# Patient Record
Sex: MALE | Race: WHITE | Employment: FULL TIME | URBAN - METROPOLITAN AREA
[De-identification: names, ages, dates, MRNs, and addresses within clinical notes are randomized per-mention and may not be internally consistent; named-entity substitution may affect disease eponyms.]

---

## 2019-09-23 ENCOUNTER — HOSPITAL ENCOUNTER (EMERGENCY)
Facility: CLINIC | Age: 54
Discharge: HOME | End: 2019-09-23
Attending: EMERGENCY MEDICINE | Admitting: EMERGENCY MEDICINE

## 2019-09-23 ENCOUNTER — APPOINTMENT (OUTPATIENT)
Dept: GENERAL RADIOLOGY | Facility: CLINIC | Age: 54
End: 2019-09-23
Attending: EMERGENCY MEDICINE

## 2019-09-23 VITALS
HEART RATE: 77 BPM | DIASTOLIC BLOOD PRESSURE: 99 MMHG | OXYGEN SATURATION: 96 % | SYSTOLIC BLOOD PRESSURE: 141 MMHG | TEMPERATURE: 98.1 F | RESPIRATION RATE: 19 BRPM | WEIGHT: 170.8 LBS

## 2019-09-23 DIAGNOSIS — S43.004A SHOULDER DISLOCATION, RIGHT, INITIAL ENCOUNTER: ICD-10-CM

## 2019-09-23 PROCEDURE — 99152 MOD SED SAME PHYS/QHP 5/>YRS: CPT

## 2019-09-23 PROCEDURE — 23650 CLTX SHO DSLC W/MNPJ WO ANES: CPT | Mod: RT

## 2019-09-23 PROCEDURE — 96360 HYDRATION IV INFUSION INIT: CPT

## 2019-09-23 PROCEDURE — 25000128 H RX IP 250 OP 636: Performed by: EMERGENCY MEDICINE

## 2019-09-23 PROCEDURE — 96361 HYDRATE IV INFUSION ADD-ON: CPT

## 2019-09-23 PROCEDURE — 40000965 ZZH STATISTIC END TITIAL CO2 MONITORING

## 2019-09-23 PROCEDURE — 99285 EMERGENCY DEPT VISIT HI MDM: CPT | Mod: 25

## 2019-09-23 PROCEDURE — 40000986 XR SHOULDER 2 VIEW RIGHT: Mod: RT

## 2019-09-23 PROCEDURE — 40000275 ZZH STATISTIC RCP TIME EA 10 MIN

## 2019-09-23 RX ORDER — CYCLOBENZAPRINE HCL 10 MG
10 TABLET ORAL 3 TIMES DAILY PRN
Qty: 20 TABLET | Refills: 0 | Status: SHIPPED | OUTPATIENT
Start: 2019-09-23 | End: 2019-09-29

## 2019-09-23 RX ORDER — NALOXONE HYDROCHLORIDE 0.4 MG/ML
.1-.4 INJECTION, SOLUTION INTRAMUSCULAR; INTRAVENOUS; SUBCUTANEOUS
Status: DISCONTINUED | OUTPATIENT
Start: 2019-09-23 | End: 2019-09-23 | Stop reason: HOSPADM

## 2019-09-23 RX ORDER — PROPOFOL 10 MG/ML
.5-1 INJECTION, EMULSION INTRAVENOUS ONCE
Status: COMPLETED | OUTPATIENT
Start: 2019-09-23 | End: 2019-09-23

## 2019-09-23 RX ORDER — PROPOFOL 10 MG/ML
0.1 INJECTION, EMULSION INTRAVENOUS
Status: DISCONTINUED | OUTPATIENT
Start: 2019-09-23 | End: 2019-09-23 | Stop reason: HOSPADM

## 2019-09-23 RX ORDER — FENTANYL CITRATE 50 UG/ML
100 INJECTION, SOLUTION INTRAMUSCULAR; INTRAVENOUS ONCE
Status: COMPLETED | OUTPATIENT
Start: 2019-09-23 | End: 2019-09-23

## 2019-09-23 RX ORDER — IBUPROFEN 600 MG/1
600 TABLET, FILM COATED ORAL EVERY 6 HOURS PRN
Qty: 60 TABLET | Refills: 0 | Status: SHIPPED | OUTPATIENT
Start: 2019-09-23 | End: 2019-10-23

## 2019-09-23 RX ADMIN — PROPOFOL 130 MG: 10 INJECTION, EMULSION INTRAVENOUS at 14:45

## 2019-09-23 RX ADMIN — FENTANYL CITRATE 100 MCG: 50 INJECTION INTRAMUSCULAR; INTRAVENOUS at 13:24

## 2019-09-23 RX ADMIN — PROPOFOL 70 MG: 10 INJECTION, EMULSION INTRAVENOUS at 14:31

## 2019-09-23 RX ADMIN — SODIUM CHLORIDE 1000 ML: 9 INJECTION, SOLUTION INTRAVENOUS at 13:30

## 2019-09-23 NOTE — ED TRIAGE NOTES
Pt states he tripped over dog last night and dislocated right shoulder, went to urgent care and had images done, sent here for possible sedation. ABC's intact, alert and oriented x3.CMS intact.

## 2019-09-23 NOTE — ED PROVIDER NOTES
History     Chief Complaint:  Shoulder Injury    HPI   Bruce Blount is a 53 year old male who is right hand dominant who presents with shoulder injury. The patient states that last night he tripped over his dog while and fell onto his right shoulder. The patient went to [facility] urgent care this morning where the dislocation was confirmed with Xray. The patient was sent to the ED for sedated dislocation reduction as he states his pain was a 10/10, so he wanted to be sedated. The patient denies prior dislocation. The patient last drank coffee at 0800. The patient states that he took Advil this morning around 0800 as well but has nothing else to eat or drink today. The patient denies any other injuries or falls.     Allergies:  No Known Drug Allergies    Medications:    Medications reviewed. No current medications.     Past Medical History:    Medical history reviewed. No pertinent medical history.    Past Surgical History:    Surgical history reviewed. No pertinent surgical history.    Family History:    Family history reviewed. No pertinent family history.     Social History:  The patient was accompanied to the ED by lakhwinder Munoz.    Review of Systems   Musculoskeletal:        Right shoulder pain   All other systems reviewed and are negative.    Physical Exam   Patient Vitals for the past 24 hrs:   BP Temp Temp src Pulse Heart Rate Resp SpO2 Weight   09/23/19 1542 -- -- -- -- -- -- 96 % --   09/23/19 1540 (!) 141/99 -- -- 77 -- -- -- --   09/23/19 1520 136/85 -- -- 77 -- -- -- --   09/23/19 1500 130/83 -- -- -- 79 19 96 % --   09/23/19 1450 131/74 -- -- -- 73 20 95 % --   09/23/19 1448 -- -- -- -- 85 19 99 % --   09/23/19 1445 131/74 -- -- -- 85 22 96 % --   09/23/19 1441 (!) 118/96 -- -- -- 92 18 97 % --   09/23/19 1440 -- -- -- -- -- 18 -- --   09/23/19 1435 (!) 130/116 -- -- -- 93 25 97 % --   09/23/19 1430 (!) 159/93 -- -- -- 84 22 98 % --   09/23/19 1425 (!) 147/94 -- -- -- 77 9 99 % --   09/23/19 1420 (!)  152/97 -- -- -- -- -- -- --   09/23/19 1415 (!) 144/93 -- -- -- 75 10 99 % --   09/23/19 1410 (!) 142/86 -- -- -- 96 16 96 % --   09/23/19 1405 -- -- -- -- 78 21 -- --   09/23/19 1355 -- -- -- -- -- -- 94 % --   09/23/19 1350 -- -- -- -- -- -- 94 % --   09/23/19 1345 (!) 153/101 -- -- 95 -- -- 95 % --   09/23/19 1340 -- -- -- -- -- -- 94 % --   09/23/19 1335 -- -- -- -- -- -- 92 % --   09/23/19 1330 (!) 140/88 -- -- 71 -- -- 92 % --   09/23/19 1325 (!) 140/88 -- -- -- -- -- -- --   09/23/19 1250 (!) 157/91 98.1  F (36.7  C) Temporal 81 81 16 96 % 77.5 kg (170 lb 12.8 oz)     Physical Exam  General: Alert, appears well-developed and well-nourished. Cooperative.     In mild distress  HEENT:  Head:  Atraumatic  Ears:  External ears are normal  Mouth/Throat:  Oropharynx is without erythema or exudate and mucous membranes are moist.   Eyes:   Conjunctivae normal and EOM are normal. No scleral icterus.  CV:  Normal rate, regular rhythm, normal heart sounds and radial pulses are 2+ and symmetric.  No murmur.  Resp:  Breath sounds are clear bilaterally    Non-labored, no retractions or accessory muscle use  GI:  Abdomen is soft, no distension, no tenderness. No rebound or guarding.  No CVA tenderness bilaterally  MS:  Normal range of motion. No edema.    Back atraumatic.    No midline cervical, thoracic, or lumbar tenderness    Right Shoulder:    The Clavicle is intact clinically    The AC joint is non-tender    The Glenohumeral Joint is dislocated    There is no evidence of rotator cuff muscle/tendon disruption    The proximal humerus is non-tender    The mid and distal shaft of the humerus are non-tender    Axillary nerve function is intact    Brachial and Radial Artery pulse is normal    Median, Ulnar, and Radial Nerve function distally are intact  Skin:  Warm and dry.  No rash or lesions noted.  Neuro:  Alert. Normal strength.  GCS: 15  Psych:  Normal mood and affect.    Emergency Department Course   Imaging:  Radiology  findings were communicated with the patient who voiced understanding of the findings.    XR Shoulder Right 2 Views:  Humeral head is now properly located in the glenoid fossa.  There is an old healed clavicle fracture. Moderate acromioclavicular  degenerative changes.  As read by Radiology.    [facility]    Procedure: Procedural Sedation  Date/Time: 9/23/2019 1:16 PM  Performed by: Kiko Alberto MD  Authorized by: Kiko Alberto MD     UNIVERSAL PROTOCOL   Site Marked: NA  Prior Images Obtained and Reviewed:  Yes  Required items: Required blood products, implants, devices and special equipment available    Patient identity confirmed:  Verbally with patient, arm band, provided demographic data and hospital-assigned identification number  Patient was reevaluated immediately before administering moderate or deep sedation or anesthesia  Confirmation Checklist:  Patient's identity using two indicators, relevant allergies, procedure was appropriate and matched the consent or emergent situation and correct equipment/implants were available  Time out: Immediately prior to the procedure a time out was called    Preparation: Patient was prepped and draped in usual sterile fashion    ESBL (mL):  0    SEDATION    Patient Sedated: Yes    Sedation Type:  Deep  Sedation:  Propofol  Vital signs: Vital signs monitored during sedation    PROCEDURE   Patient Tolerance:  Patient tolerated the procedure well with no immediate complications    Time of Sedation in Minutes by Physician:  10      Procedure: Shoulder Reduction due to Shoulder dislocation     SITE: Right shoulder     PROCEDURE PROVIDER: Dr. Kiko Alberto     CONSENT: Risks (including but not limited to: decreased respirations, oxygen perfusion, aspiration, hypotension), benefits, and alternatives were discussed with the patient and consent for procedure was obtained.     MONITORING: Monitoring consisted of heart rate, cardiac monitor, continuous pulse oximetry,  continuous capnometry, frequent blood pressure checks, level of consciousness, IV access, constant attendance by RN until patient recovered, constant attendance by MD until patient stable and intubation and emergency airway management equipment available.     REDUCTION COMMENTS: The patient's right shoulder was held in traction and abducted with internal and external rotation movements until the shoulder was palpated to be reduced.  There was no satisfying clunk during the reduction attempt. The patient's right shoulder then appeared reduced with improved alignment. Post reductions X-rays were obtained and showed good reduction.     PATIENT STATUS: Dislocation alignment improved post procedure and both sensation and circulation are intact. Vital signs remained stable, airway patent, and O2 saturations remained above 95%. Post-procedure, the patient was alert and responds to verbal stimuli. Patient was monitored during recovery and returned to pre-procedure baseline.      Interventions:  1330 NS 1000 mL IV  1324 Fentanyl 100 mcg IV  1431 Propofol 70 mg IV  1445 Propofol 130 mg IV    Emergency Department Course:  Nursing notes and vitals reviewed.    1308 I performed an exam of the patient as documented above.     1350 I reviewed the patient's images from urgent care on PACS.     1422 I preformed the sedated shoulder dislocation reduction as noted above.     The patient was sent for a XR Shoulder Right while in the emergency department, results above.     I returned to update the patient.     Findings and plan explained to the Patient. Patient discharged home with instructions regarding supportive care, medications, and reasons to return. The importance of close follow-up was reviewed. The patient was prescribed Ibuprofen.     Impression & Plan    Medical Decision Making:  Bruce Blount is a 53 year old male who presented with an acute right sided anterior, inferior glenohumeral joint dislocation, confirmed on x-ray  visualized in PACS (initial XR images from [facility], outside facility).  There is no evidence as above of neurovascular compromise.  The joint was reduced as outlined in procedure note. He was provided a shoulder immobilizer and after reduction and has good pain relief. Post-reduction x-ray shows no complication.  I discussed the natural history of shoulder dislocation, the possibility of underlying soft tissue injury, and precautions against recurrent dislocation. I have prescribed him oral pain medications and advised him to return for recurrent dislocation, worse pain, numbness, or any other concerns. I recommended orthopedic follow-up in 3-5 days.  Discharged home in care of niece.    Diagnosis:    ICD-10-CM   1. Shoulder dislocation, right, initial encounter S43.004A       Disposition:   The patient is discharged to home.    Discharge Medications:  New Prescriptions    IBUPROFEN (ADVIL/MOTRIN) 600 MG TABLET    Take 1 tablet (600 mg) by mouth every 6 hours as needed for moderate pain or fever       Scribe Disclosure:  IDanuta, am serving as a scribe at 1:06 PM on 9/23/2019 to document services personally performed by Kiko Alberto MD based on my observations and the provider's statements to me.     I, Michael Solano, am serving as a scribe at 4:50 PM on 9/23/2019 to document services personally performed by Kiko Alberto MD based on my observations and the provider's statements to me.   I assisted in finalizing this chart.  [facility] EMERGENCY DEPARTMENT       Kiko Alberto MD  09/24/19 [id number]

## 2019-09-23 NOTE — PROGRESS NOTES
An ETCO2 monitor was placed on the pt with 4 LPM bled in. The Ambu bag, suction, and airways were all setup and present in the room, but not needed. Pt was able to maintain airway throughout the procedure with no intervention needed. ETCO2 levels were maintained between 37-42.

## 2019-09-23 NOTE — ED AVS SNAPSHOT
[facility] Emergency Department  [address]  [facility] [address]  Phone:  [phone]  Fax:  [phone]                                    Bruce Blount   MRN: [id number]    Department:  [facility] Emergency Department   Date of Visit:  9/23/2019           After Visit Summary Signature Page    I have received my discharge instructions, and my questions have been answered. I have discussed any challenges I see with this plan with the nurse or doctor.    ..........................................................................................................................................  Patient/Patient Representative Signature      ..........................................................................................................................................  Patient Representative Print Name and Relationship to Patient    ..................................................               ................................................  Date                                   Time    ..........................................................................................................................................  Reviewed by Signature/Title    ...................................................              ..............................................  Date                                               Time          22EPIC Rev 08/18

## 2019-09-23 NOTE — DISCHARGE INSTRUCTIONS
Discharge Instructions  Procedural Sedation    During your visit today you had Procedural Sedation which is the process used to give you medications in order to make you comfortable or relaxed while a painful or difficult procedure is performed. This might have been a wound repair, fracture reduction ( setting a broken bone ), relocation of a dislocated joint, or other procedure.    The medications used for Procedural Sedation are generally very short-acting so you are being discharged at a time when it is most likely that the medications have completely left your body. It is possible that the medication could cause some persistent symptoms like nausea, drowsiness, dizziness, clumsiness/poor balance, or poor judgment. As a result, please do not drive, operate machinery/tools, or do anything else that requires judgment or coordination for the rest of the day. This could include climbing ladders or other heights, swimming/bathing, exercising, bicycling, or other similar activities. If you are feeling back to normal, you may resume normal activities the following day.    Generally, every Emergency Department visit should have a follow-up clinic visit with either a primary or a specialty clinic/provider. Please follow-up as instructed by your emergency provider today.  Return to the Emergency Department right away if:  You have difficulty breathing.  Your friends or family feel that you are too sleepy/sedated.  You have repeated vomiting.    Home care:  Do not drink alcohol or take sedating (sleeping) medications.  Take pain medications only as instructed by your provider.  Begin with a light diet (like clear liquids) and add more foods as your stomach tolerates.  If you have vomiting, return to clear liquids.    Follow-up:  Follow-up was recommended based on the condition that you received Procedural Sedation for; follow-up according to the instructions you received from your provider today.      If you were given a  prescription for medicine here today, be sure to read all of the information (including the package insert) that comes with your prescription. This will include important information about the medicine, its side effects, and any warnings that you need to know about. The pharmacist who fills the prescription can provide more information and answer questions you may have about the medicine.  If you have questions or concerns that the pharmacist cannot address, please call or return to the Emergency Department.     Remember that you can always come back to the Emergency Department if you are not able to see your regular provider in the amount of time listed above, if you get any new symptoms, or if there is anything that worries you.

## 2021-12-06 ENCOUNTER — APPOINTMENT (OUTPATIENT)
Dept: CT IMAGING | Facility: CLINIC | Age: 56
End: 2021-12-06
Attending: NURSE PRACTITIONER
Payer: COMMERCIAL

## 2021-12-06 ENCOUNTER — HOSPITAL ENCOUNTER (INPATIENT)
Facility: CLINIC | Age: 56
LOS: 28 days | Discharge: HOME | End: 2022-01-03
Attending: INTERNAL MEDICINE | Admitting: INTERNAL MEDICINE
Payer: COMMERCIAL

## 2021-12-06 ENCOUNTER — APPOINTMENT (OUTPATIENT)
Dept: ULTRASOUND IMAGING | Facility: CLINIC | Age: 56
End: 2021-12-06
Attending: NURSE PRACTITIONER
Payer: COMMERCIAL

## 2021-12-06 ENCOUNTER — APPOINTMENT (OUTPATIENT)
Dept: GENERAL RADIOLOGY | Facility: CLINIC | Age: 56
End: 2021-12-06
Attending: NURSE PRACTITIONER
Payer: COMMERCIAL

## 2021-12-06 ENCOUNTER — APPOINTMENT (OUTPATIENT)
Dept: CARDIOLOGY | Facility: CLINIC | Age: 56
End: 2021-12-06
Attending: NURSE PRACTITIONER
Payer: COMMERCIAL

## 2021-12-06 DIAGNOSIS — F10.10 ETOH ABUSE: Chronic | ICD-10-CM

## 2021-12-06 DIAGNOSIS — I96 GANGRENE OF LEFT FOOT (H): ICD-10-CM

## 2021-12-06 DIAGNOSIS — I25.5 ISCHEMIC CARDIOMYOPATHY: ICD-10-CM

## 2021-12-06 DIAGNOSIS — S22.43XA CLOSED FRACTURE OF MULTIPLE RIBS OF BOTH SIDES, INITIAL ENCOUNTER: ICD-10-CM

## 2021-12-06 DIAGNOSIS — I21.21 ST ELEVATION MYOCARDIAL INFARCTION INVOLVING LEFT CIRCUMFLEX CORONARY ARTERY (H): ICD-10-CM

## 2021-12-06 DIAGNOSIS — Z20.822 LAB TEST NEGATIVE FOR COVID-19 VIRUS: ICD-10-CM

## 2021-12-06 DIAGNOSIS — Z12.5 SCREENING FOR PROSTATE CANCER: ICD-10-CM

## 2021-12-06 DIAGNOSIS — I46.9 CARDIAC ARREST (H): Primary | ICD-10-CM

## 2021-12-06 DIAGNOSIS — Z72.0 TOBACCO USE: Chronic | ICD-10-CM

## 2021-12-06 LAB
ABO/RH(D): NORMAL
ACT BLD: 160 SECONDS (ref 74–150)
ACT BLD: 160 SECONDS (ref 74–150)
ACT BLD: 177 SECONDS (ref 74–150)
ACT BLD: 194 SECONDS (ref 74–150)
ACT BLD: 224 SECONDS (ref 74–150)
ACT BLD: 237 SECONDS (ref 74–150)
ACT BLD: 254 SECONDS (ref 74–150)
ACT BLD: 279 SECONDS (ref 74–150)
ACT BLD: 288 SECONDS (ref 74–150)
ACT BLD: 292 SECONDS (ref 74–150)
ACT BLD: 305 SECONDS (ref 74–150)
ACT BLD: 322 SECONDS (ref 74–150)
ALBUMIN SERPL-MCNC: 2.1 G/DL (ref 3.4–5)
ALBUMIN SERPL-MCNC: 2.2 G/DL (ref 3.4–5)
ALBUMIN SERPL-MCNC: 2.3 G/DL (ref 3.4–5)
ALBUMIN UR-MCNC: 50 MG/DL
ALP SERPL-CCNC: 42 U/L (ref 40–150)
ALP SERPL-CCNC: 45 U/L (ref 40–150)
ALP SERPL-CCNC: 58 U/L (ref 40–150)
ALT SERPL W P-5'-P-CCNC: 103 U/L (ref 0–70)
ALT SERPL W P-5'-P-CCNC: 104 U/L (ref 0–70)
ALT SERPL W P-5'-P-CCNC: 106 U/L (ref 0–70)
AMPHETAMINES UR QL SCN: ABNORMAL
ANION GAP SERPL CALCULATED.3IONS-SCNC: 12 MMOL/L (ref 3–14)
ANION GAP SERPL CALCULATED.3IONS-SCNC: 8 MMOL/L (ref 3–14)
ANION GAP SERPL CALCULATED.3IONS-SCNC: 8 MMOL/L (ref 3–14)
ANTIBODY SCREEN: NEGATIVE
APPEARANCE UR: ABNORMAL
APTT PPP: 100 SECONDS (ref 22–38)
APTT PPP: 125 SECONDS (ref 22–38)
APTT PPP: >240 SECONDS (ref 22–38)
AST SERPL W P-5'-P-CCNC: 378 U/L (ref 0–45)
AST SERPL W P-5'-P-CCNC: 403 U/L (ref 0–45)
AST SERPL W P-5'-P-CCNC: 409 U/L (ref 0–45)
ATRIAL RATE - MUSE: 88 BPM
BARBITURATES UR QL: ABNORMAL
BASE EXCESS BLDA CALC-SCNC: -16.8 MMOL/L (ref -9.6–2)
BASE EXCESS BLDA CALC-SCNC: -18.8 MMOL/L (ref -9.6–2)
BASE EXCESS BLDA CALC-SCNC: -5.9 MMOL/L (ref -9–1.8)
BASE EXCESS BLDA CALC-SCNC: -7 MMOL/L (ref -9–1.8)
BASE EXCESS BLDA CALC-SCNC: -8.3 MMOL/L (ref -9–1.8)
BASE EXCESS BLDA CALC-SCNC: -8.3 MMOL/L (ref -9–1.8)
BASE EXCESS BLDA CALC-SCNC: -8.4 MMOL/L (ref -9.6–2)
BASE EXCESS BLDA CALC-SCNC: -8.4 MMOL/L (ref -9–1.8)
BASE EXCESS BLDA CALC-SCNC: -8.5 MMOL/L (ref -9.6–2)
BASE EXCESS BLDA CALC-SCNC: -8.9 MMOL/L (ref -9–1.8)
BASE EXCESS BLDA CALC-SCNC: -9.1 MMOL/L (ref -9.6–2)
BASE EXCESS BLDV CALC-SCNC: -26.7 MMOL/L (ref -8.1–1.9)
BASE EXCESS BLDV CALC-SCNC: -7 MMOL/L (ref -7.7–1.9)
BASOPHILS # BLD AUTO: 0 10E3/UL (ref 0–0.2)
BASOPHILS NFR BLD AUTO: 0 %
BENZODIAZ UR QL: ABNORMAL
BILIRUB SERPL-MCNC: 0.3 MG/DL (ref 0.2–1.3)
BILIRUB SERPL-MCNC: 0.4 MG/DL (ref 0.2–1.3)
BILIRUB SERPL-MCNC: 0.5 MG/DL (ref 0.2–1.3)
BILIRUB UR QL STRIP: NEGATIVE
BLD PROD TYP BPU: NORMAL
BLOOD COMPONENT TYPE: NORMAL
BUN SERPL-MCNC: 23 MG/DL (ref 7–30)
BUN SERPL-MCNC: 24 MG/DL (ref 7–30)
BUN SERPL-MCNC: 25 MG/DL (ref 7–30)
CA-I BLD-MCNC: 3.6 MG/DL (ref 4.4–5.2)
CA-I BLD-MCNC: 3.7 MG/DL (ref 4.4–5.2)
CA-I BLD-MCNC: 3.7 MG/DL (ref 4.4–5.2)
CA-I BLD-MCNC: 3.8 MG/DL (ref 4.4–5.2)
CA-I BLD-MCNC: 4 MG/DL (ref 4.4–5.2)
CA-I BLD-MCNC: 4 MG/DL (ref 4.4–5.2)
CA-I BLD-MCNC: 4.4 MG/DL (ref 4.4–5.2)
CA-I BLD-MCNC: 4.5 MG/DL (ref 4.4–5.2)
CA-I BLD-MCNC: 4.7 MG/DL (ref 4.4–5.2)
CA-I BLD-MCNC: <1.6 MG/DL (ref 4.4–5.2)
CALCIUM SERPL-MCNC: 6.3 MG/DL (ref 8.5–10.1)
CALCIUM SERPL-MCNC: 7 MG/DL (ref 8.5–10.1)
CALCIUM SERPL-MCNC: 7.2 MG/DL (ref 8.5–10.1)
CANNABINOIDS UR QL SCN: ABNORMAL
CF REDUC 30M P MA P HEP LENFR BLD TEG: 2.5 % (ref 0–8)
CF REDUC 60M P MA P HEPASE LENFR BLD TEG: 4.2 % (ref 0–15)
CFT P HPASE BLD TEG: 2.6 MINUTE (ref 1–3)
CHLORIDE BLD-SCNC: 108 MMOL/L (ref 94–109)
CHLORIDE BLD-SCNC: 117 MMOL/L (ref 94–109)
CHLORIDE BLD-SCNC: 117 MMOL/L (ref 94–109)
CI (COAGULATION INDEX)(Z): -6.1 (ref -3–3)
CLOT ANGLE P HPASE BLD TEG: 34.7 DEGREES (ref 53–72)
CLOT INIT P HPASE BLD TEG: 9.5 MINUTE (ref 5–10)
CLOT STRENGTH P HPASE BLD TEG: 5.2 KD/SC (ref 4.5–11)
CO2 SERPL-SCNC: 18 MMOL/L (ref 20–32)
CO2 SERPL-SCNC: 18 MMOL/L (ref 20–32)
CO2 SERPL-SCNC: 19 MMOL/L (ref 20–32)
COCAINE UR QL: ABNORMAL
CODING SYSTEM: NORMAL
COLOR UR AUTO: ABNORMAL
CORTIS SERPL-MCNC: 14.7 UG/DL (ref 4–22)
CPB POCT: NO
CREAT BLD-MCNC: 1.1 MG/DL (ref 0.7–1.3)
CREAT SERPL-MCNC: 0.87 MG/DL (ref 0.66–1.25)
CREAT SERPL-MCNC: 0.97 MG/DL (ref 0.66–1.25)
CREAT SERPL-MCNC: 1.02 MG/DL (ref 0.66–1.25)
CROSSMATCH: NORMAL
CRP SERPL-MCNC: <2.9 MG/L (ref 0–8)
D DIMER PPP FEU-MCNC: >20 UG/ML FEU (ref 0–0.5)
D DIMER PPP FEU-MCNC: >20 UG/ML FEU (ref 0–0.5)
DIASTOLIC BLOOD PRESSURE - MUSE: NORMAL MMHG
EOSINOPHIL # BLD AUTO: 0 10E3/UL (ref 0–0.7)
EOSINOPHIL NFR BLD AUTO: 0 %
ERYTHROCYTE [DISTWIDTH] IN BLOOD BY AUTOMATED COUNT: 12.1 % (ref 10–15)
ERYTHROCYTE [DISTWIDTH] IN BLOOD BY AUTOMATED COUNT: 12.2 % (ref 10–15)
ERYTHROCYTE [DISTWIDTH] IN BLOOD BY AUTOMATED COUNT: 12.3 % (ref 10–15)
ERYTHROCYTE [SEDIMENTATION RATE] IN BLOOD BY WESTERGREN METHOD: 6 MM/HR (ref 0–20)
ETHANOL UR QL SCN: ABNORMAL
FIBRINOGEN PPP-MCNC: 200 MG/DL (ref 170–490)
GFR SERPL CREATININE-BSD FRML MDRD: 82 ML/MIN/1.73M2
GFR SERPL CREATININE-BSD FRML MDRD: 87 ML/MIN/1.73M2
GFR SERPL CREATININE-BSD FRML MDRD: >60 ML/MIN/1.73M2
GFR SERPL CREATININE-BSD FRML MDRD: >90 ML/MIN/1.73M2
GLUCOSE BLD-MCNC: 107 MG/DL (ref 70–99)
GLUCOSE BLD-MCNC: 110 MG/DL (ref 70–99)
GLUCOSE BLD-MCNC: 114 MG/DL (ref 70–99)
GLUCOSE BLD-MCNC: 124 MG/DL (ref 70–99)
GLUCOSE BLD-MCNC: 125 MG/DL (ref 70–99)
GLUCOSE BLD-MCNC: 292 MG/DL (ref 70–99)
GLUCOSE BLD-MCNC: 305 MG/DL (ref 70–99)
GLUCOSE BLD-MCNC: 339 MG/DL (ref 70–99)
GLUCOSE BLD-MCNC: 349 MG/DL (ref 70–99)
GLUCOSE BLD-MCNC: 380 MG/DL (ref 70–99)
GLUCOSE BLD-MCNC: 406 MG/DL (ref 70–99)
GLUCOSE BLD-MCNC: 407 MG/DL (ref 70–99)
GLUCOSE BLD-MCNC: 438 MG/DL (ref 70–99)
GLUCOSE BLDC GLUCOMTR-MCNC: 106 MG/DL (ref 70–99)
GLUCOSE BLDC GLUCOMTR-MCNC: 116 MG/DL (ref 70–99)
GLUCOSE BLDC GLUCOMTR-MCNC: 233 MG/DL (ref 70–99)
GLUCOSE BLDC GLUCOMTR-MCNC: 271 MG/DL (ref 70–99)
GLUCOSE BLDC GLUCOMTR-MCNC: 83 MG/DL (ref 70–99)
GLUCOSE BLDC GLUCOMTR-MCNC: 91 MG/DL (ref 70–99)
GLUCOSE UR STRIP-MCNC: 500 MG/DL
HBA1C MFR BLD: 5.6 % (ref 0–5.6)
HCO3 BLD-SCNC: 18 MMOL/L (ref 21–28)
HCO3 BLD-SCNC: 19 MMOL/L (ref 21–28)
HCO3 BLD-SCNC: 19 MMOL/L (ref 21–28)
HCO3 BLD-SCNC: 20 MMOL/L (ref 21–28)
HCO3 BLD-SCNC: 20 MMOL/L (ref 21–28)
HCO3 BLDA-SCNC: 10 MMOL/L (ref 21–28)
HCO3 BLDA-SCNC: 13 MMOL/L (ref 21–28)
HCO3 BLDA-SCNC: 17 MMOL/L (ref 21–28)
HCO3 BLDA-SCNC: 17 MMOL/L (ref 21–28)
HCO3 BLDA-SCNC: 19 MMOL/L (ref 21–28)
HCO3 BLDA-SCNC: 20 MMOL/L (ref 21–28)
HCO3 BLDV-SCNC: 17 MMOL/L (ref 21–28)
HCO3 BLDV-SCNC: 22 MMOL/L (ref 21–28)
HCO3 BLDV-SCNC: 4 MMOL/L (ref 21–28)
HCT VFR BLD AUTO: 38.3 % (ref 40–53)
HCT VFR BLD AUTO: 39 % (ref 40–53)
HCT VFR BLD AUTO: 40.7 % (ref 40–53)
HCT VFR BLD CALC: 41 % (ref 40–53)
HGB BLD-MCNC: 12.7 G/DL (ref 13.3–17.7)
HGB BLD-MCNC: 12.7 G/DL (ref 13.3–17.7)
HGB BLD-MCNC: 13 G/DL (ref 13.3–17.7)
HGB BLD-MCNC: 13 G/DL (ref 13.3–17.7)
HGB BLD-MCNC: 13.1 G/DL (ref 13.3–17.7)
HGB BLD-MCNC: 13.4 G/DL (ref 13.3–17.7)
HGB BLD-MCNC: 13.9 G/DL (ref 13.3–17.7)
HGB BLD-MCNC: 13.9 G/DL (ref 13.3–17.7)
HGB BLD-MCNC: 14.1 G/DL (ref 13.3–17.7)
HGB BLD-MCNC: 14.2 G/DL (ref 13.3–17.7)
HGB BLD-MCNC: 4.1 G/DL (ref 13.3–17.7)
HGB FREE PLAS-MCNC: 70 MG/DL
HGB UR QL STRIP: ABNORMAL
HOLD SPECIMEN: NORMAL
IMM GRANULOCYTES # BLD: 0.2 10E3/UL
IMM GRANULOCYTES NFR BLD: 1 %
INR BLD: 2.1 (ref 2–3)
INR PPP: 1.41 (ref 0.85–1.15)
INR PPP: 1.44 (ref 0.85–1.15)
INR PPP: 1.74 (ref 0.85–1.15)
INTERPRETATION ECG - MUSE: NORMAL
KETONES UR STRIP-MCNC: NEGATIVE MG/DL
LACTATE BLD-SCNC: 4.2 MMOL/L
LACTATE BLD-SCNC: 5.8 MMOL/L
LACTATE BLD-SCNC: 7.9 MMOL/L
LACTATE BLD-SCNC: 8 MMOL/L
LACTATE BLD-SCNC: 8.7 MMOL/L
LACTATE SERPL-SCNC: 2 MMOL/L (ref 0.7–2)
LACTATE SERPL-SCNC: 2.8 MMOL/L (ref 0.7–2)
LACTATE SERPL-SCNC: 4 MMOL/L (ref 0.7–2)
LACTATE SERPL-SCNC: 4.1 MMOL/L (ref 0.7–2)
LDH SERPL L TO P-CCNC: 786 U/L (ref 85–227)
LEUKOCYTE ESTERASE UR QL STRIP: NEGATIVE
LVEF ECHO: NORMAL
LYMPHOCYTES # BLD AUTO: 1.4 10E3/UL (ref 0.8–5.3)
LYMPHOCYTES NFR BLD AUTO: 9 %
MAGNESIUM SERPL-MCNC: 1.9 MG/DL (ref 1.6–2.3)
MAGNESIUM SERPL-MCNC: 2 MG/DL (ref 1.6–2.3)
MAGNESIUM SERPL-MCNC: 2.6 MG/DL (ref 1.6–2.3)
MCF P HPASE BLD TEG: 51 MM (ref 50–70)
MCH RBC QN AUTO: 31.6 PG (ref 26.5–33)
MCH RBC QN AUTO: 31.8 PG (ref 26.5–33)
MCH RBC QN AUTO: 31.9 PG (ref 26.5–33)
MCHC RBC AUTO-ENTMCNC: 32.9 G/DL (ref 31.5–36.5)
MCHC RBC AUTO-ENTMCNC: 33.2 G/DL (ref 31.5–36.5)
MCHC RBC AUTO-ENTMCNC: 33.3 G/DL (ref 31.5–36.5)
MCV RBC AUTO: 95 FL (ref 78–100)
MCV RBC AUTO: 96 FL (ref 78–100)
MCV RBC AUTO: 97 FL (ref 78–100)
MONOCYTES # BLD AUTO: 0.4 10E3/UL (ref 0–1.3)
MONOCYTES NFR BLD AUTO: 2 %
MRSA DNA SPEC QL NAA+PROBE: NEGATIVE
MUCOUS THREADS #/AREA URNS LPF: PRESENT /LPF
NEUTROPHILS # BLD AUTO: 13.5 10E3/UL (ref 1.6–8.3)
NEUTROPHILS NFR BLD AUTO: 88 %
NITRATE UR QL: NEGATIVE
NRBC # BLD AUTO: 0 10E3/UL
NRBC BLD AUTO-RTO: 0 /100
O2/TOTAL GAS SETTING VFR VENT: 100 %
O2/TOTAL GAS SETTING VFR VENT: 60 %
OPIATES UR QL SCN: ABNORMAL
OXYHGB MFR BLD: 88 % (ref 92–100)
OXYHGB MFR BLD: 95 % (ref 92–100)
OXYHGB MFR BLD: 96 % (ref 92–100)
OXYHGB MFR BLD: 96 % (ref 92–100)
OXYHGB MFR BLD: 98 % (ref 92–100)
OXYHGB MFR BLDA: 95 % (ref 92–100)
OXYHGB MFR BLDA: 96 % (ref 75–100)
OXYHGB MFR BLDA: 96 % (ref 92–100)
OXYHGB MFR BLDV: 54 %
OXYHGB MFR BLDV: 57 % (ref 92–100)
P AXIS - MUSE: 64 DEGREES
PCO2 BLD: 37 MM HG (ref 35–45)
PCO2 BLD: 39 MM HG (ref 35–45)
PCO2 BLD: 40 MM HG (ref 35–45)
PCO2 BLD: 45 MM HG (ref 35–45)
PCO2 BLD: 50 MM HG (ref 35–45)
PCO2 BLDA: 36 MM HG (ref 35–45)
PCO2 BLDA: 37 MM HG (ref 35–45)
PCO2 BLDA: 37 MM HG (ref 35–45)
PCO2 BLDA: 42 MM HG (ref 35–45)
PCO2 BLDA: 43 MM HG (ref 35–45)
PCO2 BLDA: 49 MM HG (ref 35–45)
PCO2 BLDV: 23 MM HG (ref 40–50)
PCO2 BLDV: 61 MM HG (ref 40–50)
PCO2 BLDV: 88 MM HG (ref 40–50)
PH BLD: 7.2 [PH] (ref 7.35–7.45)
PH BLD: 7.24 [PH] (ref 7.35–7.45)
PH BLD: 7.27 [PH] (ref 7.35–7.45)
PH BLD: 7.31 [PH] (ref 7.35–7.45)
PH BLD: 7.31 [PH] (ref 7.35–7.45)
PH BLDA: 7.07 [PH] (ref 7.35–7.45)
PH BLDA: 7.08 [PH] (ref 7.35–7.45)
PH BLDA: 7.21 [PH] (ref 7.35–7.45)
PH BLDA: 7.25 [PH] (ref 7.35–7.45)
PH BLDA: 7.27 [PH] (ref 7.35–7.45)
PH BLDA: 7.29 [PH] (ref 7.35–7.45)
PH BLDV: 6.85 [PH] (ref 7.32–7.43)
PH BLDV: 6.89 [PH] (ref 7.32–7.43)
PH BLDV: 7.17 [PH] (ref 7.32–7.43)
PH UR STRIP: 5.5 [PH] (ref 5–7)
PLATELET # BLD AUTO: 250 10E3/UL (ref 150–450)
PLATELET # BLD AUTO: 297 10E3/UL (ref 150–450)
PLATELET # BLD AUTO: 306 10E3/UL (ref 150–450)
PO2 BLD: 114 MM HG (ref 80–105)
PO2 BLD: 117 MM HG (ref 80–105)
PO2 BLD: 181 MM HG (ref 80–105)
PO2 BLD: 59 MM HG (ref 80–105)
PO2 BLD: 97 MM HG (ref 80–105)
PO2 BLDA: 155 MM HG (ref 80–105)
PO2 BLDA: 163 MM HG (ref 80–105)
PO2 BLDA: 184 MM HG (ref 80–105)
PO2 BLDA: 243 MM HG (ref 80–105)
PO2 BLDA: 352 MM HG (ref 80–105)
PO2 BLDA: 386 MM HG (ref 80–105)
PO2 BLDV: 35 MM HG (ref 25–47)
PO2 BLDV: 50 MM HG (ref 25–47)
PO2 BLDV: 66 MM HG (ref 25–47)
POTASSIUM BLD-SCNC: 1.1 MMOL/L (ref 3.5–5)
POTASSIUM BLD-SCNC: 3.1 MMOL/L (ref 3.4–5.3)
POTASSIUM BLD-SCNC: 3.6 MMOL/L (ref 3.4–5.3)
POTASSIUM BLD-SCNC: 3.9 MMOL/L (ref 3.4–5.3)
POTASSIUM BLD-SCNC: 4.6 MMOL/L (ref 3.4–5.3)
POTASSIUM BLD-SCNC: 4.7 MMOL/L (ref 3.5–5)
POTASSIUM BLD-SCNC: 5.4 MMOL/L (ref 3.5–5)
POTASSIUM BLD-SCNC: 5.5 MMOL/L (ref 3.5–5)
POTASSIUM BLD-SCNC: 5.7 MMOL/L (ref 3.5–5)
POTASSIUM BLD-SCNC: 6 MMOL/L (ref 3.5–5)
PR INTERVAL - MUSE: 192 MS
PROCALCITONIN SERPL-MCNC: 0.24 NG/ML
PROT SERPL-MCNC: 5 G/DL (ref 6.8–8.8)
PROT SERPL-MCNC: 5.1 G/DL (ref 6.8–8.8)
PROT SERPL-MCNC: 5.4 G/DL (ref 6.8–8.8)
QRS DURATION - MUSE: 110 MS
QT - MUSE: 342 MS
QTC - MUSE: 413 MS
R AXIS - MUSE: 82 DEGREES
RBC # BLD AUTO: 3.98 10E6/UL (ref 4.4–5.9)
RBC # BLD AUTO: 4.11 10E6/UL (ref 4.4–5.9)
RBC # BLD AUTO: 4.21 10E6/UL (ref 4.4–5.9)
RBC URINE: 36 /HPF
SA TARGET DNA: POSITIVE
SAO2 % BLDV: 73 % (ref 94–100)
SARS-COV-2 RNA RESP QL NAA+PROBE: NEGATIVE
SODIUM BLD-SCNC: 133 MMOL/L (ref 133–144)
SODIUM BLD-SCNC: 134 MMOL/L (ref 133–144)
SODIUM BLD-SCNC: 135 MMOL/L (ref 133–144)
SODIUM BLD-SCNC: 135 MMOL/L (ref 133–144)
SODIUM BLD-SCNC: 136 MMOL/L (ref 133–144)
SODIUM BLD-SCNC: 137 MMOL/L (ref 133–144)
SODIUM BLD-SCNC: 149 MMOL/L (ref 133–144)
SODIUM SERPL-SCNC: 138 MMOL/L (ref 133–144)
SODIUM SERPL-SCNC: 143 MMOL/L (ref 133–144)
SODIUM SERPL-SCNC: 144 MMOL/L (ref 133–144)
SP GR UR STRIP: 1.01 (ref 1–1.03)
SPECIMEN EXPIRATION DATE: NORMAL
SYSTOLIC BLOOD PRESSURE - MUSE: NORMAL MMHG
T AXIS - MUSE: 118 DEGREES
TROPONIN I SERPL HS-MCNC: ABNORMAL NG/L
TROPONIN T BLD-MCNC: 1.32 UG/L
UFH PPP CHRO-ACNC: 0.31 IU/ML
UFH PPP CHRO-ACNC: 0.53 IU/ML
UFH PPP CHRO-ACNC: >1.1 IU/ML
UNIT ABO/RH: NORMAL
UNIT NUMBER: NORMAL
UNIT STATUS: NORMAL
UNIT TYPE ISBT: 5100
UROBILINOGEN UR STRIP-MCNC: NORMAL MG/DL
VENTRICULAR RATE- MUSE: 88 BPM
WBC # BLD AUTO: 14.7 10E3/UL (ref 4–11)
WBC # BLD AUTO: 15.4 10E3/UL (ref 4–11)
WBC # BLD AUTO: 6.5 10E3/UL (ref 4–11)
WBC URINE: 0 /HPF

## 2021-12-06 PROCEDURE — 80354 DRUG SCREENING FENTANYL: CPT | Performed by: NURSE PRACTITIONER

## 2021-12-06 PROCEDURE — 80307 DRUG TEST PRSMV CHEM ANLYZR: CPT | Performed by: NURSE PRACTITIONER

## 2021-12-06 PROCEDURE — 250N000009 HC RX 250: Performed by: NURSE PRACTITIONER

## 2021-12-06 PROCEDURE — 258N000003 HC RX IP 258 OP 636: Performed by: NURSE PRACTITIONER

## 2021-12-06 PROCEDURE — 31500 INSERT EMERGENCY AIRWAY: CPT | Performed by: EMERGENCY MEDICINE

## 2021-12-06 PROCEDURE — 93306 TTE W/DOPPLER COMPLETE: CPT

## 2021-12-06 PROCEDURE — 93010 ELECTROCARDIOGRAM REPORT: CPT | Mod: 59 | Performed by: INTERNAL MEDICINE

## 2021-12-06 PROCEDURE — 93005 ELECTROCARDIOGRAM TRACING: CPT

## 2021-12-06 PROCEDURE — 74177 CT ABD & PELVIS W/CONTRAST: CPT | Mod: 26 | Performed by: STUDENT IN AN ORGANIZED HEALTH CARE EDUCATION/TRAINING PROGRAM

## 2021-12-06 PROCEDURE — C9803 HOPD COVID-19 SPEC COLLECT: HCPCS | Performed by: EMERGENCY MEDICINE

## 2021-12-06 PROCEDURE — 33967 INSERT I-AORT PERCUT DEVICE: CPT | Performed by: INTERNAL MEDICINE

## 2021-12-06 PROCEDURE — 250N000011 HC RX IP 250 OP 636: Performed by: INTERNAL MEDICINE

## 2021-12-06 PROCEDURE — 85520 HEPARIN ASSAY: CPT | Performed by: NURSE PRACTITIONER

## 2021-12-06 PROCEDURE — 71045 X-RAY EXAM CHEST 1 VIEW: CPT | Mod: 26 | Performed by: RADIOLOGY

## 2021-12-06 PROCEDURE — 250N000009 HC RX 250: Performed by: INTERNAL MEDICINE

## 2021-12-06 PROCEDURE — 85379 FIBRIN DEGRADATION QUANT: CPT | Performed by: NURSE PRACTITIONER

## 2021-12-06 PROCEDURE — 87641 MR-STAPH DNA AMP PROBE: CPT | Performed by: NURSE PRACTITIONER

## 2021-12-06 PROCEDURE — 36556 INSERT NON-TUNNEL CV CATH: CPT | Performed by: INTERNAL MEDICINE

## 2021-12-06 PROCEDURE — C1874 STENT, COATED/COV W/DEL SYS: HCPCS | Performed by: INTERNAL MEDICINE

## 2021-12-06 PROCEDURE — 85610 PROTHROMBIN TIME: CPT

## 2021-12-06 PROCEDURE — 99152 MOD SED SAME PHYS/QHP 5/>YRS: CPT | Performed by: INTERNAL MEDICINE

## 2021-12-06 PROCEDURE — 82947 ASSAY GLUCOSE BLOOD QUANT: CPT | Performed by: NURSE PRACTITIONER

## 2021-12-06 PROCEDURE — 99152 MOD SED SAME PHYS/QHP 5/>YRS: CPT | Mod: GC | Performed by: INTERNAL MEDICINE

## 2021-12-06 PROCEDURE — 87205 SMEAR GRAM STAIN: CPT | Performed by: NURSE PRACTITIONER

## 2021-12-06 PROCEDURE — C9113 INJ PANTOPRAZOLE SODIUM, VIA: HCPCS | Performed by: NURSE PRACTITIONER

## 2021-12-06 PROCEDURE — 83036 HEMOGLOBIN GLYCOSYLATED A1C: CPT | Performed by: NURSE PRACTITIONER

## 2021-12-06 PROCEDURE — 84484 ASSAY OF TROPONIN QUANT: CPT

## 2021-12-06 PROCEDURE — 999N000157 HC STATISTIC RCP TIME EA 10 MIN

## 2021-12-06 PROCEDURE — 84484 ASSAY OF TROPONIN QUANT: CPT | Performed by: NURSE PRACTITIONER

## 2021-12-06 PROCEDURE — 250N000011 HC RX IP 250 OP 636: Performed by: NURSE PRACTITIONER

## 2021-12-06 PROCEDURE — P9047 ALBUMIN (HUMAN), 25%, 50ML: HCPCS

## 2021-12-06 PROCEDURE — 82805 BLOOD GASES W/O2 SATURATION: CPT | Performed by: NURSE PRACTITIONER

## 2021-12-06 PROCEDURE — 83605 ASSAY OF LACTIC ACID: CPT | Performed by: NURSE PRACTITIONER

## 2021-12-06 PROCEDURE — 83735 ASSAY OF MAGNESIUM: CPT | Performed by: NURSE PRACTITIONER

## 2021-12-06 PROCEDURE — 99222 1ST HOSP IP/OBS MODERATE 55: CPT | Mod: GC | Performed by: PSYCHIATRY & NEUROLOGY

## 2021-12-06 PROCEDURE — 272N000555 HC SENSOR NIRS OXIMETER, ADULT

## 2021-12-06 PROCEDURE — 5A02210 ASSISTANCE WITH CARDIAC OUTPUT USING BALLOON PUMP, CONTINUOUS: ICD-10-PCS | Performed by: INTERNAL MEDICINE

## 2021-12-06 PROCEDURE — 83051 HEMOGLOBIN PLASMA: CPT | Performed by: NURSE PRACTITIONER

## 2021-12-06 PROCEDURE — 82565 ASSAY OF CREATININE: CPT

## 2021-12-06 PROCEDURE — 86316 IMMUNOASSAY TUMOR OTHER: CPT | Performed by: NURSE PRACTITIONER

## 2021-12-06 PROCEDURE — 86923 COMPATIBILITY TEST ELECTRIC: CPT | Performed by: INTERNAL MEDICINE

## 2021-12-06 PROCEDURE — 410N000003 HC PER-PERFUSION 1ST 30 MIN: Performed by: INTERNAL MEDICINE

## 2021-12-06 PROCEDURE — 82803 BLOOD GASES ANY COMBINATION: CPT | Performed by: NURSE PRACTITIONER

## 2021-12-06 PROCEDURE — 258N000003 HC RX IP 258 OP 636: Performed by: INTERNAL MEDICINE

## 2021-12-06 PROCEDURE — C1751 CATH, INF, PER/CENT/MIDLINE: HCPCS | Performed by: INTERNAL MEDICINE

## 2021-12-06 PROCEDURE — 93925 LOWER EXTREMITY STUDY: CPT | Mod: 26 | Performed by: RADIOLOGY

## 2021-12-06 PROCEDURE — 250N000011 HC RX IP 250 OP 636: Performed by: EMERGENCY MEDICINE

## 2021-12-06 PROCEDURE — 93306 TTE W/DOPPLER COMPLETE: CPT | Mod: 26 | Performed by: INTERNAL MEDICINE

## 2021-12-06 PROCEDURE — 85652 RBC SED RATE AUTOMATED: CPT | Performed by: NURSE PRACTITIONER

## 2021-12-06 PROCEDURE — 5A1522G EXTRACORPOREAL OXYGENATION, MEMBRANE, PERIPHERAL VENO-ARTERIAL: ICD-10-PCS | Performed by: INTERNAL MEDICINE

## 2021-12-06 PROCEDURE — 250N000011 HC RX IP 250 OP 636

## 2021-12-06 PROCEDURE — 999N000185 HC STATISTIC TRANSPORT TIME EA 15 MIN

## 2021-12-06 PROCEDURE — 82533 TOTAL CORTISOL: CPT | Performed by: NURSE PRACTITIONER

## 2021-12-06 PROCEDURE — 71260 CT THORAX DX C+: CPT | Mod: 26 | Performed by: STUDENT IN AN ORGANIZED HEALTH CARE EDUCATION/TRAINING PROGRAM

## 2021-12-06 PROCEDURE — 250N000013 HC RX MED GY IP 250 OP 250 PS 637: Performed by: NURSE PRACTITIONER

## 2021-12-06 PROCEDURE — C1757 CATH, THROMBECTOMY/EMBOLECT: HCPCS | Performed by: INTERNAL MEDICINE

## 2021-12-06 PROCEDURE — 93880 EXTRACRANIAL BILAT STUDY: CPT | Mod: 26 | Performed by: RADIOLOGY

## 2021-12-06 PROCEDURE — 80347 BENZODIAZEPINES 13 OR MORE: CPT | Performed by: NURSE PRACTITIONER

## 2021-12-06 PROCEDURE — 93454 CORONARY ARTERY ANGIO S&I: CPT | Performed by: INTERNAL MEDICINE

## 2021-12-06 PROCEDURE — 92929 PR PRQ TRLUML CORONARY BM STENT W/ANGIO ADDL ART/BRNCH: CPT | Mod: LC | Performed by: INTERNAL MEDICINE

## 2021-12-06 PROCEDURE — 71045 X-RAY EXAM CHEST 1 VIEW: CPT

## 2021-12-06 PROCEDURE — 84132 ASSAY OF SERUM POTASSIUM: CPT | Performed by: INTERNAL MEDICINE

## 2021-12-06 PROCEDURE — 82330 ASSAY OF CALCIUM: CPT | Performed by: NURSE PRACTITIONER

## 2021-12-06 PROCEDURE — C9606 PERC D-E COR REVASC W AMI S: HCPCS | Mod: LC | Performed by: INTERNAL MEDICINE

## 2021-12-06 PROCEDURE — 70450 CT HEAD/BRAIN W/O DYE: CPT | Mod: 26 | Performed by: RADIOLOGY

## 2021-12-06 PROCEDURE — 82803 BLOOD GASES ANY COMBINATION: CPT

## 2021-12-06 PROCEDURE — 93880 EXTRACRANIAL BILAT STUDY: CPT

## 2021-12-06 PROCEDURE — 200N000002 HC R&B ICU UMMC

## 2021-12-06 PROCEDURE — U0003 INFECTIOUS AGENT DETECTION BY NUCLEIC ACID (DNA OR RNA); SEVERE ACUTE RESPIRATORY SYNDROME CORONAVIRUS 2 (SARS-COV-2) (CORONAVIRUS DISEASE [COVID-19]), AMPLIFIED PROBE TECHNIQUE, MAKING USE OF HIGH THROUGHPUT TECHNOLOGIES AS DESCRIBED BY CMS-2020-01-R: HCPCS | Performed by: NURSE PRACTITIONER

## 2021-12-06 PROCEDURE — 99153 MOD SED SAME PHYS/QHP EA: CPT | Performed by: INTERNAL MEDICINE

## 2021-12-06 PROCEDURE — 80320 DRUG SCREEN QUANTALCOHOLS: CPT | Performed by: NURSE PRACTITIONER

## 2021-12-06 PROCEDURE — 250N000009 HC RX 250: Performed by: EMERGENCY MEDICINE

## 2021-12-06 PROCEDURE — 93005 ELECTROCARDIOGRAM TRACING: CPT | Performed by: EMERGENCY MEDICINE

## 2021-12-06 PROCEDURE — 36556 INSERT NON-TUNNEL CV CATH: CPT | Mod: 59 | Performed by: INTERNAL MEDICINE

## 2021-12-06 PROCEDURE — 86140 C-REACTIVE PROTEIN: CPT | Performed by: NURSE PRACTITIONER

## 2021-12-06 PROCEDURE — 86850 RBC ANTIBODY SCREEN: CPT | Performed by: NURSE PRACTITIONER

## 2021-12-06 PROCEDURE — 85347 COAGULATION TIME ACTIVATED: CPT

## 2021-12-06 PROCEDURE — 33947 ECMO/ECLS INITIATION ARTERY: CPT

## 2021-12-06 PROCEDURE — 93454 CORONARY ARTERY ANGIO S&I: CPT | Mod: 26 | Performed by: INTERNAL MEDICINE

## 2021-12-06 PROCEDURE — 99285 EMERGENCY DEPT VISIT HI MDM: CPT | Mod: 25 | Performed by: EMERGENCY MEDICINE

## 2021-12-06 PROCEDURE — C1887 CATHETER, GUIDING: HCPCS | Performed by: INTERNAL MEDICINE

## 2021-12-06 PROCEDURE — 93010 ELECTROCARDIOGRAM REPORT: CPT | Mod: 59 | Performed by: EMERGENCY MEDICINE

## 2021-12-06 PROCEDURE — C1725 CATH, TRANSLUMIN NON-LASER: HCPCS | Performed by: INTERNAL MEDICINE

## 2021-12-06 PROCEDURE — C1894 INTRO/SHEATH, NON-LASER: HCPCS | Performed by: INTERNAL MEDICINE

## 2021-12-06 PROCEDURE — 94002 VENT MGMT INPAT INIT DAY: CPT

## 2021-12-06 PROCEDURE — 85018 HEMOGLOBIN: CPT | Performed by: INTERNAL MEDICINE

## 2021-12-06 PROCEDURE — 5A1955Z RESPIRATORY VENTILATION, GREATER THAN 96 CONSECUTIVE HOURS: ICD-10-PCS | Performed by: INTERNAL MEDICINE

## 2021-12-06 PROCEDURE — 33952 ECMO/ECLS INSJ PRPH CANNULA: CPT | Mod: GC | Performed by: INTERNAL MEDICINE

## 2021-12-06 PROCEDURE — 81001 URINALYSIS AUTO W/SCOPE: CPT | Performed by: NURSE PRACTITIONER

## 2021-12-06 PROCEDURE — 74177 CT ABD & PELVIS W/CONTRAST: CPT

## 2021-12-06 PROCEDURE — C9600 PERC DRUG-EL COR STENT SING: HCPCS | Performed by: INTERNAL MEDICINE

## 2021-12-06 PROCEDURE — 82947 ASSAY GLUCOSE BLOOD QUANT: CPT

## 2021-12-06 PROCEDURE — C1769 GUIDE WIRE: HCPCS | Performed by: INTERNAL MEDICINE

## 2021-12-06 PROCEDURE — 99291 CRITICAL CARE FIRST HOUR: CPT | Mod: 25 | Performed by: INTERNAL MEDICINE

## 2021-12-06 PROCEDURE — 250N000013 HC RX MED GY IP 250 OP 250 PS 637: Performed by: STUDENT IN AN ORGANIZED HEALTH CARE EDUCATION/TRAINING PROGRAM

## 2021-12-06 PROCEDURE — 272N000001 HC OR GENERAL SUPPLY STERILE: Performed by: INTERNAL MEDICINE

## 2021-12-06 PROCEDURE — 85730 THROMBOPLASTIN TIME PARTIAL: CPT | Performed by: NURSE PRACTITIONER

## 2021-12-06 PROCEDURE — 83615 LACTATE (LD) (LDH) ENZYME: CPT | Performed by: NURSE PRACTITIONER

## 2021-12-06 PROCEDURE — 85384 FIBRINOGEN ACTIVITY: CPT | Performed by: NURSE PRACTITIONER

## 2021-12-06 PROCEDURE — 84295 ASSAY OF SERUM SODIUM: CPT

## 2021-12-06 PROCEDURE — 84132 ASSAY OF SERUM POTASSIUM: CPT

## 2021-12-06 PROCEDURE — 250N000011 HC RX IP 250 OP 636: Performed by: STUDENT IN AN ORGANIZED HEALTH CARE EDUCATION/TRAINING PROGRAM

## 2021-12-06 PROCEDURE — 82805 BLOOD GASES W/O2 SATURATION: CPT

## 2021-12-06 PROCEDURE — 82330 ASSAY OF CALCIUM: CPT

## 2021-12-06 PROCEDURE — 272N000002 HC OR SUPPLY OTHER OPNP: Performed by: INTERNAL MEDICINE

## 2021-12-06 PROCEDURE — C9601 PERC DRUG-EL COR STENT BRAN: HCPCS | Performed by: INTERNAL MEDICINE

## 2021-12-06 PROCEDURE — 85014 HEMATOCRIT: CPT | Performed by: NURSE PRACTITIONER

## 2021-12-06 PROCEDURE — 3E073PZ INTRODUCTION OF PLATELET INHIBITOR INTO CORONARY ARTERY, PERCUTANEOUS APPROACH: ICD-10-PCS | Performed by: INTERNAL MEDICINE

## 2021-12-06 PROCEDURE — 93925 LOWER EXTREMITY STUDY: CPT

## 2021-12-06 PROCEDURE — 85396 CLOTTING ASSAY WHOLE BLOOD: CPT | Performed by: NURSE PRACTITIONER

## 2021-12-06 PROCEDURE — 82810 BLOOD GASES O2 SAT ONLY: CPT

## 2021-12-06 PROCEDURE — 250N000013 HC RX MED GY IP 250 OP 250 PS 637: Performed by: INTERNAL MEDICINE

## 2021-12-06 PROCEDURE — 85610 PROTHROMBIN TIME: CPT | Performed by: NURSE PRACTITIONER

## 2021-12-06 PROCEDURE — 92928 PRQ TCAT PLMT NTRAC ST 1 LES: CPT | Mod: LC | Performed by: INTERNAL MEDICINE

## 2021-12-06 PROCEDURE — 258N000003 HC RX IP 258 OP 636: Performed by: STUDENT IN AN ORGANIZED HEALTH CARE EDUCATION/TRAINING PROGRAM

## 2021-12-06 PROCEDURE — 70450 CT HEAD/BRAIN W/O DYE: CPT

## 2021-12-06 PROCEDURE — B2111ZZ FLUOROSCOPY OF MULTIPLE CORONARY ARTERIES USING LOW OSMOLAR CONTRAST: ICD-10-PCS | Performed by: INTERNAL MEDICINE

## 2021-12-06 PROCEDURE — 92950 HEART/LUNG RESUSCITATION CPR: CPT | Performed by: INTERNAL MEDICINE

## 2021-12-06 PROCEDURE — 84132 ASSAY OF SERUM POTASSIUM: CPT | Performed by: NURSE PRACTITIONER

## 2021-12-06 PROCEDURE — 3E043XZ INTRODUCTION OF VASOPRESSOR INTO CENTRAL VEIN, PERCUTANEOUS APPROACH: ICD-10-PCS | Performed by: INTERNAL MEDICINE

## 2021-12-06 PROCEDURE — 87040 BLOOD CULTURE FOR BACTERIA: CPT | Performed by: NURSE PRACTITIONER

## 2021-12-06 PROCEDURE — 85018 HEMOGLOBIN: CPT

## 2021-12-06 PROCEDURE — 99285 EMERGENCY DEPT VISIT HI MDM: CPT | Performed by: EMERGENCY MEDICINE

## 2021-12-06 PROCEDURE — 272N000237 HC CARDIOHELP CIRCUIT

## 2021-12-06 PROCEDURE — 33947 ECMO/ECLS INITIATION ARTERY: CPT | Mod: GC | Performed by: INTERNAL MEDICINE

## 2021-12-06 PROCEDURE — 250N000009 HC RX 250: Performed by: STUDENT IN AN ORGANIZED HEALTH CARE EDUCATION/TRAINING PROGRAM

## 2021-12-06 PROCEDURE — 027137Z DILATION OF CORONARY ARTERY, TWO ARTERIES WITH FOUR OR MORE DRUG-ELUTING INTRALUMINAL DEVICES, PERCUTANEOUS APPROACH: ICD-10-PCS | Performed by: INTERNAL MEDICINE

## 2021-12-06 PROCEDURE — 33952 ECMO/ECLS INSJ PRPH CANNULA: CPT | Performed by: INTERNAL MEDICINE

## 2021-12-06 PROCEDURE — 84145 PROCALCITONIN (PCT): CPT | Performed by: NURSE PRACTITIONER

## 2021-12-06 DEVICE — STENT CORONARY DES SYNERGY XD MR US 2.50X16MM H7493941816250: Type: IMPLANTABLE DEVICE | Status: FUNCTIONAL

## 2021-12-06 DEVICE — IMPLANTABLE DEVICE: Type: IMPLANTABLE DEVICE | Status: FUNCTIONAL

## 2021-12-06 DEVICE — STENT CORONARY DES SYNERGY XD MR US 2.50X28MM H7493941828250: Type: IMPLANTABLE DEVICE | Status: FUNCTIONAL

## 2021-12-06 RX ORDER — DEXTROSE MONOHYDRATE 25 G/50ML
25-50 INJECTION, SOLUTION INTRAVENOUS
Status: DISCONTINUED | OUTPATIENT
Start: 2021-12-06 | End: 2022-01-03 | Stop reason: HOSPADM

## 2021-12-06 RX ORDER — NALOXONE HYDROCHLORIDE 0.4 MG/ML
0.2 INJECTION, SOLUTION INTRAMUSCULAR; INTRAVENOUS; SUBCUTANEOUS
Status: DISCONTINUED | OUTPATIENT
Start: 2021-12-06 | End: 2022-01-03 | Stop reason: HOSPADM

## 2021-12-06 RX ORDER — NOREPINEPHRINE BITARTRATE 0.06 MG/ML
.01-.6 INJECTION, SOLUTION INTRAVENOUS CONTINUOUS
Status: DISCONTINUED | OUTPATIENT
Start: 2021-12-06 | End: 2021-12-11

## 2021-12-06 RX ORDER — MAGNESIUM SULFATE HEPTAHYDRATE 40 MG/ML
2 INJECTION, SOLUTION INTRAVENOUS DAILY PRN
Status: DISCONTINUED | OUTPATIENT
Start: 2021-12-06 | End: 2021-12-09

## 2021-12-06 RX ORDER — NOREPINEPHRINE BITARTRATE 0.06 MG/ML
.01-.6 INJECTION, SOLUTION INTRAVENOUS CONTINUOUS
Status: DISCONTINUED | OUTPATIENT
Start: 2021-12-06 | End: 2021-12-06

## 2021-12-06 RX ORDER — LIDOCAINE 40 MG/G
CREAM TOPICAL
Status: DISCONTINUED | OUTPATIENT
Start: 2021-12-06 | End: 2022-01-03 | Stop reason: HOSPADM

## 2021-12-06 RX ORDER — CHLORHEXIDINE GLUCONATE ORAL RINSE 1.2 MG/ML
15 SOLUTION DENTAL 2 TIMES DAILY
Status: DISCONTINUED | OUTPATIENT
Start: 2021-12-06 | End: 2021-12-15

## 2021-12-06 RX ORDER — BUSPIRONE HYDROCHLORIDE 10 MG/1
10 TABLET ORAL 3 TIMES DAILY
Status: DISCONTINUED | OUTPATIENT
Start: 2021-12-06 | End: 2021-12-06

## 2021-12-06 RX ORDER — ARGATROBAN 1 MG/ML
350 INJECTION, SOLUTION INTRAVENOUS
Status: DISCONTINUED | OUTPATIENT
Start: 2021-12-06 | End: 2021-12-06 | Stop reason: HOSPADM

## 2021-12-06 RX ORDER — ETOMIDATE 2 MG/ML
20 INJECTION INTRAVENOUS ONCE
Status: COMPLETED | OUTPATIENT
Start: 2021-12-06 | End: 2021-12-06

## 2021-12-06 RX ORDER — HEPARIN SODIUM 1000 [USP'U]/ML
INJECTION, SOLUTION INTRAVENOUS; SUBCUTANEOUS
Status: DISCONTINUED | OUTPATIENT
Start: 2021-12-06 | End: 2021-12-06 | Stop reason: HOSPADM

## 2021-12-06 RX ORDER — HEPARIN SODIUM 200 [USP'U]/100ML
3 INJECTION, SOLUTION INTRAVENOUS CONTINUOUS
Status: DISCONTINUED | OUTPATIENT
Start: 2021-12-06 | End: 2021-12-09

## 2021-12-06 RX ORDER — HEPARIN SODIUM 10000 [USP'U]/100ML
100-1000 INJECTION, SOLUTION INTRAVENOUS CONTINUOUS PRN
Status: DISCONTINUED | OUTPATIENT
Start: 2021-12-06 | End: 2021-12-06 | Stop reason: HOSPADM

## 2021-12-06 RX ORDER — EPTIFIBATIDE 2 MG/ML
180 INJECTION, SOLUTION INTRAVENOUS EVERY 10 MIN PRN
Status: DISCONTINUED | OUTPATIENT
Start: 2021-12-06 | End: 2021-12-06 | Stop reason: HOSPADM

## 2021-12-06 RX ORDER — MIDAZOLAM HCL IN 0.9 % NACL/PF 1 MG/ML
PLASTIC BAG, INJECTION (ML) INTRAVENOUS CONTINUOUS PRN
Status: COMPLETED | OUTPATIENT
Start: 2021-12-06 | End: 2021-12-06

## 2021-12-06 RX ORDER — EPTIFIBATIDE 2 MG/ML
1 INJECTION, SOLUTION INTRAVENOUS CONTINUOUS PRN
Status: DISCONTINUED | OUTPATIENT
Start: 2021-12-06 | End: 2021-12-06 | Stop reason: HOSPADM

## 2021-12-06 RX ORDER — FOLIC ACID 1 MG/1
1 TABLET ORAL DAILY
Status: COMPLETED | OUTPATIENT
Start: 2021-12-06 | End: 2021-12-07

## 2021-12-06 RX ORDER — ASPIRIN 325 MG
TABLET ORAL
Status: DISCONTINUED | OUTPATIENT
Start: 2021-12-06 | End: 2021-12-06 | Stop reason: HOSPADM

## 2021-12-06 RX ORDER — DEXTROSE MONOHYDRATE 100 MG/ML
INJECTION, SOLUTION INTRAVENOUS CONTINUOUS PRN
Status: DISCONTINUED | OUTPATIENT
Start: 2021-12-06 | End: 2021-12-13

## 2021-12-06 RX ORDER — ARGATROBAN 1 MG/ML
150 INJECTION, SOLUTION INTRAVENOUS
Status: DISCONTINUED | OUTPATIENT
Start: 2021-12-06 | End: 2021-12-06 | Stop reason: HOSPADM

## 2021-12-06 RX ORDER — NALOXONE HYDROCHLORIDE 0.4 MG/ML
0.4 INJECTION, SOLUTION INTRAMUSCULAR; INTRAVENOUS; SUBCUTANEOUS
Status: DISCONTINUED | OUTPATIENT
Start: 2021-12-06 | End: 2022-01-03 | Stop reason: HOSPADM

## 2021-12-06 RX ORDER — MIDAZOLAM HCL IN 0.9 % NACL/PF 1 MG/ML
1-8 PLASTIC BAG, INJECTION (ML) INTRAVENOUS CONTINUOUS
Status: DISCONTINUED | OUTPATIENT
Start: 2021-12-06 | End: 2021-12-06

## 2021-12-06 RX ORDER — NITROGLYCERIN 20 MG/100ML
10-200 INJECTION INTRAVENOUS CONTINUOUS PRN
Status: DISCONTINUED | OUTPATIENT
Start: 2021-12-06 | End: 2021-12-06 | Stop reason: HOSPADM

## 2021-12-06 RX ORDER — MAGNESIUM SULFATE HEPTAHYDRATE 40 MG/ML
2 INJECTION, SOLUTION INTRAVENOUS ONCE
Status: COMPLETED | OUTPATIENT
Start: 2021-12-06 | End: 2021-12-06

## 2021-12-06 RX ORDER — NICOTINE POLACRILEX 4 MG
15-30 LOZENGE BUCCAL
Status: DISCONTINUED | OUTPATIENT
Start: 2021-12-06 | End: 2022-01-03 | Stop reason: HOSPADM

## 2021-12-06 RX ORDER — MIDAZOLAM HCL IN 0.9 % NACL/PF 1 MG/ML
1-8 PLASTIC BAG, INJECTION (ML) INTRAVENOUS CONTINUOUS
Status: DISCONTINUED | OUTPATIENT
Start: 2021-12-06 | End: 2021-12-13

## 2021-12-06 RX ORDER — CALCIUM GLUCONATE 20 MG/ML
1 INJECTION, SOLUTION INTRAVENOUS ONCE
Status: COMPLETED | OUTPATIENT
Start: 2021-12-06 | End: 2021-12-06

## 2021-12-06 RX ORDER — POTASSIUM CHLORIDE 29.8 MG/ML
20 INJECTION INTRAVENOUS
Status: DISCONTINUED | OUTPATIENT
Start: 2021-12-06 | End: 2021-12-08

## 2021-12-06 RX ORDER — ALBUMIN (HUMAN) 12.5 G/50ML
SOLUTION INTRAVENOUS
Status: COMPLETED
Start: 2021-12-06 | End: 2021-12-06

## 2021-12-06 RX ORDER — BUSPIRONE HYDROCHLORIDE 10 MG/1
10 TABLET ORAL 3 TIMES DAILY
Status: DISCONTINUED | OUTPATIENT
Start: 2021-12-06 | End: 2021-12-08

## 2021-12-06 RX ORDER — FENTANYL CITRATE 50 UG/ML
INJECTION, SOLUTION INTRAMUSCULAR; INTRAVENOUS
Status: DISCONTINUED | OUTPATIENT
Start: 2021-12-06 | End: 2021-12-06 | Stop reason: HOSPADM

## 2021-12-06 RX ORDER — IOPAMIDOL 755 MG/ML
INJECTION, SOLUTION INTRAVASCULAR
Status: DISCONTINUED | OUTPATIENT
Start: 2021-12-06 | End: 2021-12-06 | Stop reason: HOSPADM

## 2021-12-06 RX ORDER — MAGNESIUM SULFATE HEPTAHYDRATE 40 MG/ML
4 INJECTION, SOLUTION INTRAVENOUS EVERY 4 HOURS PRN
Status: DISCONTINUED | OUTPATIENT
Start: 2021-12-06 | End: 2021-12-09

## 2021-12-06 RX ORDER — TIROFIBAN HYDROCHLORIDE 50 UG/ML
0.07 INJECTION INTRAVENOUS CONTINUOUS PRN
Status: DISCONTINUED | OUTPATIENT
Start: 2021-12-06 | End: 2021-12-06 | Stop reason: HOSPADM

## 2021-12-06 RX ORDER — MULTIPLE VITAMINS W/ MINERALS TAB 9MG-400MCG
1 TAB ORAL DAILY
Status: COMPLETED | OUTPATIENT
Start: 2021-12-06 | End: 2021-12-07

## 2021-12-06 RX ORDER — DOPAMINE HYDROCHLORIDE 160 MG/100ML
2-20 INJECTION, SOLUTION INTRAVENOUS CONTINUOUS PRN
Status: DISCONTINUED | OUTPATIENT
Start: 2021-12-06 | End: 2021-12-06 | Stop reason: HOSPADM

## 2021-12-06 RX ORDER — ASPIRIN 81 MG/1
81 TABLET, CHEWABLE ORAL DAILY
Status: DISCONTINUED | OUTPATIENT
Start: 2021-12-07 | End: 2021-12-30

## 2021-12-06 RX ORDER — DOBUTAMINE HYDROCHLORIDE 200 MG/100ML
2-20 INJECTION INTRAVENOUS CONTINUOUS PRN
Status: DISCONTINUED | OUTPATIENT
Start: 2021-12-06 | End: 2021-12-06 | Stop reason: HOSPADM

## 2021-12-06 RX ORDER — HEPARIN SODIUM 10000 [USP'U]/100ML
10-50 INJECTION, SOLUTION INTRAVENOUS CONTINUOUS
Status: DISCONTINUED | OUTPATIENT
Start: 2021-12-06 | End: 2021-12-09

## 2021-12-06 RX ORDER — ALBUMIN (HUMAN) 12.5 G/50ML
12.5 SOLUTION INTRAVENOUS ONCE
Status: COMPLETED | OUTPATIENT
Start: 2021-12-06 | End: 2021-12-06

## 2021-12-06 RX ORDER — PIPERACILLIN SODIUM, TAZOBACTAM SODIUM 3; .375 G/15ML; G/15ML
3.38 INJECTION, POWDER, LYOPHILIZED, FOR SOLUTION INTRAVENOUS EVERY 6 HOURS
Status: DISCONTINUED | OUTPATIENT
Start: 2021-12-06 | End: 2021-12-08

## 2021-12-06 RX ADMIN — PIPERACILLIN AND TAZOBACTAM 3.38 G: 3; .375 INJECTION, POWDER, LYOPHILIZED, FOR SOLUTION INTRAVENOUS at 19:40

## 2021-12-06 RX ADMIN — SODIUM CHLORIDE, POTASSIUM CHLORIDE, SODIUM LACTATE AND CALCIUM CHLORIDE 1000 ML: 600; 310; 30; 20 INJECTION, SOLUTION INTRAVENOUS at 15:00

## 2021-12-06 RX ADMIN — HEPARIN SODIUM 3 ML/HR: 200 INJECTION, SOLUTION INTRAVENOUS at 13:41

## 2021-12-06 RX ADMIN — PANTOPRAZOLE SODIUM 40 MG: 40 INJECTION, POWDER, FOR SOLUTION INTRAVENOUS at 13:52

## 2021-12-06 RX ADMIN — SODIUM CHLORIDE, POTASSIUM CHLORIDE, SODIUM LACTATE AND CALCIUM CHLORIDE 1000 ML: 600; 310; 30; 20 INJECTION, SOLUTION INTRAVENOUS at 14:00

## 2021-12-06 RX ADMIN — BUSPIRONE HYDROCHLORIDE 10 MG: 10 TABLET ORAL at 17:11

## 2021-12-06 RX ADMIN — CALCIUM CHLORIDE 1 G: 100 INJECTION, SOLUTION INTRAVENOUS at 23:29

## 2021-12-06 RX ADMIN — HEPARIN SODIUM 700 UNITS/HR: 1000 INJECTION INTRAVENOUS; SUBCUTANEOUS at 18:30

## 2021-12-06 RX ADMIN — Medication: at 19:29

## 2021-12-06 RX ADMIN — CHLORHEXIDINE GLUCONATE 0.12% ORAL RINSE 15 ML: 1.2 LIQUID ORAL at 19:29

## 2021-12-06 RX ADMIN — FENTANYL CITRATE 100 MCG/HR: 50 INJECTION INTRAVENOUS at 13:45

## 2021-12-06 RX ADMIN — SODIUM NITROPRUSSIDE 0.5 MCG/KG/MIN: 25 INJECTION, SOLUTION, CONCENTRATE INTRAVENOUS at 10:35

## 2021-12-06 RX ADMIN — MAGNESIUM SULFATE HEPTAHYDRATE 2 G: 40 INJECTION, SOLUTION INTRAVENOUS at 18:40

## 2021-12-06 RX ADMIN — PIPERACILLIN AND TAZOBACTAM 3.38 G: 3; .375 INJECTION, POWDER, LYOPHILIZED, FOR SOLUTION INTRAVENOUS at 15:05

## 2021-12-06 RX ADMIN — IOPAMIDOL 112 ML: 755 INJECTION, SOLUTION INTRAVENOUS at 11:47

## 2021-12-06 RX ADMIN — THIAMINE HCL TAB 100 MG 100 MG: 100 TAB at 17:02

## 2021-12-06 RX ADMIN — Medication 1 TABLET: at 17:02

## 2021-12-06 RX ADMIN — ETOMIDATE 20 MG: 2 INJECTION INTRAVENOUS at 08:27

## 2021-12-06 RX ADMIN — Medication 8 MG/HR: at 20:44

## 2021-12-06 RX ADMIN — TICAGRELOR 90 MG: 90 TABLET ORAL at 19:32

## 2021-12-06 RX ADMIN — DEXTROSE 3 MCG/KG/MIN: 50 INJECTION, SOLUTION INTRAVENOUS at 21:33

## 2021-12-06 RX ADMIN — ALBUMIN (HUMAN) 12.5 G: 12.5 SOLUTION INTRAVENOUS at 14:29

## 2021-12-06 RX ADMIN — Medication 100 MG: at 08:27

## 2021-12-06 RX ADMIN — BUSPIRONE HYDROCHLORIDE 10 MG: 10 TABLET ORAL at 19:29

## 2021-12-06 RX ADMIN — CALCIUM GLUCONATE 1 G: 20 INJECTION, SOLUTION INTRAVENOUS at 13:48

## 2021-12-06 RX ADMIN — FOLIC ACID 1 MG: 1 TABLET ORAL at 17:02

## 2021-12-06 RX ADMIN — ALBUMIN HUMAN 12.5 G: 0.25 SOLUTION INTRAVENOUS at 14:29

## 2021-12-06 RX ADMIN — Medication 0.06 MCG/KG/MIN: at 17:00

## 2021-12-06 RX ADMIN — HUMAN INSULIN 4 UNITS/HR: 100 INJECTION, SOLUTION SUBCUTANEOUS at 13:30

## 2021-12-06 RX ADMIN — SODIUM CHLORIDE, POTASSIUM CHLORIDE, SODIUM LACTATE AND CALCIUM CHLORIDE 1000 ML: 600; 310; 30; 20 INJECTION, SOLUTION INTRAVENOUS at 17:00

## 2021-12-06 RX ADMIN — VANCOMYCIN HYDROCHLORIDE 1750 MG: 100 INJECTION, POWDER, LYOPHILIZED, FOR SOLUTION INTRAVENOUS at 16:29

## 2021-12-06 RX ADMIN — Medication: at 21:35

## 2021-12-06 ASSESSMENT — ACTIVITIES OF DAILY LIVING (ADL)
ADLS_ACUITY_SCORE: 12
ADLS_ACUITY_SCORE: 16
ADLS_ACUITY_SCORE: 16
ADLS_ACUITY_SCORE: 12
ADLS_ACUITY_SCORE: 16
ADLS_ACUITY_SCORE: 12
ADLS_ACUITY_SCORE: 12
ADLS_ACUITY_SCORE: 16
ADLS_ACUITY_SCORE: 16
ADLS_ACUITY_SCORE: 12
ADLS_ACUITY_SCORE: 18
ADLS_ACUITY_SCORE: 12
ADLS_ACUITY_SCORE: 14

## 2021-12-06 ASSESSMENT — MIFFLIN-ST. JEOR: SCORE: 1650.46

## 2021-12-06 NOTE — Clinical Note
dry, intact, no bleeding and no hematoma. All lines are sutured in place by physician. Ioban dressing applied.

## 2021-12-06 NOTE — PROGRESS NOTES
"Care Management Follow Up    Length of Stay (days): 0    Expected Discharge Date:  TBD     Concerns to be Addressed:     HCD/NOK  Patient plan of care discussed at interdisciplinary rounds: Yes    Anticipated Discharge Disposition:  TBD     Anticipated Discharge Services:  TBD  Anticipated Discharge DME:  TBD    Patient/family educated on Medicare website which has current facility and service quality ratings:  N/A at this time  Education Provided on the Discharge Plan:  No  Patient/Family in Agreement with the Plan:  TBD    Referrals Placed by CM/SW:  Financial Counseling referral placed  Private pay costs discussed: Not applicable    Additional Information:  Bruce Blount is a 56 year old male who was cannulated for ECMO 2021 due to  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    SW received phone call from Pt's niece Tammy Snow. She requested assistance regarding verifying Pt's insurance as family was unable to locate Pt's wallet or phone at home. Pt lives in house ([place], [place]) w/ his brother Amor ([place]) who called 911 on Pt's behalf at home. Niece states they believe Pt has \"state insurance.\" SW made Financial Counseling referral 21 requesting assistance. SW confirmed w/ family that Pt does not have a HCD completed. SW reviewed NOK policy. Pt is single, no children, parents . Pt's brothers Amor and Julio are his NOK. Pt's brother Amor (P: [phone]) is primary NOK as other sibling is currently incarcerated. Pt's niece Tammy expressed understanding of NOK policy and is agreeable. She states she is actively involved.      Judi Miller, GERBER  P. [phone]      "

## 2021-12-06 NOTE — PROGRESS NOTES
ECMO Attending Progress Note  2021    Bruce Blount is a 56 year old male who was cannulated for ECMO 2021 due to  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    Cannulation Site:  17 Fr in the R femoral artery  25 Fr in the R femoral vein  IABP    Interval events: admitted, lost all pulsatility, turning flow down, map high    Physical Exam:  Temp:  [89.6  F (32  C)-93  F (33.9  C)] 93  F (33.9  C)  Pulse:  [53-89] 63  Resp:  [16-20] 16  BP: (105)/(78) 105/78  MAP:  [65 mmHg-115 mmHg] 68 mmHg  Arterial Line BP: ()/(38-68) 89/39  FiO2 (%):  [60 %] 60 %  SpO2:  [98 %-100 %] 100 %    Intake/Output Summary (Last 24 hours) at 2021 1456  Last data filed at 2021 1429  Gross per 24 hour   Intake 1151.42 ml   Output 955 ml   Net 196.42 ml    FiO2 (%): 60 %  Resp: 16       Labs:  Recent Labs   Lab 21  1413 21  1028 21  0956 21  0919 21  0851 21  0843   PH 7.20* 7.25* 7.27* 7.29*   < >  --    PCO2 50* 43 37 37   < >  --    PO2 114* 155* 184* 352*   < >  --    HCO3 20* 19* 17* 17*   < >  --    O2PER 60  --   --   --   --  100.0    < > = values in this interval not displayed.      Recent Labs   Lab 21  1229 21  1044 21  1028 21  0956   WBC 15.4*  --   --   --    HGB 13.4 13.9 14.2 14.1     Creatinine   Date Value Ref Range Status   2021 0.87 0.66 - 1.25 mg/dL Final     Creatinine POCT   Date Value Ref Range Status   2021 1.1 0.7 - 1.3 mg/dL Final       Blood Flow (Circuit) LPM: 3.3 LPM  Gas Flow  LPM: 3 LPM  Gas FiO2   %: 60 %  ACT  (seconds): 288 seconds  Blood Temp  (degrees C): 32.6 C  Pulse Oximetry  (SpO2%): 100 %  Arterial Pressure  mmH mmHg      ECMO Issues including assessments and plan on DOS 2021:  Neuro: Sedated for mechanical ventilation cooling and ECMO.  No acute distress.  NIRS will be placed RASS goal: -3  CV: Cardiogenic shock.  Hemodynamically stable on no pressors, requiring antihypertensies  Pulsatility: 0 mmHg  Pulm: Keep vent settings at rest settings as above.  FEN/Renal: Electrolytes stable w/ replacement protocols in place, Cr stable, monitor UOP stable   Heme: ACT goal: 180-200 and if tolerates but no opening valve may increase 200-22 Hemoglobin 13.4.  Minimal oozing around the ECMO cannulas.  ID: Receiving empiric antibiotics  Cannulae: Position is acceptable on exam and the available imaging.  Distal perfusion cannula is in place and patent.  Extremities are well-perfused.     I have personally reviewed the ECMO flows, oxygenation and CO2 clearance, anticoagulation, and cannula position.  I have also personally assessed the patient's systemic response with hemodynamics, oxygenation, ventilation, and bleeding.       The patient requires continued ECMO support and management in the ICU.  I have discussed patient care and treatment plan with the primary team.      Marlo Fry MD  Critical Care Cardiology  [phone]    December 6, 2021

## 2021-12-06 NOTE — Clinical Note
Max pressure = 12 mark. Total duration = 12 seconds.     Max pressure = 12 mark. Total duration = 12 seconds.    Balloon reinflated a second time: Max pressure = 12 mark. Total duration = 12 seconds.

## 2021-12-06 NOTE — PROGRESS NOTES
Procedure/Surgery Information   [facility]      Intra-Aortic Balloon Pump Insertion  Date of Service (when I performed the procedure): 12/06/2021    Diagnosis or Indication: Cardiac arrest    Location: Cath lab   Catheter size: 50cc 8fr   Inserted: 11 inch introducer sheath   Catheter placed: Right femoral artery   Complications:: None   Assist initiated: 1:1 ratio   Percent augmentation: 100%   Timing adjusted: Adjusted to achieve optimal assist   Verification of position: Fluoroscopy   Comments: None      Recorded by Lisa Sauceda RRT

## 2021-12-06 NOTE — ED TRIAGE NOTES
Pt BIBA ([facility]) from home after found down and altered. Pt Found to be in a VFIB arrest and shocked x2, I Gel placed by EMS, epi given x3 and amiodarone given 300. IO placed in lower left leg.

## 2021-12-06 NOTE — H&P
"  [facility]  ICU History & Physical  December 6, 2021          H&P:   Bruce Blount is a 56 year old male with PMHx current tobacco use and ETOH abuse who presents to [facility] after out of hospital cardiac arrest.     Per EMS and brother, patient was in his usual state of health prior to arrest. Patients brother heard a \"thump\" and found patient unresponsive and agonal breathing. 911 called and CPR initiated. EMS arrived within 4 minutes. Initial rhythm VF--> shocked x ~7 with ROSC upon arrival to ED. Total CPR team per EMS ~ 40 minutes. 3 mg Epinephrine, 300 mg Amio given via EMS. Patient EKG reveals Valentín inferior/posterior leads with reciprocal changes. Patient initially presented with an Igel and was intubated with ETT in the ED. He arrested and was again shocked in the ED with ROSC. Taken urgently to [facility] where he underwent coronary angiogram and again arrested requiring manual CPR and was then cannulated on VA ECMO via right with 17 Trinidadian cannula RCFA, 25 Fr cannula RCFV. Coronary angiogram revealed  of RCA and acute culprit lesion of LCx/OM1, s/p PCI to pLCx, mid LCx, and OM1 with TERRY. IABP placed for decreased pulsatility. Thermogard cooling cath placed and patient was transferred to ICU for further management.     Witnessed arest (y/n): No  Home or public location (y/n): Home  Bystander CPR (y/n): Yes  Time of 911 call: 716, EMS arrived 7:20.   Initial rhythm: VF  Did they have intermittent ROSC (y/n): Yes, x 2  # of shocks ~7  Epi:  3 mg  Amio:  300 mg  Bicarb: 0  amps  EtCO2 en route: 30's  O2 sat en route: 50s initially, mid 80's thereafter    Initial rhythm in the cath lab: ST  Lactate 8  VBG initial: 6.89 (venous)  ABG: (after cannulation) 7.08/36/243/10    ECMO cannula size: 17 Trinidadian cannula RCFA, 25 Fr cannula RCFV           Medications:   I have reviewed this patient's current medications    No past medical history on file.    No family history on file.  Social " "History     Socioeconomic History     Marital status: Single     Spouse name: Not on file     Number of children: Not on file     Years of education: Not on file     Highest education level: Not on file   Occupational History     Not on file   Tobacco Use     Smoking status: Not on file     Smokeless tobacco: Not on file   Substance and Sexual Activity     Alcohol use: Not on file     Drug use: Not on file     Sexual activity: Not on file   Other Topics Concern     Not on file   Social History Narrative     Not on file     Social Determinants of Health     Financial Resource Strain: Not on file   Food Insecurity: Not on file   Transportation Needs: Not on file   Physical Activity: Not on file   Stress: Not on file   Social Connections: Not on file   Intimate Partner Violence: Not on file   Housing Stability: Not on file            Review of Systems:   Negative besides that noted in HPI            Physical Exam:   @Vitals: /78   Pulse 89   Resp 16   Ht 1.753 m (5' 9\")   Wt 83 kg (183 lb)   SpO2 100%   BMI 27.02 kg/m    BMI= Body mass index is 27.02 kg/m .   GENERAL APPEARANCE: Intubated, sedated. NAD.  EENT: No icterus, PERRL 2 mm, ETT in place, OG tube in place  CARDIOVASCULAR: Regular rate and rhythm, normal S1 and S2, no S3 or S4 and no murmur, click or rub. Normal PMI. Pulses dopplerable. IABP in place 1:1  RESP: Coarse bilaterally. Mechanical ventilation.    GASTRO: Distended but firm. bowel sounds hypoactive but present  GENITOURINARY: Gutiérrez in place.  EXTREMITIES: Cool, trace LE edema, pulses dopplered, as above. VA ECMO cannulas in right groin, ThermoGard and IO in place right Tibia  NEURO: Sedated and intubated, Pupils equal and reactive, 3 mm. Fent and Versed for sedation, as below.  INTEGUMENTARY: No rashes. Cannula/Line sites CDI  LINES/TUBES/DRAINS: (noted below) V-A ECMO Cannulas R groin, arterial sheath and ThermoGard in L groin. R radial Arterial line. ETT. OG. Gutiérrez Catheter.    Vent " Settings:  Resp: 16 SpO2: 100 % (I gel) O2 Device: Mechanical Ventilator      Arterial Blood Gas:   Recent Labs   Lab 12/06/21  0906 12/06/21  0851 12/06/21  0843   PH 7.08* 7.07*  --    PCO2 42 36  --    PO2 386* 243*  --    HCO3 13* 10*  --    O2PER  --   --  100.0     CXR:pending  Vitals:    12/06/21 0822   Weight: 83 kg (183 lb)   No intake/output data recorded.  Recent Labs   Lab 12/06/21  0906 12/06/21  0851 12/06/21  0843 12/06/21  0840    135   < >  --    POTASSIUM 5.7* 5.5*   < >  --    * 438*   < >  --    CR  --   --   --  1.1    < > = values in this interval not displayed.     No components found for: URINE   No lab results found in last 7 days.     No data recorded   Recent Labs   Lab 12/06/21  0906 12/06/21  0851 12/06/21  0843 12/06/21  0838   HGB 12.7* 13.1* 4.1* 13.9   HCT  --   --   --  41     Recent Labs   Lab 12/06/21  0835   INR 2.1     Recent Labs   Lab 12/06/21  0906 12/06/21  0851 12/06/21  0843 12/06/21  0838   * 438* 114* 406*       Lines:           Data:   All lab results reviewed    Recent Results (from the past 24 hour(s))   Cardiac Catheterization    Narrative    1.  Refractory VF arrest with hemodynamically unstable presentation   resulting in successful peripheral VA ECMO cannulation.  2.  Severe three-vessel disease.  The culprit lesion appeared to be   proximal left circumflex/OM1 bifurcation lesion.  Successful PCI of the   left circumflex/OM1 bifurcation with TERRY x3.  3.  There is moderate to severe lesion of the proximal LAD there appears   to be heavily calcified.  There is  of the mid RCA with left-to-right   collateralization of the PDA.  4.  Successful placement of IABP due to lack of pulsatility while on ECMO  5.  Successful placement of transvenous cooling catheter.   XR Chest Port 1 View    Narrative    EXAM: XR CHEST PORT 1 VIEW  12/6/2021 1:09 PM     HISTORY:  Check endotracheal tube placement and ECLS cannula  placement. DO NOT log-roll patient.   Place film under patient using  patient safety handling process.       COMPARISON:  CT chest abdomen and pelvis 12/6/2021    FINDINGS:   Portable frontal radiograph of the chest. The endotracheal tube  projects 3.2 cm above the jacques. Lower approach CVC and ECMO cannula  project at the superior cavoatrial junction. Intra-aortic balloon pump  marker projects at the level of the proximal descending thoracic  aorta. Enteric tube is looped within the stomach.    Trachea is midline. The cardiac silhouette size is normal. Trace left  pleural effusion. No pneumothorax. Perihilar predominant mixed  interstitial and airspace opacities with more focal patchy airspace  opacities in the right upper lobe. The upper abdomen is unremarkable.  Degenerative changes of the glenohumeral joint. Chronic right clavicle  fracture deformity.      Impression    IMPRESSION:   1. Endotracheal tube projects over the midthoracic trachea. Additional  support devices are unchanged compared to same day CT.  2. Perihilar predominant mixed interstitial and airspace opacities,  which may represent atelectasis versus edema.  3. Patchy opacities in the right upper lobe, which may represent  atelectasis versus aspiration/infection.  4. Trace left pleural effusion.    I have personally reviewed the examination and initial interpretation  and I agree with the findings.    SARAH PADILLA MD         SYSTEM ID:  [id number]              Assessment and Plan by system:   Bruce Blount is a 56 year old male who was admitted on 12/6/2021 following an out of hospital cardiac arrest     Neurology   # Concern for anoxic brain injury    # ETOH abuse  -- Intubated, sedated. Cooled to 33 degrees.   -- Continue versed, fentanyl for sedation with a RASS goal -4 to -5. Currently on Versed @ 5 mg/hr, starting Fentanyl  -- Neuro-crit consulted and appreciate recommendations.   -- vEEG   -- Palliative care consulted.   -- Head CT without acute intracranial pathology  --  Turn down flows on ECMO to wean off Nipride. Transition to Nicardipine if afterload still needed  -- Folate, Thiamine and multi vitamin for ETOH abuse. CIWA as needed when sedation weaned     Cardiovascular  # Refractory VF cardiac arrest 2/2 LCx lesion s/p PCI to pLCx, mLCx, OM1  # Cardiogenic shock requiring VA ECMO  # STEMI/CAD s/p PCI to pLCx, mLCx, residual CAD with RCA   Peripheral V-A ECMO inserted via RCFA and RCFV 17/25 fr  TTE: pending  IABP: 1:1, 100% augmentation. No pulsatility when IABP paused  EKG: pending  ECMO cares:   Flows 3-3.8   Sweep 4   ACT goal: 180-200  -- Decrease flows to wean off Nipride as able. Transition to Nicardipine if afterload still needed  -- Continue ASA 81mg and ticagrelor 90mg BID   -- Hold lipitor for now given shock liver  -- Hold ACE/ARB for now given likely reduced renal fxn after arrest  -- Holding beta blocker given shock  -- Lipid panel/A1c pending  -- Telemetry monitoring  -- Trend Troponin, initial 1.32  --  of RCA not intervened upon     Pulmonary  # Acute hypoxemic respiratory failure requiring intubation  # Probable aspiration pneumonia  # Ongoing tobacco use- 2 ppd smoker PTA  Vent Settings: CMV/16/480/5/60%  AB.25/43/155/19  CXR pending  CAP CT pending  -- Wean vent as able  -- Daily CXR  -- Serial ABGs   -- Consider scheduled duonebs if signs of lung dz, currently PRN      Infectious Disease  # Aspiration Pneumonia- in the setting of arrest.  CXR/CAP as above.   # Leukocytosis  # Lactic acidosis  WBC 15  -- Monitor for signs of infection  -- Daily blood cultures while on ECMO   Cultures thus far:   - NGTD  Antibiotics               - Vancomycin /- present               - Zosyn /- present      Renal, Electrolytes  # Hypoalbuminemia   # Lactic acidosis- improving  # Hyperkalemia  # Hypocalcemia  Potassium 5.4. Lactate downtrending. 8-->4.2. iCal 3.7--> repleted  -- Cr 1.1   -- Monitor urine output        Gastrointestinal, Nutrition  # Shock liver  2/2 cardiac arrest  No known medical hx.   CAP CT pending  -- Monitor LFTs  -- NPO while cooled - Nutrition consult pending feeding tube placement once warmed   -- Bowel regimen - on hold for now due to hypothermia  -- GI Prophylaxis: PPI; Protonix 40 mg daily     Hematology  Hgb stable. No e/o bleeding.  -- Receiving heparin for ECMO with ACT goal 180-200   -- ASA/ticagrelor for TERRY  -- Transfuse for Hgb < 8  -- US LE w/ arterial duplex pending  -- Carotid artery US pending  -- DVT PPX: Heparin as above     Endocrinology  # Hyperglycemia   -- No known medical history.   -- insulin gtt on currently  -- Hgb a1c pending    Lines:  R femoral arterial and venous ECMO cannulae December 6, 2021  L femoral arterial line December 6, 2021  R radial arterial line December 6, 2021  ETT December 6, 2021  Gutiérrez catheter December 6, 2021  OG tube December 6, 2021  Current lines are required for patient management    Integumentary:  - No skin issues        Family update by me today: Yes     Code Status:    The pt was discussed and evaluated with Dr. Marlo Fry, attending physician, who agrees with the assessment and plan above.     KATHARINA Peterson, CNP  [facility] Heart  Cardiology, Interventional  [phone]

## 2021-12-06 NOTE — CONSULTS
"[facility]: [facility]  NeuroCritical Care Consult    Patient Name:  Bruce Blount  MRN:  [id number]    :  1965  Date of Service:  2021  Primary care provider:  No Ref-Primary, Physician      Reason for Consult: Asked by CSI to see Bruce Blount for post CA neuro management and prognostication.     History of Present Illness:   Bruce Blount is a 56 year old male admitted on 2021 without significant PMH who presents for CA. Per brother, he heard a thump and found pt unresponsive and agonal breathing. EMS called and arrived in 34 minutes where pt was found to be in VF and shocked x7 with ROSC upon ED arrival. 3 epi and 300 amio given. On EKG pt had ST elevation and was taken to cath lab where he arrested again and was cannulated for VA ECMO and placement of IABP. Pt is now sedated and cooled.     On 5 versed   100 Fentanyl   CTH with no acute pathology     ROS: A 10-point ROS was performed as per HPI.    NEURO RECS  - Recommend vEEG while on sedation  - Avoid hypotonic solutions as they may worsen cerebral edema  - Avoid \"nitroprusside\",\"nitroglycerin\" as they may also worsen cerbral edema.  - Advise slow correction of any high Sodiums if needed  - When safe to do so, limitation of CNS acting medications will permit a more accurate neurological assessment  - We will continue to follow and monitor their EEG and neurologic exam, please call #[phone] with any questions    Thank you for this consultation.    PMH:  No past medical history on file.  No past surgical history on file.    Allergies:  No Known Allergies    Medications:      Current Facility-Administered Medications:      albumin human 25 % injection 12.5 g, 12.5 g, Intravenous, Once, Kitty Hernandez, KATHARINA CNP     artificial tears ophthalmic ointment, , Both Eyes, Q8H, Kitty Hernandez, KATHARINA CNP     [START ON 2021] aspirin (ASA) chewable tablet 81 mg, 81 mg, Per Feeding Tube, Daily, Kitty Hernandez, " APRN CNP     calcium chloride in  mL intermittent infusion 1 g, 1 g, Intravenous, Q6H PRN, Kitty Hernandez APRN CNP     calcium gluconate 1 g in 50 mL sodium chloride intermittent infusion (premix), 1 g, Intravenous, Once, Kitty Hernandez APRN CNP     dextrose 10% infusion, , Intravenous, Continuous PRN, Kitty Hernandez APRN CNP     glucose gel 15-30 g, 15-30 g, Oral, Q15 Min PRN **OR** dextrose 50 % injection 25-50 mL, 25-50 mL, Intravenous, Q15 Min PRN **OR** glucagon injection 1 mg, 1 mg, Subcutaneous, Q15 Min PRN, Kitty Hernandez APRN CNP     EPINEPHrine (ADRENALIN) 5 mg in sodium chloride 0.9 % 250 mL infusion, 0.03-0.3 mcg/kg/min, Intravenous, Continuous, Kitty Hernandez APRN CNP, Held at 12/06/21 1215     fentaNYL (SUBLIMAZE) 50 mcg/mL bolus from infusion pump 50 mcg, 50 mcg, Intravenous, Q30 Min PRN, Kitty Hernandez APRN CNP     fentaNYL (SUBLIMAZE) infusion,  mcg/hr, Intravenous, Continuous, Kitty Hernandez APRN CNP     heparin 2 unit/mL in 0.9% NaCl (for REPERFUSION CATHETER), 3 mL/hr, Intravenous, Continuous, Kitty Hernandez APRN CNP     heparin ANTICOAGULANT bolus dose from infusion pump 353.5-707 Units, 5-10 Units/kg (Ideal), Other, Q30 Min PRN, Kitty Hernandez APRN CNP     heparin infusion 25,000 units in D5W 250 mL ANTICOAGULANT, 10-50 Units/kg/hr (Ideal), Other, Continuous, Kitty Hernandez APRN CNP     insulin 1 unit/mL in saline (NovoLIN, HumuLIN Regular) drip - ADULT IV Infusion, 0-24 Units/hr, Intravenous, Continuous, Kitty Hernandez APRN CNP     lactated ringers BOLUS 1,000 mL, 1,000 mL, Intravenous, Once, Kitty Hernandez APRN CNP     lidocaine (LMX4) cream, , Topical, Q1H PRN, Kitty Hernandez APRN CNP     lidocaine 1 % 0.1-1 mL, 0.1-1 mL, Other, Q1H PRN, Kitty Hernandez APRN CNP     magnesium sulfate 2 g in water intermittent infusion, 2 g, Intravenous, Daily PRN, Kitty Hernandez APRN CNP     magnesium sulfate 4 g in 100 mL sterile water (premade), 4 g, Intravenous,  Q4H PRN, Kitty Hernandez APRN CNP     midazolam (VERSED) drip - ADULT 100 mg/100 mL in NS (pre-mix), 1-8 mg/hr, Intravenous, Continuous, Kitty Hernandez APRN CNP, Last Rate: 5 mL/hr at 12/06/21 1215, 5 mg/hr at 12/06/21 1215     midazolam (VERSED) injection 1-3 mg, 1-3 mg, Intravenous, Q1H PRN, Kitty Hernandez APRN CNP     naloxone (NARCAN) injection 0.2 mg, 0.2 mg, Intravenous, Q2 Min PRN **OR** naloxone (NARCAN) injection 0.4 mg, 0.4 mg, Intravenous, Q2 Min PRN **OR** naloxone (NARCAN) injection 0.2 mg, 0.2 mg, Intramuscular, Q2 Min PRN **OR** naloxone (NARCAN) injection 0.4 mg, 0.4 mg, Intramuscular, Q2 Min PRN, Brayan Thompson MD     nitroPRUsside (NIPRIDE) 50 mg, sodium thiosulfate 500 mg in D5W 125 mL IV infusion (conc: 0.4 mg/mL), 0.25-5 mcg/kg/min, Intravenous, Continuous, Brayan Thompson MD, Last Rate: 6.2 mL/hr at 12/06/21 1215, 0.5 mcg/kg/min at 12/06/21 1215     norepinephrine (LEVOPHED) 16 mg in  mL infusion MAX CONC CENTRAL LINE, 0.01-0.6 mcg/kg/min, Intravenous, Continuous, Kitty Hernandez APRN CNP, Held at 12/06/21 1215     pantoprazole (PROTONIX) IV push injection 40 mg, 40 mg, Intravenous, Daily, Kitty Hernandez APRN CNP     piperacillin-tazobactam (ZOSYN) 3.375 g vial to attach to  mL bag, 3.375 g, Intravenous, Q6H, Kitty Hernandez APRN CNP     potassium chloride 20 mEq in 50 mL intermittent infusion, 20 mEq, Intravenous, Q1H PRN, Mary, Kitty D, APRN CNP     sodium chloride (PF) 0.9% PF flush 3 mL, 3 mL, Intracatheter, q1 min prn, Kitty Hernandez APRN CNP     sodium phosphate 10 mmol in D5W 250 mL intermittent infusion, 10 mmol, Intravenous, Daily PRN, Kitty Hernandez, KATHAIRNA CNP     sodium phosphate 15 mmol in D5W intermittent infusion, 15 mmol, Intravenous, Daily PRN, Kitty Hernandez, KATHARINA CNP     sodium phosphate 20 mmol in D5W 250 mL intermittent infusion, 20 mmol, Intravenous, Q6H PRN, Kitty Hernandez, KATHARINA CNP     sodium phosphate 25 mmol in  "D5W 250 mL intermittent infusion, 25 mmol, Intravenous, Q8H PRN, Kitty Hernandez APRN CNP     ticagrelor (BRILINTA) tablet 90 mg, 90 mg, Oral, BID, Kitty Hernandez APRN CNP     vancomycin (VANCOCIN) 1,750 mg in sodium chloride 0.9 % 500 mL intermittent infusion, 1,750 mg, Intravenous, Once, Brayan Thompson MD     vasopressin 0.2 units/mL in NS (PITRESSIN) standard conc infusion, 0.5-4 Units/hr, Intravenous, Continuous, Kitty Hernandez APRN CNP, Held at 12/06/21 1215    Social History:  Social History     Tobacco Use     Smoking status: Not on file     Smokeless tobacco: Not on file   Substance Use Topics     Alcohol use: Not on file       Family History:    No family history on file.    Physical Examination:   /78   Pulse 53   Temp (!) 89.6  F (32  C) (Bladder)   Resp 16   Ht 1.753 m (5' 9\")   Wt 83 kg (183 lb)   SpO2 100%   BMI 27.02 kg/m    General: Adult male patient, lying in bed, NAD  HEENT: ETT  Cardiac: tele  Pulm: vent  Abdomen: Soft, bowel sounds present  Extremities: Warm, no edema  Skin: No rash or lesion     Neuro:  Pt is cooled and sedated with equal and reactive pupils.     I have personally reviewed all labs and imaging for this patient.      Patient discussed with attending neurologist, Dr. Delfin Barrera, DNP  Ascom: *[phone]   "

## 2021-12-06 NOTE — PROGRESS NOTES
ECLS Cannulation Note:    Date on: 12/6/2021  Time on: 0846  Cannulating Physician: Donna    Arterial Cannula: 17 Fr. In the Right Femoral Artery      Venous Cannula: 25 Fr. In the Right Femoral Vein      ECMO components include [employer] Lot # [id number]  Oxy Lot Number:[id number]  Console Serial Number:[id number]    Cannulation was performed in the Cath Lab, placement was verified by fluoroscopy, cannula position is good.    Jair Palacios, RT  ECMO Specialist  12/6/2021 12:55 PM

## 2021-12-06 NOTE — Clinical Note
The first balloon was inserted into the circumflex.Max pressure = 10 mark. Total duration = 10 seconds.     2.5x12 .

## 2021-12-06 NOTE — Clinical Note
Hemodynamic equipment used: 5 lead ECG, LIKEPAK Hands Off Patches, Machine BP Cuff and pulse oximeter probe.

## 2021-12-06 NOTE — PHARMACY-VANCOMYCIN DOSING SERVICE
"Pharmacy Vancomycin Initial Note  Date of Service 2021  Patient's  1965  56 year old, male    Indication: Aspiration Pneumonia    Current estimated CrCl = Estimated Creatinine Clearance: 111.3 mL/min (based on SCr of 0.87 mg/dL).    Creatinine for last 3 days  2021: 12:29 PM Creatinine 0.87 mg/dL;  8:40 AM Creatinine POCT 1.1 mg/dL    Recent Vancomycin Level(s) for last 3 days  No results found for requested labs within last 72 hours.      Vancomycin IV Administrations (past 72 hours)      No vancomycin orders with administrations in past 72 hours.                Nephrotoxins and other renal medications (From now, onward)    Start     Dose/Rate Route Frequency Ordered Stop    21 1000  vancomycin 1500 mg in 0.9% NaCl 250 ml intermittent infusion 1,500 mg         1,500 mg  over 90 Minutes Intravenous EVERY 18 HOURS 21 1501      21 1300  vancomycin (VANCOCIN) 1,750 mg in sodium chloride 0.9 % 500 mL intermittent infusion         1,750 mg  over 120 Minutes Intravenous ONCE 21 1242      21 1230  piperacillin-tazobactam (ZOSYN) 3.375 g vial to attach to  mL bag        Note to Pharmacy: For [facility], [facility] and [facility]: For Zosyn-naive patients, use the \"Zosyn initial dose + extended infusion\" order panel.    3.375 g  over 30 Minutes Intravenous EVERY 6 HOURS 21 1101 21 1229    21 1130  norepinephrine (LEVOPHED) 16 mg in  mL infusion MAX CONC CENTRAL LINE         0.01-0.6 mcg/kg/min × 83 kg  0.8-46.7 mL/hr  Intravenous CONTINUOUS 21 1101      21 1130  vasopressin 0.2 units/mL in NS (PITRESSIN) standard conc infusion         0.5-4 Units/hr  2.5-20 mL/hr  Intravenous CONTINUOUS 21 1101            Contrast Orders - past 72 hours (72h ago, onward)            Start     Dose/Rate Route Frequency Ordered Stop    21 1109  iopamidol (ISOVUE-370) solution  Status:  Discontinued           ONCE PRN 21 1109 21 1157    "       InsightRX Prediction of Planned Initial Vancomycin Regimen  N/A        Plan:  1. Start vancomycin  1750 mg IV x1, then 1500 mg IV q18h.   2. Vancomycin monitoring method: Trough (Method 2 = manual dose calculation)  3. Vancomycin therapeutic monitoring goal: 10-15 mg/L  4. Pharmacy will check vancomycin levels as appropriate in 1-3 Days.    5. Serum creatinine levels will be ordered daily for the first week of therapy and at least twice weekly for subsequent weeks.      Maylin Pearl, [facility]

## 2021-12-06 NOTE — LETTER
January 7, 2022      Bruce Blount  [address] [facility] [address]        Dear ,    We are writing to inform you of your test results.      Here are the results from your recent lab tests:     -PSA (prostate specific antigen) test is normal.  This indicates a low likelihood of prostate cancer.  ADVISE: rechecking this in 1 year.         Resulted Orders   PSA, screen   Result Value Ref Range    Prostate Specific Antigen Screen 1.86 0.00 - 4.00 ug/L    Narrative    Assay Method:  Chemiluminescence using Siemens   Vista analyzer.       If you have any questions or concerns, please call the clinic at the number listed above.       Sincerely,      Kiko Lizarraga, DO

## 2021-12-06 NOTE — ED PROVIDER NOTES
[place] EMERGENCY DEPARTMENT ([facility])  12/06/21  History     Chief Complaint   Patient presents with     Cardiac Arrest     The history is provided by medical records and the EMS personnel.     Bruce Blount is a 56 year old male with no significant past medical history who presents to the Emergency Department for evaluation of cardiac arrest. At approximately 0710, patient's brother heard a fall and found patient unresponsive on the floor.  First responders arrived and administered 2 shocks by AED and CPR.  EMS arrived and patient was found to be in V. fib arrest again.  IO was started for IV access.  A total of 3 mg epinephrine, 300 mg amiodarone, and 5 defibrillations were administered (2 of which generated a pulse).  ECMO protocol was administered at [facility] but EMS was notified that [facility] is currently on divert and patient was brought here.  Per EMS, pulse ox was in 40s and blood glucose was 127. Patient was suctioned numerous times. Here in the ED, patient has a pulse.     No past medical history on file.    No past surgical history on file.    No family history on file.    Social History     Tobacco Use     Smoking status: Not on file     Smokeless tobacco: Not on file   Substance Use Topics     Alcohol use: Not on file       Current Facility-Administered Medications   Medication     argatroban (ACOVA) ANTICOAGULANT bolus from infusion pump 150 mcg/kg (Dosing Weight)     argatroban (ACOVA) ANTICOAGULANT bolus from infusion pump 350 mcg/kg (Dosing Weight)     argatroban (ACOVA) infusion 250 mg/250 mL     aspirin (ASA) tablet     bivalirudin (ANGIOMAX) 250 mg in sodium chloride 0.9 % 50 mL ANTICOAGULANT infusion     bivalirudin (ANGIOMAX) ANTICOAGULANT bolus from infusion pump 0.75 mg/kg (Dosing Weight)     cangrelor (KENGREAL) 200 mcg/mL bolus dose from infusion pump 30 mcg/kg (Dosing Weight)    Followed by     cangrelor (KENGREAL) 50 mg in sodium chloride 0.9 % 250 mL infusion      "DOBUTamine 500 mg in dextrose 5% 250 mL (adult std conc) premix     DOPamine 400 mg in dextrose 5% 250 mL (adult std) - premix - CENTRAL     eptifibatide (INTEGRILIN) 2 mg/mL loading dose     eptifibatide (INTEGRILIN) 200 mg/100 mL infusion     fentaNYL (PF) (SUBLIMAZE) injection     fentaNYL (SUBLIMAZE) infusion     heparin (porcine) injection     heparin infusion 25,000 units in D5W 250 mL ANTICOAGULANT     Medication Considerations - To maintain platelet inhibition after discontinuation of cangrelor (KENGREAL) [see admin instructions]     midazolam (VERSED) drip - ADULT 100 mg/100 mL in NS (pre-mix)     midazolam (VERSED) drip - ADULT 100 mg/100 mL in NS (pre-mix)     midazolam (VERSED) injection     nitroGLYcerin 50 mg in D5W 250 mL (adult std) infusion CENTRAL     nitroPRUsside (NIPRIDE) 50 mg, sodium thiosulfate 500 mg in D5W 125 mL IV infusion (conc: 0.4 mg/mL)     norepinephrine (LEVOPHED) 16 mg in  mL infusion MAX CONC CENTRAL LINE     sodium bicarbonate 8.4 % injection     ticagrelor (BRILINTA) tablet 90 mg     ticagrelor (BRILINTA) tablet     tirofiban (AGGRASTAT) 12.5 mg/250 mL infusion     tirofiban (AGGRASTAT) bolus from infusion pump 25 mcg/kg (Dosing Weight)      No Known Allergies      I have reviewed the Medications, Allergies, Past Medical and Surgical History, and Social History in the Epic system.    Review of Systems   Reason unable to perform ROS: patient unconscious.   All other systems reviewed and are negative.    A complete review of systems was performed with pertinent positives and negatives noted in the HPI, and all other systems negative.    Physical Exam   BP: 105/78  Pulse: 89  Resp: 16  Height: 175.3 cm (5' 9\")  Weight: 83 kg (183 lb)  SpO2: 100 % (I gel)      Physical Exam  Constitutional:       Interventions: He is intubated.   Eyes:      Pupils:      Right eye: Pupil is sluggish.      Left eye: Pupil is sluggish.   Pulmonary:      Effort: He is intubated.   Chest:      " Chest wall: No mass, lacerations, deformity, swelling, crepitus or edema.   Abdominal:      General: Abdomen is flat. Bowel sounds are absent.   Neurological:      Mental Status: He is unresponsive.     Patient is on the Phil with ongoing CPR    ED Course     At 8:21 AM the patient was seen and examined by Jair Gilbert MD in Room ED03.        Procedures              EKG Interpretation:      Interpreted by Jair Gilbert MD  Time reviewed: 8:24 AM  Symptoms at time of EKG: Patient unconscious  Rhythm: normal sinus   Rate: Normal  Axis: Normal  Ectopy: premature ventricular contractions (unifocal) and premature junctional contraction  Conduction: normal  ST Segments/ T Waves: T wave peaking Inferior with RV extension and Posterior  Q Waves: none  Comparison to prior: No old EKG available    Clinical Impression: Acute myocardial infarction                 Critical Care Addendum    My initial assessment, based on my review of prehospital provider report, review of nursing observations, review of vital signs, focused history, physical exam and review of cardiac rhythm monitor, established that Bruce Blount has cardiac arrest, which requires immediate intervention, and therefore he is critically ill.     After the initial assessment, the care team initiated multiple lab tests, initiated IV fluid administration, initiated medication therapy with Etomidate and succinylcholine and performed advanced airway management to provide stabilization care. Due to the critical nature of this patient, I reassessed nursing observations, vital signs, physical exam, review of cardiac rhythm monitor and 12 lead ECG analysis multiple times prior to his disposition.     Time also spent performing documentation, reviewing test results, discussion with consultants and coordination of care.     Critical care time (excluding teaching time and procedures): 25 minutes.   The medical record was reviewed and interpreted.  Current labs reviewed  and interpreted.  Previous labs reviewed and interpreted.     Results for orders placed or performed during the hospital encounter of 12/06/21 (from the past 24 hour(s))   iStat Troponin, POCT   Result Value Ref Range    TROPPC POCT 1.32 (HH) <=0.12 ug/L   iStat INR, POCT   Result Value Ref Range    INR POCT 2.1 2.0 - 3.0   iStat Gases Electrolytes ICA Glucose Venous, POCT   Result Value Ref Range    CPB Applied No     Hematocrit POCT 41 40 - 53 %    Calcium, Ionized Whole Blood POCT 4.7 4.4 - 5.2 mg/dL    Glucose Whole Blood POCT 406 (H) 70 - 99 mg/dL    Bicarbonate Venous POCT 17 (L) 21 - 28 mmol/L    Hemoglobin POCT 13.9 13.3 - 17.7 g/dL    Potassium POCT 4.6 3.4 - 5.3 mmol/L    Sodium POCT 136 133 - 144 mmol/L    pCO2 Venous POCT 88 (HH) 40 - 50 mm Hg    pO2 Venous POCT 66 (H) 25 - 47 mm Hg    pH Venous POCT 6.89 (LL) 7.32 - 7.43    O2 Sat, Venous POCT 73 (L) 94 - 100 %   Creatinine POCT   Result Value Ref Range    Creatinine POCT 1.1 0.7 - 1.3 mg/dL    GFR, ESTIMATED POCT >60 >60 mL/min/1.73m2   Blood gas venous with oxyhemoglobin POCT   Result Value Ref Range    pH Venous POCT 6.85 (LL) 7.32 - 7.43    pCO2 Venous POCT 23 (L) 40 - 50 mm Hg    pO2 Venous POCT 50 (H) 25 - 47 mm Hg    Bicarbonate Venous POCT 4 (LL) 21 - 28 mmol/L    Oxyhemoglobin Venous POCT 57 (L) 92 - 100 %    FIO2 POCT 100.0 %    Base Excess/Deficit (+/-) POCT -26.7 (L) -8.1 - 1.9 mmol/L   Potassium whole blood POCT   Result Value Ref Range    Potassium POCT 1.1 (LL) 3.5 - 5.0 mmol/L   Glucose whole blood POCT   Result Value Ref Range    Glucose Whole Blood POCT 114 (H) 70 - 99 mg/dL   Ionized calcium, whole blood POCT   Result Value Ref Range    Calcium, Ionized Whole Blood POCT <1.6 (LL) 4.4 - 5.2 mg/dL   Hemoglobin POCT   Result Value Ref Range    Hemoglobin POCT 4.1 (LL) 13.3 - 17.7 g/dL   Sodium Whole Blood POCT   Result Value Ref Range    Sodium POCT 149 (H) 133 - 144 mmol/L   Arterial Panel POCT   Result Value Ref Range    pH Arterial  POCT 7.07 (LL) 7.35 - 7.45    pCO2 Arterial POCT 36 35 - 45 mm Hg    pO2 Arterial POCT 243 (H) 80 - 105 mm Hg    Bicarbonate Arterial POCT 10 (LL) 21 - 28 mmol/L    Sodium POCT 135 133 - 144 mmol/L    Potassium POCT 5.5 (H) 3.5 - 5.0 mmol/L    Hemoglobin POCT 13.1 (L) 13.3 - 17.7 g/dL    Glucose Whole Blood POCT 438 (H) 70 - 99 mg/dL    Calcium, Ionized Whole Blood POCT 4.0 (L) 4.4 - 5.2 mg/dL    Base Excess/Deficit (+/-) POCT -18.8 (L) -9.6 - 2.0 mmol/L    Lactic Acid POCT 8.7 (HH) <=2.0 mmol/L   Oxyhemoglobin, arterial POCT   Result Value Ref Range    Oxyhemoglobin POCT 95 92 - 100 %   Activated clotting time celite, POCT   Result Value Ref Range    Activated Clotting Time (Celite) POCT 237 (H) 74 - 150 seconds   Arterial Panel POCT   Result Value Ref Range    pH Arterial POCT 7.08 (LL) 7.35 - 7.45    pCO2 Arterial POCT 42 35 - 45 mm Hg    pO2 Arterial POCT 386 (H) 80 - 105 mm Hg    Bicarbonate Arterial POCT 13 (L) 21 - 28 mmol/L    Sodium POCT 133 133 - 144 mmol/L    Potassium POCT 5.7 (H) 3.5 - 5.0 mmol/L    Hemoglobin POCT 12.7 (L) 13.3 - 17.7 g/dL    Glucose Whole Blood POCT 407 (H) 70 - 99 mg/dL    Calcium, Ionized Whole Blood POCT 4.0 (L) 4.4 - 5.2 mg/dL    Base Excess/Deficit (+/-) POCT -16.8 (L) -9.6 - 2.0 mmol/L    Lactic Acid POCT 8.0 (HH) <=2.0 mmol/L   Oxyhemoglobin, arterial POCT   Result Value Ref Range    Oxyhemoglobin POCT 96 92 - 100 %   Arterial Panel POCT   Result Value Ref Range    pH Arterial POCT 7.29 (L) 7.35 - 7.45    pCO2 Arterial POCT 37 35 - 45 mm Hg    pO2 Arterial POCT 352 (H) 80 - 105 mm Hg    Bicarbonate Arterial POCT 17 (L) 21 - 28 mmol/L    Sodium POCT 135 133 - 144 mmol/L    Potassium POCT 6.0 (H) 3.5 - 5.0 mmol/L    Hemoglobin POCT 13.0 (L) 13.3 - 17.7 g/dL    Glucose Whole Blood POCT 380 (H) 70 - 99 mg/dL    Calcium, Ionized Whole Blood POCT 3.7 (L) 4.4 - 5.2 mg/dL    Base Excess/Deficit (+/-) POCT -8.5 -9.6 - 2.0 mmol/L    Lactic Acid POCT 7.9 (HH) <=2.0 mmol/L   Oxyhemoglobin,  arterial POCT   Result Value Ref Range    Oxyhemoglobin POCT 96 92 - 100 %   Activated clotting time celite, POCT   Result Value Ref Range    Activated Clotting Time (Celite) POCT 279 (H) 74 - 150 seconds   Activated clotting time celite, POCT   Result Value Ref Range    Activated Clotting Time (Celite) POCT 322 (H) 74 - 150 seconds   Arterial Panel POCT   Result Value Ref Range    pH Arterial POCT 7.27 (L) 7.35 - 7.45    pCO2 Arterial POCT 37 35 - 45 mm Hg    pO2 Arterial POCT 184 (H) 80 - 105 mm Hg    Bicarbonate Arterial POCT 17 (L) 21 - 28 mmol/L    Sodium POCT 134 133 - 144 mmol/L    Potassium POCT 5.4 (H) 3.5 - 5.0 mmol/L    Hemoglobin POCT 14.1 13.3 - 17.7 g/dL    Glucose Whole Blood POCT 349 (H) 70 - 99 mg/dL    Calcium, Ionized Whole Blood POCT 3.6 (L) 4.4 - 5.2 mg/dL    Base Excess/Deficit (+/-) POCT -9.1 -9.6 - 2.0 mmol/L    Lactic Acid POCT 5.8 (HH) <=2.0 mmol/L   Oxyhemoglobin, arterial POCT   Result Value Ref Range    Oxyhemoglobin POCT 96 92 - 100 %   Activated clotting time celite, POCT   Result Value Ref Range    Activated Clotting Time (Celite) POCT 292 (H) 74 - 150 seconds     Medications   argatroban (ACOVA) ANTICOAGULANT bolus from infusion pump 350 mcg/kg (Dosing Weight) (has no administration in time range)   argatroban (ACOVA) ANTICOAGULANT bolus from infusion pump 150 mcg/kg (Dosing Weight) (has no administration in time range)   argatroban (ACOVA) infusion 250 mg/250 mL (has no administration in time range)   eptifibatide (INTEGRILIN) 2 mg/mL loading dose (has no administration in time range)   eptifibatide (INTEGRILIN) 200 mg/100 mL infusion (has no administration in time range)   heparin infusion 25,000 units in D5W 250 mL ANTICOAGULANT (has no administration in time range)   nitroGLYcerin 50 mg in D5W 250 mL (adult std) infusion CENTRAL (has no administration in time range)   nitroPRUsside (NIPRIDE) 50 mg, sodium thiosulfate 500 mg in D5W 125 mL IV infusion (conc: 0.4 mg/mL) (has no  administration in time range)   DOBUTamine 500 mg in dextrose 5% 250 mL (adult std conc) premix (has no administration in time range)   DOPamine 400 mg in dextrose 5% 250 mL (adult std) - premix - CENTRAL (has no administration in time range)   Medication Considerations - To maintain platelet inhibition after discontinuation of cangrelor (KENGREAL) [see admin instructions] (has no administration in time range)   cangrelor (KENGREAL) 200 mcg/mL bolus dose from infusion pump 30 mcg/kg (Dosing Weight) (has no administration in time range)     Followed by   cangrelor (KENGREAL) 50 mg in sodium chloride 0.9 % 250 mL infusion (has no administration in time range)   bivalirudin (ANGIOMAX) ANTICOAGULANT bolus from infusion pump 0.75 mg/kg (Dosing Weight) (has no administration in time range)   bivalirudin (ANGIOMAX) 250 mg in sodium chloride 0.9 % 50 mL ANTICOAGULANT infusion (has no administration in time range)   tirofiban (AGGRASTAT) bolus from infusion pump 25 mcg/kg (Dosing Weight) (has no administration in time range)   tirofiban (AGGRASTAT) 12.5 mg/250 mL infusion (has no administration in time range)   heparin (porcine) injection (2,000 Units Intravenous Given 12/6/21 0926)   fentaNYL (PF) (SUBLIMAZE) injection (100 mcg Intravenous Given 12/6/21 0936)   midazolam (VERSED) injection (2 mg Intravenous Given 12/6/21 0933)   midazolam (VERSED) drip - ADULT 100 mg/100 mL in NS (pre-mix) (has no administration in time range)   fentaNYL (SUBLIMAZE) infusion (has no administration in time range)   norepinephrine (LEVOPHED) 16 mg in  mL infusion MAX CONC CENTRAL LINE (has no administration in time range)   ticagrelor (BRILINTA) tablet 90 mg (has no administration in time range)   sodium bicarbonate 8.4 % injection (100 mEq Intravenous Given 12/6/21 0908)   midazolam (VERSED) drip - ADULT 100 mg/100 mL in NS (pre-mix) (5 mg/hr Intravenous Rate/Dose Change 12/6/21 0929)   aspirin (ASA) tablet (324 mg Oral Given 12/6/21  0922)   ticagrelor (BRILINTA) tablet (180 mg Oral Given 12/6/21 0922)   etomidate (AMIDATE) injection 20 mg (20 mg Intravenous Given 12/6/21 0827)   succinylcholine (ANECTINE) injection 100 mg (100 mg Intravenous Given 12/6/21 0827)             Assessments & Plan (with Medical Decision Making)   56-year-old male presents to the emergency room following cardiac arrest.  Differential includes stroke, arrhythmia, metabolic derangement, intracranial event, status epilepticus.  Exam revealed the patient to be unresponsive, intubated, with ongoing compressions from Phil device.  Patient had been defibrillated multiple times.  Phil device was discontinued and patient had palpable pulse with a blood pressure in the  range.  Patient was intubated with #8 ET tube after administration of etomidate and succinyl choline.  Airway was confirmed with capnography, colorimeter and breath sounds.  Following intubation, patient went into a wide-complex tachycardia and lost his pulse requiring 200 J monophasic defibrillation with resultant normal sinus rhythm.  The case was discussed with interventional cardiology and the patient was brought directly to the catheterization lab.    I have reviewed the nursing notes.    I have reviewed the findings, diagnosis, plan and need for follow up with the patient.    There are no discharge medications for this patient.      Final diagnoses:   Cardiac arrest (H)       Nydia BIRD am serving as a trained medical scribe to document services personally performed by Jair Gilbert MD, based on the provider's statements to me.      Jair BIRD MD, was physically present and have reviewed and verified the accuracy of this note documented by Nydia Nolan.     Jair Gilbert MD  12/6/2021   [facility] EMERGENCY DEPARTMENT     Jair Gilbert MD  12/06/21 0941

## 2021-12-06 NOTE — Clinical Note
Max pressure = 8 mark. Total duration = 5 seconds.     Max pressure = 12 mark. Total duration = 8 seconds.    Balloon reinflated a second time: Max pressure = 12 mark. Total duration = 8 seconds.

## 2021-12-06 NOTE — Clinical Note
IABP inserted in the left femoral artery. IABP inserted with 50 cc balloon volume Lot number is: [id number]

## 2021-12-07 ENCOUNTER — APPOINTMENT (OUTPATIENT)
Dept: NEUROLOGY | Facility: CLINIC | Age: 56
End: 2021-12-07
Attending: NURSE PRACTITIONER
Payer: COMMERCIAL

## 2021-12-07 ENCOUNTER — APPOINTMENT (OUTPATIENT)
Dept: CARDIOLOGY | Facility: CLINIC | Age: 56
End: 2021-12-07
Attending: PHYSICIAN ASSISTANT
Payer: COMMERCIAL

## 2021-12-07 ENCOUNTER — APPOINTMENT (OUTPATIENT)
Dept: GENERAL RADIOLOGY | Facility: CLINIC | Age: 56
End: 2021-12-07
Attending: NURSE PRACTITIONER
Payer: COMMERCIAL

## 2021-12-07 PROBLEM — Z72.0 TOBACCO USE: Chronic | Status: ACTIVE | Noted: 2021-12-07

## 2021-12-07 PROBLEM — F10.10 ETOH ABUSE: Chronic | Status: ACTIVE | Noted: 2021-12-07

## 2021-12-07 LAB
ACT BLD: 182 SECONDS (ref 74–150)
ACT BLD: 190 SECONDS (ref 74–150)
ACT BLD: 194 SECONDS (ref 74–150)
ACT BLD: 199 SECONDS (ref 74–150)
ACT BLD: 199 SECONDS (ref 74–150)
ACT BLD: 203 SECONDS (ref 74–150)
ACT BLD: 207 SECONDS (ref 74–150)
ACT BLD: 211 SECONDS (ref 74–150)
ACT BLD: 211 SECONDS (ref 74–150)
ALBUMIN SERPL-MCNC: 1.8 G/DL (ref 3.4–5)
ALBUMIN SERPL-MCNC: 1.9 G/DL (ref 3.4–5)
ALP SERPL-CCNC: 34 U/L (ref 40–150)
ALP SERPL-CCNC: 36 U/L (ref 40–150)
ALP SERPL-CCNC: 37 U/L (ref 40–150)
ALP SERPL-CCNC: 40 U/L (ref 40–150)
ALT SERPL W P-5'-P-CCNC: 88 U/L (ref 0–70)
ALT SERPL W P-5'-P-CCNC: 90 U/L (ref 0–70)
ALT SERPL W P-5'-P-CCNC: 93 U/L (ref 0–70)
ALT SERPL W P-5'-P-CCNC: 95 U/L (ref 0–70)
ANION GAP SERPL CALCULATED.3IONS-SCNC: 11 MMOL/L (ref 3–14)
ANION GAP SERPL CALCULATED.3IONS-SCNC: 6 MMOL/L (ref 3–14)
ANION GAP SERPL CALCULATED.3IONS-SCNC: 8 MMOL/L (ref 3–14)
ANION GAP SERPL CALCULATED.3IONS-SCNC: 8 MMOL/L (ref 3–14)
APTT PPP: 139 SECONDS (ref 22–38)
APTT PPP: 170 SECONDS (ref 22–38)
APTT PPP: 82 SECONDS (ref 22–38)
APTT PPP: 90 SECONDS (ref 22–38)
AST SERPL W P-5'-P-CCNC: 214 U/L (ref 0–45)
AST SERPL W P-5'-P-CCNC: 250 U/L (ref 0–45)
AST SERPL W P-5'-P-CCNC: 277 U/L (ref 0–45)
AST SERPL W P-5'-P-CCNC: 326 U/L (ref 0–45)
AT III ACT/NOR PPP CHRO: 55 % (ref 85–135)
ATRIAL RATE - MUSE: 0 BPM
ATRIAL RATE - MUSE: 53 BPM
BASE EXCESS BLDA CALC-SCNC: -7.6 MMOL/L (ref -9–1.8)
BASE EXCESS BLDA CALC-SCNC: -8.1 MMOL/L (ref -9–1.8)
BASE EXCESS BLDA CALC-SCNC: -8.3 MMOL/L (ref -9–1.8)
BASE EXCESS BLDA CALC-SCNC: -8.3 MMOL/L (ref -9–1.8)
BASE EXCESS BLDA CALC-SCNC: -8.4 MMOL/L (ref -9–1.8)
BASE EXCESS BLDA CALC-SCNC: -8.6 MMOL/L (ref -9–1.8)
BASE EXCESS BLDA CALC-SCNC: -8.6 MMOL/L (ref -9–1.8)
BASE EXCESS BLDA CALC-SCNC: -8.7 MMOL/L (ref -9–1.8)
BASE EXCESS BLDA CALC-SCNC: -8.8 MMOL/L (ref -9–1.8)
BASE EXCESS BLDA CALC-SCNC: -9 MMOL/L (ref -9–1.8)
BASE EXCESS BLDA CALC-SCNC: -9 MMOL/L (ref -9–1.8)
BASE EXCESS BLDA CALC-SCNC: -9.2 MMOL/L (ref -9–1.8)
BASE EXCESS BLDA CALC-SCNC: -9.2 MMOL/L (ref -9–1.8)
BASE EXCESS BLDA CALC-SCNC: -9.6 MMOL/L (ref -9–1.8)
BASE EXCESS BLDV CALC-SCNC: -7.3 MMOL/L (ref -7.7–1.9)
BASE EXCESS BLDV CALC-SCNC: -8.2 MMOL/L (ref -7.7–1.9)
BILIRUB SERPL-MCNC: 0.4 MG/DL (ref 0.2–1.3)
BILIRUB SERPL-MCNC: 0.5 MG/DL (ref 0.2–1.3)
BUN SERPL-MCNC: 25 MG/DL (ref 7–30)
BUN SERPL-MCNC: 27 MG/DL (ref 7–30)
BUN SERPL-MCNC: 28 MG/DL (ref 7–30)
BUN SERPL-MCNC: 33 MG/DL (ref 7–30)
CA-I BLD-MCNC: 4.4 MG/DL (ref 4.4–5.2)
CA-I BLD-MCNC: 4.6 MG/DL (ref 4.4–5.2)
CA-I BLD-MCNC: 4.6 MG/DL (ref 4.4–5.2)
CA-I BLD-MCNC: 4.7 MG/DL (ref 4.4–5.2)
CALCIUM SERPL-MCNC: 7.5 MG/DL (ref 8.5–10.1)
CALCIUM SERPL-MCNC: 7.5 MG/DL (ref 8.5–10.1)
CALCIUM SERPL-MCNC: 7.6 MG/DL (ref 8.5–10.1)
CALCIUM SERPL-MCNC: 7.7 MG/DL (ref 8.5–10.1)
CF REDUC 30M P MA P HEP LENFR BLD TEG: 0.3 % (ref 0–8)
CF REDUC 60M P MA P HEPASE LENFR BLD TEG: 2.7 % (ref 0–15)
CFT P HPASE BLD TEG: 4.8 MINUTE (ref 1–3)
CHLORIDE BLD-SCNC: 118 MMOL/L (ref 94–109)
CHOLEST SERPL-MCNC: 127 MG/DL
CI (COAGULATION INDEX)(Z): -7.8 (ref -3–3)
CLOT ANGLE P HPASE BLD TEG: 44 DEGREES (ref 53–72)
CLOT INIT P HPASE BLD TEG: 12.8 MINUTE (ref 5–10)
CLOT STRENGTH P HPASE BLD TEG: 6.1 KD/SC (ref 4.5–11)
CO2 SERPL-SCNC: 16 MMOL/L (ref 20–32)
CO2 SERPL-SCNC: 18 MMOL/L (ref 20–32)
CO2 SERPL-SCNC: 18 MMOL/L (ref 20–32)
CO2 SERPL-SCNC: 20 MMOL/L (ref 20–32)
CREAT SERPL-MCNC: 0.96 MG/DL (ref 0.66–1.25)
CREAT SERPL-MCNC: 1.01 MG/DL (ref 0.66–1.25)
CREAT SERPL-MCNC: 1.03 MG/DL (ref 0.66–1.25)
CREAT SERPL-MCNC: 1.46 MG/DL (ref 0.66–1.25)
CRP SERPL-MCNC: 67 MG/L (ref 0–8)
D DIMER PPP FEU-MCNC: 5.07 UG/ML FEU (ref 0–0.5)
D DIMER PPP FEU-MCNC: 8.79 UG/ML FEU (ref 0–0.5)
DIASTOLIC BLOOD PRESSURE - MUSE: NORMAL MMHG
DIASTOLIC BLOOD PRESSURE - MUSE: NORMAL MMHG
ERYTHROCYTE [DISTWIDTH] IN BLOOD BY AUTOMATED COUNT: 12.4 % (ref 10–15)
ERYTHROCYTE [DISTWIDTH] IN BLOOD BY AUTOMATED COUNT: 12.6 % (ref 10–15)
ERYTHROCYTE [DISTWIDTH] IN BLOOD BY AUTOMATED COUNT: 12.7 % (ref 10–15)
ERYTHROCYTE [DISTWIDTH] IN BLOOD BY AUTOMATED COUNT: 12.9 % (ref 10–15)
ERYTHROCYTE [SEDIMENTATION RATE] IN BLOOD BY WESTERGREN METHOD: 9 MM/HR (ref 0–20)
FIBRINOGEN PPP-MCNC: 302 MG/DL (ref 170–490)
FIBRINOGEN PPP-MCNC: 474 MG/DL (ref 170–490)
GFR SERPL CREATININE-BSD FRML MDRD: 53 ML/MIN/1.73M2
GFR SERPL CREATININE-BSD FRML MDRD: 81 ML/MIN/1.73M2
GFR SERPL CREATININE-BSD FRML MDRD: 83 ML/MIN/1.73M2
GFR SERPL CREATININE-BSD FRML MDRD: 88 ML/MIN/1.73M2
GLUCOSE BLD-MCNC: 129 MG/DL (ref 70–99)
GLUCOSE BLD-MCNC: 129 MG/DL (ref 70–99)
GLUCOSE BLD-MCNC: 151 MG/DL (ref 70–99)
GLUCOSE BLD-MCNC: 151 MG/DL (ref 70–99)
GLUCOSE BLD-MCNC: 152 MG/DL (ref 70–99)
GLUCOSE BLD-MCNC: 158 MG/DL (ref 70–99)
GLUCOSE BLD-MCNC: 159 MG/DL (ref 70–99)
GLUCOSE BLDC GLUCOMTR-MCNC: 109 MG/DL (ref 70–99)
GLUCOSE BLDC GLUCOMTR-MCNC: 118 MG/DL (ref 70–99)
GLUCOSE BLDC GLUCOMTR-MCNC: 127 MG/DL (ref 70–99)
GLUCOSE BLDC GLUCOMTR-MCNC: 132 MG/DL (ref 70–99)
GLUCOSE BLDC GLUCOMTR-MCNC: 137 MG/DL (ref 70–99)
GLUCOSE BLDC GLUCOMTR-MCNC: 146 MG/DL (ref 70–99)
GLUCOSE BLDC GLUCOMTR-MCNC: 146 MG/DL (ref 70–99)
GLUCOSE BLDC GLUCOMTR-MCNC: 147 MG/DL (ref 70–99)
GLUCOSE BLDC GLUCOMTR-MCNC: 148 MG/DL (ref 70–99)
GLUCOSE BLDC GLUCOMTR-MCNC: 152 MG/DL (ref 70–99)
HCO3 BLD-SCNC: 17 MMOL/L (ref 21–28)
HCO3 BLD-SCNC: 18 MMOL/L (ref 21–28)
HCO3 BLDA-SCNC: 17 MMOL/L (ref 21–28)
HCO3 BLDA-SCNC: 17 MMOL/L (ref 21–28)
HCO3 BLDV-SCNC: 19 MMOL/L (ref 21–28)
HCO3 BLDV-SCNC: 20 MMOL/L (ref 21–28)
HCT VFR BLD AUTO: 35.7 % (ref 40–53)
HCT VFR BLD AUTO: 36.1 % (ref 40–53)
HCT VFR BLD AUTO: 37.5 % (ref 40–53)
HCT VFR BLD AUTO: 37.9 % (ref 40–53)
HDLC SERPL-MCNC: 44 MG/DL
HGB BLD-MCNC: 12.1 G/DL (ref 13.3–17.7)
HGB BLD-MCNC: 12.2 G/DL (ref 13.3–17.7)
HGB BLD-MCNC: 12.7 G/DL (ref 13.3–17.7)
HGB BLD-MCNC: 12.8 G/DL (ref 13.3–17.7)
HGB FREE PLAS-MCNC: <30 MG/DL
HOLD SPECIMEN: NORMAL
INR PPP: 1.31 (ref 0.85–1.15)
INR PPP: 1.31 (ref 0.85–1.15)
INR PPP: 1.36 (ref 0.85–1.15)
INR PPP: 1.58 (ref 0.85–1.15)
INTERPRETATION ECG - MUSE: NORMAL
INTERPRETATION ECG - MUSE: NORMAL
INTERPRETATION TEGPIA: NORMAL
LACTATE SERPL-SCNC: 2.2 MMOL/L (ref 0.7–2)
LACTATE SERPL-SCNC: 2.2 MMOL/L (ref 0.7–2)
LACTATE SERPL-SCNC: 2.3 MMOL/L (ref 0.7–2)
LACTATE SERPL-SCNC: 2.5 MMOL/L (ref 0.7–2)
LACTATE SERPL-SCNC: 2.6 MMOL/L (ref 0.7–2)
LACTATE SERPL-SCNC: 2.8 MMOL/L (ref 0.7–2)
LACTATE SERPL-SCNC: 3 MMOL/L (ref 0.7–2)
LACTATE SERPL-SCNC: 3.3 MMOL/L (ref 0.7–2)
LDH SERPL L TO P-CCNC: 790 U/L (ref 85–227)
LDLC SERPL CALC-MCNC: 67 MG/DL
MAGNESIUM SERPL-MCNC: 2.4 MG/DL (ref 1.6–2.3)
MCF P HPASE BLD TEG: 55.2 MM (ref 50–70)
MCH RBC QN AUTO: 31.8 PG (ref 26.5–33)
MCH RBC QN AUTO: 32 PG (ref 26.5–33)
MCH RBC QN AUTO: 32.1 PG (ref 26.5–33)
MCH RBC QN AUTO: 32.2 PG (ref 26.5–33)
MCHC RBC AUTO-ENTMCNC: 33.5 G/DL (ref 31.5–36.5)
MCHC RBC AUTO-ENTMCNC: 33.8 G/DL (ref 31.5–36.5)
MCHC RBC AUTO-ENTMCNC: 33.9 G/DL (ref 31.5–36.5)
MCHC RBC AUTO-ENTMCNC: 34.1 G/DL (ref 31.5–36.5)
MCV RBC AUTO: 94 FL (ref 78–100)
MCV RBC AUTO: 95 FL (ref 78–100)
NONHDLC SERPL-MCNC: 83 MG/DL
O2/TOTAL GAS SETTING VFR VENT: 40 %
O2/TOTAL GAS SETTING VFR VENT: 60 %
O2/TOTAL GAS SETTING VFR VENT: 70 %
OXYHGB MFR BLD: 84 % (ref 92–100)
OXYHGB MFR BLD: 96 % (ref 92–100)
OXYHGB MFR BLD: 97 % (ref 92–100)
OXYHGB MFR BLD: 98 % (ref 92–100)
OXYHGB MFR BLDA: 98 % (ref 75–100)
OXYHGB MFR BLDA: 99 % (ref 75–100)
OXYHGB MFR BLDV: 68 %
OXYHGB MFR BLDV: 75 %
P AXIS - MUSE: 70 DEGREES
P AXIS - MUSE: NORMAL DEGREES
PA AA BLD-ACNC: 34 %
PA ADP BLD-ACNC: 99 %
PCO2 BLD: 35 MM HG (ref 35–45)
PCO2 BLD: 35 MM HG (ref 35–45)
PCO2 BLD: 36 MM HG (ref 35–45)
PCO2 BLD: 37 MM HG (ref 35–45)
PCO2 BLD: 39 MM HG (ref 35–45)
PCO2 BLD: 40 MM HG (ref 35–45)
PCO2 BLD: 40 MM HG (ref 35–45)
PCO2 BLD: 41 MM HG (ref 35–45)
PCO2 BLD: 42 MM HG (ref 35–45)
PCO2 BLD: 47 MM HG (ref 35–45)
PCO2 BLDA: 33 MM HG (ref 35–45)
PCO2 BLDA: 39 MM HG (ref 35–45)
PCO2 BLDV: 43 MM HG (ref 40–50)
PCO2 BLDV: 48 MM HG (ref 40–50)
PH BLD: 7.2 [PH] (ref 7.35–7.45)
PH BLD: 7.25 [PH] (ref 7.35–7.45)
PH BLD: 7.25 [PH] (ref 7.35–7.45)
PH BLD: 7.26 [PH] (ref 7.35–7.45)
PH BLD: 7.27 [PH] (ref 7.35–7.45)
PH BLD: 7.29 [PH] (ref 7.35–7.45)
PH BLD: 7.3 [PH] (ref 7.35–7.45)
PH BLD: 7.31 [PH] (ref 7.35–7.45)
PH BLDA: 7.26 [PH] (ref 7.35–7.45)
PH BLDA: 7.31 [PH] (ref 7.35–7.45)
PH BLDV: 7.22 [PH] (ref 7.32–7.43)
PH BLDV: 7.27 [PH] (ref 7.32–7.43)
PHOSPHATE SERPL-MCNC: 3.8 MG/DL (ref 2.5–4.5)
PLATELET # BLD AUTO: 162 10E3/UL (ref 150–450)
PLATELET # BLD AUTO: 171 10E3/UL (ref 150–450)
PLATELET # BLD AUTO: 172 10E3/UL (ref 150–450)
PLATELET # BLD AUTO: 219 10E3/UL (ref 150–450)
PO2 BLD: 114 MM HG (ref 80–105)
PO2 BLD: 126 MM HG (ref 80–105)
PO2 BLD: 133 MM HG (ref 80–105)
PO2 BLD: 152 MM HG (ref 80–105)
PO2 BLD: 157 MM HG (ref 80–105)
PO2 BLD: 183 MM HG (ref 80–105)
PO2 BLD: 188 MM HG (ref 80–105)
PO2 BLD: 237 MM HG (ref 80–105)
PO2 BLD: 255 MM HG (ref 80–105)
PO2 BLD: 304 MM HG (ref 80–105)
PO2 BLD: 54 MM HG (ref 80–105)
PO2 BLD: 94 MM HG (ref 80–105)
PO2 BLDA: 214 MM HG (ref 80–105)
PO2 BLDA: 336 MM HG (ref 80–105)
PO2 BLDV: 39 MM HG (ref 25–47)
PO2 BLDV: 44 MM HG (ref 25–47)
POTASSIUM BLD-SCNC: 3.3 MMOL/L (ref 3.4–5.3)
POTASSIUM BLD-SCNC: 3.4 MMOL/L (ref 3.4–5.3)
POTASSIUM BLD-SCNC: 3.7 MMOL/L (ref 3.4–5.3)
POTASSIUM BLD-SCNC: 4.7 MMOL/L (ref 3.4–5.3)
PR INTERVAL - MUSE: 146 MS
PR INTERVAL - MUSE: NORMAL MS
PROT SERPL-MCNC: 4.8 G/DL (ref 6.8–8.8)
PROT SERPL-MCNC: 4.9 G/DL (ref 6.8–8.8)
PROT SERPL-MCNC: 5.1 G/DL (ref 6.8–8.8)
PROT SERPL-MCNC: 5.2 G/DL (ref 6.8–8.8)
QRS DURATION - MUSE: 120 MS
QRS DURATION - MUSE: 96 MS
QT - MUSE: 564 MS
QT - MUSE: 610 MS
QTC - MUSE: 564 MS
QTC - MUSE: 572 MS
R AXIS - MUSE: 60 DEGREES
R AXIS - MUSE: 70 DEGREES
RBC # BLD AUTO: 3.77 10E6/UL (ref 4.4–5.9)
RBC # BLD AUTO: 3.81 10E6/UL (ref 4.4–5.9)
RBC # BLD AUTO: 3.98 10E6/UL (ref 4.4–5.9)
RBC # BLD AUTO: 3.99 10E6/UL (ref 4.4–5.9)
SODIUM SERPL-SCNC: 144 MMOL/L (ref 133–144)
SODIUM SERPL-SCNC: 145 MMOL/L (ref 133–144)
SYSTOLIC BLOOD PRESSURE - MUSE: NORMAL MMHG
SYSTOLIC BLOOD PRESSURE - MUSE: NORMAL MMHG
T AXIS - MUSE: 256 DEGREES
T AXIS - MUSE: 67 DEGREES
TRIGL SERPL-MCNC: 78 MG/DL
TROPONIN I SERPL HS-MCNC: ABNORMAL NG/L
UFH PPP CHRO-ACNC: 0.17 IU/ML
UFH PPP CHRO-ACNC: 0.26 IU/ML
UFH PPP CHRO-ACNC: 0.34 IU/ML
UFH PPP CHRO-ACNC: 0.51 IU/ML
VENTRICULAR RATE- MUSE: 53 BPM
VENTRICULAR RATE- MUSE: 60 BPM
WBC # BLD AUTO: 13.8 10E3/UL (ref 4–11)
WBC # BLD AUTO: 6.3 10E3/UL (ref 4–11)
WBC # BLD AUTO: 7.2 10E3/UL (ref 4–11)
WBC # BLD AUTO: 9.6 10E3/UL (ref 4–11)

## 2021-12-07 PROCEDURE — 250N000013 HC RX MED GY IP 250 OP 250 PS 637: Performed by: STUDENT IN AN ORGANIZED HEALTH CARE EDUCATION/TRAINING PROGRAM

## 2021-12-07 PROCEDURE — 85396 CLOTTING ASSAY WHOLE BLOOD: CPT | Performed by: NURSE PRACTITIONER

## 2021-12-07 PROCEDURE — 999N000015 HC STATISTIC ARTERIAL MONITORING DAILY

## 2021-12-07 PROCEDURE — 93005 ELECTROCARDIOGRAM TRACING: CPT

## 2021-12-07 PROCEDURE — 85610 PROTHROMBIN TIME: CPT | Performed by: NURSE PRACTITIONER

## 2021-12-07 PROCEDURE — 93010 ELECTROCARDIOGRAM REPORT: CPT | Performed by: INTERNAL MEDICINE

## 2021-12-07 PROCEDURE — 83051 HEMOGLOBIN PLASMA: CPT | Performed by: NURSE PRACTITIONER

## 2021-12-07 PROCEDURE — 85300 ANTITHROMBIN III ACTIVITY: CPT | Performed by: NURSE PRACTITIONER

## 2021-12-07 PROCEDURE — 85520 HEPARIN ASSAY: CPT | Performed by: NURSE PRACTITIONER

## 2021-12-07 PROCEDURE — 80061 LIPID PANEL: CPT | Performed by: PHYSICIAN ASSISTANT

## 2021-12-07 PROCEDURE — 82330 ASSAY OF CALCIUM: CPT | Performed by: NURSE PRACTITIONER

## 2021-12-07 PROCEDURE — 84484 ASSAY OF TROPONIN QUANT: CPT | Performed by: NURSE PRACTITIONER

## 2021-12-07 PROCEDURE — 85384 FIBRINOGEN ACTIVITY: CPT | Performed by: NURSE PRACTITIONER

## 2021-12-07 PROCEDURE — 82805 BLOOD GASES W/O2 SATURATION: CPT | Performed by: NURSE PRACTITIONER

## 2021-12-07 PROCEDURE — 99291 CRITICAL CARE FIRST HOUR: CPT | Mod: 25 | Performed by: INTERNAL MEDICINE

## 2021-12-07 PROCEDURE — 87205 SMEAR GRAM STAIN: CPT | Performed by: NURSE PRACTITIONER

## 2021-12-07 PROCEDURE — 85347 COAGULATION TIME ACTIVATED: CPT

## 2021-12-07 PROCEDURE — 85379 FIBRIN DEGRADATION QUANT: CPT | Performed by: NURSE PRACTITIONER

## 2021-12-07 PROCEDURE — 95714 VEEG EA 12-26 HR UNMNTR: CPT

## 2021-12-07 PROCEDURE — 80053 COMPREHEN METABOLIC PANEL: CPT | Performed by: NURSE PRACTITIONER

## 2021-12-07 PROCEDURE — 85027 COMPLETE CBC AUTOMATED: CPT | Performed by: NURSE PRACTITIONER

## 2021-12-07 PROCEDURE — 82803 BLOOD GASES ANY COMBINATION: CPT | Performed by: NURSE PRACTITIONER

## 2021-12-07 PROCEDURE — 250N000013 HC RX MED GY IP 250 OP 250 PS 637: Performed by: NURSE PRACTITIONER

## 2021-12-07 PROCEDURE — 85014 HEMATOCRIT: CPT | Performed by: NURSE PRACTITIONER

## 2021-12-07 PROCEDURE — 86316 IMMUNOASSAY TUMOR OTHER: CPT | Performed by: NURSE PRACTITIONER

## 2021-12-07 PROCEDURE — 258N000003 HC RX IP 258 OP 636: Performed by: PHYSICIAN ASSISTANT

## 2021-12-07 PROCEDURE — 84155 ASSAY OF PROTEIN SERUM: CPT | Performed by: NURSE PRACTITIONER

## 2021-12-07 PROCEDURE — 33949 ECMO/ECLS DAILY MGMT ARTERY: CPT

## 2021-12-07 PROCEDURE — 83615 LACTATE (LD) (LDH) ENZYME: CPT | Performed by: NURSE PRACTITIONER

## 2021-12-07 PROCEDURE — 33949 ECMO/ECLS DAILY MGMT ARTERY: CPT | Performed by: INTERNAL MEDICINE

## 2021-12-07 PROCEDURE — 95720 EEG PHY/QHP EA INCR W/VEEG: CPT | Performed by: PSYCHIATRY & NEUROLOGY

## 2021-12-07 PROCEDURE — 83605 ASSAY OF LACTIC ACID: CPT | Performed by: NURSE PRACTITIONER

## 2021-12-07 PROCEDURE — 94003 VENT MGMT INPAT SUBQ DAY: CPT

## 2021-12-07 PROCEDURE — 71045 X-RAY EXAM CHEST 1 VIEW: CPT | Mod: 26 | Performed by: RADIOLOGY

## 2021-12-07 PROCEDURE — 93308 TTE F-UP OR LMTD: CPT | Mod: 26 | Performed by: INTERNAL MEDICINE

## 2021-12-07 PROCEDURE — 250N000011 HC RX IP 250 OP 636: Performed by: NURSE PRACTITIONER

## 2021-12-07 PROCEDURE — 83735 ASSAY OF MAGNESIUM: CPT | Performed by: NURSE PRACTITIONER

## 2021-12-07 PROCEDURE — 71045 X-RAY EXAM CHEST 1 VIEW: CPT

## 2021-12-07 PROCEDURE — 85730 THROMBOPLASTIN TIME PARTIAL: CPT | Performed by: NURSE PRACTITIONER

## 2021-12-07 PROCEDURE — 93308 TTE F-UP OR LMTD: CPT

## 2021-12-07 PROCEDURE — 84100 ASSAY OF PHOSPHORUS: CPT | Performed by: NURSE PRACTITIONER

## 2021-12-07 PROCEDURE — 36415 COLL VENOUS BLD VENIPUNCTURE: CPT | Performed by: NURSE PRACTITIONER

## 2021-12-07 PROCEDURE — 93325 DOPPLER ECHO COLOR FLOW MAPG: CPT

## 2021-12-07 PROCEDURE — 85652 RBC SED RATE AUTOMATED: CPT | Performed by: NURSE PRACTITIONER

## 2021-12-07 PROCEDURE — 258N000003 HC RX IP 258 OP 636: Performed by: NURSE PRACTITIONER

## 2021-12-07 PROCEDURE — 250N000011 HC RX IP 250 OP 636: Performed by: PHYSICIAN ASSISTANT

## 2021-12-07 PROCEDURE — 200N000002 HC R&B ICU UMMC

## 2021-12-07 PROCEDURE — 999N000157 HC STATISTIC RCP TIME EA 10 MIN

## 2021-12-07 PROCEDURE — 258N000003 HC RX IP 258 OP 636: Performed by: STUDENT IN AN ORGANIZED HEALTH CARE EDUCATION/TRAINING PROGRAM

## 2021-12-07 PROCEDURE — 999N000075 HC STATISTIC IABP MONITORING

## 2021-12-07 PROCEDURE — 83605 ASSAY OF LACTIC ACID: CPT | Performed by: PHYSICIAN ASSISTANT

## 2021-12-07 PROCEDURE — 82947 ASSAY GLUCOSE BLOOD QUANT: CPT | Performed by: NURSE PRACTITIONER

## 2021-12-07 PROCEDURE — 86140 C-REACTIVE PROTEIN: CPT | Performed by: NURSE PRACTITIONER

## 2021-12-07 PROCEDURE — 250N000009 HC RX 250: Performed by: NURSE PRACTITIONER

## 2021-12-07 PROCEDURE — 99222 1ST HOSP IP/OBS MODERATE 55: CPT | Performed by: INTERNAL MEDICINE

## 2021-12-07 PROCEDURE — 84145 PROCALCITONIN (PCT): CPT | Performed by: NURSE PRACTITIONER

## 2021-12-07 PROCEDURE — 93325 DOPPLER ECHO COLOR FLOW MAPG: CPT | Mod: 26 | Performed by: INTERNAL MEDICINE

## 2021-12-07 PROCEDURE — C9113 INJ PANTOPRAZOLE SODIUM, VIA: HCPCS | Performed by: NURSE PRACTITIONER

## 2021-12-07 PROCEDURE — 250N000011 HC RX IP 250 OP 636: Performed by: STUDENT IN AN ORGANIZED HEALTH CARE EDUCATION/TRAINING PROGRAM

## 2021-12-07 RX ADMIN — Medication: at 20:04

## 2021-12-07 RX ADMIN — TICAGRELOR 90 MG: 90 TABLET ORAL at 07:46

## 2021-12-07 RX ADMIN — Medication: at 13:03

## 2021-12-07 RX ADMIN — TICAGRELOR 90 MG: 90 TABLET ORAL at 19:48

## 2021-12-07 RX ADMIN — THIAMINE HCL TAB 100 MG 100 MG: 100 TAB at 07:46

## 2021-12-07 RX ADMIN — BUSPIRONE HYDROCHLORIDE 10 MG: 10 TABLET ORAL at 13:36

## 2021-12-07 RX ADMIN — BUSPIRONE HYDROCHLORIDE 10 MG: 10 TABLET ORAL at 07:46

## 2021-12-07 RX ADMIN — EPINEPHRINE 0.03 MCG/KG/MIN: 1 INJECTION PARENTERAL at 14:45

## 2021-12-07 RX ADMIN — Medication 2 UNITS/HR: at 19:44

## 2021-12-07 RX ADMIN — FENTANYL CITRATE 50 MCG/HR: 50 INJECTION INTRAVENOUS at 20:46

## 2021-12-07 RX ADMIN — BUSPIRONE HYDROCHLORIDE 10 MG: 10 TABLET ORAL at 19:47

## 2021-12-07 RX ADMIN — ASPIRIN 81 MG CHEWABLE TABLET 81 MG: 81 TABLET CHEWABLE at 07:46

## 2021-12-07 RX ADMIN — PIPERACILLIN AND TAZOBACTAM 3.38 G: 3; .375 INJECTION, POWDER, LYOPHILIZED, FOR SOLUTION INTRAVENOUS at 07:46

## 2021-12-07 RX ADMIN — PIPERACILLIN AND TAZOBACTAM 3.38 G: 3; .375 INJECTION, POWDER, LYOPHILIZED, FOR SOLUTION INTRAVENOUS at 19:40

## 2021-12-07 RX ADMIN — CHLORHEXIDINE GLUCONATE 0.12% ORAL RINSE 15 ML: 1.2 LIQUID ORAL at 07:46

## 2021-12-07 RX ADMIN — PIPERACILLIN AND TAZOBACTAM 3.38 G: 3; .375 INJECTION, POWDER, LYOPHILIZED, FOR SOLUTION INTRAVENOUS at 00:38

## 2021-12-07 RX ADMIN — Medication: at 04:44

## 2021-12-07 RX ADMIN — CALCIUM CHLORIDE 1 G: 100 INJECTION, SOLUTION INTRAVENOUS at 23:36

## 2021-12-07 RX ADMIN — PANTOPRAZOLE SODIUM 40 MG: 40 INJECTION, POWDER, FOR SOLUTION INTRAVENOUS at 07:46

## 2021-12-07 RX ADMIN — CHLORHEXIDINE GLUCONATE 0.12% ORAL RINSE 15 ML: 1.2 LIQUID ORAL at 19:49

## 2021-12-07 RX ADMIN — PIPERACILLIN AND TAZOBACTAM 3.38 G: 3; .375 INJECTION, POWDER, LYOPHILIZED, FOR SOLUTION INTRAVENOUS at 13:22

## 2021-12-07 RX ADMIN — Medication 1 TABLET: at 07:46

## 2021-12-07 RX ADMIN — FOLIC ACID 1 MG: 1 TABLET ORAL at 07:46

## 2021-12-07 ASSESSMENT — ACTIVITIES OF DAILY LIVING (ADL)
ADLS_ACUITY_SCORE: 11
ADLS_ACUITY_SCORE: 18
ADLS_ACUITY_SCORE: 20
ADLS_ACUITY_SCORE: 18
ADLS_ACUITY_SCORE: 20
ADLS_ACUITY_SCORE: 18
ADLS_ACUITY_SCORE: 11
ADLS_ACUITY_SCORE: 18
ADLS_ACUITY_SCORE: 20
ADLS_ACUITY_SCORE: 18
ADLS_ACUITY_SCORE: 20
ADLS_ACUITY_SCORE: 18
ADLS_ACUITY_SCORE: 20
ADLS_ACUITY_SCORE: 11
ADLS_ACUITY_SCORE: 20
ADLS_ACUITY_SCORE: 11
ADLS_ACUITY_SCORE: 18
ADLS_ACUITY_SCORE: 20

## 2021-12-07 ASSESSMENT — MIFFLIN-ST. JEOR: SCORE: 1593.38

## 2021-12-07 NOTE — PLAN OF CARE
Admitted/transferred from: Cath Lab  Reason for admission/transfer: Cardiac arrest on ECMO  2 RN skin assessment: completed by Demetrio VERA and Priya DUARTE  Result of skin assessment and interventions/actions: No major skin issues noted.  Height, weight, drug calc weight: Done  Patient belongings (see Flowsheet)  MDRO education added to care planYes      Major Shift Events: Remains intubated on [facility] ECMO. Sedated w/ Versed and Fentanyl- continues to shiver with SVO2s undetectable via ECMO machine (CSI team aware; plan to paralyze). Cooling at 33C. ECMO flowing at 2.2LPM due to frequent chugging (gave 3L LR for this). On Levo drip to keep MAP >65. IABP 1:1. Bilat pedal pulses not dopplerable (Dr. Fry aware), but bilat post tibials present with doppler. Feet dusky/cool. Gutiérrez output adequate. 3 large, loose bms.   Plan: Cool overnight. Paralyze for constant shivering. Brother at bedside and updated by myself. Brother told bedside nurse that patient drinks about 6 beers/day and smokes 1.5 packs of cigarettes/day.   For vital signs and complete assessments, please see documentation flowsheets.          ?

## 2021-12-07 NOTE — PROGRESS NOTES
"[facility] Unit 4E  Palliative Care Initial Spiritual Assessment    Patient: Bruce Blount  Date of Admission:  12/6/2021  Reason for consult: goals of care and patient/family coping      Summary and Recommendations:    Patient Bruce \"Wolf\" Mine's family is understandably concerned about him; brother Walter and lakhwinder Munoz are mutually supportive in their distress. They wonder if he will be able to recover, while naming hope in the midst of the unknown.    Walter benefits from being able to ask questions about Wolf's care.     Family are appreciative of emotional support; culturally Sabianist, but not Jainism.    I will follow for spiritual/emotional support while Palliative Care is consulted.    Elizabeth Calderon  Palliative   Pager [phone]  [facility] Inpatient Team Consult pager [phone] (M-F 8-4:30)  After-hours Answering Service [phone]      Assessments:   Telephone conversation with patient Bruce \"Wolf\" Mine's brother Amor (Walter) and lakhwinder Munoz.     Distress:  Distress in the context of serious illness was assessed today:    Existential/spiritual/emotional distress: Yes; family are worried about [place] and \"really confused right now\". Brother Walter was with him and initiated compressions with the help of emergency dispatcher. He feels \"a little helpless\" right now and said, \"It's terrible waiting and not knowing.\"      Worship distress:  No; Sabianist \"for Grandma\", does not attend Latter day.      Social/economic/relational distress:  No; Walter and Wolf live together. 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 When he is at home, Wolf always has some project in the yard or with his car.     Coping, Meaning, & Spirituality:   Coping, meaning, and/or spirituality in the context of serious illness were assessed " "today:    Spiritual background and preferences: Protestant \"for Grandma\", does not attend [employer].      Beliefs, rituals, and practices: none named by family      Meaning-making: through relationships with brother and niece, work as a , life experience      Strengths and resources: family, personal resilience    Prognosis, Goals, & Planning:     Prognosis, Goals, and/or Advance Care Planning were assessed today: Yes; Walter had questions about plan of care. He worries that Wolf isn't \"doing well at all\", and he wanted to better understand the process of rewarming. Goals are restorative.      Preferred language: English      Patient's decision making preferences: unable to assess      I have concerns about the patient/family's health literacy today: No      Patient has a completed Health Care Directive: No. Brother Walter lives with him and is next of kin. Per chart review, another sibling is incarcerated.      Code status per chart review: Full Code    Key Palliative Symptom Data:  We are not helping to manage these symptoms currently in this patient.      Interventions:  I offered emotional and crisis support through listening presence and meaning-based exploration of sources of distress and goals of care.              "

## 2021-12-07 NOTE — PROGRESS NOTES
ECMO Shift Summary:      ECMO Equipment:  Console serial number: [id number]  Circuit Lot number: [id number]  Oxygenator Lot number: [id number]        Patient remains on [facility] ECMO, all equipment is functioning and alarms are appropriately set. RPM's 2700 with flow range 2.3-2.4 L/min. Sweep gas is at 3 LPM and FiO2 60%. Circuit remains free of air, clot and fibrin. Cannulas are secure with no bleeding from site. Extremities are cool. Suctioned ETT for small amount of secretions.     Significant Shift Events:  ECMO flow remains low due to chugging.    Vent settings:  Ventilation Mode: CMV/AC  (Continuous Mandatory Ventilation/ Assist Control)  FiO2 (%): 60 %  Rate Set (breaths/minute): 16 breaths/min  Tidal Volume Set (mL): 480 mL  PEEP (cm H2O): 5 cmH2O  Oxygen Concentration (%): 60 %  Resp: 16  .    Heparin is running at 750 u/hr, ACT range 177-203.    No product given included.       Intake/Output Summary (Last 24 hours) at 12/7/2021 0528  Last data filed at 12/7/2021 0500  Gross per 24 hour   Intake 5751.04 ml   Output 2850 ml   Net 2901.04 ml         CXR:  Recent Results (from the past 24 hour(s))   Cardiac Catheterization    Narrative    Refractory VT/VF cardiac arrest.  Cardiogenic shock.  STEMI involving the OM1 as culprit.  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  CPR  VA ECMO placement.  IABP placement.  Thermoguard placement with initiation of hypothermia protocol.  Right radial arterial line placement.  PCI with three drug eluting stents to the proximal, mid LCx and OM1.   CT Head w/o Contrast    Narrative    Head CT without contrast 12/6/2021 12:09 PM    History: Status post cardiac arrest.  Comparison: None available.    Technique: Thin section helical acquisition was performed from the  skull base through the cranial vertex without the  administration of  intravenous contrast. Images were reconstructed in axial, sagittal,  and coronal planes.     Findings: Increased density of vascular structures attributable to  recent contrast injection for cardiac catheterization. There is no  evidence of intracranial hemorrhage. There is no mass-effect or  midline shift. The ventricles are proportionate to the cerebral sulci.  Normal gray-white matter differentiation.    Partially visualized endotracheal and enteric tubes. Scattered  paranasal sinus fluid levels, nonspecific in the setting of  intubation. Clear mastoid air cells.      Impression    Impression:  No acute intracranial pathology.    I have personally reviewed the examination and initial interpretation  and I agree with the findings.    JESS WILSON MD         SYSTEM ID:  [id number]   CT Chest/Abdomen/Pelvis w Contrast    Narrative    EXAM: CT CHEST/ABDOMEN/PELVIS W CONTRAST, 12/6/2021 12:11 PM    TECHNIQUE:  Helical CT images from the lung bases through the  symphysis pubis were obtained with iopamidol (ISOVUE-370) solution  112cc intravenous contrast. Coronal and sagittal reformatted images  were generated at a workstation for further assessment.    HISTORY: Status post cardiac arrest.    COMPARISON: None.    FINDINGS:   Lines:   ET tube terminates 2.1 cm above the jacques. Right femoral approach  venous ECMO catheter with tip in the high right atrium. Right femoral  arterial line terminates just above the aortoiliac bifurcation, and  overlaps briefly with the distal end of the intraaortic balloon pump.  Left femoral approach PICC with tip in the right atrium. Enteric tube  with tip in the gastric lumen. Left approach intra-aortic balloon  catheter with tip in the proximal descending aorta. Gutiérrez catheter in  place.    Chest:   Central tracheobronchial tree is patent. Bilateral dependent  consolidation. No pleural effusions. No pneumothoraces. Centrilobular  and paraseptal emphysematous changes.  Heart is normal size without  pericardial effusion.  No central pulmonary embolism. Non-aneurysmal  thoracic aorta. No thoracic lymphadenopathy.    Abdomen/Pelvis:  No suspicious liver lesions. Patent hepatic vasculature. . No  gallstones or evidence of acute cholecystitis. No suspicious  pancreatic lesions. Pancreatic duct is not dilated. Spleen is within  normal limits. No adrenal nodules.     No hydronephrosis or obstructing renal stones. No kidney masses.  Urinary bladder is unremarkable.  Pelvic organs are unremarkable.    Stomach is distended. No abnormally thickened or dilated bowel.  Moderate stool within the colon. Appendix is unremarkable. No free  fluid or air within the abdomen.    No infrarenal aortic aneurysm. No pathologically enlarged  retroperitoneal, mesenteric, or pelvic lymph nodes.    Bones / Soft Tissues:   Left anterior rib 1-3 fractures. Right anterior rib 2 fracture.  Proximal sternal fracture. Degenerative changes of the spine. No soft  tissue abnormalities.      Impression    IMPRESSION:   1. Bilateral dependent consolidative opacities. Differential diagnosis  includes atelectasis, aspiration, infection.  2. Bilateral anterior rib fractures and sternal fracture likely  secondary to CPR.  3. Endotracheal tube terminates 2.1 cm above the jacques. Consider  retracting 2 cm.    I have personally reviewed the examination and initial interpretation  and I agree with the findings.    ANTOLIN SÁNCHEZ MD         SYSTEM ID:  [id number]   XR Chest Port 1 View    Narrative    EXAM: XR CHEST PORT 1 VIEW  12/6/2021 1:09 PM     HISTORY:  Check endotracheal tube placement and ECLS cannula  placement. DO NOT log-roll patient.  Place film under patient using  patient safety handling process.       COMPARISON:  CT chest abdomen and pelvis 12/6/2021    FINDINGS:   Portable frontal radiograph of the chest. The endotracheal tube  projects 3.2 cm above the jacques. Lower approach CVC and ECMO cannula  project at  the superior cavoatrial junction. Intra-aortic balloon pump  marker projects at the level of the proximal descending thoracic  aorta. Enteric tube is looped within the stomach.    Trachea is midline. The cardiac silhouette size is normal. Trace left  pleural effusion. No pneumothorax. Perihilar predominant mixed  interstitial and airspace opacities with more focal patchy airspace  opacities in the right upper lobe. The upper abdomen is unremarkable.  Degenerative changes of the glenohumeral joint. Chronic right clavicle  fracture deformity.      Impression    IMPRESSION:   1. Endotracheal tube projects over the midthoracic trachea. Additional  support devices are unchanged compared to same day CT.  2. Perihilar predominant mixed interstitial and airspace opacities,  which may represent atelectasis versus edema.  3. Patchy opacities in the right upper lobe, which may represent  atelectasis versus aspiration/infection.  4. Trace left pleural effusion.    I have personally reviewed the examination and initial interpretation  and I agree with the findings.    SARAH PADILLA MD         SYSTEM ID:  [id number]   US Carotid Bilateral    Narrative    BILATERAL CAROTID DUPLEX DOPPLER ULTRASOUND 12/6/2021 3:17 PM    CLINICAL HISTORY: Cardiac Arrest on ECMO    COMPARISON: None available.    REFERRING PROVIDER: CAMDEN DORANTES    TECHNIQUE: Grayscale (B-mode) and duplex and spectral Doppler  ultrasound of the common carotid, extracranial internal carotid,  external carotid, and vertebral artery origins. Velocity measurements  obtained with angle correction at or less than 60 degrees.    FINDINGS: Abnormal waveforms consistent with ECMO.    RIGHT SIDE:     Plaque Morphology: Echogenic plaque causes less than 50% diameter  stenosis.       Proximal CCA: 79/26 cm/s     Mid CCA: 63/0 cm/s     Distal CCA: 48/0 cm/s           External CA: 40/0 cm/s       Proximal ICA: 37/0 cm/s     Mid ICA: 72/32 cm/s     Distal ICA: 89/16 cm/s        Vertebral artery: Antegrade: 20/14 cm/s     ICA/CCA ratio: 1.85     LEFT SIDE:     Plaque Morphology: Echogenic plaque causes less than 50% diameter  stenosis.        Proximal CCA: 71/15 cm/s     Mid CCA: 65/17 cm/s     Distal CCA: 65/20 cm/s           External CA: 55/9 cm/s       Proximal ICA: 55/37 cm/s     Mid ICA: 56/39 cm/s     Distal ICA: 75/50 cm/s       Vertebral artery: Antegrade: 70/40 cm/s     ICA/CCA ratio: 1.15       Impression    IMPRESSION:    1. RIGHT ICA: Less than 50% diameter narrowing by grayscale imaging  and sonographic velocity criteria.    2. LEFT ICA:  Less than 50% diameter narrowing by grayscale imaging  and sonographic velocity criteria.    Consensus Panel Gray-Scale and Doppler US Criteria for Diagnosis of  ICA Stenosis (Radiology 11/2003) additionally modified by Flaquita et  al. in Journal of Vascular Surgery 1/2011, [phone].       Normal         ICA PSV < 140 cm/sec       Plaque Estimate None       ICA/CCA  PSV Ratio < 2.0       ICA EDV < 40 cm/sec       < 50%          ICA PSV < 140 cm/sec       Plaque Estimate < 50%       ICA/CCA  PSV Ratio < 2.0       ICA EDV < 40 cm/sec       50- 69%       ICA -230 cm/sec       Plaque Estimate > or = 50%       ICA/CCA PSV Ratio 2.0-4.0       ICA EDV  cm/sec         > or = 70%, less than near occlusion       ICA PSV > 230 cm/sec       Plaque Estimate > or = 50%       ICA/CCA Ratio > 4.0       ICA EDV > 100 cm/sec                                            Additional criteria from vascular surgery     > 80%       EDV > 120 cm/sec     TJ CALLE MD         SYSTEM ID:  [id number]   US Lower Extremity Arterial Duplex Bilateral    Narrative    Exam: Duplex ultrasound of bilateral lower extremity arteries dated  12/6/2021 3:17 PM     Clinical information: Baseline to assess blood flow to Lower  Extremities (VA ECMO)     Comparison: None available    Technique: Grayscale (B-mode), color Doppler, and duplex spectral  Doppler ultrasound of the  lower extremity arteries. Velocity  measurements obtained with angle correction of 60 degrees or less.    Ordering provider: Kitty Hernandez    Findings:     Right lower extremity:     CFA through mid SFA are obscured due to ECMO dressings.     Distal SFA: Velocity: 10 cm/sec. Waveforms: Post obstructive  monophasic    Popliteal artery: Velocity: 8 cm/sec. Waveforms: Post obstructive  monophasic    PTA ankle: Velocity: 18 cm/sec. Waveforms: Post obstructive monophasic  JULIANNA ankle: Velocity: 4 cm/sec. Waveforms: Post obstructive monophasic    Left lower extremity:    CFA through mid SFA obscured due to dressings from balloon pump.   Distal SFA: Velocity: 54 cm/sec. Waveforms: Multiphasic    Popliteal artery: Velocity: 46 cm/sec. Waveforms: Multiphasic    PTA ankle: Velocity: 43 cm/sec. Waveforms: Multiphasic  JULIANNA ankle: Velocity: 46 cm/sec. Waveforms: Multiphasic      Impression    Impression:     1. Right leg: Patent arteries with post obstructive diminished  waveforms likely due to ECMO cannula.      2. Left leg: Patent arteries.     Guidelines:    [facility] duplex criteria for lower limb arterial  occlusive disease    Percent stenosis:     Normal (1-19%): Peak systolic velocity (cm/s): <150, End-diastolic  velocity (cm/s): <40, Velocity ratio (Vr): <1.5, Distal arterial  waveform: Triphasic    20-49%: Peak systolic velocity (cm/s): 150-200, End-diastolic velocity  (cm/s): <40, Velocity ratio (Vr): 1.5-2.0, Distal arterial waveform:  Triphasic    50-75%: Peak systolic velocity (cm/s): 200-300, End-diastolic velocity  (cm/s): <90, Velocity ratio (Vr): 2.0-3.9, Distal arterial waveform:  Poststenotic turbulence distal to stenosis, monophasic distal waveform    >75%: Peak systolic velocity (cm/s): >300, End-diastolic velocity  (cm/s): <90, Velocity ratio (Vr): >4.0, Distal arterial waveform:  Dampened distal waveform and low PSV/EDV* in the stenosis    Occlusion: Absent flow by color Doppler/pulsed  Doppler spectral  analysis; length of occlusion estimated from distance between exit and  reentry collateral arteries    *PSV = peak systolic velocity, EDV = end-diastolic velocity  [email or web address]  html    BILLY LAMAS         SYSTEM ID:  [id number]   Echocardiogram Complete   Result Value    LVEF  10-15% (severely reduced)    [facility]    [id number]  [id number]  [id number]  [id number]^JOSE E^[facility]DEN^[facility]     [facility]  Echocardiography Laboratory  [address] [place] [id number]     Name: MINERVA GARAY  MRN: [id number]  : 1965  Study Date: 2021 03:21 PM  Age: 56 yrs  Gender: Male  Patient Location: Count includes the [facility]  Reason For Study: Shock  Ordering Physician: CAMDEN DORANTES  Performed By: Christi Auguste RDCS     BSA: 2.0 m2  Height: 69 in  Weight: 183 lb  HR: 67  BP: 90/38 mmHg  ______________________________________________________________________________  Procedure  Complete Portable Echo Adult. Technically difficult study.  ______________________________________________________________________________  Interpretation Summary  Technically difficult study.  VA ECMO at 2.8L/min.  Left ventricular function is decreased. The ejection fraction is 10-15%  (severely reduced).  Global right ventricular function is severely reduced.  Septum midline.  The aortic valve appears to remain closed.  Trivial pericardial effusion is present.  Previous study not available for comparison.  ______________________________________________________________________________  Left Ventricle  Left ventricular wall thickness cannot evaluate. Borderline left ventricular  dilation is present. Left ventricular function is decreased. The ejection  fraction is 10-15% (severely reduced). Left ventricular diastolic function is  abnormal. Severe diffuse hypokinesis is present.     Right Ventricle  The right ventricle is normal size. Global right  ventricular function is  severely reduced.     Atria  The atria cannot be assessed.     Mitral Valve  The mitral valve is normal.     Aortic Valve  The aortic valve appears to remain closed. Mild aortic valve calcification is  present.     Tricuspid Valve  The tricuspid valve cannot be assessed.     Pulmonic Valve  The pulmonic valve cannot be assessed.     Vessels  Dilation of the inferior vena cava is present with normal respiratory  variation in diameter. Unable to assess mean RA pressure given the patient is  on a ventilator. Venous ECMO cannula is seen traversing the IVC and right  atrium.     Pericardium  Trivial pericardial effusion is present.     Compared to Previous Study  Previous study not available for comparison.  ______________________________________________________________________________  MMode/2D Measurements & Calculations     IVSd: 0.97 cm  LVIDd: 5.3 cm  LVIDs: 5.0 cm  LVPWd: 1.0 cm  FS: 6.2 %  LV mass(C)d: 198.7 grams  LV mass(C)dI: 99.9 grams/m2  RWT: 0.38     ______________________________________________________________________________  Report approved by: Gina Mathias 12/06/2021 04:38 PM         XR Chest Port 1 View    Impression    RESIDENT PRELIMINARY INTERPRETATION  IMPRESSION:     1. Stable position of previous support devices with new esophageal  temperature probe present.   2.  Stable appearance of perihilar opacities representing atelectasis  versus edema.  3. Stable appearance of right upper lobe opacity, atelectasis versus  aspiration/infection.       Labs:  Recent Labs   Lab 12/07/21  0342 12/07/21  0340 12/07/21  0151 12/07/21  0015 12/06/21  2202   PH  --  7.30* 7.29* 7.31* 7.27*   PCO2  --  35 37 35 39   PO2  --  304* 188* 133* 181*   HCO3  --  17* 18* 18* 18*   O2PER 60 70  60 60 60 60       Lab Results   Component Value Date    HGB 12.8 (L) 12/07/2021    PHGB <30 12/07/2021     12/07/2021    FIBR 302 12/07/2021    INR 1.36 (H) 12/07/2021     (HH)  12/07/2021    DD 8.79 (H) 12/07/2021         Plan is to remain on VA ECMO support.    Rio Finch, RT  ECMO Specialist  12/7/2021 5:28 AM

## 2021-12-07 NOTE — PROGRESS NOTES
ECMO Shift Summary:3416-1597      ECMO Equipment:  Console serial number: [id number]  Circuit Lot number: [id number]  Oxygenator Lot number: [id number]      Patient remains on VA ECMO, all equipment is functioning and alarms are appropriately set. RPM's 2573 with flow range 2.2-3.2 L/min. Sweep gas is at 4 LPM and FiO2 60%. Circuit remains free of air, clot and fibrin. Cannulas are secure with no bleeding from site. Extremities are cool. Suctioned ETT for small amount of secretions.    Significant Shift Events: Pt needing continuous fluid to maintain ECMO flow.    Vent settings:  FiO2 (%): 60 %  Resp: 16  .    Heparin is going to be started, ACT range 160-288.    Urine output is as charted, blood loss was none. Product given included none.       Intake/Output Summary (Last 24 hours) at 12/6/2021 1808  Last data filed at 12/6/2021 1800  Gross per 24 hour   Intake 3871.55 ml   Output 1960 ml   Net 1911.55 ml       ECHO:  No results found for this or any previous visit.  No results found for this or any previous visit.      CXR:  Recent Results (from the past 24 hour(s))   Cardiac Catheterization    Narrative    Refractory VT/VF cardiac arrest.  Cardiogenic shock.  STEMI involving the OM1 as culprit.  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  CPR  VA ECMO placement.  IABP placement.  Thermoguard placement with initiation of hypothermia protocol.  Right radial arterial line placement.  PCI with three drug eluting stents to the proximal, mid LCx and OM1.   CT Head w/o Contrast    Narrative    Head CT without contrast 12/6/2021 12:09 PM    History: Status post cardiac arrest.  Comparison: None available.    Technique: Thin section helical acquisition was performed from the  skull base through the cranial vertex without the administration of  intravenous  contrast. Images were reconstructed in axial, sagittal,  and coronal planes.     Findings: Increased density of vascular structures attributable to  recent contrast injection for cardiac catheterization. There is no  evidence of intracranial hemorrhage. There is no mass-effect or  midline shift. The ventricles are proportionate to the cerebral sulci.  Normal gray-white matter differentiation.    Partially visualized endotracheal and enteric tubes. Scattered  paranasal sinus fluid levels, nonspecific in the setting of  intubation. Clear mastoid air cells.      Impression    Impression:  No acute intracranial pathology.    I have personally reviewed the examination and initial interpretation  and I agree with the findings.    JESS WILSON MD         SYSTEM ID:  [id number]   CT Chest/Abdomen/Pelvis w Contrast    Narrative    EXAM: CT CHEST/ABDOMEN/PELVIS W CONTRAST, 12/6/2021 12:11 PM    TECHNIQUE:  Helical CT images from the lung bases through the  symphysis pubis were obtained with iopamidol (ISOVUE-370) solution  112cc intravenous contrast. Coronal and sagittal reformatted images  were generated at a workstation for further assessment.    HISTORY: Status post cardiac arrest.    COMPARISON: None.    FINDINGS:   Lines:   ET tube terminates 2.1 cm above the jacques. Right femoral approach  venous ECMO catheter with tip in the high right atrium. Right femoral  arterial line terminates just above the aortoiliac bifurcation, and  overlaps briefly with the distal end of the intraaortic balloon pump.  Left femoral approach PICC with tip in the right atrium. Enteric tube  with tip in the gastric lumen. Left approach intra-aortic balloon  catheter with tip in the proximal descending aorta. Gutiérrez catheter in  place.    Chest:   Central tracheobronchial tree is patent. Bilateral dependent  consolidation. No pleural effusions. No pneumothoraces. Centrilobular  and paraseptal emphysematous changes. Heart is normal size  without  pericardial effusion.  No central pulmonary embolism. Non-aneurysmal  thoracic aorta. No thoracic lymphadenopathy.    Abdomen/Pelvis:  No suspicious liver lesions. Patent hepatic vasculature. . No  gallstones or evidence of acute cholecystitis. No suspicious  pancreatic lesions. Pancreatic duct is not dilated. Spleen is within  normal limits. No adrenal nodules.     No hydronephrosis or obstructing renal stones. No kidney masses.  Urinary bladder is unremarkable.  Pelvic organs are unremarkable.    Stomach is distended. No abnormally thickened or dilated bowel.  Moderate stool within the colon. Appendix is unremarkable. No free  fluid or air within the abdomen.    No infrarenal aortic aneurysm. No pathologically enlarged  retroperitoneal, mesenteric, or pelvic lymph nodes.    Bones / Soft Tissues:   Left anterior rib 1-3 fractures. Right anterior rib 2 fracture.  Proximal sternal fracture. Degenerative changes of the spine. No soft  tissue abnormalities.      Impression    IMPRESSION:   1. Bilateral dependent consolidative opacities. Differential diagnosis  includes atelectasis, aspiration, infection.  2. Bilateral anterior rib fractures and sternal fracture likely  secondary to CPR.  3. Endotracheal tube terminates 2.1 cm above the jacques. Consider  retracting 2 cm.    I have personally reviewed the examination and initial interpretation  and I agree with the findings.    ANTOLIN SÁNCHEZ MD         SYSTEM ID:  [id number]   XR Chest Port 1 View    Narrative    EXAM: XR CHEST PORT 1 VIEW  12/6/2021 1:09 PM     HISTORY:  Check endotracheal tube placement and ECLS cannula  placement. DO NOT log-roll patient.  Place film under patient using  patient safety handling process.       COMPARISON:  CT chest abdomen and pelvis 12/6/2021    FINDINGS:   Portable frontal radiograph of the chest. The endotracheal tube  projects 3.2 cm above the jacques. Lower approach CVC and ECMO cannula  project at the superior  cavoatrial junction. Intra-aortic balloon pump  marker projects at the level of the proximal descending thoracic  aorta. Enteric tube is looped within the stomach.    Trachea is midline. The cardiac silhouette size is normal. Trace left  pleural effusion. No pneumothorax. Perihilar predominant mixed  interstitial and airspace opacities with more focal patchy airspace  opacities in the right upper lobe. The upper abdomen is unremarkable.  Degenerative changes of the glenohumeral joint. Chronic right clavicle  fracture deformity.      Impression    IMPRESSION:   1. Endotracheal tube projects over the midthoracic trachea. Additional  support devices are unchanged compared to same day CT.  2. Perihilar predominant mixed interstitial and airspace opacities,  which may represent atelectasis versus edema.  3. Patchy opacities in the right upper lobe, which may represent  atelectasis versus aspiration/infection.  4. Trace left pleural effusion.    I have personally reviewed the examination and initial interpretation  and I agree with the findings.    SARAH PADILLA MD         SYSTEM ID:  [id number]   US Carotid Bilateral    Narrative    BILATERAL CAROTID DUPLEX DOPPLER ULTRASOUND 12/6/2021 3:17 PM    CLINICAL HISTORY: Cardiac Arrest on ECMO    COMPARISON: None available.    REFERRING PROVIDER: CAMDEN DORANTES    TECHNIQUE: Grayscale (B-mode) and duplex and spectral Doppler  ultrasound of the common carotid, extracranial internal carotid,  external carotid, and vertebral artery origins. Velocity measurements  obtained with angle correction at or less than 60 degrees.    FINDINGS: Abnormal waveforms consistent with ECMO.    RIGHT SIDE:     Plaque Morphology: Echogenic plaque causes less than 50% diameter  stenosis.       Proximal CCA: 79/26 cm/s     Mid CCA: 63/0 cm/s     Distal CCA: 48/0 cm/s           External CA: 40/0 cm/s       Proximal ICA: 37/0 cm/s     Mid ICA: 72/32 cm/s     Distal ICA: 89/16 cm/s       Vertebral  artery: Antegrade: 20/14 cm/s     ICA/CCA ratio: 1.85     LEFT SIDE:     Plaque Morphology: Echogenic plaque causes less than 50% diameter  stenosis.        Proximal CCA: 71/15 cm/s     Mid CCA: 65/17 cm/s     Distal CCA: 65/20 cm/s           External CA: 55/9 cm/s       Proximal ICA: 55/37 cm/s     Mid ICA: 56/39 cm/s     Distal ICA: 75/50 cm/s       Vertebral artery: Antegrade: 70/40 cm/s     ICA/CCA ratio: 1.15       Impression    IMPRESSION:    1. RIGHT ICA: Less than 50% diameter narrowing by grayscale imaging  and sonographic velocity criteria.    2. LEFT ICA:  Less than 50% diameter narrowing by grayscale imaging  and sonographic velocity criteria.    Consensus Panel Gray-Scale and Doppler US Criteria for Diagnosis of  ICA Stenosis (Radiology 11/2003) additionally modified by Flaquita et  al. in Journal of Vascular Surgery 1/2011, [phone].       Normal         ICA PSV < 140 cm/sec       Plaque Estimate None       ICA/CCA  PSV Ratio < 2.0       ICA EDV < 40 cm/sec       < 50%          ICA PSV < 140 cm/sec       Plaque Estimate < 50%       ICA/CCA  PSV Ratio < 2.0       ICA EDV < 40 cm/sec       50- 69%       ICA -230 cm/sec       Plaque Estimate > or = 50%       ICA/CCA PSV Ratio 2.0-4.0       ICA EDV  cm/sec         > or = 70%, less than near occlusion       ICA PSV > 230 cm/sec       Plaque Estimate > or = 50%       ICA/CCA Ratio > 4.0       ICA EDV > 100 cm/sec                                            Additional criteria from vascular surgery     > 80%       EDV > 120 cm/sec     TJ CALLE MD         SYSTEM ID:  [id number]   US Lower Extremity Arterial Duplex Bilateral    Narrative    Exam: Duplex ultrasound of bilateral lower extremity arteries dated  12/6/2021 3:17 PM     Clinical information: Baseline to assess blood flow to Lower  Extremities (VA ECMO)     Comparison: None available    Technique: Grayscale (B-mode), color Doppler, and duplex spectral  Doppler ultrasound of the lower  extremity arteries. Velocity  measurements obtained with angle correction of 60 degrees or less.    Ordering provider: Kitty Hernandez    Findings:     Right lower extremity:     CFA through mid SFA are obscured due to ECMO dressings.     Distal SFA: Velocity: 10 cm/sec. Waveforms: Post obstructive  monophasic    Popliteal artery: Velocity: 8 cm/sec. Waveforms: Post obstructive  monophasic    PTA ankle: Velocity: 18 cm/sec. Waveforms: Post obstructive monophasic  JULIANNA ankle: Velocity: 4 cm/sec. Waveforms: Post obstructive monophasic    Left lower extremity:    CFA through mid SFA obscured due to dressings from balloon pump.   Distal SFA: Velocity: 54 cm/sec. Waveforms: Multiphasic    Popliteal artery: Velocity: 46 cm/sec. Waveforms: Multiphasic    PTA ankle: Velocity: 43 cm/sec. Waveforms: Multiphasic  JULIANNA ankle: Velocity: 46 cm/sec. Waveforms: Multiphasic      Impression    Impression:     1. Right leg: Patent arteries with post obstructive diminished  waveforms likely due to ECMO cannula.      2. Left leg: Patent arteries.     Guidelines:    [facility] duplex criteria for lower limb arterial  occlusive disease    Percent stenosis:     Normal (1-19%): Peak systolic velocity (cm/s): <150, End-diastolic  velocity (cm/s): <40, Velocity ratio (Vr): <1.5, Distal arterial  waveform: Triphasic    20-49%: Peak systolic velocity (cm/s): 150-200, End-diastolic velocity  (cm/s): <40, Velocity ratio (Vr): 1.5-2.0, Distal arterial waveform:  Triphasic    50-75%: Peak systolic velocity (cm/s): 200-300, End-diastolic velocity  (cm/s): <90, Velocity ratio (Vr): 2.0-3.9, Distal arterial waveform:  Poststenotic turbulence distal to stenosis, monophasic distal waveform    >75%: Peak systolic velocity (cm/s): >300, End-diastolic velocity  (cm/s): <90, Velocity ratio (Vr): >4.0, Distal arterial waveform:  Dampened distal waveform and low PSV/EDV* in the stenosis    Occlusion: Absent flow by color Doppler/pulsed Doppler  spectral  analysis; length of occlusion estimated from distance between exit and  reentry collateral arteries    *PSV = peak systolic velocity, EDV = end-diastolic velocity  http://[email or web address]/[email or web address]/fulltext  html    BILLY LAMAS         SYSTEM ID:  [id number]   Echocardiogram Complete   Result Value    LVEF  10-15% (severely reduced)    [facility]    [id number]  [id number]  [id number]  [id number]^JOSE E^CAMDEN^RITU     [facility],[place]  Echocardiography Laboratory  [address] [place] [id number]     Name: MINERVA GARAY  MRN: [id number]  : 1965  Study Date: 2021 03:21 PM  Age: 56 yrs  Gender: Male  Patient Location: [facility]  Reason For Study: Shock  Ordering Physician: CAMDEN DORANTES  Performed By: Christi Auguste RDCS     BSA: 2.0 m2  Height: 69 in  Weight: 183 lb  HR: 67  BP: 90/38 mmHg  ______________________________________________________________________________  Procedure  Complete Portable Echo Adult. Technically difficult study.  ______________________________________________________________________________  Interpretation Summary  Technically difficult study.  VA ECMO at 2.8L/min.  Left ventricular function is decreased. The ejection fraction is 10-15%  (severely reduced).  Global right ventricular function is severely reduced.  Septum midline.  The aortic valve appears to remain closed.  Trivial pericardial effusion is present.  Previous study not available for comparison.  ______________________________________________________________________________  Left Ventricle  Left ventricular wall thickness cannot evaluate. Borderline left ventricular  dilation is present. Left ventricular function is decreased. The ejection  fraction is 10-15% (severely reduced). Left ventricular diastolic function is  abnormal. Severe diffuse hypokinesis is present.     Right Ventricle  The right ventricle is normal size. Global right ventricular  function is  severely reduced.     Atria  The atria cannot be assessed.     Mitral Valve  The mitral valve is normal.     Aortic Valve  The aortic valve appears to remain closed. Mild aortic valve calcification is  present.     Tricuspid Valve  The tricuspid valve cannot be assessed.     Pulmonic Valve  The pulmonic valve cannot be assessed.     Vessels  Dilation of the inferior vena cava is present with normal respiratory  variation in diameter. Unable to assess mean RA pressure given the patient is  on a ventilator. Venous ECMO cannula is seen traversing the IVC and right  atrium.     Pericardium  Trivial pericardial effusion is present.     Compared to Previous Study  Previous study not available for comparison.  ______________________________________________________________________________  MMode/2D Measurements & Calculations     IVSd: 0.97 cm  LVIDd: 5.3 cm  LVIDs: 5.0 cm  LVPWd: 1.0 cm  FS: 6.2 %  LV mass(C)d: 198.7 grams  LV mass(C)dI: 99.9 grams/m2  RWT: 0.38     ______________________________________________________________________________  Report approved by: Gina Mathias 12/06/2021 04:38 PM             Labs:  Recent Labs   Lab 12/06/21  1620 12/06/21  1619 12/06/21  1413 12/06/21  1028 12/06/21  0956 12/06/21  0851 12/06/21  0843   PH  --  7.24* 7.20* 7.25* 7.27*   < >  --    PCO2  --  45 50* 43 37   < >  --    PO2  --  117* 114* 155* 184*   < >  --    HCO3  --  19* 20* 19* 17*   < >  --    O2PER 60  60 60 60  --   --   --  100.0    < > = values in this interval not displayed.       Lab Results   Component Value Date    HGB 12.7 (L) 12/06/2021    PHGB 70 (H) 12/06/2021     12/06/2021    INR 1.44 (H) 12/06/2021    PTT >240 (HH) 12/06/2021    DD >20.00 (HH) 12/06/2021         Plan is to continue to support.      Stephany Hargrove, RT  ECMO Specialist  12/6/2021 6:08 PM

## 2021-12-07 NOTE — PROGRESS NOTES
PRELIMINARY EEG REPORT:    First hour of vEEG was reviewed. No PDR was present. Diffuse low to moderate amplitude 3-5 Hz theta-delta slowing was seen. No epileptiform discharges or seizures were recorded. Findings are consistent with moderately-severe diffuse encephalopathy, which could be in part due to sedative medications.     Eladia Sheikh MD

## 2021-12-07 NOTE — CONSULTS
Care Management Initial Consult    General Information  Assessment completed with: Other (Niece), Tammy Edy (Niece)  Type of CM/SW Visit: Compliance    Primary Care Provider verified and updated as needed: No   Readmission within the last 30 days: no previous admission in last 30 days         Advance Care Planning: Advance Care Planning Reviewed: education/resources on health care directives provided (None per family)       Communication Assessment  Patient's communication style: spoken language (English or Bilingual)    Hearing Difficulty or Deaf: other (see comments)   Wear Glasses or Blind: other (see comments)    Cognitive  Cognitive/Neuro/Behavioral: .WDL except  Level of Consciousness: sedated  Arousal Level: unresponsive  Orientation: other (see comments) (farrah)  Mood/Behavior: other (see comments) (farrah)     Speech: endotracheal tube    Living Environment:   People in home: sibling(s)  Amor (Walter) brother & Pt  Current living Arrangements: house      Able to return to prior arrangements:  (TBD)  Living Arrangement Comments: Lives in house w/ brother Walter who has a broken back    Family/Social Support:  Care provided by: self  Provides care for:    Marital Status: Single  Sibling(s) (Niece)          Description of Support System: Supportive       Current Resources:   Patient receiving home care services:  None     Employment/Financial:  Employment Status: employed part-time        Financial Concerns: insurance inadequate   Referral to Financial Counselor: Yes   Functional Status:  Prior to admission patient needed assistance:   Dependent ADLs::  (Ind PLOF)  Dependent IADLs::  (Ind PLOF)  Assesssment of Functional Status: Not at baseline with ADL Functioning    Mental Health Status:  Mental Health Status: No Current Concerns       Chemical Dependency Status:  Chemical Dependency Status: No Current Concerns     15 years ago hx of DUI issues, family reports none current, no CD tx hx per family knowledge         Values/Beliefs:  Spiritual, Cultural Beliefs, Latter day Practices, Values that affect care:  (Unknown)             Additional Information:  Bruce Blount is a 56 year old male with PMHx current tobacco use and ETOH abuse who presents to [facility] after out of hospital cardiac arrest.     CSW contact by pt's niece and brother regarding insurance verification. SW confirmed w/ financial counseling that Pt's insurance has been uploaded into EMR. Pt has [facility] [place]. Per financial counseling, if Pt has loss of income due to hospitalization he may qualify to transition to Medical Assistance. SW updated Pt's family of this information. Pt works as a  [employer]/ [employer] and was receiving unemployment. However, Pt was asked to return to work in 1-2 weeks. It's unclear at this time if this constitutes loss of income. Pt's family was made aware of likely no TCU coverage under [place] if this is needed in the future. Pt and family deny additional questions or concerns at this time. SW provided supportive listening and encouragement.     Judi Miller, MSW  P. [phone]

## 2021-12-07 NOTE — PROGRESS NOTES
ECMO Attending Progress Note  2021    Bruce Blount is a 56 year old male who was cannulated for ECMO 2021 due to  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    Cannulation Site:  17 Fr in the R femoral artery  25 Fr in the R femoral vein  IABP    Interval events:cooled, volume resuscitated, remains with out pulsatility now on pressors, minimal bleeding at iabp site this am after turn    Physical Exam:  Temp:  [91.4  F (33  C)-94.5  F (34.7  C)] 93.4  F (34.1  C)  Pulse:  [53-74] 68  Resp:  [13-39] 16  MAP:  [55 mmHg-84 mmHg] 63 mmHg  Arterial Line BP: ()/(16-65) 91/34  FiO2 (%):  [40 %-60 %] 40 %  SpO2:  [88 %-100 %] 100 %    Intake/Output Summary (Last 24 hours) at 2021 1456  Last data filed at 2021 1429  Gross per 24 hour   Intake 1151.42 ml   Output 955 ml   Net 196.42 ml    Ventilation Mode: CMV/AC  (Continuous Mandatory Ventilation/ Assist Control)  FiO2 (%): 40 %  Rate Set (breaths/minute): 16 breaths/min  Tidal Volume Set (mL): 480 mL  PEEP (cm H2O): 5 cmH2O  Oxygen Concentration (%): 60 %  Resp: 16       Labs:  Recent Labs   Lab 21  1153 21  1007 21  0759 21  0553   PH 7.27* 7.27* 7.25* 7.26*   PCO2 39 39 41 39   PO2 255* 237* 94 126*   HCO3 18* 18* 18* 17*   O2PER 40 60 60 60      Recent Labs   Lab 21  1007 21  0342 21  2203 21  1621   WBC 6.3 7.2 6.5 14.7*   HGB 12.2* 12.8* 13.0* 12.7*     Creatinine   Date Value Ref Range Status   2021 1.03 0.66 - 1.25 mg/dL Final   2021 0.96 0.66 - 1.25 mg/dL Final   2021 1.02 0.66 - 1.25 mg/dL Final   2021 0.97 0.66 - 1.25 mg/dL Final       Blood Flow (Circuit) LPM: 3.3 LPM  Gas Flow  LPM: 3 LPM  Gas FiO2   %: 60 %  ACT  (seconds): 288 seconds  Blood Temp  (degrees C): 32.6 C  Pulse Oximetry  (SpO2%): 100 %  Arterial Pressure  mmH mmHg      ECMO Issues including assessments and plan on DOS 2021:  Neuro: Sedated for mechanical ventilation cooling and  ECMO.  No acute distress.  NIRS 58 R 70s L dopplerable lower ext pulses RASS goal: -3  CV: Cardiogenic shock.  Hemodynamically stable on n at 0.03, requiring antihypertensies Pulsatility: 0 mmHg briefly to 10 mmhg last night then back to - despite low flows all night  Pulm: Keep vent settings at rest settings as above.  FEN/Renal: Electrolytes stable w/ replacement protocols in place, Cr stable, 40cc/hr UOP bicarb down and metabolic acidosis suspect will need crrt soon when rewarming sweep at 3 FIP 60% flow was 2.7 increase today for lactate rising, then lactate down with more flow.  Heme: ACT goal: 200-220 b/c no opening valve Hemoglobin 12.8 dilutional component  Minimal oozing around the ECMO cannulas mild around iabp this am only.  ID: Receiving empiric antibiotics  Cannulae: Position is acceptable on exam and the available imaging.  Distal perfusion cannula is in place and patent.  Extremities are well-perfused.     I have personally reviewed the ECMO flows, oxygenation and CO2 clearance, anticoagulation, and cannula position.  I have also personally assessed the patient's systemic response with hemodynamics, oxygenation, ventilation, and bleeding.       The patient requires continued ECMO support and management in the ICU.  I have discussed patient care and treatment plan with the primary team.      Marlo Fry MD  Critical Care Cardiology  [phone]    December 7, 2021

## 2021-12-07 NOTE — CONSULTS
[facility]  Palliative Care Consultation Note    Patient: Bruce Blount  Date of Admission:  12/6/2021    Requesting Clinician / Team: Cardiac ICU  Reason for consult: Patient and family support  ECMO triggered consult    Recommendations:    Next of kin is and designated visitor is Walter. I spoke with Walter today by phone and he seemed well informed and aware that his brother, Wolf, is currently critically ill with a life threatening condition.  Walter feels that all that is happening to Wolf is confusing and overwhelming and Walter is hoping and praying that Wolf will get better. Walter seems to be receiving, processing and understanding information well and is aware that his overall prognosis is guarded and day by day we will know more about what to expect.     Walter does not believe that patient would want our palliative  support at this time.     Palliative team will continue to follow for support and goals discussion as his course continues. Anticipate 1-2x per week check in from palliative medical provider and for care conferences.         Thank you for the opportunity to participate in the care of this patient and family. Our team: will continue to follow.     During regular M-F work hours -- if you are not sure who specifically to contact -- please contact us by sending a text page to our team consult pager at [phone].    After regular work hours and on weekends/holidays, you can call our answering service at [phone]. Also, who's on call for us is available in [facility] Smart Web.     Jayde Quesada MD / Palliative Medicine / Pager [phone]     Total time spent was 60 minutes spent in reviewing record, patient examination and family counseling, discussion with primary team and documentation.  >50% of time was spent counseling and/or coordination of care regarding plan of care, support.      Assessments:  Bruce Blount is a 56 year old male with no  "significant past medical history significant for aside from significant tobacco use who was in his usual state of health when his brother heard a \"thump\" and found patient unresponsive and agonal breathing. 911 called and CPR initiated and EMS arrived within 4 minutes. Initial rhythm VF--> shocked x ~7 with ROSC upon arrival to ED. Total CPR team per EMS ~ 40 minutes. Patient was intubated in the ED and arrested again and was again shocked in the ED with ROSC. Taken urgently to cardiac cath lab where he underwent coronary angiogram and again arrested requiring manual CPR and was then cannulated on VA ECMO. He is s/p PCI to pLCx, mid LCx, and OM1 with TERRY. IABP placed for decreased pulsatility. Thermogard cooling cath placed and patient was transferred to ICU for further management.     Today, the patient was seen for:  Heart failure requiring ECMO and IABP  Respiratory failure  support    Prognosis, Goals, & Planning:      Functional Status just prior to hospitalization: 0 (Fully active, able to carry on all activities without restriction)      Prognosis, Goals, and/or Advance Care Planning were addressed today: Yes   Discussed that Wolf is critically ill and might not recover.       Patient's decision making preferences: unable to assess          Patient has decision-making capacity today for complex decisions: No            I have concerns about the patient/family's health literacy today: No           Patient has a completed Health Care Directive: No.       Code status: Full Code    Coping, Meaning, & Spirituality:   Mood, coping, and/or meaning in the context of serious illness were addressed today: Yes  Pt not Quaker although grew up Adventism and wanted Adventism denomination kept on the chart \"for his grandmother\".     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 Always active and never liked to sit around the house. Day that he was brought into the hospital, he had put up all the bruce lights on the house.     Substance use history: Tobacco;     History of Present Illness:  History gathered today from: family/loved ones, medical chart    56 yr old male with PMH sig for tobacco use who was in his usual state of health when his brother heard a \"thump\" and found patient unresponsive and agonal breathing. 911 called and CPR initiated. EMS arrived within 4 minutes. Initial rhythm VF--> shocked x ~7 with ROSC upon arrival to ED. Total CPR team per EMS ~ 40 minutes. 3 mg Epinephrine, 300 mg Amio given via EMS. Patient EKG reveals Valentín inferior/posterior leads with reciprocal changes. Patient initially presented with an Igel and was intubated with ETT in the ED. He arrested and was again shocked in the ED with ROSC. Taken urgently to CCL where he underwent coronary angiogram and again arrested requiring manual CPR and was then cannulated on VA ECMO via right with 17 [place] cannula RCFA, 25 Fr cannula RCFV. Coronary angiogram revealed  of RCA and acute culprit lesion of LCx/OM1, s/p PCI to pLCx, mid LCx, and OM1 with TERRY. IABP placed for decreased pulsatility. Thermogard cooling cath placed and patient was transferred to ICU for further management.       ROS:  Pt intubated, sedated, not appropriate for ROS questions     Past Medical History:  No past medical history on file.     Past Surgical History:  Past Surgical History:   Procedure Laterality Date     CV ARTERIAL LINE PLACEMENT N/A 12/6/2021    Procedure: Arterial Line Placement;  Surgeon: Brayan Thompson MD;  Location: [facility] CARDIAC CATH LAB     CV CENTRAL VENOUS CATHETER PLACEMENT N/A 12/6/2021    Procedure: Central Venous Catheter Placement;  Surgeon: Brayan Thompson MD;  Location: [facility] CARDIAC CATH LAB     CV CORONARY ANGIOGRAM N/A 12/6/2021    " Procedure: CV CORONARY ANGIOGRAM;  Surgeon: Brayan Thompson MD;  Location: [facility] CARDIAC CATH LAB     CV CPR WITH CHEST COMPRESSION N/A 12/6/2021    Procedure: CPR with Chest Compression System;  Surgeon: rBayan Thompson MD;  Location: [facility] CARDIAC CATH LAB     CV EXTRACORPERAL MEMBRANE OXYGENATION N/A 12/6/2021    Procedure: Extracorporeal Membrance Oxygenation;  Surgeon: Brayan Thompson MD;  Location: [facility] CARDIAC CATH LAB     CV PCI STENT DRUG ELUTING N/A 12/6/2021    Procedure: Percutaneous Coronary Intervention Stent Drug Eluting;  Surgeon: Brayan Thompson MD;  Location: [facility] CARDIAC CATH LAB     CV THERAPEUTIC HYPOTHERMIA N/A 12/6/2021    Procedure: Therapeutic Hypothermia;  Surgeon: Brayan Thompson MD;  Location: [facility] CARDIAC CATH LAB         Family History:  No family history on file.      Allergies:  No Known Allergies     Medications:  I have reviewed this patient's medication profile and medications from this hospitalization.       Physical Exam:  Vital Signs: Temp: (!) 91.9  F (33.3  C) Temp src: Esophageal   Pulse: 54   Resp: 16 SpO2: 100 % O2 Device: Mechanical Ventilator    Weight: 170 lbs 6.65 oz  Gen: intubated, sedated, cooled, appeared stated age  Mental status/Psych: sedated; sensorium not intact    Data reviewed:  Reviewed recent labs and pertinent imaging  GFR 81

## 2021-12-07 NOTE — PROGRESS NOTES
"    [facility]   Critical Care Cardiology - Progress Note    Bruce Blount MRN: [id number]  Age: 56 year old, : 1965  Date: 2021    Assessment and Plan:  Bruce Blount is a 56 year old male with PMHx current tobacco use and ETOH abuse who presents to [facility] after out of hospital cardiac arrest.      Per EMS and brother, patient was in his usual state of health prior to arrest. Patients brother heard a \"thump\" and found patient unresponsive and agonal breathing. 911 called and CPR initiated. EMS arrived within 4 minutes. Initial rhythm VF--> shocked x ~7 with ROSC upon arrival to ED. Total CPR team per EMS ~ 40 minutes. 3 mg Epinephrine, 300 mg Amio given via EMS. Patient EKG reveals Valentín inferior/posterior leads with reciprocal changes. Patient initially presented with an Igel and was intubated with ETT in the ED. He arrested and was again shocked in the ED with ROSC. Taken urgently to CCL where he underwent coronary angiogram and again arrested requiring manual CPR and was then cannulated on VA ECMO via right with 17 [place]cu[place] cannula RCFA, 25 Fr cannula RCFV. Coronary angiogram revealed  of RCA and acute culprit lesion of LCx/OM1, s/p PCI to pLCx, mid LCx, and OM1 with TERRY. IABP placed for decreased pulsatility. Thermogard cooling cath placed and patient was transferred to ICU for further management.     Today's Plan:  - rewarm 0.5C per hour, starting at noon  - repeat limited echocardiogram today to assess aortic valve opening  - increase flows max tolerated without chugging given ongoing lactic acidosis  - lactic acid Q2 hours  - if valve does not open, epi if lactic acidosis resolves versus dobutamine for inotrope  - lipid panel  - stop vancomycin    Neurology  # Concern for anoxic brain injury    # Hx of possible ETOH abuse  Intubated, sedated. Cooled to 33 degrees. Initial head CT without acute pathology  - Continue versed, fentanyl for sedation with a RASS goal -4 " to -5.  - rewarm starting at noon, 0.5C per hour  - Neuro-crit consulted and appreciate recommendations.   - vEEG   - Palliative care consulted.   - Folate, Thiamine and multi vitamin for ETOH abuse. CIWA as needed when sedation weaned     Cardiovascular  # Refractory VF cardiac arrest 2/2 secondary to STEMI  # Cardiogenic shock requiring VA ECMO  # Ischemic Cardiomyopathy (EF 10-15%)  # Aortic Valve Closed  # S/p PCI pLCx, mLCx, OM1  # Residual RCA   # HTN, HLD  Peripheral V-A ECMO inserted via RCFA and RCFV 17/25 fr. No pulsatility when IABP paused. troponin peak 124. Loss pulsatility overnight.  - repeat limited echocardiogram today to assess aortic valve opening  - wean norepi as able  - will consider transitioning to epinephrine pending appearance of aortic valve, pulsatility versus dobutamine  - ECMO cares:   - Flows 2.4L. Increase to max tolerated flow in the setting of lactic acidosis   - Sweep 3L   - ACT goal: 180-200. Increase 200 to 220  - GDMT   - Continue ASA 81mg and ticagrelor 90mg BID    - Hold Lipitor for now given shock liver   - Hold ACE/ARB for now given likely reduced renal fxn after arrest   - Holding beta blocker given shock  - Lipid panel pending  -  of RCA not intervened upon    Pulmonary  # Acute hypoxemic respiratory failure requiring intubation  # Probable aspiration pneumonia  # Rib Fractures  # Sternal Fracture  # Tobacco Abuse (2PPD)  Vent Settings: CMV/16/480/5/60%. AB.26/39/126/17  - goal CO2 40-50  - Wean vent as able  - Daily CXR  - Serial ABGs   - Consider scheduled duonebs if signs of lung dz, currently PRN      Gastrointestinal, Nutrition  # Shock liver 2/2 cardiac arrest  No known medical hx.   - Monitor LFTs BID  - NPO while cooled - Nutrition consult pending feeding tube placement once warmed   - Bowel regimen: on hold for now due to hypothermia  - GI Prophylaxis: Protonix 40 mg daily    Renal, Electrolytes  # Hypoalbuminemia   # Lactic acidosis  # Hyperkalemia,  resolved  # Hypocalcemia, resolved  Unclear creatinine baseline, on admission 0.87. Lactate trend 8 -> 4.2  -> 2.0 -> 2.2 -> 3.3. Making approximately 40cc urine/hour  - discuss heads up with renal given possible need for CRRT later today  - lactic acid Q2 hours  - Monitor urine output    Infectious Disease  # Aspiration Pneumonia  # Lactic acidosis  # Leukocytosis, resolved  WBC 15 on arrival, now normal  - Monitor for signs of infection  - Daily blood cultures while on ECMO   Cultures thus far:                - NGTD  Antibiotics               - Vancomycin 12/6- 12/7 (MRSA negative)               - Zosyn 12/6- present     Hematology  # Loss of bilateral DP pulses  Hgb stable. No e/o bleeding. US with monophasic flow to DP  - Transfuse for Hgb < 8  - DVT PPX: Heparin as above    Endocrinology  # Hyperglycemia   No known medical history. Hemoglobin A1c 5.6%  - insulin gtt as needed    MSK/Skin  No active issues    Pertinent Lines  R femoral arterial and venous ECMO cannulae December 6, 2021  L femoral arterial line December 6, 2021  R radial arterial line December 6, 2021  ETT December 6, 2021  Gutiérrez catheter December 6, 2021  OG tube December 6, 2021    ICU Cares:  Daily CXR: ETT 4cm from jacques.  IABP well positioned.  Unchanged perihilar opacities  Fluids/Feeds: TF not indicated while cooled.  Fluids not indicated  DVT Prophylaxis: heparin ggt  GI Prophylaxis: protonix  Bowel Regimen: not indicated while cooled  Anticipated Floor Transfer: N/A    Family Update: brother, updated by me    Patient seen and discussed with Dr. Marlo Fry.  Assessment and plan as above.    Mary Leger PA-C  Critical Care Cardiology  Pager: [phone]      Interval History:  Paralysis overnight.  Loss of pulsatility overnight.    Objective Findings:  Temp:  [89.6  F (32  C)-94.5  F (34.7  C)] 92.8  F (33.8  C)  Pulse:  [53-89] 62  Resp:  [13-39] 16  BP: (105)/(78) 105/78  MAP:  [55 mmHg-115 mmHg] 62 mmHg  Arterial Line BP:  ()/(16-68) 89/36  FiO2 (%):  [60 %] 60 %  SpO2:  [88 %-100 %] 100 %    I/O:  Intake/Output Summary (Last 24 hours) at 12/7/2021 0817  Last data filed at 12/7/2021 0800  Gross per 24 hour   Intake 6454.77 ml   Output 2985 ml   Net 3469.77 ml     Physical Exam:  GEN: intubated, sedated  HEENT: no appreciable cranial trauma. Pupils 2mm, reactive  Pulm: coarse breath sounds bilaterally  Cardiac: regular rate/rhythm. No murmur, rub, gallop  ECMO cannula: insertion site clean, dry, intact. No appreciable hematoma  GI: soft, non distended  Neuro: pupils reactive.  Otherwise sedated  Integument: no appreciable skin breakdown  Vascular: LE cool      Medications:    artificial tears   Both Eyes Q8H     aspirin  81 mg Per Feeding Tube Daily     busPIRone  10 mg Oral or Feeding Tube TID     chlorhexidine  15 mL Swish & Spit BID     folic acid  1 mg Oral Daily     multivitamin w/minerals  1 tablet Oral Daily     pantoprazole (PROTONIX) IV  40 mg Intravenous Daily     piperacillin-tazobactam  3.375 g Intravenous Q6H     thiamine  100 mg Oral Daily     ticagrelor  90 mg Oral BID       cisatracurium 2 mcg/kg/min (12/07/21 0700)     dextrose       EPINEPHrine Stopped (12/06/21 1700)     fentaNYL 50 mcg/hr (12/07/21 0700)     heparin (PRESSURE BAG) 2 unit/mL in 0.9% NaCl 3 mL/hr (12/07/21 0700)     HEParin 600 Units/hr (12/07/21 0700)     insulin regular Stopped (12/06/21 1630)     midazolam 2 mg/hr (12/07/21 0700)     nitroPRUsside Stopped (12/06/21 1300)     norepinephrine 0.12 mcg/kg/min (12/07/21 0645)     vasopressin Stopped (12/06/21 1215)         Labs:   CMP  Recent Labs   Lab 12/07/21  0353 12/07/21  0342 12/07/21  0015 12/06/21  2209 12/06/21  2203 12/06/21  1626 12/06/21  1621 12/06/21  1235 12/06/21  1229   NA  --  145*  --   --  143  --  144  --  138   POTASSIUM  --  3.4  --   --  3.6  --  3.1*  --  3.9   CHLORIDE  --  118*  --   --  117*  --  117*  --  108   CO2  --  16*  --   --  18*  --  19*  --  18*   ANIONGAP   --  11  --   --  8  --  8  --  12   * 158* 137* 116* 125*   < > 110*   < > 305*  292*   BUN  --  25  --   --  25  --  24  --  23   CR  --  0.96  --   --  1.02  --  0.97  --  0.87   GFRESTIMATED  --  88  --   --  82  --  87  --  >90   BRIDGETTE  --  7.6*  --   --  7.2*  --  7.0*  --  6.3*   MAG  --  2.4*  --   --  2.6*  --  1.9  --  2.0   PHOS  --  3.8  --   --   --   --   --   --   --    PROTTOTAL  --  4.9*  --   --  5.0*  --  5.1*  --  5.4*   ALBUMIN  --  1.9*  --   --  2.1*  --  2.3*  --  2.2*   BILITOTAL  --  0.4  --   --  0.5  --  0.4  --  0.3   ALKPHOS  --  40  --   --  42  --  45  --  58   AST  --  326*  --   --  403*  --  409*  --  378*   ALT  --  95*  --   --  103*  --  104*  --  106*    < > = values in this interval not displayed.     CBC  Recent Labs   Lab 12/07/21  0342 12/06/21  2203 12/06/21  1621 12/06/21  1229   WBC 7.2 6.5 14.7* 15.4*   RBC 3.98* 4.11* 3.98* 4.21*   HGB 12.8* 13.0* 12.7* 13.4   HCT 37.5* 39.0* 38.3* 40.7   MCV 94 95 96 97   MCH 32.2 31.6 31.9 31.8   MCHC 34.1 33.3 33.2 32.9   RDW 12.4 12.3 12.2 12.1    250 297 306     Arterial Blood Gas  Recent Labs   Lab 12/07/21  0553 12/07/21  0342 12/07/21  0340 12/07/21  0151 12/07/21  0015   PH 7.26*  --  7.30* 7.29* 7.31*   PCO2 39  --  35 37 35   PO2 126*  --  304* 188* 133*   HCO3 17*  --  17* 18* 18*   O2PER 60 60 70  60 60 60         Pertinent Imaging Studies:  Coronary angiogram:   Refractory VT/VF cardiac arrest.  Cardiogenic shock.  STEMI involving the OM1 as culprit.  Three vessel severe CAD involving the  of the RCA, mid LCx and OM1 severe disease with occlusion of OM1 as culprit in STEMI, and moderate proximal LAD lesions likely hemodynamically significant.  CPR  VA ECMO placement.  IABP placement.  Thermogard placement with initiation of hypothermia protocol.  Right radial arterial line placement.  PCI with three drug eluting stents to the proximal, mid LCx and OM1.    LE Arterial Duplex, bilateral:  1. Right leg:  Patent arteries with post obstructive diminished waveforms likely due to ECMO cannula.    2. Left leg: Patent arteries.     Echo:   Technically difficult study.  VA ECMO at 2.8L/min.  Left ventricular function is decreased. The ejection fraction is 10-15% (severely reduced).  Global right ventricular function is severely reduced.  Septum midline.  The aortic valve appears to remain closed.  Trivial pericardial effusion is present.  Previous study not available for comparison.    CT Head:  No acute intracranial pathology.    CT Chest, Abdomen, Pelvis:  1. Bilateral dependent consolidative opacities. Differential diagnosis includes atelectasis, aspiration, infection.  2. Bilateral anterior rib fractures and sternal fracture likely secondary to CPR.  3. Endotracheal tube terminates 2.1 cm above the jacques. Consider retracting 2 cm.      Mary Leger PA-C  Critical Care Cardiology  Pager: [phone]

## 2021-12-07 NOTE — PLAN OF CARE
Pt placed on paralytics gtt overnight d/t constant shivering, unresolved by maxed sedation and fentanyl gtt. SVO2s in 40s without cis gtt, SVO2s 50-60s with gtt on low dose, shivering resolved on EKG. Remains at 33C via thermogard until noon today per 24hr cool order. NO cough to suction on paralytic gtt. No changes to vent settings overnight, sputum culture done, small amt secretions via inline ETT. SR vs junctional 60s, rare PVCs, keeping MAP >65 w/ levo gtt, dopplering pulses thorughout, Bilateral DPs absent, bilat US done already to show reduced flow. LLE cooler and more blotchy than RLE, both with delayed cap refill. No blood products given overnight, no alarms on IABP.  ECMO to 60%, sweep 3, and flowing 2.4 at a speed of 2700. 4th L LR completed by 2000 at start of shift. Gave 500ml NS for chugging during night shift. UOP 40ml/hr, loose watery stools, OG to LIS. Lytes replaced.  Will continue to update MD with any changes in condition per protocol.

## 2021-12-08 ENCOUNTER — APPOINTMENT (OUTPATIENT)
Dept: CARDIOLOGY | Facility: CLINIC | Age: 56
End: 2021-12-08
Attending: PHYSICIAN ASSISTANT
Payer: COMMERCIAL

## 2021-12-08 ENCOUNTER — APPOINTMENT (OUTPATIENT)
Dept: NEUROLOGY | Facility: CLINIC | Age: 56
End: 2021-12-08
Attending: NURSE PRACTITIONER
Payer: COMMERCIAL

## 2021-12-08 ENCOUNTER — APPOINTMENT (OUTPATIENT)
Dept: GENERAL RADIOLOGY | Facility: CLINIC | Age: 56
End: 2021-12-08
Attending: NURSE PRACTITIONER
Payer: COMMERCIAL

## 2021-12-08 LAB
ACT BLD: 156 SECONDS (ref 74–150)
ACT BLD: 165 SECONDS (ref 74–150)
ACT BLD: 169 SECONDS (ref 74–150)
ACT BLD: 177 SECONDS (ref 74–150)
ACT BLD: 178 SECONDS (ref 74–150)
ACT BLD: 182 SECONDS (ref 74–150)
ACT BLD: 195 SECONDS (ref 74–150)
ACT BLD: 207 SECONDS (ref 74–150)
ACT BLD: 211 SECONDS (ref 74–150)
ACT BLD: 211 SECONDS (ref 74–150)
ACT BLD: 216 SECONDS (ref 74–150)
ACT BLD: 220 SECONDS (ref 74–150)
ALBUMIN SERPL-MCNC: 1.7 G/DL (ref 3.4–5)
ALBUMIN SERPL-MCNC: 1.8 G/DL (ref 3.4–5)
ALBUMIN SERPL-MCNC: 1.8 G/DL (ref 3.4–5)
ALBUMIN SERPL-MCNC: 1.9 G/DL (ref 3.4–5)
ALBUMIN SERPL-MCNC: 1.9 G/DL (ref 3.4–5)
ALP SERPL-CCNC: 34 U/L (ref 40–150)
ALP SERPL-CCNC: 34 U/L (ref 40–150)
ALP SERPL-CCNC: 39 U/L (ref 40–150)
ALP SERPL-CCNC: 41 U/L (ref 40–150)
ALT SERPL W P-5'-P-CCNC: 84 U/L (ref 0–70)
ALT SERPL W P-5'-P-CCNC: 84 U/L (ref 0–70)
ALT SERPL W P-5'-P-CCNC: 85 U/L (ref 0–70)
ALT SERPL W P-5'-P-CCNC: 86 U/L (ref 0–70)
ANION GAP SERPL CALCULATED.3IONS-SCNC: 7 MMOL/L (ref 3–14)
ANION GAP SERPL CALCULATED.3IONS-SCNC: 8 MMOL/L (ref 3–14)
ANION GAP SERPL CALCULATED.3IONS-SCNC: 8 MMOL/L (ref 3–14)
APTT PPP: 123 SECONDS (ref 22–38)
APTT PPP: 48 SECONDS (ref 22–38)
APTT PPP: 55 SECONDS (ref 22–38)
APTT PPP: 57 SECONDS (ref 22–38)
AST SERPL W P-5'-P-CCNC: 194 U/L (ref 0–45)
AST SERPL W P-5'-P-CCNC: 198 U/L (ref 0–45)
AST SERPL W P-5'-P-CCNC: 199 U/L (ref 0–45)
AST SERPL W P-5'-P-CCNC: 204 U/L (ref 0–45)
AT III ACT/NOR PPP CHRO: 55 % (ref 85–135)
BASE EXCESS BLDA CALC-SCNC: -4.6 MMOL/L (ref -9–1.8)
BASE EXCESS BLDA CALC-SCNC: -5.3 MMOL/L (ref -9–1.8)
BASE EXCESS BLDA CALC-SCNC: -5.3 MMOL/L (ref -9–1.8)
BASE EXCESS BLDA CALC-SCNC: -5.4 MMOL/L (ref -9–1.8)
BASE EXCESS BLDA CALC-SCNC: -5.8 MMOL/L (ref -9–1.8)
BASE EXCESS BLDA CALC-SCNC: -6.7 MMOL/L (ref -9–1.8)
BASE EXCESS BLDA CALC-SCNC: -6.9 MMOL/L (ref -9–1.8)
BASE EXCESS BLDA CALC-SCNC: -7.1 MMOL/L (ref -9–1.8)
BASE EXCESS BLDA CALC-SCNC: -7.9 MMOL/L (ref -9–1.8)
BASE EXCESS BLDA CALC-SCNC: -7.9 MMOL/L (ref -9–1.8)
BASE EXCESS BLDA CALC-SCNC: -8 MMOL/L (ref -9–1.8)
BASE EXCESS BLDA CALC-SCNC: -8 MMOL/L (ref -9–1.8)
BASE EXCESS BLDV CALC-SCNC: -4.7 MMOL/L (ref -7.7–1.9)
BASE EXCESS BLDV CALC-SCNC: -6.9 MMOL/L (ref -7.7–1.9)
BILIRUB SERPL-MCNC: 0.5 MG/DL (ref 0.2–1.3)
BILIRUB SERPL-MCNC: 0.7 MG/DL (ref 0.2–1.3)
BILIRUB SERPL-MCNC: 0.8 MG/DL (ref 0.2–1.3)
BILIRUB SERPL-MCNC: 0.9 MG/DL (ref 0.2–1.3)
BUN SERPL-MCNC: 37 MG/DL (ref 7–30)
BUN SERPL-MCNC: 40 MG/DL (ref 7–30)
BUN SERPL-MCNC: 40 MG/DL (ref 7–30)
BUN SERPL-MCNC: 42 MG/DL (ref 7–30)
BUN SERPL-MCNC: 42 MG/DL (ref 7–30)
CA-I BLD-MCNC: 4.4 MG/DL (ref 4.4–5.2)
CA-I BLD-MCNC: 4.4 MG/DL (ref 4.4–5.2)
CA-I BLD-MCNC: 4.5 MG/DL (ref 4.4–5.2)
CA-I BLD-MCNC: 4.6 MG/DL (ref 4.4–5.2)
CALCIUM SERPL-MCNC: 7.8 MG/DL (ref 8.5–10.1)
CALCIUM SERPL-MCNC: 8.1 MG/DL (ref 8.5–10.1)
CF REDUC 30M P MA P HEP LENFR BLD TEG: 3.8 % (ref 0–8)
CF REDUC 60M P MA P HEPASE LENFR BLD TEG: 7.9 % (ref 0–15)
CFT P HPASE BLD TEG: 1.4 MINUTE (ref 1–3)
CHLORIDE BLD-SCNC: 114 MMOL/L (ref 94–109)
CHLORIDE BLD-SCNC: 114 MMOL/L (ref 94–109)
CHLORIDE BLD-SCNC: 117 MMOL/L (ref 94–109)
CHLORIDE BLD-SCNC: 117 MMOL/L (ref 94–109)
CHLORIDE BLD-SCNC: 118 MMOL/L (ref 94–109)
CI (COAGULATION INDEX)(Z): 0.8 (ref -3–3)
CK SERPL-CCNC: 2267 U/L (ref 30–300)
CK SERPL-CCNC: 2604 U/L (ref 30–300)
CLOT ANGLE P HPASE BLD TEG: 70.7 DEGREES (ref 53–72)
CLOT INIT P HPASE BLD TEG: 6.5 MINUTE (ref 5–10)
CLOT STRENGTH P HPASE BLD TEG: 9.4 KD/SC (ref 4.5–11)
CO2 SERPL-SCNC: 18 MMOL/L (ref 20–32)
CO2 SERPL-SCNC: 20 MMOL/L (ref 20–32)
CO2 SERPL-SCNC: 21 MMOL/L (ref 20–32)
CREAT SERPL-MCNC: 1.75 MG/DL (ref 0.66–1.25)
CREAT SERPL-MCNC: 1.75 MG/DL (ref 0.66–1.25)
CREAT SERPL-MCNC: 1.82 MG/DL (ref 0.66–1.25)
CREAT SERPL-MCNC: 2.02 MG/DL (ref 0.66–1.25)
CREAT SERPL-MCNC: 2.02 MG/DL (ref 0.66–1.25)
CRP SERPL-MCNC: 310 MG/L (ref 0–8)
D DIMER PPP FEU-MCNC: 4.98 UG/ML FEU (ref 0–0.5)
D DIMER PPP FEU-MCNC: 5.87 UG/ML FEU (ref 0–0.5)
ERYTHROCYTE [DISTWIDTH] IN BLOOD BY AUTOMATED COUNT: 13.1 % (ref 10–15)
ERYTHROCYTE [DISTWIDTH] IN BLOOD BY AUTOMATED COUNT: 13.3 % (ref 10–15)
ERYTHROCYTE [DISTWIDTH] IN BLOOD BY AUTOMATED COUNT: 13.4 % (ref 10–15)
ERYTHROCYTE [DISTWIDTH] IN BLOOD BY AUTOMATED COUNT: 13.6 % (ref 10–15)
ERYTHROCYTE [SEDIMENTATION RATE] IN BLOOD BY WESTERGREN METHOD: 38 MM/HR (ref 0–20)
FIBRINOGEN PPP-MCNC: 651 MG/DL (ref 170–490)
FIBRINOGEN PPP-MCNC: 806 MG/DL (ref 170–490)
GFR SERPL CREATININE-BSD FRML MDRD: 36 ML/MIN/1.73M2
GFR SERPL CREATININE-BSD FRML MDRD: 36 ML/MIN/1.73M2
GFR SERPL CREATININE-BSD FRML MDRD: 41 ML/MIN/1.73M2
GFR SERPL CREATININE-BSD FRML MDRD: 43 ML/MIN/1.73M2
GFR SERPL CREATININE-BSD FRML MDRD: 43 ML/MIN/1.73M2
GLUCOSE BLD-MCNC: 110 MG/DL (ref 70–99)
GLUCOSE BLD-MCNC: 116 MG/DL (ref 70–99)
GLUCOSE BLD-MCNC: 124 MG/DL (ref 70–99)
GLUCOSE BLD-MCNC: 124 MG/DL (ref 70–99)
GLUCOSE BLD-MCNC: 136 MG/DL (ref 70–99)
GLUCOSE BLD-MCNC: 140 MG/DL (ref 70–99)
GLUCOSE BLD-MCNC: 143 MG/DL (ref 70–99)
GLUCOSE BLD-MCNC: 146 MG/DL (ref 70–99)
GLUCOSE BLDC GLUCOMTR-MCNC: 104 MG/DL (ref 70–99)
GLUCOSE BLDC GLUCOMTR-MCNC: 104 MG/DL (ref 70–99)
GLUCOSE BLDC GLUCOMTR-MCNC: 109 MG/DL (ref 70–99)
GLUCOSE BLDC GLUCOMTR-MCNC: 110 MG/DL (ref 70–99)
GLUCOSE BLDC GLUCOMTR-MCNC: 118 MG/DL (ref 70–99)
GLUCOSE BLDC GLUCOMTR-MCNC: 119 MG/DL (ref 70–99)
GLUCOSE BLDC GLUCOMTR-MCNC: 132 MG/DL (ref 70–99)
GLUCOSE BLDC GLUCOMTR-MCNC: 139 MG/DL (ref 70–99)
HCO3 BLD-SCNC: 17 MMOL/L (ref 21–28)
HCO3 BLD-SCNC: 19 MMOL/L (ref 21–28)
HCO3 BLD-SCNC: 20 MMOL/L (ref 21–28)
HCO3 BLD-SCNC: 21 MMOL/L (ref 21–28)
HCO3 BLD-SCNC: 21 MMOL/L (ref 21–28)
HCO3 BLD-SCNC: 22 MMOL/L (ref 21–28)
HCO3 BLDA-SCNC: 20 MMOL/L (ref 21–28)
HCO3 BLDA-SCNC: 23 MMOL/L (ref 21–28)
HCO3 BLDV-SCNC: 21 MMOL/L (ref 21–28)
HCO3 BLDV-SCNC: 24 MMOL/L (ref 21–28)
HCT VFR BLD AUTO: 31.9 % (ref 40–53)
HCT VFR BLD AUTO: 33.6 % (ref 40–53)
HCT VFR BLD AUTO: 33.8 % (ref 40–53)
HCT VFR BLD AUTO: 35.5 % (ref 40–53)
HGB BLD-MCNC: 10.3 G/DL (ref 13.3–17.7)
HGB BLD-MCNC: 10.9 G/DL (ref 13.3–17.7)
HGB BLD-MCNC: 11.1 G/DL (ref 13.3–17.7)
HGB BLD-MCNC: 11.8 G/DL (ref 13.3–17.7)
HGB FREE PLAS-MCNC: <30 MG/DL
INR PPP: 1.55 (ref 0.85–1.15)
INR PPP: 1.79 (ref 0.85–1.15)
INR PPP: 1.98 (ref 0.85–1.15)
INR PPP: 1.98 (ref 0.85–1.15)
INTERPRETATION TEGPIA: NORMAL
LACTATE SERPL-SCNC: 1.8 MMOL/L (ref 0.7–2)
LACTATE SERPL-SCNC: 1.8 MMOL/L (ref 0.7–2)
LACTATE SERPL-SCNC: 2 MMOL/L (ref 0.7–2)
LACTATE SERPL-SCNC: 2.1 MMOL/L (ref 0.7–2)
LACTATE SERPL-SCNC: 2.2 MMOL/L (ref 0.7–2)
LACTATE SERPL-SCNC: 2.3 MMOL/L (ref 0.7–2)
LACTATE SERPL-SCNC: 2.3 MMOL/L (ref 0.7–2)
LACTATE SERPL-SCNC: 2.8 MMOL/L (ref 0.7–2)
LDH SERPL L TO P-CCNC: 741 U/L (ref 85–227)
MAGNESIUM SERPL-MCNC: 2.4 MG/DL (ref 1.6–2.3)
MAGNESIUM SERPL-MCNC: 2.5 MG/DL (ref 1.6–2.3)
MCF P HPASE BLD TEG: 65.3 MM (ref 50–70)
MCH RBC QN AUTO: 31.7 PG (ref 26.5–33)
MCH RBC QN AUTO: 31.7 PG (ref 26.5–33)
MCH RBC QN AUTO: 31.8 PG (ref 26.5–33)
MCH RBC QN AUTO: 32 PG (ref 26.5–33)
MCHC RBC AUTO-ENTMCNC: 32.3 G/DL (ref 31.5–36.5)
MCHC RBC AUTO-ENTMCNC: 32.4 G/DL (ref 31.5–36.5)
MCHC RBC AUTO-ENTMCNC: 32.8 G/DL (ref 31.5–36.5)
MCHC RBC AUTO-ENTMCNC: 33.2 G/DL (ref 31.5–36.5)
MCV RBC AUTO: 96 FL (ref 78–100)
MCV RBC AUTO: 97 FL (ref 78–100)
MCV RBC AUTO: 98 FL (ref 78–100)
MCV RBC AUTO: 98 FL (ref 78–100)
O2/TOTAL GAS SETTING VFR VENT: 60 %
OXYHGB MFR BLD: 89 % (ref 92–100)
OXYHGB MFR BLD: 90 % (ref 92–100)
OXYHGB MFR BLD: 91 % (ref 92–100)
OXYHGB MFR BLD: 93 % (ref 92–100)
OXYHGB MFR BLD: 94 % (ref 92–100)
OXYHGB MFR BLD: 94 % (ref 92–100)
OXYHGB MFR BLD: 95 % (ref 92–100)
OXYHGB MFR BLD: 95 % (ref 92–100)
OXYHGB MFR BLD: 96 % (ref 92–100)
OXYHGB MFR BLDA: 86 % (ref 75–100)
OXYHGB MFR BLDA: 98 % (ref 75–100)
OXYHGB MFR BLDV: 60 %
OXYHGB MFR BLDV: 72 %
PA AA BLD-ACNC: 27 %
PA ADP BLD-ACNC: 94 %
PCO2 BLD: 35 MM HG (ref 35–45)
PCO2 BLD: 39 MM HG (ref 35–45)
PCO2 BLD: 39 MM HG (ref 35–45)
PCO2 BLD: 40 MM HG (ref 35–45)
PCO2 BLD: 41 MM HG (ref 35–45)
PCO2 BLD: 42 MM HG (ref 35–45)
PCO2 BLD: 42 MM HG (ref 35–45)
PCO2 BLD: 43 MM HG (ref 35–45)
PCO2 BLD: 44 MM HG (ref 35–45)
PCO2 BLD: 44 MM HG (ref 35–45)
PCO2 BLDA: 48 MM HG (ref 35–45)
PCO2 BLDA: 58 MM HG (ref 35–45)
PCO2 BLDV: 53 MM HG (ref 40–50)
PCO2 BLDV: 59 MM HG (ref 40–50)
PH BLD: 7.24 [PH] (ref 7.35–7.45)
PH BLD: 7.26 [PH] (ref 7.35–7.45)
PH BLD: 7.27 [PH] (ref 7.35–7.45)
PH BLD: 7.27 [PH] (ref 7.35–7.45)
PH BLD: 7.29 [PH] (ref 7.35–7.45)
PH BLD: 7.29 [PH] (ref 7.35–7.45)
PH BLD: 7.3 [PH] (ref 7.35–7.45)
PH BLD: 7.3 [PH] (ref 7.35–7.45)
PH BLD: 7.31 [PH] (ref 7.35–7.45)
PH BLD: 7.31 [PH] (ref 7.35–7.45)
PH BLD: 7.32 [PH] (ref 7.35–7.45)
PH BLD: 7.33 [PH] (ref 7.35–7.45)
PH BLDA: 7.21 [PH] (ref 7.35–7.45)
PH BLDA: 7.22 [PH] (ref 7.35–7.45)
PH BLDV: 7.21 [PH] (ref 7.32–7.43)
PH BLDV: 7.21 [PH] (ref 7.32–7.43)
PHOSPHATE SERPL-MCNC: 6.6 MG/DL (ref 2.5–4.5)
PHOSPHATE SERPL-MCNC: 7.2 MG/DL (ref 2.5–4.5)
PLATELET # BLD AUTO: 132 10E3/UL (ref 150–450)
PLATELET # BLD AUTO: 146 10E3/UL (ref 150–450)
PLATELET # BLD AUTO: 152 10E3/UL (ref 150–450)
PLATELET # BLD AUTO: 160 10E3/UL (ref 150–450)
PO2 BLD: 56 MM HG (ref 80–105)
PO2 BLD: 60 MM HG (ref 80–105)
PO2 BLD: 62 MM HG (ref 80–105)
PO2 BLD: 62 MM HG (ref 80–105)
PO2 BLD: 72 MM HG (ref 80–105)
PO2 BLD: 73 MM HG (ref 80–105)
PO2 BLD: 83 MM HG (ref 80–105)
PO2 BLD: 85 MM HG (ref 80–105)
PO2 BLD: 87 MM HG (ref 80–105)
PO2 BLD: 93 MM HG (ref 80–105)
PO2 BLDA: 158 MM HG (ref 80–105)
PO2 BLDA: 58 MM HG (ref 80–105)
PO2 BLDV: 35 MM HG (ref 25–47)
PO2 BLDV: 42 MM HG (ref 25–47)
POTASSIUM BLD-SCNC: 4.5 MMOL/L (ref 3.4–5.3)
POTASSIUM BLD-SCNC: 4.7 MMOL/L (ref 3.4–5.3)
POTASSIUM BLD-SCNC: 5.4 MMOL/L (ref 3.4–5.3)
PROCALCITONIN SERPL-MCNC: 52.24 NG/ML
PROT SERPL-MCNC: 5.2 G/DL (ref 6.8–8.8)
PROT SERPL-MCNC: 5.2 G/DL (ref 6.8–8.8)
PROT SERPL-MCNC: 5.5 G/DL (ref 6.8–8.8)
PROT SERPL-MCNC: 5.7 G/DL (ref 6.8–8.8)
RBC # BLD AUTO: 3.25 10E6/UL (ref 4.4–5.9)
RBC # BLD AUTO: 3.44 10E6/UL (ref 4.4–5.9)
RBC # BLD AUTO: 3.47 10E6/UL (ref 4.4–5.9)
RBC # BLD AUTO: 3.71 10E6/UL (ref 4.4–5.9)
SODIUM SERPL-SCNC: 142 MMOL/L (ref 133–144)
SODIUM SERPL-SCNC: 143 MMOL/L (ref 133–144)
SODIUM SERPL-SCNC: 143 MMOL/L (ref 133–144)
SODIUM SERPL-SCNC: 144 MMOL/L (ref 133–144)
SODIUM SERPL-SCNC: 144 MMOL/L (ref 133–144)
TROPONIN I SERPL HS-MCNC: ABNORMAL NG/L
UFH PPP CHRO-ACNC: 0.29 IU/ML
UFH PPP CHRO-ACNC: <0.1 IU/ML
WBC # BLD AUTO: 15.4 10E3/UL (ref 4–11)
WBC # BLD AUTO: 16 10E3/UL (ref 4–11)
WBC # BLD AUTO: 19.2 10E3/UL (ref 4–11)
WBC # BLD AUTO: 19.3 10E3/UL (ref 4–11)

## 2021-12-08 PROCEDURE — 250N000009 HC RX 250: Performed by: CLINICAL NURSE SPECIALIST

## 2021-12-08 PROCEDURE — 82330 ASSAY OF CALCIUM: CPT | Performed by: NURSE PRACTITIONER

## 2021-12-08 PROCEDURE — 85610 PROTHROMBIN TIME: CPT | Performed by: NURSE PRACTITIONER

## 2021-12-08 PROCEDURE — 250N000011 HC RX IP 250 OP 636: Performed by: INTERNAL MEDICINE

## 2021-12-08 PROCEDURE — 82803 BLOOD GASES ANY COMBINATION: CPT | Performed by: NURSE PRACTITIONER

## 2021-12-08 PROCEDURE — 93321 DOPPLER ECHO F-UP/LMTD STD: CPT | Mod: 26 | Performed by: INTERNAL MEDICINE

## 2021-12-08 PROCEDURE — 83735 ASSAY OF MAGNESIUM: CPT | Performed by: NURSE PRACTITIONER

## 2021-12-08 PROCEDURE — 250N000011 HC RX IP 250 OP 636: Performed by: STUDENT IN AN ORGANIZED HEALTH CARE EDUCATION/TRAINING PROGRAM

## 2021-12-08 PROCEDURE — 93325 DOPPLER ECHO COLOR FLOW MAPG: CPT

## 2021-12-08 PROCEDURE — 85520 HEPARIN ASSAY: CPT | Performed by: NURSE PRACTITIONER

## 2021-12-08 PROCEDURE — 85300 ANTITHROMBIN III ACTIVITY: CPT | Performed by: NURSE PRACTITIONER

## 2021-12-08 PROCEDURE — 87205 SMEAR GRAM STAIN: CPT | Performed by: NURSE PRACTITIONER

## 2021-12-08 PROCEDURE — 82805 BLOOD GASES W/O2 SATURATION: CPT | Performed by: NURSE PRACTITIONER

## 2021-12-08 PROCEDURE — 272N000001 HC OR GENERAL SUPPLY STERILE: Performed by: INTERNAL MEDICINE

## 2021-12-08 PROCEDURE — 250N000013 HC RX MED GY IP 250 OP 250 PS 637: Performed by: STUDENT IN AN ORGANIZED HEALTH CARE EDUCATION/TRAINING PROGRAM

## 2021-12-08 PROCEDURE — 85027 COMPLETE CBC AUTOMATED: CPT | Performed by: NURSE PRACTITIONER

## 2021-12-08 PROCEDURE — C9113 INJ PANTOPRAZOLE SODIUM, VIA: HCPCS | Performed by: NURSE PRACTITIONER

## 2021-12-08 PROCEDURE — 82040 ASSAY OF SERUM ALBUMIN: CPT | Performed by: CLINICAL NURSE SPECIALIST

## 2021-12-08 PROCEDURE — 93010 ELECTROCARDIOGRAM REPORT: CPT | Performed by: INTERNAL MEDICINE

## 2021-12-08 PROCEDURE — 84100 ASSAY OF PHOSPHORUS: CPT | Performed by: NURSE PRACTITIONER

## 2021-12-08 PROCEDURE — 71045 X-RAY EXAM CHEST 1 VIEW: CPT | Mod: 26 | Performed by: RADIOLOGY

## 2021-12-08 PROCEDURE — 99152 MOD SED SAME PHYS/QHP 5/>YRS: CPT | Performed by: INTERNAL MEDICINE

## 2021-12-08 PROCEDURE — 33949 ECMO/ECLS DAILY MGMT ARTERY: CPT | Performed by: INTERNAL MEDICINE

## 2021-12-08 PROCEDURE — 90947 DIALYSIS REPEATED EVAL: CPT

## 2021-12-08 PROCEDURE — 94640 AIRWAY INHALATION TREATMENT: CPT | Mod: 76

## 2021-12-08 PROCEDURE — 84155 ASSAY OF PROTEIN SERUM: CPT | Performed by: NURSE PRACTITIONER

## 2021-12-08 PROCEDURE — 250N000009 HC RX 250: Performed by: PHYSICIAN ASSISTANT

## 2021-12-08 PROCEDURE — 80053 COMPREHEN METABOLIC PANEL: CPT | Performed by: NURSE PRACTITIONER

## 2021-12-08 PROCEDURE — 200N000002 HC R&B ICU UMMC

## 2021-12-08 PROCEDURE — 85730 THROMBOPLASTIN TIME PARTIAL: CPT | Performed by: NURSE PRACTITIONER

## 2021-12-08 PROCEDURE — 36415 COLL VENOUS BLD VENIPUNCTURE: CPT | Performed by: INTERNAL MEDICINE

## 2021-12-08 PROCEDURE — 250N000011 HC RX IP 250 OP 636: Performed by: NURSE PRACTITIONER

## 2021-12-08 PROCEDURE — 94003 VENT MGMT INPAT SUBQ DAY: CPT

## 2021-12-08 PROCEDURE — 84484 ASSAY OF TROPONIN QUANT: CPT | Performed by: NURSE PRACTITIONER

## 2021-12-08 PROCEDURE — 93325 DOPPLER ECHO COLOR FLOW MAPG: CPT | Mod: 26 | Performed by: INTERNAL MEDICINE

## 2021-12-08 PROCEDURE — 99254 IP/OBS CNSLTJ NEW/EST MOD 60: CPT | Performed by: INTERNAL MEDICINE

## 2021-12-08 PROCEDURE — 94640 AIRWAY INHALATION TREATMENT: CPT

## 2021-12-08 PROCEDURE — 99233 SBSQ HOSP IP/OBS HIGH 50: CPT | Mod: 25 | Performed by: PSYCHIATRY & NEUROLOGY

## 2021-12-08 PROCEDURE — 82947 ASSAY GLUCOSE BLOOD QUANT: CPT | Performed by: NURSE PRACTITIONER

## 2021-12-08 PROCEDURE — 85396 CLOTTING ASSAY WHOLE BLOOD: CPT | Performed by: NURSE PRACTITIONER

## 2021-12-08 PROCEDURE — C1894 INTRO/SHEATH, NON-LASER: HCPCS | Performed by: INTERNAL MEDICINE

## 2021-12-08 PROCEDURE — 85347 COAGULATION TIME ACTIVATED: CPT

## 2021-12-08 PROCEDURE — 250N000013 HC RX MED GY IP 250 OP 250 PS 637: Performed by: PHYSICIAN ASSISTANT

## 2021-12-08 PROCEDURE — 258N000003 HC RX IP 258 OP 636: Performed by: STUDENT IN AN ORGANIZED HEALTH CARE EDUCATION/TRAINING PROGRAM

## 2021-12-08 PROCEDURE — 83051 HEMOGLOBIN PLASMA: CPT | Performed by: NURSE PRACTITIONER

## 2021-12-08 PROCEDURE — 999N000185 HC STATISTIC TRANSPORT TIME EA 15 MIN

## 2021-12-08 PROCEDURE — 87040 BLOOD CULTURE FOR BACTERIA: CPT | Performed by: INTERNAL MEDICINE

## 2021-12-08 PROCEDURE — 85652 RBC SED RATE AUTOMATED: CPT | Performed by: NURSE PRACTITIONER

## 2021-12-08 PROCEDURE — 272N000555 HC SENSOR NIRS OXIMETER, ADULT

## 2021-12-08 PROCEDURE — 71045 X-RAY EXAM CHEST 1 VIEW: CPT

## 2021-12-08 PROCEDURE — 99291 CRITICAL CARE FIRST HOUR: CPT | Mod: 25 | Performed by: INTERNAL MEDICINE

## 2021-12-08 PROCEDURE — 83605 ASSAY OF LACTIC ACID: CPT | Performed by: PHYSICIAN ASSISTANT

## 2021-12-08 PROCEDURE — 250N000013 HC RX MED GY IP 250 OP 250 PS 637: Performed by: NURSE PRACTITIONER

## 2021-12-08 PROCEDURE — 258N000003 HC RX IP 258 OP 636: Performed by: NURSE PRACTITIONER

## 2021-12-08 PROCEDURE — 83615 LACTATE (LD) (LDH) ENZYME: CPT | Performed by: NURSE PRACTITIONER

## 2021-12-08 PROCEDURE — 250N000009 HC RX 250: Performed by: INTERNAL MEDICINE

## 2021-12-08 PROCEDURE — 36415 COLL VENOUS BLD VENIPUNCTURE: CPT | Performed by: NURSE PRACTITIONER

## 2021-12-08 PROCEDURE — 36620 INSERTION CATHETER ARTERY: CPT | Performed by: INTERNAL MEDICINE

## 2021-12-08 PROCEDURE — 85379 FIBRIN DEGRADATION QUANT: CPT | Performed by: NURSE PRACTITIONER

## 2021-12-08 PROCEDURE — 999N000075 HC STATISTIC IABP MONITORING

## 2021-12-08 PROCEDURE — 99221 1ST HOSP IP/OBS SF/LOW 40: CPT | Mod: GC | Performed by: SURGERY

## 2021-12-08 PROCEDURE — 82550 ASSAY OF CK (CPK): CPT | Performed by: INTERNAL MEDICINE

## 2021-12-08 PROCEDURE — 99153 MOD SED SAME PHYS/QHP EA: CPT | Performed by: INTERNAL MEDICINE

## 2021-12-08 PROCEDURE — 83605 ASSAY OF LACTIC ACID: CPT | Performed by: INTERNAL MEDICINE

## 2021-12-08 PROCEDURE — 999N000015 HC STATISTIC ARTERIAL MONITORING DAILY

## 2021-12-08 PROCEDURE — G0463 HOSPITAL OUTPT CLINIC VISIT: HCPCS

## 2021-12-08 PROCEDURE — 999N000157 HC STATISTIC RCP TIME EA 10 MIN

## 2021-12-08 PROCEDURE — 272N000272 HC CONTINUOUS NEBULIZER MICRO PUMP

## 2021-12-08 PROCEDURE — 82550 ASSAY OF CK (CPK): CPT | Performed by: PHYSICIAN ASSISTANT

## 2021-12-08 PROCEDURE — 250N000009 HC RX 250: Performed by: NURSE PRACTITIONER

## 2021-12-08 PROCEDURE — 250N000013 HC RX MED GY IP 250 OP 250 PS 637: Performed by: INTERNAL MEDICINE

## 2021-12-08 PROCEDURE — 93005 ELECTROCARDIOGRAM TRACING: CPT

## 2021-12-08 PROCEDURE — 85384 FIBRINOGEN ACTIVITY: CPT | Performed by: NURSE PRACTITIONER

## 2021-12-08 PROCEDURE — 33949 ECMO/ECLS DAILY MGMT ARTERY: CPT

## 2021-12-08 PROCEDURE — C1769 GUIDE WIRE: HCPCS | Performed by: INTERNAL MEDICINE

## 2021-12-08 PROCEDURE — 95720 EEG PHY/QHP EA INCR W/VEEG: CPT | Mod: GC | Performed by: PSYCHIATRY & NEUROLOGY

## 2021-12-08 PROCEDURE — 86140 C-REACTIVE PROTEIN: CPT | Performed by: NURSE PRACTITIONER

## 2021-12-08 PROCEDURE — 93308 TTE F-UP OR LMTD: CPT | Mod: 26 | Performed by: INTERNAL MEDICINE

## 2021-12-08 PROCEDURE — 95714 VEEG EA 12-26 HR UNMNTR: CPT

## 2021-12-08 RX ORDER — CALCIUM CHLORIDE, MAGNESIUM CHLORIDE, SODIUM CHLORIDE, SODIUM BICARBONATE, POTASSIUM CHLORIDE AND SODIUM PHOSPHATE DIBASIC DIHYDRATE 3.68; 3.05; 6.34; 3.09; .314; .187 G/L; G/L; G/L; G/L; G/L; G/L
INJECTION INTRAVENOUS CONTINUOUS
Status: DISCONTINUED | OUTPATIENT
Start: 2021-12-08 | End: 2021-12-09

## 2021-12-08 RX ORDER — HEPARIN SODIUM 200 [USP'U]/100ML
3 INJECTION, SOLUTION INTRAVENOUS CONTINUOUS
Status: DISCONTINUED | OUTPATIENT
Start: 2021-12-08 | End: 2021-12-09

## 2021-12-08 RX ORDER — POTASSIUM CHLORIDE 29.8 MG/ML
20 INJECTION INTRAVENOUS EVERY 8 HOURS PRN
Status: DISCONTINUED | OUTPATIENT
Start: 2021-12-08 | End: 2021-12-09

## 2021-12-08 RX ORDER — IPRATROPIUM BROMIDE AND ALBUTEROL SULFATE 2.5; .5 MG/3ML; MG/3ML
3 SOLUTION RESPIRATORY (INHALATION)
Status: DISCONTINUED | OUTPATIENT
Start: 2021-12-08 | End: 2021-12-17

## 2021-12-08 RX ORDER — POLYETHYLENE GLYCOL 3350 17 G/17G
17 POWDER, FOR SOLUTION ORAL DAILY
Status: DISCONTINUED | OUTPATIENT
Start: 2021-12-08 | End: 2021-12-10

## 2021-12-08 RX ORDER — AMINO AC/PROTEIN HYDR/WHEY PRO 10G-100/30
2 LIQUID (ML) ORAL 4 TIMES DAILY
Status: DISCONTINUED | OUTPATIENT
Start: 2021-12-08 | End: 2021-12-14

## 2021-12-08 RX ORDER — CALCIUM CHLORIDE, MAGNESIUM CHLORIDE, SODIUM CHLORIDE, SODIUM BICARBONATE, POTASSIUM CHLORIDE AND SODIUM PHOSPHATE DIBASIC DIHYDRATE 3.68; 3.05; 6.34; 3.09; .314; .187 G/L; G/L; G/L; G/L; G/L; G/L
12.5 INJECTION INTRAVENOUS CONTINUOUS
Status: DISCONTINUED | OUTPATIENT
Start: 2021-12-08 | End: 2021-12-09

## 2021-12-08 RX ORDER — PIPERACILLIN SODIUM, TAZOBACTAM SODIUM 4; .5 G/20ML; G/20ML
4.5 INJECTION, POWDER, LYOPHILIZED, FOR SOLUTION INTRAVENOUS EVERY 6 HOURS
Status: DISCONTINUED | OUTPATIENT
Start: 2021-12-08 | End: 2021-12-09

## 2021-12-08 RX ORDER — AMOXICILLIN 250 MG
2 CAPSULE ORAL 2 TIMES DAILY
Status: DISCONTINUED | OUTPATIENT
Start: 2021-12-08 | End: 2021-12-08

## 2021-12-08 RX ORDER — PANTOPRAZOLE SODIUM 40 MG/1
40 TABLET, DELAYED RELEASE ORAL
Status: DISCONTINUED | OUTPATIENT
Start: 2021-12-09 | End: 2021-12-08

## 2021-12-08 RX ORDER — FOLIC ACID 1 MG/1
1 TABLET ORAL DAILY
Status: DISCONTINUED | OUTPATIENT
Start: 2021-12-08 | End: 2021-12-14

## 2021-12-08 RX ORDER — CALCIUM GLUCONATE 20 MG/ML
2 INJECTION, SOLUTION INTRAVENOUS EVERY 8 HOURS PRN
Status: DISCONTINUED | OUTPATIENT
Start: 2021-12-08 | End: 2021-12-09

## 2021-12-08 RX ORDER — GUAIFENESIN 600 MG/1
15 TABLET, EXTENDED RELEASE ORAL DAILY
Status: DISCONTINUED | OUTPATIENT
Start: 2021-12-08 | End: 2021-12-22

## 2021-12-08 RX ORDER — CALCIUM CHLORIDE, MAGNESIUM CHLORIDE, DEXTROSE MONOHYDRATE, LACTIC ACID, SODIUM CHLORIDE, SODIUM BICARBONATE AND POTASSIUM CHLORIDE 5.15; 2.03; 22; 5.4; 6.46; 3.09; .157 G/L; G/L; G/L; G/L; G/L; G/L; G/L
12.5 INJECTION INTRAVENOUS CONTINUOUS
Status: DISCONTINUED | OUTPATIENT
Start: 2021-12-08 | End: 2021-12-09

## 2021-12-08 RX ORDER — DEXTROSE MONOHYDRATE 100 MG/ML
INJECTION, SOLUTION INTRAVENOUS CONTINUOUS PRN
Status: DISCONTINUED | OUTPATIENT
Start: 2021-12-08 | End: 2021-12-30

## 2021-12-08 RX ORDER — AMOXICILLIN 250 MG
2 CAPSULE ORAL 2 TIMES DAILY
Status: DISCONTINUED | OUTPATIENT
Start: 2021-12-08 | End: 2021-12-10

## 2021-12-08 RX ORDER — MAGNESIUM SULFATE HEPTAHYDRATE 40 MG/ML
2 INJECTION, SOLUTION INTRAVENOUS EVERY 8 HOURS PRN
Status: DISCONTINUED | OUTPATIENT
Start: 2021-12-08 | End: 2021-12-09

## 2021-12-08 RX ADMIN — CALCIUM CHLORIDE, MAGNESIUM CHLORIDE, SODIUM CHLORIDE, SODIUM BICARBONATE, POTASSIUM CHLORIDE AND SODIUM PHOSPHATE DIBASIC DIHYDRATE 12.5 ML/KG/HR: 3.68; 3.05; 6.34; 3.09; .314; .187 INJECTION INTRAVENOUS at 18:06

## 2021-12-08 RX ADMIN — CALCIUM CHLORIDE, MAGNESIUM CHLORIDE, DEXTROSE MONOHYDRATE, LACTIC ACID, SODIUM CHLORIDE, SODIUM BICARBONATE AND POTASSIUM CHLORIDE 12.5 ML/KG/HR: 5.15; 2.03; 22; 5.4; 6.46; 3.09; .157 INJECTION INTRAVENOUS at 09:58

## 2021-12-08 RX ADMIN — TICAGRELOR 90 MG: 90 TABLET ORAL at 19:32

## 2021-12-08 RX ADMIN — Medication 2 PACKET: at 15:33

## 2021-12-08 RX ADMIN — Medication 2 PACKET: at 19:34

## 2021-12-08 RX ADMIN — CALCIUM CHLORIDE, MAGNESIUM CHLORIDE, DEXTROSE MONOHYDRATE, LACTIC ACID, SODIUM CHLORIDE, SODIUM BICARBONATE AND POTASSIUM CHLORIDE 12.5 ML/KG/HR: 5.15; 2.03; 22; 5.4; 6.46; 3.09; .157 INJECTION INTRAVENOUS at 23:30

## 2021-12-08 RX ADMIN — IPRATROPIUM BROMIDE AND ALBUTEROL SULFATE 3 ML: 2.5; .5 SOLUTION RESPIRATORY (INHALATION) at 12:11

## 2021-12-08 RX ADMIN — CALCIUM CHLORIDE 1 G: 100 INJECTION, SOLUTION INTRAVENOUS at 22:04

## 2021-12-08 RX ADMIN — MULTIVIT AND MINERALS-FERROUS GLUCONATE 9 MG IRON/15 ML ORAL LIQUID 15 ML: at 11:28

## 2021-12-08 RX ADMIN — FOLIC ACID 1 MG: 1 TABLET ORAL at 11:28

## 2021-12-08 RX ADMIN — HEPARIN SODIUM 3 ML/HR: 200 INJECTION, SOLUTION INTRAVENOUS at 19:28

## 2021-12-08 RX ADMIN — PANTOPRAZOLE SODIUM 40 MG: 40 INJECTION, POWDER, FOR SOLUTION INTRAVENOUS at 08:21

## 2021-12-08 RX ADMIN — PIPERACILLIN SODIUM AND TAZOBACTAM SODIUM 4.5 G: 4; .5 INJECTION, POWDER, LYOPHILIZED, FOR SOLUTION INTRAVENOUS at 19:32

## 2021-12-08 RX ADMIN — Medication 2 PACKET: at 11:46

## 2021-12-08 RX ADMIN — Medication 0.5 UNITS/HR: at 19:28

## 2021-12-08 RX ADMIN — PIPERACILLIN AND TAZOBACTAM 3.38 G: 3; .375 INJECTION, POWDER, LYOPHILIZED, FOR SOLUTION INTRAVENOUS at 13:13

## 2021-12-08 RX ADMIN — Medication 0.08 MCG/KG/MIN: at 04:58

## 2021-12-08 RX ADMIN — CHLORHEXIDINE GLUCONATE 0.12% ORAL RINSE 15 ML: 1.2 LIQUID ORAL at 08:21

## 2021-12-08 RX ADMIN — THIAMINE HCL TAB 100 MG 100 MG: 100 TAB at 11:28

## 2021-12-08 RX ADMIN — CALCIUM CHLORIDE, MAGNESIUM CHLORIDE, SODIUM CHLORIDE, SODIUM BICARBONATE, POTASSIUM CHLORIDE AND SODIUM PHOSPHATE DIBASIC DIHYDRATE 12.5 ML/KG/HR: 3.68; 3.05; 6.34; 3.09; .314; .187 INJECTION INTRAVENOUS at 23:30

## 2021-12-08 RX ADMIN — CALCIUM CHLORIDE, MAGNESIUM CHLORIDE, SODIUM CHLORIDE, SODIUM BICARBONATE, POTASSIUM CHLORIDE AND SODIUM PHOSPHATE DIBASIC DIHYDRATE: 3.68; 3.05; 6.34; 3.09; .314; .187 INJECTION INTRAVENOUS at 18:01

## 2021-12-08 RX ADMIN — CALCIUM CHLORIDE, MAGNESIUM CHLORIDE, SODIUM CHLORIDE, SODIUM BICARBONATE, POTASSIUM CHLORIDE AND SODIUM PHOSPHATE DIBASIC DIHYDRATE 12.5 ML/KG/HR: 3.68; 3.05; 6.34; 3.09; .314; .187 INJECTION INTRAVENOUS at 15:08

## 2021-12-08 RX ADMIN — PIPERACILLIN AND TAZOBACTAM 3.38 G: 3; .375 INJECTION, POWDER, LYOPHILIZED, FOR SOLUTION INTRAVENOUS at 01:55

## 2021-12-08 RX ADMIN — CALCIUM CHLORIDE, MAGNESIUM CHLORIDE, DEXTROSE MONOHYDRATE, LACTIC ACID, SODIUM CHLORIDE, SODIUM BICARBONATE AND POTASSIUM CHLORIDE 12.5 ML/KG/HR: 5.15; 2.03; 22; 5.4; 6.46; 3.09; .157 INJECTION INTRAVENOUS at 18:03

## 2021-12-08 RX ADMIN — CALCIUM CHLORIDE, MAGNESIUM CHLORIDE, SODIUM CHLORIDE, SODIUM BICARBONATE, POTASSIUM CHLORIDE AND SODIUM PHOSPHATE DIBASIC DIHYDRATE 12.5 ML/KG/HR: 3.68; 3.05; 6.34; 3.09; .314; .187 INJECTION INTRAVENOUS at 09:58

## 2021-12-08 RX ADMIN — CHLORHEXIDINE GLUCONATE 0.12% ORAL RINSE 15 ML: 1.2 LIQUID ORAL at 19:40

## 2021-12-08 RX ADMIN — CALCIUM CHLORIDE, MAGNESIUM CHLORIDE, DEXTROSE MONOHYDRATE, LACTIC ACID, SODIUM CHLORIDE, SODIUM BICARBONATE AND POTASSIUM CHLORIDE 12.5 ML/KG/HR: 5.15; 2.03; 22; 5.4; 6.46; 3.09; .157 INJECTION INTRAVENOUS at 15:08

## 2021-12-08 RX ADMIN — Medication 1.5 UNITS/HR: at 06:00

## 2021-12-08 RX ADMIN — IPRATROPIUM BROMIDE AND ALBUTEROL SULFATE 3 ML: 2.5; .5 SOLUTION RESPIRATORY (INHALATION) at 09:14

## 2021-12-08 RX ADMIN — IPRATROPIUM BROMIDE AND ALBUTEROL SULFATE 3 ML: 2.5; .5 SOLUTION RESPIRATORY (INHALATION) at 20:26

## 2021-12-08 RX ADMIN — ASPIRIN 81 MG CHEWABLE TABLET 81 MG: 81 TABLET CHEWABLE at 08:21

## 2021-12-08 RX ADMIN — CALCIUM CHLORIDE, MAGNESIUM CHLORIDE, SODIUM CHLORIDE, SODIUM BICARBONATE, POTASSIUM CHLORIDE AND SODIUM PHOSPHATE DIBASIC DIHYDRATE: 3.68; 3.05; 6.34; 3.09; .314; .187 INJECTION INTRAVENOUS at 09:57

## 2021-12-08 RX ADMIN — TICAGRELOR 90 MG: 90 TABLET ORAL at 08:21

## 2021-12-08 RX ADMIN — Medication 6 MG/HR: at 01:55

## 2021-12-08 RX ADMIN — PIPERACILLIN AND TAZOBACTAM 3.38 G: 3; .375 INJECTION, POWDER, LYOPHILIZED, FOR SOLUTION INTRAVENOUS at 08:21

## 2021-12-08 ASSESSMENT — ACTIVITIES OF DAILY LIVING (ADL)
ADLS_ACUITY_SCORE: 11

## 2021-12-08 ASSESSMENT — MIFFLIN-ST. JEOR: SCORE: 1591.38

## 2021-12-08 NOTE — PLAN OF CARE
"Back to normothermic temp of 37C at 2200. Pt  HAZEL intermittently with episodes of hypertension and coughing to vent, does not follow commands, remains on EEG, PERRLA. Sedation increased to refrain f/these repeated episodes, still overbreathes vent at times.     No changes to vent overnight, see results for serial ABGs. Moderate to small amt secretions via ETT. Front tooth dislodged with oral cares tonight, no bleeding. Tooth has been loose since pt arrival, nurses asked pts brother if it was loose before his arrival to the hospital and the brother stated \"he had poor dental care at home.\" Pts tooth placed in a sterile cup with pt label and placed in [employer].     SR 90s, keeping MAP >65, small dose vaso gtt started for hypotension, norepi gtt titrated down. No blood products or extra volume overnight.  Dopplering pulses to BLE, LLE blotchy and mottled L>R.  ECMO 60%, sweep 2, flows, 4.06 and speed 3840. No issues w/ IABP overnight, dressing redone.     UOP decreasing, MD notified, plans to potentially place pt on CRRT today, K up to 5.4, creat up to 1.75, phos 7.2, UOP ~10ml/hr. Rectal tube op 100ml, still watery stool. OG to LIS, plans to potentially start nutrition today. Will continue to update team with any changes in condition per protocol.     "

## 2021-12-08 NOTE — PROGRESS NOTES
CLINICAL NUTRITION SERVICES - BRIEF NOTE   (see RD note on 12/7 for full assessment)    Pt fully rewarmed. Plan to start TF via OGT today.     Nutrition changes today:  - Initiate Novasource renal @ 15 ml/hr. Adv by 10 ml q8h to eventual goal rate @ 45 ml/hr   - Prosource (2 pkts QID) to meet high protein needs on ECMO   - FWF 30 ml q4h for tube patency   - Certavite, thiamine 100 mg, folic acid 1 mg daily.     Goal TF Provisions: Novasource Renal @ 45 ml/hr (1080 ml) + Prosource (2 pkts QID) provides 2480 kcal (32 kcal/kg), 186 g pro (2.4 g/kg), 198 g CHO, 774 ml free water, and 0 g fiber daily.     Dora High, RD, LD  [id number]  Pgr: [phone]

## 2021-12-08 NOTE — CONSULTS
"[facility]  WO Nurse Inpatient Adult Pressure Injury Prevention Assessment: ECMO  Initial     Positioning Tolerance: Good  Date of ECMO cannulation: 12/6  Presence of Ischemia: Yes  Location of ischemia: bilateral cold dusky plantar toes    Pressure Injury Prevention Interventions In Place:  Optifoam Dressing under ECMO Cannula, Z flow Positioner under head, Pillows for repositioning, TAPs Wedge Positioners in use, Heel off-loading boots and Mepilex Sacral Dressing   Current support surface: Standard  Low air loss mattress with pulsation        Pressure Injury Prevention Interventions Added:  None        Plan of Care for Positioning and Pressure Injury Prevention  Reposition patient every 1-2 hours using TAP Wedges  Position head on Z flow positioner, mold indentation at areas of pressure points.  Pad ECMO IJ cannula with Optifoam (#[id number]) along face and scalp between skin and cannula and under Coban head wraps    Pad ECMO groin and chest cannula under rigid connectors with Optifoam or Soft cloth  Heel off-loading Boots at all times  Sacral Mepilex for Prevention, change every 5 days and prn  Low Air loss mattress    Patient History:   According to medical record: Bruce Blount is a 56 year old male with a pmhx sig for heavy etoh use and 2ppd for \"years\" otherwise does not follow with medical physicians thus has no known medical history who was at home here he lives with his brother, had no complaints until his brother heard a thud then found him down, began cpr until ems arrived 4min later where he was found to be in vfib--> shocked 7 times, with rosc on arrival to er where he was noted to have jensen the inferior and posterior leads with reciprocal changes --> cath lab, rearrested in er and cath lab with shocks and rosc but due to recurrent arrythmia was placed on va ecmo -->  rca, acute culprit cx om1 with pci to prox and mid cx as well as om1. IABP placed due " to loss of pulsatility --> iCTH with preserved grey white, hypertensive on admit to icu. Cooled to 33 (cold on arrival to icu).     Admission conditions: cardiac arrest, coronary artery disease, myocadial infarction, anoxic brain injury, Ihypoxic/hypercarbic respiratory failure, lactic acidosis, hypotension, cardiogenic shock, ventricular fibrillation, acute heart failure, transaminitis, shock liver, acute renal injury, hypokalemia, aspiration pneumonia, hyperglycemia, vasodilatory shock, etoh abuse, tobacco abuse    Current Diet / Nutrition:     Orders Placed This Encounter      NPO for Medical/Clinical Reasons Except for: No Exceptions      Output:    I/O last 3 completed shifts:  In: 1547.17 [I.V.:1412.17; NG/GT:135]  Out: 1580 [Urine:930; Emesis/NG output:450; Stool:200]  Containment: of urine/stool: Incontinence Protocol and Urinary Catheter    Risk Assessment:   Sensory Perception: 1-->completely limited  Moisture: 3-->occasionally moist  Activity: 1-->bedfast  Mobility: 2-->very limited  Nutrition: 2-->probably inadequate  Friction and Shear: 2-->potential problem  Yamil Score: 11    Labs:  Recent Labs   Lab 12/08/21  0353 12/06/21  2203 12/06/21  1621   ALBUMIN 1.9*   < > 2.3*   HGB 11.8*   < > 12.7*   INR 1.98*   < > 1.44*   WBC 15.4*   < > 14.7*   A1C  --   --  5.6   .0*   < >  --     < > = values in this interval not displayed.       Focused Assessment: right Right groin ECMO cannula bilateral ears, oral cavity, lips ETAD position    Pressure Injury Present::No    Education provided to: nurse  Discussed importance of:repositioning etad, and noting the towel roll under the tube to offload pressure on upper lip   Discussed plan of care with Nurse  WOC Nurse follow-up plan:weekly    Elle Young RN BS CWOCN

## 2021-12-08 NOTE — CONSULTS
Nephrology Initial Consult  December 8, 2021      Bruce Blount MRN:[id number] YOB: 1965  Date of Admission:12/6/2021  Primary care provider: No Ref-Primary, Physician  Requesting physician: Brayan Thompson*    ASSESSMENT AND RECOMMENDATIONS:   ESTER-Presentation Cr 1.0, normal imaging on CT with no hydro.  Cr up after arrest and K is 5.4 in setting of ongoing rewarming from cooling protocol.  Still making some UOP and there is no urgent indication but with need for IABP, ECMO and 2 pressors ESTER is unlikely to improve in the short term so will start CRRT.    -CRRT, mix of 2k/4k baths, I=O fluid goal.     -Access is via ECMO circuit.     Volume-Mild edema, starting with matching I=O's.      Electrolytes/pH-K 5.4, bicarb 20.      BMD-Ca 7.8, Mg and Phos mildly up consistent with ESTER.      VF arrest-EF currently 5-10%.      Anemia-Hgb 11.1, slightly down from admission, acute management per team.    Seen and discussed with Dr Guillory    Recommendations were communicated to primary team via verbal communication.     KATHARINA Kemp CNS  Clinical Nurse Specialist  [phone]    REASON FOR CONSULT: Requested to evaluate 56 yom for management of ESTER in setting of cardiac arrest.      HISTORY OF PRESENT ILLNESS:  Bruce Blount is a 56 yom with hx of ETOH and tobacco use who presents to [facility] after out of hospital arrest on IABP and ECMO.  Had witnessed arrest at home (brother heard him fall and called 911), found in VF and coded by EMS.  UOP dwindling and has mild hyperkalemia after rewarming, nephrology consulted for ESTER and management of K.      PAST MEDICAL HISTORY:  Past Surgical History:   Procedure Laterality Date     CV ARTERIAL LINE PLACEMENT N/A 12/6/2021    Procedure: Arterial Line Placement;  Surgeon: Brayan Thompson MD;  Location:  HEART CARDIAC CATH LAB     CV CENTRAL VENOUS CATHETER PLACEMENT N/A 12/6/2021    Procedure: Central Venous Catheter Placement;   Surgeon: Brayan Thompson MD;  Location:  HEART CARDIAC CATH LAB     CV CORONARY ANGIOGRAM N/A 12/6/2021    Procedure: CV CORONARY ANGIOGRAM;  Surgeon: Brayan Thompson MD;  Location: [facility] CARDIAC CATH LAB     CV CPR WITH CHEST COMPRESSION N/A 12/6/2021    Procedure: CPR with Chest Compression System;  Surgeon: Brayan Thompson MD;  Location: [facility] CARDIAC CATH LAB     CV EXTRACORPERAL MEMBRANE OXYGENATION N/A 12/6/2021    Procedure: Extracorporeal Membrance Oxygenation;  Surgeon: Brayan Thompson MD;  Location: [facility] CARDIAC CATH LAB     CV PCI STENT DRUG ELUTING N/A 12/6/2021    Procedure: Percutaneous Coronary Intervention Stent Drug Eluting;  Surgeon: Brayan Thompson MD;  Location: [facility] CARDIAC CATH LAB     CV THERAPEUTIC HYPOTHERMIA N/A 12/6/2021    Procedure: Therapeutic Hypothermia;  Surgeon: Brayan Thompson MD;  Location: [facility] CARDIAC CATH LAB        MEDICATIONS:  PTA Meds  Prior to Admission medications    Not on File      Current Meds    aspirin  81 mg Per Feeding Tube Daily     chlorhexidine  15 mL Swish & Spit BID     folic acid  1 mg Oral or Feeding Tube Daily     ipratropium - albuterol 0.5 mg/2.5 mg/3 mL  3 mL Nebulization 4x daily     multivitamins w/minerals  15 mL Per Feeding Tube Daily     [START ON 12/9/2021] pantoprazole  40 mg Oral QAM AC     piperacillin-tazobactam  4.5 g Intravenous Q6H     polyethylene glycol  17 g Oral Daily     protein modular  2 packet Per Feeding Tube 4x Daily     senna-docusate  2 tablet Oral or Feeding Tube BID     thiamine  100 mg Oral or Feeding Tube Daily     ticagrelor  90 mg Oral or Feeding Tube BID     Infusion Meds    dextrose       dextrose       CRRT replacement solution 12.5 mL/kg/hr (12/08/21 0958)     fentaNYL 50 mcg/hr (12/08/21 1500)     heparin (PRESSURE BAG) 2 unit/mL in 0.9% NaCl 3 mL/hr (12/08/21 1500)     HEParin Stopped (12/08/21 0730)      "insulin regular Stopped (21 021)     midazolam 2 mg/hr (21 1500)     - MEDICATION INSTRUCTIONS -       norepinephrine 0.1 mcg/kg/min (21 1400)     CRRT replacement solution 200 mL/hr at 21 0957     CRRT replacement solution 12.5 mL/kg/hr (21 0958)     vasopressin 1 Units/hr (21 1400)       ALLERGIES:    No Known Allergies    REVIEW OF SYSTEMS:  A 10 point review of systems was negative except as noted above.    SOCIAL HISTORY:   Social History     Socioeconomic History     Marital status: Single     Spouse name: Not on file     Number of children: Not on file     Years of education: Not on file     Highest education level: Not on file   Occupational History     Not on file   Tobacco Use     Smoking status: Not on file     Smokeless tobacco: Not on file   Substance and Sexual Activity     Alcohol use: Not on file     Drug use: Not on file     Sexual activity: Not on file   Other Topics Concern     Not on file   Social History Narrative     Not on file     Social Determinants of Health     Financial Resource Strain: Not on file   Food Insecurity: Not on file   Transportation Needs: Not on file   Physical Activity: Not on file   Stress: Not on file   Social Connections: Not on file   Intimate Partner Violence: Not on file   Housing Stability: Not on file     Unable to review with pt due to vent/sedation.      FAMILY MEDICAL HISTORY:   Unable to review due to vent/sedation.     PHYSICAL EXAM:   Temp  Av.2  F (35.1  C)  Min: 89.6  F (32  C)  Max: 99.1  F (37.3  C)  Arterial Line BP  Min: 75/32  Max: 171/54  Arterial Line MAP (mmHg)  Av.3 mmHg  Min: 55 mmHg  Max: 115 mmHg      Pulse  Av.4  Min: 53  Max: 107 Resp  Av.9  Min: 11  Max: 39  FiO2 (%)  Av %  Min: 40 %  Max: 60 %  SpO2  Av %  Min: 88 %  Max: 100 %       /78   Pulse 93   Temp 98.2  F (36.8  C)   Resp 18   Ht 1.753 m (5' 9\")   Wt 77.1 kg (169 lb 15.6 oz)   SpO2 93%   BMI 25.10 kg/m   "   Date 12/08/21 0700 - 12/09/21 0659   Shift 7996-5535 7000-9332 4130-8454 24 Hour Total   INTAKE   I.V. 467.63 15  482.63   Other 1   1   NG/GT 60   60   Enteral 45 15  60   Shift Total(mL/kg) 573.63(7.44) 30(0.39)  603.63(7.83)   OUTPUT   Urine 215 15  230   Emesis/NG output 150   150   Other 157   157   Stool 0   0   Shift Total(mL/kg) 522(6.77) 15(0.19)  537(6.97)   Weight (kg) 77.1 77.1 77.1 77.1      Admit Weight: 83 kg (183 lb)     GENERAL APPEARANCE: Intubated and sedated, on IABP and ECMO.    EYES: No scleral icterus  NECK:  Difficult to assess JVD  Pulmonary: lungs clear to auscultation with equal breath sounds bilaterally, no clubbing or cyanosis  CV: Regular rhythm, normal rate, no rub   - Edema +1 generalized.   GI: soft, nontender, normal bowel sounds  MS: no evidence of inflammation in joints, no muscle tenderness  : + Gutiérrez  SKIN: no rash, warm, dry  NEURO: Intubated and sedated.     LABS:   CMP  Recent Labs   Lab 12/08/21  1407 12/08/21  1005 12/08/21  1004 12/08/21  0800 12/08/21  0401 12/08/21  0353 12/07/21  2224 12/07/21  2208 12/07/21  1602 12/07/21  1556 12/07/21  0353 12/07/21  0342   NA  --  144  144  --   --   --  143  --  144  --  144   < > 145*   POTASSIUM  --  5.4*  5.4*  --   --   --  5.4*  --  4.7  --  3.7   < > 3.4   CHLORIDE  --  117*  117*  --   --   --  118*  --  118*  --  118*   < > 118*   CO2  --  20  20  --   --   --  18*  --  18*  --  20   < > 16*   ANIONGAP  --  7  7  --   --   --  7  --  8  --  6   < > 11   * 124*  124* 116* 109*   < > 110*   < > 129*   < > 159*  151*   < > 158*   BUN  --  42*  42*  --   --   --  37*  --  33*  --  28   < > 25   CR  --  2.02*  2.02*  --   --   --  1.75*  --  1.46*  --  1.01   < > 0.96   GFRESTIMATED  --  36*  36*  --   --   --  43*  --  53*  --  83   < > 88   BRIDGETTE  --  7.8*  7.8*  --   --   --  8.1*  --  7.5*  --  7.5*   < > 7.6*   MAG  --  2.4*  --   --   --  2.4*  --  2.4*  --  2.4*   < > 2.4*   PHOS  --  6.6*  --   --    --  7.2*  --   --   --   --   --  3.8   PROTTOTAL  --  5.2*  --   --   --  5.2*  --  5.2*  --  5.1*   < > 4.9*   ALBUMIN  --  1.8*  1.8*  --   --   --  1.9*  --  1.8*  --  1.8*   < > 1.9*   BILITOTAL  --  0.7  --   --   --  0.5  --  0.5  --  0.4   < > 0.4   ALKPHOS  --  34*  --   --   --  34*  --  34*  --  36*   < > 40   AST  --  194*  --   --   --  204*  --  214*  --  250*   < > 326*   ALT  --  85*  --   --   --  86*  --  88*  --  90*   < > 95*    < > = values in this interval not displayed.     CBC  Recent Labs   Lab 12/08/21  1005 12/08/21  0353 12/07/21 2208 12/07/21  1556   HGB 11.1* 11.8* 12.1* 12.7*   WBC 16.0* 15.4* 13.8* 9.6   RBC 3.47* 3.71* 3.77* 3.99*   HCT 33.8* 35.5* 35.7* 37.9*   MCV 97 96 95 95   MCH 32.0 31.8 32.1 31.8   MCHC 32.8 33.2 33.9 33.5   RDW 13.3 13.1 12.9 12.7    160 171 162     INR  Recent Labs   Lab 12/08/21  1005 12/08/21  0353 12/07/21 2208 12/07/21  1556   INR 1.98* 1.98* 1.58* 1.31*   PTT 57* 123* 82* 90*     ABG  Recent Labs   Lab 12/08/21  1404 12/08/21  1117 12/08/21  1004 12/08/21  0801   PH 7.32* 7.33* 7.29* 7.27*   PCO2 39 39 40 43   PO2 62* 56* 73* 83   HCO3 20* 20* 19* 20*   O2PER 60 60 60 60      URINE STUDIES  Recent Labs   Lab Test 12/06/21  1347   COLOR Light Yellow   APPEARANCE Slightly Cloudy*   URINEGLC 500 *   URINEBILI Negative   URINEKETONE Negative   SG 1.010   UBLD Large*   URINEPH 5.5   PROTEIN 50 *   NITRITE Negative   LEUKEST Negative   RBCU 36*   WBCU 0     No lab results found.  PTH  No lab results found.  IRON STUDIES  No lab results found.

## 2021-12-08 NOTE — PROGRESS NOTES
"[facility]: [place]  NeuroCritical Care Progress Note    Patient Name:  Bruce Blount  MRN:  [id number]    :  1965  Date of Service:  2021  Primary care provider:  No Ref-Primary, Physician      A/P:   Bruce Blount is a 56 year old male admitted on 2021 without significant PMH who presents for CA. Per brother, he heard a thump and found pt unresponsive and agonal breathing. EMS called and arrived in 34 minutes where pt was found to be in VF and shocked x7 with ROSC upon ED arrival. 3 epi and 300 amio given. On EKG pt had ST elevation and was taken to cath lab where he arrested again and was cannulated for VA ECMO and placement of IABP.     On 6 versed   100 Fentanyl   CTH with no acute pathology   Pt rewarmed  No seizures on EEG      NEURO RECS  - Continue vEEG while on sedation  - Avoid hypotonic solutions as they may worsen cerebral edema  - Avoid \"nitroprusside\",\"nitroglycerin\" as they may also worsen cerbral edema.  - Advise slow correction of any high Sodiums if needed  - When safe to do so, limitation of CNS acting medications will permit a more accurate neurological assessment  - We will continue to follow and monitor their EEG and neurologic exam, please call #[phone] with any questions      Physical Examination:   /78   Pulse 88   Temp 98.1  F (36.7  C)   Resp 18   Ht 1.753 m (5' 9\")   Wt 77.1 kg (169 lb 15.6 oz)   SpO2 94%   BMI 25.10 kg/m    General: Adult male patient, lying in bed, NAD  HEENT: ETT  Cardiac: tele  Pulm: vent  Abdomen: Soft, bowel sounds present  Extremities: Warm, no edema  Skin: No rash or lesion      Neuro:  Pt is rewarmed and sedated with equal and reactive pupils. No cough/gag. No response to noxious stim in /.     I have personally reviewed all labs and imaging for this patient.      Patient discussed with attending neurologist, Dr. Delfin Barrera, LAYLA  Ascom: *[phone]  "

## 2021-12-08 NOTE — PROGRESS NOTES
"    [facility]   Critical Care Cardiology - Progress Note    Bruce Blount MRN: [id number]  Age: 56 year old, : 1965  Date: 2021    Assessment and Plan:  Bruce Blount is a 56 year old male with PMHx current tobacco use and ETOH abuse who presents to [facility] after out of hospital cardiac arrest.      Per EMS and brother, patient was in his usual state of health prior to arrest. Patients brother heard a \"thump\" and found patient unresponsive and agonal breathing. 911 called and CPR initiated. EMS arrived within 4 minutes. Initial rhythm VF--> shocked x ~7 with ROSC upon arrival to ED. Total CPR team per EMS ~ 40 minutes. 3 mg Epinephrine, 300 mg Amio given via EMS. Patient EKG reveals Valentín inferior/posterior leads with reciprocal changes. Patient initially presented with an Igel and was intubated with ETT in the ED. He arrested and was again shocked in the ED with ROSC. Taken urgently to CCL where he underwent coronary angiogram and again arrested requiring manual CPR and was then cannulated on VA ECMO via right with 17 Mauritian cannula RCFA, 25 Fr cannula RCFV. Coronary angiogram revealed  of RCA and acute culprit lesion of LCx/OM1, s/p PCI to pLCx, mid LCx, and OM1 with TERRY. IABP placed for decreased pulsatility. Thermogard cooling cath placed and patient was transferred to ICU for further management.     Today's Plan:  - renal consult for CRRT  - ECMO turndown study  - reduce ACT goal, 180-200  - wean sedation  - start senna, miralax  - nutrition consult for TF  - discuss LLE with vascular  - consider pulling IABP later today  - hold heparin for now    Neurology  # Concern for anoxic brain injury    # Hx of possible ETOH abuse  Intubated, sedated. Cooled to 33 degrees. Initial head CT without acute pathology  - Continue versed, fentanyl for sedation with a RASS goal -4 to -5.  - Neuro-crit consulted and appreciate recommendations.   - vEEG   - Palliative care " consulted.   - Folate, Thiamine and multi vitamin for ETOH abuse. CIWA as needed when sedation weaned     Cardiovascular  # Refractory VF cardiac arrest 2/2 secondary to STEMI  # Cardiogenic shock requiring VA ECMO  # Ischemic Cardiomyopathy (EF 10-15%)  # S/p PCI pLCx, mLCx, OM1  # Residual RCA   # HTN, HLD  Peripheral V-A ECMO inserted via RCFA and RCFV 17/25 fr. No pulsatility when IABP paused. troponin peak 124. Loss pulsatility overnight.  - limited echo for turndown study today  - wean norepi/vaso as able  - ECMO cares:   - Flows 4L.    - Sweep 2L   - ACT goal: hold heparin for now given LLE mottling.  - GDMT   - Continue ASA 81mg and ticagrelor 90mg BID    - Hold Lipitor for now given shock liver   - Hold ACE/ARB for now given likely reduced renal fxn after arrest   - Holding beta blocker given shock  -  of RCA not intervened upon    Pulmonary  # Acute hypoxemic respiratory failure requiring intubation  # Probable aspiration pneumonia  # Rib Fractures  # Sternal Fracture  # Tobacco Abuse (2PPD)  Vent Settings: CMV 16/480/8/60%. AB.27/45/93/20  - goal CO2 40-50  - Wean vent as able  - Daily CXR  - Serial ABGs   - duoneb Q6 hours    Gastrointestinal, Nutrition  # Shock liver 2/2 cardiac arrest, improving  No known medical hx.   - Monitor LFTs BID  - nutrition consult today for TF  - Bowel regimen: senna, miralax  - GI Prophylaxis: Protonix 40 mg daily    Renal, Electrolytes  # Acute Renal Injury  # Hyperkalemia  # Hypoalbuminemia   # Lactic acidosis, resolved  # Hypocalcemia, resolved  Unclear creatinine baseline, on admission 0.87, now 1.75. Urine output tapering off. New hyperkalemia  - renal consult today; will discuss if they would like us to shift versus initiation of CRRT  - lactic acid Q6 hours  - Monitor urine output    Infectious Disease  # Aspiration Pneumonia  # Leukocytosis, resolved  # Lactic acidosis, resolved  WBC 15.4. Afebrile overnight (on thermogard)  - Monitor for signs of  infection  - Daily blood cultures while on ECMO   Cultures thus far:              - sputum 12/6: staph aureus, staph dysgalactia, klebsiella   - sputum 12/7: gram positive cocci  Antibiotics               - Vancomycin 12/6- 12/7 (MRSA negative)               - Zosyn 12/6- present     Hematology  # Loss of bilateral DP pulses, improved  Hgb stable. No e/o bleeding. US with monophasic flow to DP  - Transfuse for Hgb < 8  - DVT PPX: Heparin as above    Endocrinology  # Hyperglycemia   No known medical history. Hemoglobin A1c 5.6%  - insulin gtt as needed    MSK/Skin  # Mottling LLE  Pulses remain intact, however skin appears more mottled. Probable PAD. Exacerbated by IABP  - continue to monitor  - hold heparin  - discuss with vascular this AM  - consider pulling IABP later today    Pertinent Lines  R femoral arterial and venous ECMO cannulae December 6, 2021  L femoral arterial line December 6, 2021  R radial arterial line December 6, 2021  ETT December 6, 2021  Gutiérrez catheter December 6, 2021  OG tube December 6, 2021    ICU Cares:  Daily CXR: ETT 3.5cm from jacques.  IABP well positioned.  Worsened perihilar opacities  Fluids/Feeds: nutrition consult for TF today.  Fluids not indicated  DVT Prophylaxis: heparin ggt  GI Prophylaxis: protonix  Bowel Regimen: senna, miralax  Anticipated Floor Transfer: N/A    Family Update: brother, updated by me    Patient seen and discussed with Dr. Marlo Fry.  Assessment and plan as above.    Mary Leger PA-C  Critical Care Cardiology  Pager: [phone]      Interval History:  Persistent pulsatility overnight.  When sedation is lightened, the patient moves all extremities spontaneously.  Does not move to command. Urine output tapering off overnight.    Objective Findings:  Temp:  [91.6  F (33.1  C)-99.1  F (37.3  C)] 98.8  F (37.1  C)  Pulse:  [] 95  Resp:  [13-27] 16  MAP:  [56 mmHg-104 mmHg] 62 mmHg  Arterial Line BP: ()/(32-72) 88/37  FiO2 (%):  [40 %-60 %] 60  %  SpO2:  [91 %-100 %] 100 %    I/O:  Intake/Output Summary (Last 24 hours) at 12/8/2021 0854  Last data filed at 12/8/2021 0800  Gross per 24 hour   Intake 1460.69 ml   Output 1580 ml   Net -119.31 ml       Physical Exam:  GEN: intubated, sedated  HEENT: no appreciable cranial trauma. Pupils 2mm, reactive  Pulm: coarse breath sounds bilaterally  Cardiac: regular rhythm. Tachycardia. No murmur, rub, gallop  ECMO cannula: insertion site clean, dry, intact. No appreciable hematoma  GI: soft, non distended  Neuro: pupils reactive.  Otherwise sedated  Integument: no appreciable skin breakdown  Vascular: LE cool, LLE mottled      Medications:    aspirin  81 mg Per Feeding Tube Daily     chlorhexidine  15 mL Swish & Spit BID     pantoprazole (PROTONIX) IV  40 mg Intravenous Daily     piperacillin-tazobactam  3.375 g Intravenous Q6H     ticagrelor  90 mg Oral BID       dextrose       EPINEPHrine Stopped (12/07/21 1815)     fentaNYL 100 mcg/hr (12/08/21 0700)     heparin (PRESSURE BAG) 2 unit/mL in 0.9% NaCl 3 mL/hr (12/08/21 0700)     HEParin 1,000 Units/kg/hr (12/08/21 0700)     insulin regular Stopped (12/08/21 0215)     midazolam 6 mg/hr (12/08/21 0700)     norepinephrine 0.1 mcg/kg/min (12/08/21 0700)     vasopressin 1.5 Units/hr (12/08/21 0700)         Labs:   CMP  Recent Labs   Lab 12/08/21  0558 12/08/21  0401 12/08/21  0353 12/08/21  0159 12/07/21  2224 12/07/21  2208 12/07/21  1602 12/07/21  1556 12/07/21  1201 12/07/21  1007 12/07/21  0353 12/07/21  0342   NA  --   --  143  --   --  144  --  144  --  144  --  145*   POTASSIUM  --   --  5.4*  --   --  4.7  --  3.7  --  3.3*  --  3.4   CHLORIDE  --   --  118*  --   --  118*  --  118*  --  118*  --  118*   CO2  --   --  18*  --   --  18*  --  20  --  18*  --  16*   ANIONGAP  --   --  7  --   --  8  --  6  --  8  --  11   * 104* 110* 104*   < > 129*   < > 159*  151*   < > 152*  151*   < > 158*   BUN  --   --  37*  --   --  33*  --  28  --  27  --  25   CR   --   --  1.75*  --   --  1.46*  --  1.01  --  1.03  --  0.96   GFRESTIMATED  --   --  43*  --   --  53*  --  83  --  81  --  88   BRIDGETTE  --   --  8.1*  --   --  7.5*  --  7.5*  --  7.7*  --  7.6*   MAG  --   --  2.4*  --   --  2.4*  --  2.4*  --  2.4*  --  2.4*   PHOS  --   --  7.2*  --   --   --   --   --   --   --   --  3.8   PROTTOTAL  --   --  5.2*  --   --  5.2*  --  5.1*  --  4.8*  --  4.9*   ALBUMIN  --   --  1.9*  --   --  1.8*  --  1.8*  --  1.8*  --  1.9*   BILITOTAL  --   --  0.5  --   --  0.5  --  0.4  --  0.4  --  0.4   ALKPHOS  --   --  34*  --   --  34*  --  36*  --  37*  --  40   AST  --   --  204*  --   --  214*  --  250*  --  277*  --  326*   ALT  --   --  86*  --   --  88*  --  90*  --  93*  --  95*    < > = values in this interval not displayed.     CBC  Recent Labs   Lab 12/08/21  0353 12/07/21  2208 12/07/21  1556 12/07/21  1007   WBC 15.4* 13.8* 9.6 6.3   RBC 3.71* 3.77* 3.99* 3.81*   HGB 11.8* 12.1* 12.7* 12.2*   HCT 35.5* 35.7* 37.9* 36.1*   MCV 96 95 95 95   MCH 31.8 32.1 31.8 32.0   MCHC 33.2 33.9 33.5 33.8   RDW 13.1 12.9 12.7 12.6    171 162 172     Arterial Blood Gas  Recent Labs   Lab 12/08/21  0551 12/08/21  0354 12/08/21  0353 12/08/21  0352 12/08/21  0200 12/07/21  2354   PH 7.27* 7.26*  --   --  7.24* 7.31*   PCO2 43 42  --   --  44 35   PO2 93 87  --   --  85 60*   HCO3 20* 19*  --   --  19* 17*   O2PER 60 60 60 60 60 60         Pertinent Imaging Studies:  Coronary angiogram:   Refractory VT/VF cardiac arrest.  Cardiogenic shock.  STEMI involving the OM1 as culprit.  Three vessel severe CAD involving the  of the RCA, mid LCx and OM1 severe disease with occlusion of OM1 as culprit in STEMI, and moderate proximal LAD lesions likely hemodynamically significant.  CPR  VA ECMO placement.  IABP placement.  Thermogard placement with initiation of hypothermia protocol.  Right radial arterial line placement.  PCI with three drug eluting stents to the proximal, mid LCx and  OM1.    LE Arterial Duplex, bilateral:  1. Right leg: Patent arteries with post obstructive diminished waveforms likely due to ECMO cannula.    2. Left leg: Patent arteries.     Echo:   Technically difficult study.  VA ECMO at 2.8L/min.  Left ventricular function is decreased. The ejection fraction is 10-15% (severely reduced).  Global right ventricular function is severely reduced.  Septum midline.  The aortic valve appears to remain closed.  Trivial pericardial effusion is present.  Previous study not available for comparison.    CT Head:  No acute intracranial pathology.    CT Chest, Abdomen, Pelvis:  1. Bilateral dependent consolidative opacities. Differential diagnosis includes atelectasis, aspiration, infection.  2. Bilateral anterior rib fractures and sternal fracture likely secondary to CPR.  3. Endotracheal tube terminates 2.1 cm above the jacques. Consider retracting 2 cm.      Mary Leger PA-C  Critical Care Cardiology  Pager: [phone]

## 2021-12-08 NOTE — PROGRESS NOTES
"CLINICAL NUTRITION SERVICES - ASSESSMENT NOTE     Nutrition Prescription    RECOMMENDATIONS FOR MDs/PROVIDERS TO ORDER:  Recommend continue daily supplementation of Thera-Vit-M (1 tablet), 100 mg thiamine, and 1 mg folate during hospital stay.    Recommend initiate enteral nutrition support within the next 48 hours (once rewarmed to at least 36 degrees Celsius), pending GI status and hemodynamic stability.   --> If/when nutrition support becomes POC, please consult RD (\"Registered Dietitian to Order TF per Medical Nutrition Therapy Guidelines\")    Malnutrition Status:    Moderate malnutrition in the context of acute on chronic illness (ETOH use)    Recommendations already ordered by Registered Dietitian (RD):  None - pt currently cooled    Future/Additional Recommendations:  Once EN support becomes nutrition POC, rec:   Osmolite 1.5 Jose @ goal of  60ml/hr  (1440ml/day) + 2 pkts ProSource TID (6 pkts daily)  will provide: 2400 kcals (31 kcal/kg), 156 g PRO (2 g pro/kg), 1097 ml free H20, 293 g CHO, and 0 g fiber daily.  - Initiate @ 10 mL/hr and advance by 10 mL q8hr as tolerated  - Do not start or advance unless K+ >/= 3.4, Mg++ >1.5, and phos >1.9   - 30 mL q4hr fluid flushes for tube patency. Additional fluids and/or adjustments per MD.    - Multivitamin/mineral (15 mL/day via FT) to help ensure micronutrient needs being met with suspected hypermetabolic demands and potential interruptions to TF infusions.  - If gastric access: HOB >30 degrees.     If renal formula needed, rec:  Novasource Renal @ 45 mL/hr (1080 mL) + 2 pkts ProSource TID (6 pkts daily) = 2400 kcal (31 kcal/kg), 164 g PRO (2.1 g/kg), 198 g CHO, and 778 mL water daily.       REASON FOR ASSESSMENT  Bruce Blount is a/an 56 year old male assessed by the dietitian for Nutrition Risk Monitoring, VA ECMO pt    NUTRITION HISTORY  Not previously known to Clinical Nutrition. No family at bedside and unable to obtain nutrition history from pt d/t " "intubated, sedated. No known allergies.    CURRENT NUTRITION ORDERS  Diet: NPO    LABS  Labs reviewed  -Lactic acid 2.6 (H)  -K+ 3.7 (WNL)  -Phos 3.8 (WNL)  -Mg++ 2.4 (WNL)  -BUN 28 (WNL)  -Cr 1.01 (WNL)    MEDICATIONS  Medications reviewed  -Thera-vit-M x 2 doses (complete)  -Thiamine 100 mg x 2 doses (complete)  -Folic acid x 2 doses (complete)  -Norepi gtt  -Epi gtt  -Insulin gtt    ANTHROPOMETRICS  Height: 175.3 cm (5' 9\")  Most Recent Weight: 77.3 kg (170 lb 6.7 oz)    IBW: 72.7 kg   BMI: 25.17 kg/m2; Overweight BMI 25-29.9  Weight History: Limited wt history below and in Care Everywhere.  Wt Readings from Last 20 Encounters:   12/07/21 77.3 kg (170 lb 6.7 oz)   09/23/19 77.5 kg (170 lb 12.8 oz)     Dosing Weight: 77 kg (actual, based on lowest wt this admit of 77.3 kg on 12/7)    ASSESSED NUTRITION NEEDS  Estimated Energy Needs: 6104-1977 kcals/day (25 - 30 kcals/kg)  Justification: Maintenance  Estimated Protein Needs: 116-154 grams protein/day (1.5-2.0+ grams of pro/kg)  Justification: Increased needs, VA ECMO - monitor renal function  Estimated Fluid Needs: 1 mL/kcal  Justification: Maintenance or Per provider pending fluid status    PHYSICAL FINDINGS  See malnutrition section below.  -Nails and skin appear WNL    MALNUTRITION  % Intake: Unable to assess  % Weight Loss: Unable to assess  Subcutaneous Fat Loss: None observed  Muscle Loss: Temporal:  Mild, Upper arm (bicep, tricep):  Mild and Upper leg (quadricep, hamstring): Mild  Fluid Accumulation/Edema: Mild on exam (most notable to BLE)  Malnutrition Diagnosis: Moderate malnutrition in the context of acute on chronic illness    NUTRITION DIAGNOSIS  Inadequate protein-energy intake related to NPO status in setting of intubation as evidenced by pt currently meeting 0% minimum assessed needs (not accounting for kcal from lipid/dextrose-containing medications).    INTERVENTIONS  Implementation  -Nutrition Education: Not appropriate at this time due to " patient condition   -Enteral Nutrition - Recs as above     Goals  Initiate nutrition support within 2-3 days.     Monitoring/Evaluation  Progress toward goals will be monitored and evaluated per protocol.    Dolores Harrison RD, LD  Pager: [phone]

## 2021-12-08 NOTE — PROGRESS NOTES
ECMO Shift Summary:12D       ECMO Equipment:  Console serial number: [id number]  Circuit Lot number: [id number]  Oxygenator Lot number: [id number]    Patient remains on VA ECMO, all equipment is functioning and alarms are appropriately set. RPM's 3840 with flow range 4.03 L/min. Sweep gas is at 2 LPM and FiO2 60%. Circuit remains free of air and clot, but fibrin is appreciated at connector sites2. Cannulas are secure with no bleeding from site. Extremities are cool to touch.    Significant Shift Events:  -Pt fully rewarmed at 2000.  -Increased flow to 4 LPM to decrease lactate and titrate pressors  -Heparin increased to achieve goal of 200-220.    Vent settings:  Ventilation Mode: CMV/AC  (Continuous Mandatory Ventilation/ Assist Control)  FiO2 (%): 60 %  Rate Set (breaths/minute): 16 breaths/min  Tidal Volume Set (mL): 480 mL  PEEP (cm H2O): 8 cmH2O  Oxygen Concentration (%): 60 %  Resp: 16  .    Heparin is running at 1000 units/hr, ACT range 190-211. ACT goal 200-220.    Urine output is marginal, see RN notes, blood loss was none. Product given included none.       Intake/Output Summary (Last 24 hours) at 2021 0403  Last data filed at 2021 0400  Gross per 24 hour   Intake 2016.42 ml   Output 1370 ml   Net 646.42 ml       ECHO:  No results found for this or any previous visit.  No results found for this or any previous visit.      CXR:  Recent Results (from the past 24 hour(s))   Echocardiogram Limited    Narrative    [id number]  [id number]  [id number]  [id number]^VERONICA^JAY^TASIA     [facility]  Echocardiography Laboratory  [address] [place] [id number]     Name: MINERVA GARAY  MRN: [id number]  : 1965  Study Date: 2021 08:47 AM  Age: 56 yrs  Gender: Male  Patient Location: [facility]  Reason For Study: Aortic Valve Disorder  Ordering Physician: JAY MARTIN  Performed By: Christi Auguste [facility]     BSA: 1.9 m2  Height: 69 in  Weight: 170 lb  HR:  56  BP: 105/43 mmHg  ______________________________________________________________________________  Procedure  Limited Portable Echo Adult.  ______________________________________________________________________________  Interpretation Summary  VA ECMO at 3L/min, IABP     Left ventricular function is decreased. The ejection fraction is 5-10%  (severely reduced).  Global right ventricular function is severely reduced.  Septum midline.  The aortic valve opens intermittently.  No significant pericardial effusion is present.     ______________________________________________________________________________  ______________________________________________________________________________  Report approved by: Gina Rowley 12/07/2021 11:10 AM     ______________________________________________________________________________          Labs:  [facility]   Lab 12/08/21  0200 12/07/21  2354 12/07/21 2207 12/07/21 1957   PH 7.24* 7.31* 7.27* 7.26*   PCO2 44 35 36 40   PO2 85 60* 114* 157*   HCO3 19* 17* 17* 18*   O2PER 60 60 60 60       Lab Results   Component Value Date    HGB 12.1 (L) 12/07/2021    PHGB <30 12/07/2021     12/07/2021    FIBR 474 12/07/2021    INR 1.58 (H) 12/07/2021    PTT 82 (H) 12/07/2021    DD 5.07 (H) 12/07/2021    ANTCH 55 (L) 12/07/2021         Plan is remain on VA ECMO. Continue to titrate and wean as tolerated.      Shannan Fenton RN  ECMO Specialist  12/8/2021 4:03 AM

## 2021-12-08 NOTE — PROGRESS NOTES
ECMO Shift Summary:    ECMO Equipment:  Console serial number: [id number]  Circuit Lot number: [id number]  Oxygenator Lot number: [id number]    Patient remains on VA ECMO, all equipment is functioning and alarms are appropriately set. RPM's increased to 3300 with flow range ~3.25 L/min. Sweep gas is at 3 LPM and FiO2 60%. Circuit remains free of visible air, clot and fibrin. Cannulas are secure with no bleeding from right fem site. Left fem site (IABP + thermoguard) is oozing, pressure bag applied. Left toes remain dusky.     Significant Shift Events:   0845 Limited echo obtained  0930 EEG placed  4679-7623 Heparin gtt held until ACT goal increased to 200-220  1200 started to re-warm pt  ~1345 pulsatility of 10 appreciated       Vent settings:  CMV 16/480+8/60%. Peep and FiO2 increased with ?North/South syndrome appreciated around 1600 today.    Heparin is running at 500 unit(s)/hr (7 u/kg/hr). ACT range 199-211, currently 207.    Urine output is ~55 mls/hr. Blood loss was small from left groin per above. No product given.       Intake/Output Summary (Last 24 hours) at 12/7/2021 1825  Last data filed at 12/7/2021 1800  Gross per 24 hour   Intake 3010.96 ml   Output 1715 ml   Net 1295.96 ml       ECHO:  No results found for this or any previous visit.  No results found for this or any previous visit.      CXR:  Recent Results (from the past 24 hour(s))   XR Chest Port 1 View    Narrative    EXAM: XR CHEST PORT 1 VIEW  12/7/2021 1:50 AM     HISTORY:  Check endotracheal tube placement and ECLS cannula  placement. DO NOT log-roll patient.  Place film under patient using  patient safety handling process.       COMPARISON:  Chest x-ray 12/6/2021, chest and pelvis CT 12/6/2021    FINDINGS  Technique: Supine AP view of the chest.     Devices: Inferior approach ECMO cannula and central venous catheter  terminate at the superior cavoatrial junction. Intra-aortic balloon  pump marker terminates over the proximal descending  thoracic aorta.  Endotracheal tube terminates approximately 3.6 cm above the jacques.  Enteric tube passes below the left hemidiaphragm. Esophageal  temperature probe terminates over the mid esophagus    Lungs: Unchanged perihilar predominant streaky interstitial and  airspace opacities. Stable slightly more focal patchy airspace opacity  within the right upper lobe. No discernible pneumothorax.  No large  pleural effusion.     Cardiovascular: Cardiomediastinal silhouette is  stable in size.    Chronic fracture deformity involving the right clavicle.      Impression    IMPRESSION:     1. Stable position of previous support devices with new esophageal  temperature probe present.   2.  Stable appearance of perihilar opacities representing atelectasis  versus edema.  3. Stable appearance of right upper lobe opacity, atelectasis versus  aspiration/infection.    I have personally reviewed the examination and initial interpretation  and I agree with the findings.    JESS IVORY MD         SYSTEM ID:  [id number]   Echocardiogram Limited    Narrative    [id number]  [id number]  [id number]^VERONICA^JAY^TASIA     [facility]  Echocardiography Laboratory  [address] [place] [id number]     Name: MINERVA GARAY  MRN: [id number]  : 1965  Study Date: 2021 08:47 AM  Age: 56 yrs  Gender: Male  Patient Location: [facility]  Reason For Study: Aortic Valve Disorder  Ordering Physician: JAY MARTIN  Performed By: Christi Auguste RDCS     BSA: 1.9 m2  Height: 69 in  Weight: 170 lb  HR: 56  BP: 105/43 mmHg  ______________________________________________________________________________  Procedure  Limited Portable Echo Adult.  ______________________________________________________________________________  Interpretation Summary  VA ECMO at 3L/min, IABP     Left ventricular function is decreased. The ejection fraction is 5-10%  (severely reduced).  Global right ventricular  function is severely reduced.  Septum midline.  The aortic valve opens intermittently.  No significant pericardial effusion is present.     ______________________________________________________________________________  ______________________________________________________________________________  Report approved by: Gina Rowley 12/07/2021 11:10 AM     ______________________________________________________________________________          Labs:  Recent Labs   Lab 12/07/21  1803 12/07/21  1557 12/07/21  1556 12/07/21  1357 12/07/21  1153   PH 7.20*  --  7.25* 7.26* 7.27*   PCO2 47*  --  42 40 39   PO2 152*  --  54* 183* 255*   HCO3 18*  --  18* 18* 18*   O2PER 60 60 40  40 40 40       Lab Results   Component Value Date    HGB 12.7 (L) 12/07/2021    PHGB <30 12/07/2021     12/07/2021    FIBR 474 12/07/2021    INR 1.31 (H) 12/07/2021    PTT 90 (H) 12/07/2021    DD 5.07 (H) 12/07/2021    ANTCH 55 (L) 12/07/2021         Plan is to continue VA ECMO support. Pt should be re-warmed around 2000.    Rebecca Hair, RT  ECMO Specialist  12/7/2021 6:25 PM

## 2021-12-08 NOTE — PROGRESS NOTES
ECMO Attending Progress Note  2021    Bruce Blount is a 56 year old male who was cannulated for ECMO 2021 due to  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    Cannulation Site:  17 Fr in the R femoral artery  25 Fr in the R femoral vein  IABP    Interval events:rewarmed recovered some pulsatility now 13mmHg     Physical Exam:  Temp:  [91.9  F (33.3  C)-99.1  F (37.3  C)] 98.6  F (37  C)  Pulse:  [] 87  Resp:  [11-27] 20  MAP:  [56 mmHg-104 mmHg] 77 mmHg  Arterial Line BP: ()/(32-72) 100/32  FiO2 (%):  [40 %-60 %] 60 %  SpO2:  [91 %-100 %] 100 %    Intake/Output Summary (Last 24 hours) at 2021 1456  Last data filed at 2021 1429  Gross per 24 hour   Intake 1151.42 ml   Output 955 ml   Net 196.42 ml    Ventilation Mode: CMV/AC  (Continuous Mandatory Ventilation/ Assist Control)  FiO2 (%): 60 %  Rate Set (breaths/minute): 16 breaths/min  Tidal Volume Set (mL): 480 mL  PEEP (cm H2O): 5 cmH2O  Oxygen Concentration (%): 60 %  Resp: 20       Labs:  Recent Labs   Lab 21  1117 21  1004 21  0801 21  0551   PH 7.33* 7.29* 7.27* 7.27*   PCO2 39 40 43 43   PO2 56* 73* 83 93   HCO3 20* 19* 20* 20*   O2PER 60 60 60 60      Recent Labs   Lab 21  1005 21  0353 21  2208 21  1556   WBC 16.0* 15.4* 13.8* 9.6   HGB 11.1* 11.8* 12.1* 12.7*     Creatinine   Date Value Ref Range Status   2021 2.02 (H) 0.66 - 1.25 mg/dL Final   2021 2.02 (H) 0.66 - 1.25 mg/dL Final   2021 1.75 (H) 0.66 - 1.25 mg/dL Final   2021 1.46 (H) 0.66 - 1.25 mg/dL Final       Blood Flow (Circuit) LPM: 3.3 LPM  Gas Flow  LPM: 3 LPM  Gas FiO2   %: 60 %  ACT  (seconds): 288 seconds  Blood Temp  (degrees C): 32.6 C  Pulse Oximetry  (SpO2%): 100 %  Arterial Pressure  mmH mmHg      ECMO Issues including assessments and plan on DOS 2021:  Neuro: Sedated for mechanical ventilationand ECMO.  No acute distress.  [id number] R60s L dopplerable lower ext  pulses RASS goal: -3 L lower ext mottled and cold will ask vascular to assess  CV: Cardiogenic shock.  Hemodynamically stable on n at 0.1 and vaso 2, Pulsatility: 13mmHg RV looks very sick  Pulm: Keep vent settings at rest settings as above.  FEN/Renal: Electrolytes stable w/ replacement protocols in place, cr and UOP reflect atn, will ask renal to start crrt for hyperkalemia, lactate in low 2s.  Heme: ACT goal:180-200 b/c no opening valve Hemoglobin 11.8 dilutional component  Minimal oozing around the ECMO cannulas mild around iabp this am only.  ID: Receiving empiric antibiotics  Cannulae: Position is acceptable on exam and the available imaging.  Distal perfusion cannula is in place and patent.      I have personally reviewed the ECMO flows, oxygenation and CO2 clearance, anticoagulation, and cannula position.  I have also personally assessed the patient's systemic response with hemodynamics, oxygenation, ventilation, and bleeding.       The patient requires continued ECMO support and management in the ICU.  I have discussed patient care and treatment plan with the primary team.      Marlo Fry MD  Critical Care Cardiology  [phone]    December 8, 2021

## 2021-12-08 NOTE — PROGRESS NOTES
ECMO [place] STUDY  December 8, 2021    Inotropes/Pressors: 2 vaso, 0.1 norepi  ACT: 182    Ventilation Mode: CMV/AC  (Continuous Mandatory Ventilation/ Assist Control)  FiO2 (%): 60 %  Rate Set (breaths/minute): 16 breaths/min  Tidal Volume Set (mL): 480 mL  PEEP (cm H2O): 5 cmH2O  Oxygen Concentration (%): 60 %  Resp: 20       Flow  Dilliner BP IABP BP HR O2 Sat  MVO2  IABP    4L 120/44 - 73 78/59 - 81 87 96  65.3  On  3.5L 97/32 - 68 76/52 - 71 86 96  66.2  On  3.0L 99/38 - 65 68/48 - 66 89 95  67.8  On  2.5L 88/31 - 61 65/46 - 68 90 94  66.7  On  2.0L 96/32 - 62 65/46 - 68 84 94  70.3  On  1.5L 109/36 - 72 63/40 - 63 91 93  70.2  On  1.0L 106/29 - 63 71/45 - 70 88 93  70.3  On  1.0L 96/57 - 68 94/59 - 71 89 90  70.3  Standby    ABG at lowest Flow: 7.33/39/56/20   P/F Ratio: 93         Summary:  --improving EF with decreasing flows  --Hemodynamically stable with turndown requiring 2 of vasopressin, 0.1 norepinephrine (unchanged)  --Oxygenation and ventilation unstable with turndown    Plan:  --The patient is not ready for decannulation.  --Volume removal today, wean pressors today  --Will attempt to maintain flow of 3L overnight  --Repeat turndown tomorrow AM    Mary Leger PA-C  Critical Care Cardiology  [phone]

## 2021-12-08 NOTE — CONSULTS
Vascular Surgery Consult Note  12/08/2021    Reason for consult: concern for LLE ischemia  Consult requested by: Mary Leger PA-C      ASSESSMENT: 55 yo M w/ cardiac arrest two days ago placed on AV ECMO via right groin and underwent cardiac cath w/ PCI to pLCx, mid LCx, and OM1 with TERRY and IABP placed in L groin. Mottling of LLE w/ DP/PT signals and duplex on 12/6 showing monophasic signals distally due to proximal occlusion w/ IABP.      RECOMMENDATIONS:  - Recc therapeutic heparin gtt   - No intervention required and ok to maintain IABP. If pt loses signals and able to remove IABP safely, recc removing and re-evaluate ischemia. If signals do not return after removal, please page Vascular Surgery on-call.     Seen and examined w/ Dr. Soto.    Poonam Lenz MD  Vascular Surgery Fellow  Pager [phone]      =======================    History of Present Illness:    Bruce Blount is a 56 year old male admitted after cardiac arrest 2/2 STEMI, now on VA ECMO via RCFA and RCFV, with intra-arterial balloon pump placed for decreased pulsatility, now with discoloration of LLE, concern by primary team for ischemia.     Past Medical History:  No past medical history on file.    Past Surgical History  Past Surgical History:   Procedure Laterality Date     CV ARTERIAL LINE PLACEMENT N/A 12/6/2021    Procedure: Arterial Line Placement;  Surgeon: Brayan Thompson MD;  Location: [facility] CARDIAC CATH LAB     CV CENTRAL VENOUS CATHETER PLACEMENT N/A 12/6/2021    Procedure: Central Venous Catheter Placement;  Surgeon: Brayan Thompson MD;  Location: [facility] CARDIAC CATH LAB     CV CORONARY ANGIOGRAM N/A 12/6/2021    Procedure: CV CORONARY ANGIOGRAM;  Surgeon: Brayan Thompson MD;  Location: [facility] CARDIAC CATH LAB     CV CPR WITH CHEST COMPRESSION N/A 12/6/2021    Procedure: CPR with Chest Compression System;  Surgeon: Brayan Thompson MD;  Location: [facility] CARDIAC  CATH LAB     CV EXTRACORPERAL MEMBRANE OXYGENATION N/A 12/6/2021    Procedure: Extracorporeal Membrance Oxygenation;  Surgeon: Brayan Thompson MD;  Location:  HEART CARDIAC CATH LAB     CV PCI STENT DRUG ELUTING N/A 12/6/2021    Procedure: Percutaneous Coronary Intervention Stent Drug Eluting;  Surgeon: Brayan Thompson MD;  Location:  HEART CARDIAC CATH LAB     CV THERAPEUTIC HYPOTHERMIA N/A 12/6/2021    Procedure: Therapeutic Hypothermia;  Surgeon: Brayan Thompson MD;  Location:  HEART CARDIAC CATH LAB       Family History:  No family history on file.    Social History:  Social History     Social History Narrative     Not on file        Medications:  No current outpatient medications on file.       Allergies:  No Known Allergies    Review of Symptoms:  A 10 point review of symptoms has been conducted and is negative except for that mentioned in the above HPI.    Physical Exam:    Temp:  [93  F (33.9  C)-99.1  F (37.3  C)] 98.4  F (36.9  C)  Pulse:  [] 81  Resp:  [11-27] 21  MAP:  [56 mmHg-104 mmHg] 78 mmHg  Arterial Line BP: ()/(32-72) 130/54  FiO2 (%):  [40 %-60 %] 60 %  SpO2:  [91 %-100 %] 94 %    Gen: NAD, resting comfortably  HEENT: NCAT, sclera anicteric  CV: RRR  Resp: intubated on du  Ext: Mottling of LLE w/ monophasic distal DP/PT signals by doppler  Neuro: no focal deficits  Skin: No rashes            Imaging:  Impression:      1. Right leg: Patent arteries with post obstructive diminished  waveforms likely due to ECMO cannula.       2. Left leg: Patent arteries.

## 2021-12-08 NOTE — PLAN OF CARE
Major Shift Events: Remains intubated on VA ECMO w/ flow 3.3LPM, sweep 3 and 90%. Currently re-warming; will be 37C at 2000. Paralytic off. Stopped Versed drip at 1800. At 1830 pt opened eyes slightly, moved all extremities and was biting the ET tube with a respiratory rate of 30; had to re-sedate. Weaned off Levo drip and switched to Epi to help with pulsatility, but d/t increasing LA w/ heart rate of 100s, now switching back to Levo drip per Mary CALDWELL. Pt also having frequent large swings in BP since beginning re-warming (MAPs from 50s to 120s); SPO2 and PaO2 drop with high BPs (north/south syndrome) Mary CALDWELL notified and ordered to increase PEEP and FiO2 on vent.   Plan: Continue to re-warm and monitor pt closely. Brother and Niece updated this afternoon via phone by myself.   For vital signs and complete assessments, please see documentation flowsheets.

## 2021-12-09 ENCOUNTER — APPOINTMENT (OUTPATIENT)
Dept: NEUROLOGY | Facility: CLINIC | Age: 56
End: 2021-12-09
Attending: NURSE PRACTITIONER
Payer: COMMERCIAL

## 2021-12-09 ENCOUNTER — APPOINTMENT (OUTPATIENT)
Dept: GENERAL RADIOLOGY | Facility: CLINIC | Age: 56
End: 2021-12-09
Attending: NURSE PRACTITIONER
Payer: COMMERCIAL

## 2021-12-09 LAB
ABO/RH(D): NORMAL
ACT BLD: 165 SECONDS (ref 74–150)
ACT BLD: 169 SECONDS (ref 74–150)
ACT BLD: 177 SECONDS (ref 74–150)
ACT BLD: 177 SECONDS (ref 74–150)
ACT BLD: 186 SECONDS (ref 74–150)
ALBUMIN SERPL-MCNC: 1.6 G/DL (ref 3.4–5)
ALBUMIN SERPL-MCNC: 1.7 G/DL (ref 3.4–5)
ALBUMIN SERPL-MCNC: 1.7 G/DL (ref 3.4–5)
ALP SERPL-CCNC: 37 U/L (ref 40–150)
ALP SERPL-CCNC: 38 U/L (ref 40–150)
ALP SERPL-CCNC: 41 U/L (ref 40–150)
ALP SERPL-CCNC: 44 U/L (ref 40–150)
ALT SERPL W P-5'-P-CCNC: 73 U/L (ref 0–70)
ALT SERPL W P-5'-P-CCNC: 74 U/L (ref 0–70)
ALT SERPL W P-5'-P-CCNC: 74 U/L (ref 0–70)
ALT SERPL W P-5'-P-CCNC: 75 U/L (ref 0–70)
ANION GAP SERPL CALCULATED.3IONS-SCNC: 4 MMOL/L (ref 3–14)
ANION GAP SERPL CALCULATED.3IONS-SCNC: 5 MMOL/L (ref 3–14)
ANION GAP SERPL CALCULATED.3IONS-SCNC: 5 MMOL/L (ref 3–14)
ANION GAP SERPL CALCULATED.3IONS-SCNC: 7 MMOL/L (ref 3–14)
ANION GAP SERPL CALCULATED.3IONS-SCNC: 7 MMOL/L (ref 3–14)
ANTIBODY SCREEN: NEGATIVE
APTT PPP: 47 SECONDS (ref 22–38)
APTT PPP: 48 SECONDS (ref 22–38)
APTT PPP: 49 SECONDS (ref 22–38)
APTT PPP: 55 SECONDS (ref 22–38)
AST SERPL W P-5'-P-CCNC: 124 U/L (ref 0–45)
AST SERPL W P-5'-P-CCNC: 136 U/L (ref 0–45)
AST SERPL W P-5'-P-CCNC: 145 U/L (ref 0–45)
AST SERPL W P-5'-P-CCNC: 165 U/L (ref 0–45)
AT III ACT/NOR PPP CHRO: 98 % (ref 85–135)
ATRIAL RATE - MUSE: 74 BPM
ATRIAL RATE - MUSE: 98 BPM
BACTERIA SPT CULT: ABNORMAL
BASE EXCESS BLDA CALC-SCNC: -0.5 MMOL/L (ref -9–1.8)
BASE EXCESS BLDA CALC-SCNC: -1.7 MMOL/L (ref -9–1.8)
BASE EXCESS BLDA CALC-SCNC: -2 MMOL/L (ref -9–1.8)
BASE EXCESS BLDA CALC-SCNC: -3.1 MMOL/L (ref -9–1.8)
BASE EXCESS BLDA CALC-SCNC: -3.2 MMOL/L (ref -9–1.8)
BASE EXCESS BLDA CALC-SCNC: -3.5 MMOL/L (ref -9–1.8)
BASE EXCESS BLDA CALC-SCNC: -3.8 MMOL/L (ref -9–1.8)
BASE EXCESS BLDA CALC-SCNC: 0.2 MMOL/L (ref -9–1.8)
BASE EXCESS BLDA CALC-SCNC: 0.4 MMOL/L (ref -9–1.8)
BASE EXCESS BLDA CALC-SCNC: 0.6 MMOL/L (ref -9–1.8)
BASE EXCESS BLDA CALC-SCNC: 0.6 MMOL/L (ref -9–1.8)
BASE EXCESS BLDA CALC-SCNC: 0.8 MMOL/L (ref -9–1.8)
BASE EXCESS BLDA CALC-SCNC: 0.9 MMOL/L (ref -9–1.8)
BASE EXCESS BLDA CALC-SCNC: 1.1 MMOL/L (ref -9–1.8)
BASE EXCESS BLDV CALC-SCNC: -2.8 MMOL/L (ref -7.7–1.9)
BASE EXCESS BLDV CALC-SCNC: 1.3 MMOL/L (ref -7.7–1.9)
BILIRUB DIRECT SERPL-MCNC: 0.4 MG/DL (ref 0–0.2)
BILIRUB SERPL-MCNC: 0.5 MG/DL (ref 0.2–1.3)
BILIRUB SERPL-MCNC: 0.6 MG/DL (ref 0.2–1.3)
BILIRUB SERPL-MCNC: 0.8 MG/DL (ref 0.2–1.3)
BILIRUB SERPL-MCNC: 0.9 MG/DL (ref 0.2–1.3)
BUN SERPL-MCNC: 38 MG/DL (ref 7–30)
BUN SERPL-MCNC: 38 MG/DL (ref 7–30)
BUN SERPL-MCNC: 39 MG/DL (ref 7–30)
BUN SERPL-MCNC: 39 MG/DL (ref 7–30)
BUN SERPL-MCNC: 40 MG/DL (ref 7–30)
BUN SERPL-MCNC: 40 MG/DL (ref 7–30)
CA-I BLD-MCNC: 4.4 MG/DL (ref 4.4–5.2)
CA-I BLD-MCNC: 4.4 MG/DL (ref 4.4–5.2)
CA-I BLD-MCNC: 4.5 MG/DL (ref 4.4–5.2)
CA-I BLD-MCNC: 4.6 MG/DL (ref 4.4–5.2)
CALCIUM SERPL-MCNC: 7.8 MG/DL (ref 8.5–10.1)
CALCIUM SERPL-MCNC: 8 MG/DL (ref 8.5–10.1)
CALCIUM SERPL-MCNC: 8.7 MG/DL (ref 8.5–10.1)
CHLORIDE BLD-SCNC: 110 MMOL/L (ref 94–109)
CHLORIDE BLD-SCNC: 111 MMOL/L (ref 94–109)
CHLORIDE BLD-SCNC: 111 MMOL/L (ref 94–109)
CHLORIDE BLD-SCNC: 112 MMOL/L (ref 94–109)
CHLORIDE BLD-SCNC: 114 MMOL/L (ref 94–109)
CK SERPL-CCNC: 1593 U/L (ref 30–300)
CK SERPL-CCNC: 2038 U/L (ref 30–300)
CO2 SERPL-SCNC: 22 MMOL/L (ref 20–32)
CO2 SERPL-SCNC: 24 MMOL/L (ref 20–32)
CO2 SERPL-SCNC: 25 MMOL/L (ref 20–32)
CO2 SERPL-SCNC: 26 MMOL/L (ref 20–32)
CO2 SERPL-SCNC: 27 MMOL/L (ref 20–32)
CREAT SERPL-MCNC: 1.48 MG/DL (ref 0.66–1.25)
CREAT SERPL-MCNC: 1.53 MG/DL (ref 0.66–1.25)
CREAT SERPL-MCNC: 1.53 MG/DL (ref 0.66–1.25)
CREAT SERPL-MCNC: 1.63 MG/DL (ref 0.66–1.25)
CRP SERPL-MCNC: 450 MG/L (ref 0–8)
D DIMER PPP FEU-MCNC: 4.93 UG/ML FEU (ref 0–0.5)
D DIMER PPP FEU-MCNC: >20 UG/ML FEU (ref 0–0.5)
DIASTOLIC BLOOD PRESSURE - MUSE: NORMAL MMHG
DIASTOLIC BLOOD PRESSURE - MUSE: NORMAL MMHG
ERYTHROCYTE [DISTWIDTH] IN BLOOD BY AUTOMATED COUNT: 13.7 % (ref 10–15)
ERYTHROCYTE [DISTWIDTH] IN BLOOD BY AUTOMATED COUNT: 13.8 % (ref 10–15)
ERYTHROCYTE [SEDIMENTATION RATE] IN BLOOD BY WESTERGREN METHOD: 97 MM/HR (ref 0–20)
FIBRINOGEN PPP-MCNC: 1031 MG/DL (ref 170–490)
FIBRINOGEN PPP-MCNC: 942 MG/DL (ref 170–490)
GFR SERPL CREATININE-BSD FRML MDRD: 46 ML/MIN/1.73M2
GFR SERPL CREATININE-BSD FRML MDRD: 50 ML/MIN/1.73M2
GFR SERPL CREATININE-BSD FRML MDRD: 50 ML/MIN/1.73M2
GFR SERPL CREATININE-BSD FRML MDRD: 52 ML/MIN/1.73M2
GLUCOSE BLD-MCNC: 129 MG/DL (ref 70–99)
GLUCOSE BLD-MCNC: 137 MG/DL (ref 70–99)
GLUCOSE BLD-MCNC: 137 MG/DL (ref 70–99)
GLUCOSE BLD-MCNC: 144 MG/DL (ref 70–99)
GLUCOSE BLD-MCNC: 145 MG/DL (ref 70–99)
GLUCOSE BLD-MCNC: 151 MG/DL (ref 70–99)
GLUCOSE BLD-MCNC: 156 MG/DL (ref 70–99)
GLUCOSE BLD-MCNC: 166 MG/DL (ref 70–99)
GLUCOSE BLDC GLUCOMTR-MCNC: 126 MG/DL (ref 70–99)
GLUCOSE BLDC GLUCOMTR-MCNC: 128 MG/DL (ref 70–99)
GLUCOSE BLDC GLUCOMTR-MCNC: 147 MG/DL (ref 70–99)
GLUCOSE BLDC GLUCOMTR-MCNC: 152 MG/DL (ref 70–99)
GLUCOSE BLDC GLUCOMTR-MCNC: 156 MG/DL (ref 70–99)
GRAM STAIN RESULT: ABNORMAL
GRAM STAIN RESULT: ABNORMAL
HBA1C MFR BLD: 5.6 % (ref 0–5.6)
HCO3 BLD-SCNC: 23 MMOL/L (ref 21–28)
HCO3 BLD-SCNC: 24 MMOL/L (ref 21–28)
HCO3 BLD-SCNC: 25 MMOL/L (ref 21–28)
HCO3 BLD-SCNC: 26 MMOL/L (ref 21–28)
HCO3 BLDA-SCNC: 23 MMOL/L (ref 21–28)
HCO3 BLDA-SCNC: 27 MMOL/L (ref 21–28)
HCO3 BLDV-SCNC: 24 MMOL/L (ref 21–28)
HCO3 BLDV-SCNC: 28 MMOL/L (ref 21–28)
HCT VFR BLD AUTO: 26.5 % (ref 40–53)
HCT VFR BLD AUTO: 26.6 % (ref 40–53)
HCT VFR BLD AUTO: 26.6 % (ref 40–53)
HCT VFR BLD AUTO: 28.2 % (ref 40–53)
HGB BLD-MCNC: 8.8 G/DL (ref 13.3–17.7)
HGB BLD-MCNC: 8.8 G/DL (ref 13.3–17.7)
HGB BLD-MCNC: 9 G/DL (ref 13.3–17.7)
HGB BLD-MCNC: 9.3 G/DL (ref 13.3–17.7)
HGB FREE PLAS-MCNC: <30 MG/DL
INR PPP: 1.15 (ref 0.85–1.15)
INR PPP: 1.22 (ref 0.85–1.15)
INR PPP: 1.22 (ref 0.85–1.15)
INR PPP: 1.33 (ref 0.85–1.15)
INTERPRETATION ECG - MUSE: NORMAL
INTERPRETATION ECG - MUSE: NORMAL
LACTATE SERPL-SCNC: 1.3 MMOL/L (ref 0.7–2)
LACTATE SERPL-SCNC: 1.5 MMOL/L (ref 0.7–2)
LACTATE SERPL-SCNC: 2.3 MMOL/L (ref 0.7–2)
LACTATE SERPL-SCNC: 2.3 MMOL/L (ref 0.7–2)
LACTATE SERPL-SCNC: 2.4 MMOL/L (ref 0.7–2)
LACTATE SERPL-SCNC: 2.7 MMOL/L (ref 0.7–2)
LDH SERPL L TO P-CCNC: 838 U/L (ref 85–227)
MAGNESIUM SERPL-MCNC: 2.2 MG/DL (ref 1.6–2.3)
MAGNESIUM SERPL-MCNC: 2.3 MG/DL (ref 1.6–2.3)
MAGNESIUM SERPL-MCNC: 2.4 MG/DL (ref 1.6–2.3)
MAGNESIUM SERPL-MCNC: 2.5 MG/DL (ref 1.6–2.3)
MAGNESIUM SERPL-MCNC: 2.5 MG/DL (ref 1.6–2.3)
MCH RBC QN AUTO: 31.8 PG (ref 26.5–33)
MCH RBC QN AUTO: 31.8 PG (ref 26.5–33)
MCH RBC QN AUTO: 32 PG (ref 26.5–33)
MCH RBC QN AUTO: 32.3 PG (ref 26.5–33)
MCHC RBC AUTO-ENTMCNC: 33 G/DL (ref 31.5–36.5)
MCHC RBC AUTO-ENTMCNC: 33.1 G/DL (ref 31.5–36.5)
MCHC RBC AUTO-ENTMCNC: 33.2 G/DL (ref 31.5–36.5)
MCHC RBC AUTO-ENTMCNC: 33.8 G/DL (ref 31.5–36.5)
MCV RBC AUTO: 95 FL (ref 78–100)
MCV RBC AUTO: 96 FL (ref 78–100)
MCV RBC AUTO: 96 FL (ref 78–100)
MCV RBC AUTO: 97 FL (ref 78–100)
NSE SERPL IA-MCNC: 40 NG/ML
NSE SERPL IA-MCNC: 42.2 NG/ML
NSE SERPL IA-MCNC: 48.5 NG/ML
O2/TOTAL GAS SETTING VFR VENT: 60 %
O2/TOTAL GAS SETTING VFR VENT: 80 %
O2/TOTAL GAS SETTING VFR VENT: 80 %
OXYHGB MFR BLD: 93 % (ref 92–100)
OXYHGB MFR BLD: 94 % (ref 92–100)
OXYHGB MFR BLD: 95 % (ref 92–100)
OXYHGB MFR BLD: 97 % (ref 92–100)
OXYHGB MFR BLD: 98 % (ref 92–100)
OXYHGB MFR BLD: 98 % (ref 92–100)
OXYHGB MFR BLDA: 99 % (ref 75–100)
OXYHGB MFR BLDA: 99 % (ref 75–100)
OXYHGB MFR BLDV: 72 %
OXYHGB MFR BLDV: 72 %
P AXIS - MUSE: 62 DEGREES
P AXIS - MUSE: 64 DEGREES
PCO2 BLD: 39 MM HG (ref 35–45)
PCO2 BLD: 40 MM HG (ref 35–45)
PCO2 BLD: 40 MM HG (ref 35–45)
PCO2 BLD: 41 MM HG (ref 35–45)
PCO2 BLD: 42 MM HG (ref 35–45)
PCO2 BLD: 43 MM HG (ref 35–45)
PCO2 BLD: 46 MM HG (ref 35–45)
PCO2 BLDA: 46 MM HG (ref 35–45)
PCO2 BLDA: 53 MM HG (ref 35–45)
PCO2 BLDV: 53 MM HG (ref 40–50)
PCO2 BLDV: 58 MM HG (ref 40–50)
PH BLD: 7.3 [PH] (ref 7.35–7.45)
PH BLD: 7.33 [PH] (ref 7.35–7.45)
PH BLD: 7.33 [PH] (ref 7.35–7.45)
PH BLD: 7.36 [PH] (ref 7.35–7.45)
PH BLD: 7.38 [PH] (ref 7.35–7.45)
PH BLD: 7.39 [PH] (ref 7.35–7.45)
PH BLD: 7.39 [PH] (ref 7.35–7.45)
PH BLD: 7.4 [PH] (ref 7.35–7.45)
PH BLD: 7.41 [PH] (ref 7.35–7.45)
PH BLDA: 7.31 [PH] (ref 7.35–7.45)
PH BLDA: 7.31 [PH] (ref 7.35–7.45)
PH BLDV: 7.27 [PH] (ref 7.32–7.43)
PH BLDV: 7.3 [PH] (ref 7.32–7.43)
PHOSPHATE SERPL-MCNC: 4.1 MG/DL (ref 2.5–4.5)
PHOSPHATE SERPL-MCNC: 4.6 MG/DL (ref 2.5–4.5)
PLATELET # BLD AUTO: 107 10E3/UL (ref 150–450)
PLATELET # BLD AUTO: 88 10E3/UL (ref 150–450)
PLATELET # BLD AUTO: 98 10E3/UL (ref 150–450)
PLATELET # BLD AUTO: 99 10E3/UL (ref 150–450)
PO2 BLD: 119 MM HG (ref 80–105)
PO2 BLD: 136 MM HG (ref 80–105)
PO2 BLD: 139 MM HG (ref 80–105)
PO2 BLD: 69 MM HG (ref 80–105)
PO2 BLD: 70 MM HG (ref 80–105)
PO2 BLD: 70 MM HG (ref 80–105)
PO2 BLD: 71 MM HG (ref 80–105)
PO2 BLD: 72 MM HG (ref 80–105)
PO2 BLD: 76 MM HG (ref 80–105)
PO2 BLD: 76 MM HG (ref 80–105)
PO2 BLD: 79 MM HG (ref 80–105)
PO2 BLD: 81 MM HG (ref 80–105)
PO2 BLDA: 277 MM HG (ref 80–105)
PO2 BLDA: 298 MM HG (ref 80–105)
PO2 BLDV: 40 MM HG (ref 25–47)
PO2 BLDV: 41 MM HG (ref 25–47)
POTASSIUM BLD-SCNC: 3.8 MMOL/L (ref 3.4–5.3)
POTASSIUM BLD-SCNC: 3.9 MMOL/L (ref 3.4–5.3)
POTASSIUM BLD-SCNC: 4.3 MMOL/L (ref 3.4–5.3)
PR INTERVAL - MUSE: 122 MS
PR INTERVAL - MUSE: 134 MS
PROT SERPL-MCNC: 5.3 G/DL (ref 6.8–8.8)
PROT SERPL-MCNC: 5.4 G/DL (ref 6.8–8.8)
PROT SERPL-MCNC: 5.8 G/DL (ref 6.8–8.8)
PROT SERPL-MCNC: 5.9 G/DL (ref 6.8–8.8)
QRS DURATION - MUSE: 86 MS
QRS DURATION - MUSE: 94 MS
QT - MUSE: 378 MS
QT - MUSE: 422 MS
QTC - MUSE: 468 MS
QTC - MUSE: 482 MS
R AXIS - MUSE: 62 DEGREES
R AXIS - MUSE: 63 DEGREES
RBC # BLD AUTO: 2.77 10E6/UL (ref 4.4–5.9)
RBC # BLD AUTO: 2.77 10E6/UL (ref 4.4–5.9)
RBC # BLD AUTO: 2.79 10E6/UL (ref 4.4–5.9)
RBC # BLD AUTO: 2.91 10E6/UL (ref 4.4–5.9)
SODIUM SERPL-SCNC: 141 MMOL/L (ref 133–144)
SODIUM SERPL-SCNC: 141 MMOL/L (ref 133–144)
SODIUM SERPL-SCNC: 142 MMOL/L (ref 133–144)
SODIUM SERPL-SCNC: 143 MMOL/L (ref 133–144)
SODIUM SERPL-SCNC: 143 MMOL/L (ref 133–144)
SPECIMEN EXPIRATION DATE: NORMAL
SYSTOLIC BLOOD PRESSURE - MUSE: NORMAL MMHG
SYSTOLIC BLOOD PRESSURE - MUSE: NORMAL MMHG
T AXIS - MUSE: 227 DEGREES
T AXIS - MUSE: 250 DEGREES
TROPONIN I SERPL HS-MCNC: ABNORMAL NG/L
UFH PPP CHRO-ACNC: <0.1 IU/ML
VENTRICULAR RATE- MUSE: 74 BPM
VENTRICULAR RATE- MUSE: 98 BPM
WBC # BLD AUTO: 15.9 10E3/UL (ref 4–11)
WBC # BLD AUTO: 17 10E3/UL (ref 4–11)
WBC # BLD AUTO: 17.5 10E3/UL (ref 4–11)
WBC # BLD AUTO: 18 10E3/UL (ref 4–11)

## 2021-12-09 PROCEDURE — 83605 ASSAY OF LACTIC ACID: CPT | Performed by: INTERNAL MEDICINE

## 2021-12-09 PROCEDURE — 94640 AIRWAY INHALATION TREATMENT: CPT

## 2021-12-09 PROCEDURE — 84520 ASSAY OF UREA NITROGEN: CPT | Performed by: INTERNAL MEDICINE

## 2021-12-09 PROCEDURE — 258N000003 HC RX IP 258 OP 636: Performed by: STUDENT IN AN ORGANIZED HEALTH CARE EDUCATION/TRAINING PROGRAM

## 2021-12-09 PROCEDURE — 85027 COMPLETE CBC AUTOMATED: CPT | Performed by: NURSE PRACTITIONER

## 2021-12-09 PROCEDURE — 87077 CULTURE AEROBIC IDENTIFY: CPT | Performed by: INTERNAL MEDICINE

## 2021-12-09 PROCEDURE — 250N000009 HC RX 250: Performed by: PHYSICIAN ASSISTANT

## 2021-12-09 PROCEDURE — 250N000009 HC RX 250: Performed by: NURSE PRACTITIONER

## 2021-12-09 PROCEDURE — 250N000013 HC RX MED GY IP 250 OP 250 PS 637: Performed by: NURSE PRACTITIONER

## 2021-12-09 PROCEDURE — 87040 BLOOD CULTURE FOR BACTERIA: CPT | Performed by: INTERNAL MEDICINE

## 2021-12-09 PROCEDURE — 94003 VENT MGMT INPAT SUBQ DAY: CPT

## 2021-12-09 PROCEDURE — 94640 AIRWAY INHALATION TREATMENT: CPT | Mod: 76

## 2021-12-09 PROCEDURE — 85610 PROTHROMBIN TIME: CPT | Performed by: NURSE PRACTITIONER

## 2021-12-09 PROCEDURE — 82550 ASSAY OF CK (CPK): CPT | Performed by: PHYSICIAN ASSISTANT

## 2021-12-09 PROCEDURE — 82040 ASSAY OF SERUM ALBUMIN: CPT | Performed by: NURSE PRACTITIONER

## 2021-12-09 PROCEDURE — 82805 BLOOD GASES W/O2 SATURATION: CPT | Performed by: NURSE PRACTITIONER

## 2021-12-09 PROCEDURE — 87205 SMEAR GRAM STAIN: CPT | Performed by: INTERNAL MEDICINE

## 2021-12-09 PROCEDURE — 250N000011 HC RX IP 250 OP 636: Performed by: PHYSICIAN ASSISTANT

## 2021-12-09 PROCEDURE — 84155 ASSAY OF PROTEIN SERUM: CPT | Performed by: NURSE PRACTITIONER

## 2021-12-09 PROCEDURE — 85730 THROMBOPLASTIN TIME PARTIAL: CPT | Performed by: NURSE PRACTITIONER

## 2021-12-09 PROCEDURE — 95714 VEEG EA 12-26 HR UNMNTR: CPT

## 2021-12-09 PROCEDURE — 84484 ASSAY OF TROPONIN QUANT: CPT | Performed by: NURSE PRACTITIONER

## 2021-12-09 PROCEDURE — 999N000075 HC STATISTIC IABP MONITORING

## 2021-12-09 PROCEDURE — 90947 DIALYSIS REPEATED EVAL: CPT

## 2021-12-09 PROCEDURE — 85379 FIBRIN DEGRADATION QUANT: CPT | Performed by: NURSE PRACTITIONER

## 2021-12-09 PROCEDURE — 99291 CRITICAL CARE FIRST HOUR: CPT | Mod: 25 | Performed by: INTERNAL MEDICINE

## 2021-12-09 PROCEDURE — 83036 HEMOGLOBIN GLYCOSYLATED A1C: CPT | Performed by: PHYSICIAN ASSISTANT

## 2021-12-09 PROCEDURE — 83735 ASSAY OF MAGNESIUM: CPT | Performed by: NURSE PRACTITIONER

## 2021-12-09 PROCEDURE — 93005 ELECTROCARDIOGRAM TRACING: CPT

## 2021-12-09 PROCEDURE — 82565 ASSAY OF CREATININE: CPT | Performed by: INTERNAL MEDICINE

## 2021-12-09 PROCEDURE — 83735 ASSAY OF MAGNESIUM: CPT | Performed by: CLINICAL NURSE SPECIALIST

## 2021-12-09 PROCEDURE — 80053 COMPREHEN METABOLIC PANEL: CPT | Performed by: NURSE PRACTITIONER

## 2021-12-09 PROCEDURE — 200N000002 HC R&B ICU UMMC

## 2021-12-09 PROCEDURE — 85347 COAGULATION TIME ACTIVATED: CPT

## 2021-12-09 PROCEDURE — 83615 LACTATE (LD) (LDH) ENZYME: CPT | Performed by: NURSE PRACTITIONER

## 2021-12-09 PROCEDURE — 93010 ELECTROCARDIOGRAM REPORT: CPT | Mod: 59 | Performed by: INTERNAL MEDICINE

## 2021-12-09 PROCEDURE — 71045 X-RAY EXAM CHEST 1 VIEW: CPT

## 2021-12-09 PROCEDURE — 85520 HEPARIN ASSAY: CPT | Performed by: NURSE PRACTITIONER

## 2021-12-09 PROCEDURE — 250N000013 HC RX MED GY IP 250 OP 250 PS 637: Performed by: PHYSICIAN ASSISTANT

## 2021-12-09 PROCEDURE — 250N000013 HC RX MED GY IP 250 OP 250 PS 637: Performed by: STUDENT IN AN ORGANIZED HEALTH CARE EDUCATION/TRAINING PROGRAM

## 2021-12-09 PROCEDURE — 82947 ASSAY GLUCOSE BLOOD QUANT: CPT | Performed by: NURSE PRACTITIONER

## 2021-12-09 PROCEDURE — 250N000011 HC RX IP 250 OP 636: Performed by: NURSE PRACTITIONER

## 2021-12-09 PROCEDURE — 999N000015 HC STATISTIC ARTERIAL MONITORING DAILY

## 2021-12-09 PROCEDURE — 33949 ECMO/ECLS DAILY MGMT ARTERY: CPT

## 2021-12-09 PROCEDURE — 85384 FIBRINOGEN ACTIVITY: CPT | Performed by: NURSE PRACTITIONER

## 2021-12-09 PROCEDURE — 84520 ASSAY OF UREA NITROGEN: CPT | Performed by: CLINICAL NURSE SPECIALIST

## 2021-12-09 PROCEDURE — 258N000003 HC RX IP 258 OP 636: Performed by: INTERNAL MEDICINE

## 2021-12-09 PROCEDURE — 250N000011 HC RX IP 250 OP 636: Performed by: INTERNAL MEDICINE

## 2021-12-09 PROCEDURE — 250N000009 HC RX 250: Performed by: CLINICAL NURSE SPECIALIST

## 2021-12-09 PROCEDURE — 82248 BILIRUBIN DIRECT: CPT | Performed by: CLINICAL NURSE SPECIALIST

## 2021-12-09 PROCEDURE — 83605 ASSAY OF LACTIC ACID: CPT | Performed by: STUDENT IN AN ORGANIZED HEALTH CARE EDUCATION/TRAINING PROGRAM

## 2021-12-09 PROCEDURE — 82803 BLOOD GASES ANY COMBINATION: CPT | Performed by: NURSE PRACTITIONER

## 2021-12-09 PROCEDURE — 83051 HEMOGLOBIN PLASMA: CPT | Performed by: NURSE PRACTITIONER

## 2021-12-09 PROCEDURE — 84100 ASSAY OF PHOSPHORUS: CPT | Performed by: NURSE PRACTITIONER

## 2021-12-09 PROCEDURE — 85652 RBC SED RATE AUTOMATED: CPT | Performed by: INTERNAL MEDICINE

## 2021-12-09 PROCEDURE — 86850 RBC ANTIBODY SCREEN: CPT | Performed by: STUDENT IN AN ORGANIZED HEALTH CARE EDUCATION/TRAINING PROGRAM

## 2021-12-09 PROCEDURE — 82330 ASSAY OF CALCIUM: CPT | Performed by: NURSE PRACTITIONER

## 2021-12-09 PROCEDURE — 86022 PLATELET ANTIBODIES: CPT | Performed by: PHYSICIAN ASSISTANT

## 2021-12-09 PROCEDURE — 71045 X-RAY EXAM CHEST 1 VIEW: CPT | Mod: 26 | Performed by: RADIOLOGY

## 2021-12-09 PROCEDURE — 36415 COLL VENOUS BLD VENIPUNCTURE: CPT | Performed by: INTERNAL MEDICINE

## 2021-12-09 PROCEDURE — 86140 C-REACTIVE PROTEIN: CPT | Performed by: NURSE PRACTITIONER

## 2021-12-09 PROCEDURE — 258N000003 HC RX IP 258 OP 636: Performed by: PHYSICIAN ASSISTANT

## 2021-12-09 PROCEDURE — 258N000003 HC RX IP 258 OP 636: Performed by: NURSE PRACTITIONER

## 2021-12-09 PROCEDURE — 85300 ANTITHROMBIN III ACTIVITY: CPT | Performed by: NURSE PRACTITIONER

## 2021-12-09 PROCEDURE — 999N000157 HC STATISTIC RCP TIME EA 10 MIN

## 2021-12-09 PROCEDURE — 33949 ECMO/ECLS DAILY MGMT ARTERY: CPT | Performed by: INTERNAL MEDICINE

## 2021-12-09 PROCEDURE — 99233 SBSQ HOSP IP/OBS HIGH 50: CPT | Performed by: INTERNAL MEDICINE

## 2021-12-09 PROCEDURE — 95720 EEG PHY/QHP EA INCR W/VEEG: CPT | Mod: GC | Performed by: PSYCHIATRY & NEUROLOGY

## 2021-12-09 PROCEDURE — 250N000013 HC RX MED GY IP 250 OP 250 PS 637: Performed by: INTERNAL MEDICINE

## 2021-12-09 RX ORDER — NICOTINE POLACRILEX 4 MG
15-30 LOZENGE BUCCAL
Status: DISCONTINUED | OUTPATIENT
Start: 2021-12-09 | End: 2021-12-09

## 2021-12-09 RX ORDER — CALCIUM GLUCONATE 20 MG/ML
2 INJECTION, SOLUTION INTRAVENOUS EVERY 8 HOURS PRN
Status: DISCONTINUED | OUTPATIENT
Start: 2021-12-09 | End: 2021-12-13

## 2021-12-09 RX ORDER — CALCIUM CHLORIDE, MAGNESIUM CHLORIDE, SODIUM CHLORIDE, SODIUM BICARBONATE, POTASSIUM CHLORIDE AND SODIUM PHOSPHATE DIBASIC DIHYDRATE 3.68; 3.05; 6.34; 3.09; .314; .187 G/L; G/L; G/L; G/L; G/L; G/L
INJECTION INTRAVENOUS CONTINUOUS
Status: DISCONTINUED | OUTPATIENT
Start: 2021-12-09 | End: 2021-12-13

## 2021-12-09 RX ORDER — CEFTRIAXONE 2 G/1
2 INJECTION, POWDER, FOR SOLUTION INTRAMUSCULAR; INTRAVENOUS EVERY 24 HOURS
Status: COMPLETED | OUTPATIENT
Start: 2021-12-09 | End: 2021-12-13

## 2021-12-09 RX ORDER — CALCIUM CHLORIDE, MAGNESIUM CHLORIDE, SODIUM CHLORIDE, SODIUM BICARBONATE, POTASSIUM CHLORIDE AND SODIUM PHOSPHATE DIBASIC DIHYDRATE 3.68; 3.05; 6.34; 3.09; .314; .187 G/L; G/L; G/L; G/L; G/L; G/L
12.5 INJECTION INTRAVENOUS CONTINUOUS
Status: DISCONTINUED | OUTPATIENT
Start: 2021-12-09 | End: 2021-12-13

## 2021-12-09 RX ORDER — DEXTROSE MONOHYDRATE 25 G/50ML
25-50 INJECTION, SOLUTION INTRAVENOUS
Status: DISCONTINUED | OUTPATIENT
Start: 2021-12-09 | End: 2021-12-09

## 2021-12-09 RX ORDER — MAGNESIUM SULFATE HEPTAHYDRATE 40 MG/ML
2 INJECTION, SOLUTION INTRAVENOUS EVERY 8 HOURS PRN
Status: DISCONTINUED | OUTPATIENT
Start: 2021-12-09 | End: 2021-12-13

## 2021-12-09 RX ORDER — POTASSIUM CHLORIDE 29.8 MG/ML
20 INJECTION INTRAVENOUS EVERY 8 HOURS PRN
Status: DISCONTINUED | OUTPATIENT
Start: 2021-12-09 | End: 2021-12-13

## 2021-12-09 RX ADMIN — CALCIUM CHLORIDE, MAGNESIUM CHLORIDE, SODIUM CHLORIDE, SODIUM BICARBONATE, POTASSIUM CHLORIDE AND SODIUM PHOSPHATE DIBASIC DIHYDRATE 12.5 ML/KG/HR: 3.68; 3.05; 6.34; 3.09; .314; .187 INJECTION INTRAVENOUS at 08:58

## 2021-12-09 RX ADMIN — CHLORHEXIDINE GLUCONATE 0.12% ORAL RINSE 15 ML: 1.2 LIQUID ORAL at 19:50

## 2021-12-09 RX ADMIN — TICAGRELOR 90 MG: 90 TABLET ORAL at 07:29

## 2021-12-09 RX ADMIN — CALCIUM CHLORIDE, MAGNESIUM CHLORIDE, DEXTROSE MONOHYDRATE, LACTIC ACID, SODIUM CHLORIDE, SODIUM BICARBONATE AND POTASSIUM CHLORIDE 12.5 ML/KG/HR: 5.15; 2.03; 22; 5.4; 6.46; 3.09; .157 INJECTION INTRAVENOUS at 08:58

## 2021-12-09 RX ADMIN — BIVALIRUDIN: 250 INJECTION INTRACAVERNOUS at 13:51

## 2021-12-09 RX ADMIN — CALCIUM CHLORIDE, MAGNESIUM CHLORIDE, SODIUM CHLORIDE, SODIUM BICARBONATE, POTASSIUM CHLORIDE AND SODIUM PHOSPHATE DIBASIC DIHYDRATE 12.5 ML/KG/HR: 3.68; 3.05; 6.34; 3.09; .314; .187 INJECTION INTRAVENOUS at 14:35

## 2021-12-09 RX ADMIN — BIVALIRUDIN: 250 INJECTION INTRACAVERNOUS at 13:50

## 2021-12-09 RX ADMIN — PIPERACILLIN SODIUM AND TAZOBACTAM SODIUM 4.5 G: 4; .5 INJECTION, POWDER, LYOPHILIZED, FOR SOLUTION INTRAVENOUS at 07:35

## 2021-12-09 RX ADMIN — Medication 2 PACKET: at 07:34

## 2021-12-09 RX ADMIN — BIVALIRUDIN 0.02 MG/KG/HR: 250 INJECTION, POWDER, LYOPHILIZED, FOR SOLUTION INTRAVENOUS at 13:57

## 2021-12-09 RX ADMIN — Medication 2 PACKET: at 12:00

## 2021-12-09 RX ADMIN — Medication 2 MG/HR: at 05:07

## 2021-12-09 RX ADMIN — Medication 2 PACKET: at 16:06

## 2021-12-09 RX ADMIN — FOLIC ACID 1 MG: 1 TABLET ORAL at 07:29

## 2021-12-09 RX ADMIN — CALCIUM CHLORIDE, MAGNESIUM CHLORIDE, SODIUM CHLORIDE, SODIUM BICARBONATE, POTASSIUM CHLORIDE AND SODIUM PHOSPHATE DIBASIC DIHYDRATE: 3.68; 3.05; 6.34; 3.09; .314; .187 INJECTION INTRAVENOUS at 19:25

## 2021-12-09 RX ADMIN — IPRATROPIUM BROMIDE AND ALBUTEROL SULFATE 3 ML: 2.5; .5 SOLUTION RESPIRATORY (INHALATION) at 08:13

## 2021-12-09 RX ADMIN — CEFTRIAXONE SODIUM 2 G: 2 INJECTION, POWDER, FOR SOLUTION INTRAMUSCULAR; INTRAVENOUS at 10:42

## 2021-12-09 RX ADMIN — CALCIUM CHLORIDE, MAGNESIUM CHLORIDE, DEXTROSE MONOHYDRATE, LACTIC ACID, SODIUM CHLORIDE, SODIUM BICARBONATE AND POTASSIUM CHLORIDE 12.5 ML/KG/HR: 5.15; 2.03; 22; 5.4; 6.46; 3.09; .157 INJECTION INTRAVENOUS at 04:39

## 2021-12-09 RX ADMIN — CALCIUM CHLORIDE, MAGNESIUM CHLORIDE, SODIUM CHLORIDE, SODIUM BICARBONATE, POTASSIUM CHLORIDE AND SODIUM PHOSPHATE DIBASIC DIHYDRATE 12.5 ML/KG/HR: 3.68; 3.05; 6.34; 3.09; .314; .187 INJECTION INTRAVENOUS at 19:26

## 2021-12-09 RX ADMIN — MULTIVIT AND MINERALS-FERROUS GLUCONATE 9 MG IRON/15 ML ORAL LIQUID 15 ML: at 07:31

## 2021-12-09 RX ADMIN — PIPERACILLIN SODIUM AND TAZOBACTAM SODIUM 4.5 G: 4; .5 INJECTION, POWDER, LYOPHILIZED, FOR SOLUTION INTRAVENOUS at 02:05

## 2021-12-09 RX ADMIN — Medication 40 MG: at 07:32

## 2021-12-09 RX ADMIN — FENTANYL CITRATE 50 MCG/HR: 50 INJECTION INTRAVENOUS at 02:23

## 2021-12-09 RX ADMIN — TICAGRELOR 90 MG: 90 TABLET ORAL at 19:49

## 2021-12-09 RX ADMIN — Medication 2 PACKET: at 19:50

## 2021-12-09 RX ADMIN — CALCIUM CHLORIDE 1 G: 100 INJECTION, SOLUTION INTRAVENOUS at 21:34

## 2021-12-09 RX ADMIN — IPRATROPIUM BROMIDE AND ALBUTEROL SULFATE 3 ML: 2.5; .5 SOLUTION RESPIRATORY (INHALATION) at 15:56

## 2021-12-09 RX ADMIN — CHLORHEXIDINE GLUCONATE 0.12% ORAL RINSE 15 ML: 1.2 LIQUID ORAL at 08:01

## 2021-12-09 RX ADMIN — CALCIUM CHLORIDE, MAGNESIUM CHLORIDE, SODIUM CHLORIDE, SODIUM BICARBONATE, POTASSIUM CHLORIDE AND SODIUM PHOSPHATE DIBASIC DIHYDRATE 12.5 ML/KG/HR: 3.68; 3.05; 6.34; 3.09; .314; .187 INJECTION INTRAVENOUS at 04:38

## 2021-12-09 RX ADMIN — SODIUM CHLORIDE, POTASSIUM CHLORIDE, SODIUM LACTATE AND CALCIUM CHLORIDE 500 ML: 600; 310; 30; 20 INJECTION, SOLUTION INTRAVENOUS at 03:45

## 2021-12-09 RX ADMIN — ASPIRIN 81 MG CHEWABLE TABLET 81 MG: 81 TABLET CHEWABLE at 07:29

## 2021-12-09 RX ADMIN — CALCIUM CHLORIDE, MAGNESIUM CHLORIDE, SODIUM CHLORIDE, SODIUM BICARBONATE, POTASSIUM CHLORIDE AND SODIUM PHOSPHATE DIBASIC DIHYDRATE 12.5 ML/KG/HR: 3.68; 3.05; 6.34; 3.09; .314; .187 INJECTION INTRAVENOUS at 14:37

## 2021-12-09 RX ADMIN — THIAMINE HCL TAB 100 MG 100 MG: 100 TAB at 07:29

## 2021-12-09 ASSESSMENT — MIFFLIN-ST. JEOR: SCORE: 1520.38

## 2021-12-09 ASSESSMENT — ACTIVITIES OF DAILY LIVING (ADL)
ADLS_ACUITY_SCORE: 11

## 2021-12-09 NOTE — PROGRESS NOTES
"Nephrology Progress Note  12/09/2021       Bruce Blount is a 56 yom with hx of ETOH and tobacco use who presents to [facility] after out of hospital arrest on IABP and ECMO.  Had witnessed arrest at home (brother heard him fall and called 911), found in VF and coded by EMS.  UOP dwindling and has mild hyperkalemia after rewarming, nephrology consulted for ESTER and management of K.      Interval History :   Mr Blount continues on CRRT started yesterday mainly for management of K.  Still making some UOP but dwindling today.  Changing to 4k baths with last K 3.9, planning I=O fluid goal as she received fluids overnight for chugging.      Assessment & Recommendations:   ESTER-Presentation Cr 1.0, normal imaging on CT with no hydro.  Cr up after arrest and K 5.4 in setting of ongoing rewarming from cooling protocol.  Still making some UOP but with need for IABP, ECMO and 2 pressors ESTER is unlikely to improve in the short term so started CRRT 12/8.                  -CRRT, mix of 2k/4k baths, I=O fluid goal.                  -Access is via ECMO circuit.      Volume-Mild edema, needed fluids overnight for chugging, still making some UOP.  Planning I=O for fluid goal today.       Electrolytes/pH-K 3.9, bicarb 24.       BMD-Ca 8.0, Mg and Phos mildly up consistent with ESTER.       VF arrest-EF currently 5-10%.       Anemia-Hgb 9.0, down from admission, acute management per team.      Seen and discussed with Dr Guillory     Recommendations were communicated to primary team via verbal communication.        KATHARINA Kemp CNS  Clinical Nurse Specialist  [phone]    Review of Systems:   I reviewed the following systems:  ROS not done due to vent/sedation.     Physical Exam:   I/O last 3 completed shifts:  In: 2372.48 [I.V.:1270.48; Other:7; NG/GT:250; IV Piggyback:500]  Out: 1830 [Urine:558; Emesis/NG output:150; Other:1122]   /78   Pulse 88   Temp 98.6  F (37  C) (Esophageal)   Resp 16   Ht 1.753 m (5' 9\")   Wt 70 kg " (154 lb 5.2 oz)   SpO2 97%   BMI 22.79 kg/m       GENERAL APPEARANCE: Intubated and sedated, on IABP and ECMO.    EYES: No scleral icterus  NECK:  Difficult to assess JVD  Pulmonary: lungs clear to auscultation with equal breath sounds bilaterally, no clubbing or cyanosis  CV: Regular rhythm, normal rate, no rub   - Edema +1 generalized.   GI: soft, nontender, normal bowel sounds  MS: no evidence of inflammation in joints, no muscle tenderness  : + Gutiérrez  SKIN: no rash, warm, dry  NEURO: Intubated and sedated.     Labs:   All labs reviewed by me  Electrolytes/Renal - Recent Labs   Lab Test 12/09/21  1157 12/09/21  0937 12/09/21  0933 12/09/21  0806 12/09/21  0349 12/08/21  2144 12/08/21  1407 12/08/21  1005 12/08/21  0401 12/08/21  0353   NA  --   --  143  --  143 143   < > 144  144  --  143   POTASSIUM  --   --  3.9  --  4.3 4.5   < > 5.4*  5.4*  --  5.4*   CHLORIDE  --   --  112*  --  114* 114*   < > 117*  117*  --  118*   CO2  --   --  24  --  22 21   < > 20  20  --  18*   BUN  --   --  39*  --  38* 40*   < > 42*  42*  --  37*   CR  --   --  1.53*  --  1.63* 1.75*   < > 2.02*  2.02*  --  1.75*   * 152* 166*   < > 151* 146*   < > 124*  124*   < > 110*   BRIDGETTE  --   --  8.0*  --  8.7 7.8*   < > 7.8*  7.8*  --  8.1*   MAG  --   --  2.2  --  2.3 2.4*   < > 2.4*  --  2.4*   PHOS  --   --   --   --  4.6*  --   --  6.6*  --  7.2*    < > = values in this interval not displayed.       CBC -   Recent Labs   Lab Test 12/09/21  0933 12/09/21 0349 12/08/21 2144   WBC 18.0* 17.5* 19.3*   HGB 9.0* 9.3* 10.3*   PLT 98* 107* 132*       LFTs -   Recent Labs   Lab Test 12/09/21 0933 12/09/21 0349 12/08/21 2144   ALKPHOS 38* 37* 39*   BILITOTAL 0.9 0.8 0.8   ALT 74* 75* 84*   * 165* 198*   PROTTOTAL 5.4* 5.3* 5.7*   ALBUMIN 1.6* 1.6* 1.7*       Iron Panel - No lab results found.        Current Medications:    aspirin  81 mg Per Feeding Tube Daily     cefTRIAXone  2 g Intravenous Q24H     chlorhexidine   15 mL Swish & Spit BID     folic acid  1 mg Oral or Feeding Tube Daily     insulin aspart  1-6 Units Subcutaneous Q4H     ipratropium - albuterol 0.5 mg/2.5 mg/3 mL  3 mL Nebulization 4x daily     multivitamins w/minerals  15 mL Per Feeding Tube Daily     pantoprazole  40 mg Oral QAM AC     polyethylene glycol  17 g Oral Daily     protein modular  2 packet Per Feeding Tube 4x Daily     senna-docusate  2 tablet Oral or Feeding Tube BID     thiamine  100 mg Oral or Feeding Tube Daily     ticagrelor  90 mg Oral or Feeding Tube BID       Bivalirudin (ANGIOMAX) 0.02 mg/mL in 0.9% NaCl FOR REPERFUSION CATHETER 3 mL/hr at 12/09/21 1406     Bivalirudin (ANGIOMAX) 0.02 mg/mL in 0.9% NaCl FOR REPERFUSION CATHETER 3 mL/hr at 12/09/21 1351     bivalirudin ANTICOAGULANT (ANGIOMAX) 250 mg/250 mL in 0.9% Sodium Chloride 0.2 mg/kg/hr (12/09/21 1400)     dextrose       dextrose       CRRT replacement solution 12.5 mL/kg/hr (12/09/21 0858)     fentaNYL 50 mcg/hr (12/09/21 1400)     midazolam 2 mg/hr (12/09/21 1400)     - MEDICATION INSTRUCTIONS -       norepinephrine 0.04 mcg/kg/min (12/09/21 1400)     CRRT replacement solution 200 mL/hr at 12/08/21 1801     CRRT replacement solution 12.5 mL/kg/hr (12/09/21 0858)     vasopressin 1 Units/hr (12/09/21 1400)

## 2021-12-09 NOTE — PROGRESS NOTES
ECMO Attending Progress Note  2021    Bruce Bolunt is a 56 year old male who was cannulated for ECMO 2021 due to  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    Cannulation Site:  17 Fr in the R femoral artery  25 Fr in the R femoral vein  IABP    Interval events: some pulsatility now 9mmHg reprefusion cannula placed on left yesterday improved extremity flow 500 ml for chugging last night      Physical Exam:  Temp:  [97.3  F (36.3  C)-98.6  F (37  C)] 98.6  F (37  C)  Pulse:  [65-98] 94  Resp:  [16-19] 16  MAP:  [57 mmHg-107 mmHg] 90 mmHg  Arterial Line BP: ()/(20-72) 136/46  FiO2 (%):  [60 %] 60 %  SpO2:  [90 %-100 %] 98 %    Intake/Output Summary (Last 24 hours) at 2021 1456  Last data filed at 2021 1429  Gross per 24 hour   Intake 1151.42 ml   Output 955 ml   Net 196.42 ml    Ventilation Mode: CMV/AC  (Continuous Mandatory Ventilation/ Assist Control)  FiO2 (%): 60 %  Rate Set (breaths/minute): 16 breaths/min  Tidal Volume Set (mL): 480 mL  PEEP (cm H2O): 8 cmH2O  Oxygen Concentration (%): 60 %  Resp: 16       Labs:  Recent Labs   Lab 21  1344 21  1146 21  0932 21  0802   PH 7.41 7.41 7.40 7.38   PCO2 40 39 39 39   PO2 79* 71* 72* 70*   HCO3 25 25 24 23   O2PER 60 60 60 60      Recent Labs   Lab 21  0933 21  0349 21  2144 21  1553   WBC 18.0* 17.5* 19.3* 19.2*   HGB 9.0* 9.3* 10.3* 10.9*     Creatinine   Date Value Ref Range Status   2021 1.53 (H) 0.66 - 1.25 mg/dL Final   2021 1.63 (H) 0.66 - 1.25 mg/dL Final   2021 1.75 (H) 0.66 - 1.25 mg/dL Final   2021 1.82 (H) 0.66 - 1.25 mg/dL Final       Blood Flow (Circuit) LPM: 3.3 LPM  Gas Flow  LPM: 3 LPM  Gas FiO2   %: 60 %  ACT  (seconds): 288 seconds  Blood Temp  (degrees C): 32.6 C  Pulse Oximetry  (SpO2%): 100 %  Arterial Pressure  mmH mmHg      ECMO Issues including assessments and plan on DOS 2021:  Neuro: Sedated for mechanical ventilationand  ECMO.  No acute distress.  [id number] R50s L dopplerable lower ext pulses RASS goal: -3 reprefusion cannula placed on left yesterday improved extremity flow  CV: Cardiogenic shock.  Hemodynamically stable on n at 0.08 and vaso 1 Pulsatility: 9mmHg  Pulm: Keep vent settings at rest settings as above.  FEN/Renal: Electrolytes stable w/ replacement protocols in place, cr 1.63 uop minimal on crrt k 4.3  Heme: ACT goal:180-200 b/c no opening valve Hemoglobin 9.3 dilutional component  Minimal oozing around the ECMO cannulas  ID: Receiving empiric antibiotics  Cannulae: Position is acceptable on exam and the available imaging.  Distal perfusion cannulas are in place and patent.      I have personally reviewed the ECMO flows, oxygenation and CO2 clearance, anticoagulation, and cannula position.  I have also personally assessed the patient's systemic response with hemodynamics, oxygenation, ventilation, and bleeding.       The patient requires continued ECMO support and management in the ICU.  I have discussed patient care and treatment plan with the primary team.      Marlo Fry MD  Critical Care Cardiology  [phone]    December 9, 2021

## 2021-12-09 NOTE — PROGRESS NOTES
CRRT INITIATION NOTE    Consent for CRRT Completed:  YES  Patient s Vascular Access: Catheter              Placement Confirmed: ECMO    DATA:  Procedure:  CVVHDF  Start Time:  1018  Machine#:  [id number]  Filter:  M150  Blood Flow:  200  ML/min  Pre-Replacement Solution:  Phoxillum BK4/2.5  Post-Replacement Solution:  Phoxillum BK4/2.5  Dialysate Solution:  Phoxillum BK4/2.5  Pre-Replacement Solution Rate:  200 mL/hr  Post-Replacement Solution Rate:  1000 mL/hr  Dialysate Flow Rate:  1000 mL/hr   Patient Removal Rate:  0 mL/hr  Anticoagulation Type and Rate:  NA    ASSESSMENT:  How Patient Tolerated Initiation:   Vital Signs:  113/39 (67), HR 84, RR 18, Temp 98.4    Initial Pressures:  Access:  29  Filter:  154  Return:  129  TMP:  46  Change in Filter Pressure:  40      INTERVENTIONS:  CVVHDF initiated per MD order, goal removal Intake = Output.  Access via ECMO.  Attempted recirc for cath lab procedure but circuit timed out so circuit was changed/.    PLAN:  Continue with current of care, please contact CRRT resource with any questions or concerns at [phone].

## 2021-12-09 NOTE — PROGRESS NOTES
ECMO Shift Summary:[id number]      ECMO Equipment:  Console serial number: [id number]  Circuit Lot number: [id number]  Oxygenator Lot number: [id number]       Patient remains on VA ECMO, all equipment is functioning and alarms are appropriately set. RPM's 3840 with flow range 3.86-4.13 L/min. Sweep gas is at 1 LPM and FiO2 60%. Circuit remains free of air, clot and fibrin. Cannulas are secure with no bleeding from site. Extremities are cool. Suctioned ETT for small amount of secretions.    Significant Shift Events: Bedside turndown with ECHO done, tolerated to !L see PA note. Traveled to [facility] for distal reperfusion cath to IABP leg 2/2 modeling and poor perfusion.    Vent settings:  Ventilation Mode: CMV/AC  (Continuous Mandatory Ventilation/ Assist Control)  FiO2 (%): 60 %  Rate Set (breaths/minute): 16 breaths/min  Tidal Volume Set (mL): 480 mL  PEEP (cm H2O): 5 cmH2O  Oxygen Concentration (%): 60 %  Resp: 18  .    Heparin is running at 600 u/hr, ACT range 156-207.    Urine output is as charted, blood loss was none. Product given included none.       Intake/Output Summary (Last 24 hours) at 12/8/2021 1839  Last data filed at 12/8/2021 1830  Gross per 24 hour   Intake 1583.6 ml   Output 1344 ml   Net 239.6 ml       ECHO:  No results found for this or any previous visit.  No results found for this or any previous visit.      CXR:  Recent Results (from the past 24 hour(s))   XR Chest Port 1 View    Narrative    EXAM: XR CHEST PORT 1 VIEW  12/8/2021 1:45 AM     HISTORY:  Check endotracheal tube placement and ECLS cannula  placement. DO NOT log-roll patient.  Place film under patient using  patient safety handling process.       COMPARISON:  Chest x-ray 12/7/2021, 12/6/2021, chest and pelvis CT  12/6/2021    FINDINGS  Technique: Supine AP view of the chest.     Devices: Inferior approach ECMO cannula terminates over the low SVC.  Intra-aortic balloon pump marker projects over the proximal descending  thoracic aorta.  Endotracheal tube terminates over the mid thoracic  trachea. Esophageal temperature probe terminates over the proximal  esophagus. Enteric tube passes below the left hemidiaphragm.    Lungs: Unchanged diffuse bilateral interstitial and airspace  opacities. No discernible pneumothorax.  No definite pleural effusion.      Cardiovascular: Cardiomediastinal silhouette is  stable in size.      Impression    IMPRESSION:     1. Stable position of ECMO cannula and intra-aortic balloon pump  marker.  2. Unchanged diffuse bilateral pulmonary opacities.    I have personally reviewed the examination and initial interpretation  and I agree with the findings.    PHILIP BOWER MD         SYSTEM ID:  [id number]   Echocardiogram Limited    Narrative    [id number]  [id number]  [id number]  [id number]^VERONICA^JAY^TASIA     [facility],[place]  Echocardiography Laboratory  [address] [place] [address]     Name: MINERVA GARAY  MRN: [id number]  : 1965  Study Date: 2021 10:46 AM  Age: 56 yrs  Gender: Male  Patient Location: [facility]  Reason For Study: MI  Ordering Physician: JAY MARTIN  Performed By: Ulices Lopez     BSA: 1.9 m2  Height: 69 in  Weight: 169 lb  HR: 89  BP: 109/38 mmHg  ______________________________________________________________________________  Procedure  Limited Portable Echo Adult. VA ECMO turndown.  ______________________________________________________________________________  Interpretation Summary     Limited VA ECMO turndown from 4.1 L/min to 1.0 L/min.  Baseline (4.1 L/min): Normal LV size and severely reduced LV function, LVEF=5-  10%. Septum midline. Moderately to severely reduced RV function on limited  views. Septum midline. The aortic valve opens with each cardiac cycle.  With turndown to 1.0L/min, there is no appreciable change in LV/RV size,  function, or septal position.  Trivial pericardial effusion is present.  This study was compared with the study from  12/6/21 and 12/7/21 .  No significant changes noted.  ______________________________________________________________________________  Right Ventricle  The right ventricle is normal size. Global right ventricular function is  moderately to severely reduced.     Mitral Valve  Trace mitral insufficiency is present.     Aortic Valve  The aortic valve opens with each cardiac cycle.     Vessels  The inferior vena cava was normal in size with preserved respiratory  variability. Venous ECMO cannula is seen traversing the IVC.     Pericardium  Trivial pericardial effusion is present.     Compared to Previous Study  This study was compared with the study from 12/6/21 and 12/7/21 . No  significant changes noted.  ______________________________________________________________________________  Report approved by: Gina Mathias 12/08/2021 01:23 PM     ______________________________________________________________________________          Labs:  Recent Labs   Lab 12/08/21  1552 12/08/21  1551 12/08/21  1404 12/08/21  1117 12/08/21  1004   PH  --  7.31* 7.32* 7.33* 7.29*   PCO2  --  41 39 39 40   PO2  --  60* 62* 56* 73*   HCO3  --  21 20* 20* 19*   O2PER 60 60  60 60 60 60       Lab Results   Component Value Date    HGB 10.9 (L) 12/08/2021    PHGB <30 12/08/2021     (L) 12/08/2021    FIBR 806 (H) 12/08/2021    INR 1.79 (H) 12/08/2021    PTT 48 (H) 12/08/2021    DD 4.98 (H) 12/08/2021    ANTCH 55 (L) 12/08/2021         Plan is to continue to support and wean as tolerated.      Stephany Hargrove, RT  ECMO Specialist  12/8/2021 6:39 PM

## 2021-12-09 NOTE — PROGRESS NOTES
"    [facility]   Critical Care Cardiology - Progress Note    Bruce Blount MRN: [id number]  Age: 56 year old, : 1965  Date: 2021    Assessment and Plan:  Bruce Blount is a 56 year old male with PMHx current tobacco use and ETOH abuse who presents to [facility] after out of hospital cardiac arrest.      Per EMS and brother, patient was in his usual state of health prior to arrest. Patients brother heard a \"thump\" and found patient unresponsive and agonal breathing. 911 called and CPR initiated. EMS arrived within 4 minutes. Initial rhythm VF--> shocked x ~7 with ROSC upon arrival to ED. Total CPR team per EMS ~ 40 minutes. 3 mg Epinephrine, 300 mg Amio given via EMS. Patient EKG reveals Valentín inferior/posterior leads with reciprocal changes. Patient initially presented with an Igel and was intubated with ETT in the ED. He arrested and was again shocked in the ED with ROSC. Taken urgently to CCL where he underwent coronary angiogram and again arrested requiring manual CPR and was then cannulated on VA ECMO via right with 17 Lithuanian cannula RCFA, 25 Fr cannula RCFV. Coronary angiogram revealed  of RCA and acute culprit lesion of LCx/OM1, s/p PCI to pLCx, mid LCx, and OM1 with TERRY. IABP placed for decreased pulsatility. Thermogard cooling cath placed and patient was transferred to ICU for further management.     Today's Plan:  - wean sedation  - volume removal today via CRRT; okay to go up to 2 of vaso, 0.12 or norepi for volume removal  - transition zosyn to rocephin  - send HIT today; transition to bival until screen is back  - start sliding scale insulin    Neurology  # Concern for anoxic brain injury    # Hx of possible ETOH abuse  Intubated, sedated. Cooled to 33 degrees. Initial head CT without acute pathology  - Continue versed, fentanyl for sedation with a RASS goal -2 to -3.  - consider head CT in the coming days  - Neuro-crit consulted and appreciate recommendations. "   - vEEG   - Palliative care consulted.   - Folate, Thiamine and multi vitamin for ETOH abuse. CIWA as needed when sedation weaned     Cardiovascular  # Refractory VF cardiac arrest 2/2 secondary to STEMI  # Cardiogenic shock requiring VA ECMO  # Ischemic Cardiomyopathy (EF 10-15%)  # S/p PCI pLCx, mLCx, OM1  # Residual RCA   # HTN, HLD  Peripheral V-A ECMO inserted via RCFA and RCFV 17/25 fr.  troponin peak 124K. Pulsatility improved  - wean norepi/vaso as able  - ECMO cares:   - Flows 4L. Reduce to 3.25L for improved pulsatility   - Sweep 2L   - ACT goal: 180-200  - GDMT   - Continue ASA 81mg and ticagrelor 90mg BID    - Hold Lipitor for now given shock liver   - Hold ACE/ARB for now given likely reduced renal fxn after arrest   - Holding beta blocker given shock  -  of RCA not intervened upon    Pulmonary  # Acute hypoxemic respiratory failure requiring intubation  # Probable aspiration pneumonia  # Rib Fractures  # Sternal Fracture  # Tobacco Abuse (2PPD)  Vent Settings: CMV 16/480/8/60%. AB.36/42/139/23  - goal CO2 40-50  - Wean vent as able  - Daily CXR  - Serial ABGs   - duoneb Q6 hours    Gastrointestinal, Nutrition  # Shock liver 2/2 cardiac arrest, improving  No known medical hx.   - Monitor LFTs BID  - advance TF to goal  - Bowel regimen: senna, miralax  - GI Prophylaxis: Protonix 40 mg daily    Renal, Electrolytes  # Acute Renal Injury  # Lactic acidosis  # Hypoalbuminemia  # Hyperkalemia, resolved   # Hypocalcemia, resolved  Unclear creatinine baseline, on admission 0.87. UOP poor  with hyperkalemia. Started on CRRT. LA peaked again at 2.7 in the setting of LLE limb ischemia, now 2.3  - volume removal; goal net negative 500-1000cc today  - renal consult; appreciate recommendations  - lactic acid Q6 hours  - Monitor urine output    Infectious Disease  # Aspiration Pneumonia  # Leukocytosis  # Lactic acidosis, resolved  WBC 17.5. Afebrile overnight.  - Monitor for signs of infection  - Daily  blood cultures while on ECMO   Cultures thus far:              - sputum 12/6: staph aureus, staph dysgalactia, klebsiella   - sputum 12/7: staph aureus   - sputum 12/8: gram positive cocci  Antibiotics              - Vancomycin 12/6 - 12/7 (MRSA negative)              - Zosyn 12/6 - 12/9   - Rocephin 12/9 - present    Hematology  # Loss of bilateral DP pulses, improved  Hgb stable. No e/o bleeding. US with monophasic flow to DP. LLE distal reperfusion cannula placed 12/8 in the setting of worsened LLE mottling.  Never lost Dopplerable pulses  - Transfuse for Hgb < 8  - DVT PPX: Heparin as above    Endocrinology  # Hyperglycemia   No known medical history. Hemoglobin A1c 5.6%  - insulin gtt as needed    MSK/Skin  # Mottling LLE s/p LLE distal reperfusion cannula, improved  Pulses remain intact, however skin appears more mottled. Probable PAD. Exacerbated by IABP. Worsened throughout the day 12/8 with rising LA, CK.  LLE distal reperfusion cannula placed  - continue to monitor  - continue heparin via distal reperfusion cannula    Pertinent Lines  R femoral arterial and venous ECMO cannulae December 6, 2021 (ECMO cannula)  RCFA arterial line December 6, 2021 (R distal reperfusion cannula)  LCFA arterial line December 8, 2021 (L distal reperfusion cannula)  L femoral arterial line December 6, 2021 (IABP)  Rectal tube December 6, 2021  R radial arterial line December 6, 2021  ETT December 6, 2021  Gutiérrez catheter December 6, 2021  OG tube December 6, 2021    ICU Cares:  Daily CXR: ETT 4.5cm from jacques.  IABP well positioned.  Worsened perihilar opacities  Fluids/Feeds: advancing TF to goal. Fluids not indicated  DVT Prophylaxis: heparin ggt  GI Prophylaxis: protonix  Bowel Regimen: senna, miralax if needed  Anticipated Floor Transfer: N/A    Family Update: brother, updated by me    Patient seen and discussed with Dr. Marlo Fry.  Assessment and plan as above.    Mary Leger PA-C  Critical Care Cardiology  Pager:  [phone]      Interval History:  Some chugging overnight - give 500cc fluid.  Persistently pulsatility overnight, although only 5mmHg this morning.  LLE improved in regards to perfusion (improved color, temperature). Moving upper extremities spontaneously    Objective Findings:  Temp:  [97.3  F (36.3  C)-98.8  F (37.1  C)] 98.1  F (36.7  C)  Pulse:  [73-98] 74  Resp:  [11-21] 16  MAP:  [57 mmHg-93 mmHg] 78 mmHg  Arterial Line BP: ()/(20-72) 117/46  FiO2 (%):  [60 %] 60 %  SpO2:  [90 %-100 %] 100 %    I/O:  Intake/Output Summary (Last 24 hours) at 12/9/2021 0804  Last data filed at 12/9/2021 0720  Gross per 24 hour   Intake 2326.58 ml   Output 1646 ml   Net 680.58 ml     Physical Exam:  GEN: intubated, sedated  HEENT: no appreciable cranial trauma. Pupils 2mm, reactive  Pulm: coarse breath sounds bilaterally  Cardiac: regular rhythm. Tachycardia. No murmur, rub, gallop  ECMO cannula: insertion site clean, dry, intact. No appreciable hematoma  GI: soft, non distended  Neuro: pupils reactive.  Otherwise sedated  Integument: no appreciable skin breakdown  Vascular: improved LLE temperature, color      Medications:    aspirin  81 mg Per Feeding Tube Daily     chlorhexidine  15 mL Swish & Spit BID     folic acid  1 mg Oral or Feeding Tube Daily     ipratropium - albuterol 0.5 mg/2.5 mg/3 mL  3 mL Nebulization 4x daily     multivitamins w/minerals  15 mL Per Feeding Tube Daily     pantoprazole  40 mg Oral QAM AC     piperacillin-tazobactam  4.5 g Intravenous Q6H     polyethylene glycol  17 g Oral Daily     protein modular  2 packet Per Feeding Tube 4x Daily     senna-docusate  2 tablet Oral or Feeding Tube BID     thiamine  100 mg Oral or Feeding Tube Daily     ticagrelor  90 mg Oral or Feeding Tube BID       dextrose       dextrose       CRRT replacement solution 12.5 mL/kg/hr (12/09/21 0439)     fentaNYL 50 mcg/hr (12/09/21 0700)     heparin (PRESSURE BAG) 2 unit/mL in 0.9% NaCl 6 Units/hr (12/09/21 0700)      heparin (PRESSURE BAG) 2 unit/mL in 0.9% NaCl 6 Units/hr (12/09/21 0600)     HEParin 400 Units/hr (12/09/21 0700)     insulin regular Stopped (12/08/21 0215)     midazolam 2 mg/hr (12/09/21 0700)     - MEDICATION INSTRUCTIONS -       norepinephrine 0.12 mcg/kg/min (12/09/21 0600)     CRRT replacement solution 200 mL/hr at 12/08/21 1801     CRRT replacement solution 12.5 mL/kg/hr (12/09/21 0438)     vasopressin 1 Units/hr (12/09/21 0700)         Labs:   CMP  Recent Labs   Lab 12/09/21  0349 12/08/21  2144 12/08/21  2143 12/08/21  1954 12/08/21  1852 12/08/21  1553 12/08/21  1407 12/08/21  1005 12/08/21  0401 12/08/21  0353 12/07/21  0353 12/07/21  0342    143  --   --   --  142  --  144  144  --  143   < > 145*   POTASSIUM 4.3 4.5  --   --   --  4.7  --  5.4*  5.4*  --  5.4*   < > 3.4   CHLORIDE 114* 114*  --   --   --  114*  --  117*  117*  --  118*   < > 118*   CO2 22 21  --   --   --  20  --  20  20  --  18*   < > 16*   ANIONGAP 7 8  --   --   --  8  --  7  7  --  7   < > 11   * 146* 140* 132*   < > 143*   < > 124*  124*   < > 110*   < > 158*   BUN 38* 40*  --   --   --  40*  --  42*  42*  --  37*   < > 25   CR 1.63* 1.75*  --   --   --  1.82*  --  2.02*  2.02*  --  1.75*   < > 0.96   GFRESTIMATED 46* 43*  --   --   --  41*  --  36*  36*  --  43*   < > 88   BRIDGETTE 8.7 7.8*  --   --   --  7.8*  --  7.8*  7.8*  --  8.1*   < > 7.6*   MAG 2.3 2.4*  --   --   --  2.5*  --  2.4*  --  2.4*   < > 2.4*   PHOS 4.6*  --   --   --   --   --   --  6.6*  --  7.2*  --  3.8   PROTTOTAL 5.3* 5.7*  --   --   --  5.5*  --  5.2*  --  5.2*   < > 4.9*   ALBUMIN 1.6* 1.7*  --   --   --  1.9*  --  1.8*  1.8*  --  1.9*   < > 1.9*   BILITOTAL 0.8 0.8  --   --   --  0.9  --  0.7  --  0.5   < > 0.4   ALKPHOS 37* 39*  --   --   --  41  --  34*  --  34*   < > 40   * 198*  --   --   --  199*  --  194*  --  204*   < > 326*   ALT 75* 84*  --   --   --  84*  --  85*  --  86*   < > 95*    < > = values in this interval  not displayed.     CBC  Recent Labs   Lab 12/09/21  0349 12/08/21  2144 12/08/21  1553 12/08/21  1005   WBC 17.5* 19.3* 19.2* 16.0*   RBC 2.91* 3.25* 3.44* 3.47*   HGB 9.3* 10.3* 10.9* 11.1*   HCT 28.2* 31.9* 33.6* 33.8*   MCV 97 98 98 97   MCH 32.0 31.7 31.7 32.0   MCHC 33.0 32.3 32.4 32.8   RDW 13.7 13.6 13.4 13.3   * 132* 146* 152     Arterial Blood Gas  Recent Labs   Lab 12/09/21  0553 12/09/21  0349 12/09/21  0348 12/09/21  0150 12/08/21  2350   PH 7.36  --  7.33* 7.33* 7.30*   PCO2 42  --  43 43 46*   PO2 139*  --  136* 119* 69*   HCO3 23  --  23 23 23   O2PER 60 60 60  80 60 60         Pertinent Imaging Studies:  Coronary angiogram:   Refractory VT/VF cardiac arrest.  Cardiogenic shock.  STEMI involving the OM1 as culprit.  Three vessel severe CAD involving the  of the RCA, mid LCx and OM1 severe disease with occlusion of OM1 as culprit in STEMI, and moderate proximal LAD lesions likely hemodynamically significant.  CPR  VA ECMO placement.  IABP placement.  Thermogard placement with initiation of hypothermia protocol.  Right radial arterial line placement.  PCI with three drug eluting stents to the proximal, mid LCx and OM1.    LE Arterial Duplex, bilateral:  1. Right leg: Patent arteries with post obstructive diminished waveforms likely due to ECMO cannula.    2. Left leg: Patent arteries.     Echo:   Technically difficult study.  VA ECMO at 2.8L/min.  Left ventricular function is decreased. The ejection fraction is 10-15% (severely reduced).  Global right ventricular function is severely reduced.  Septum midline.  The aortic valve appears to remain closed.  Trivial pericardial effusion is present.  Previous study not available for comparison.    CT Head:  No acute intracranial pathology.    CT Chest, Abdomen, Pelvis:  1. Bilateral dependent consolidative opacities. Differential diagnosis includes atelectasis, aspiration, infection.  2. Bilateral anterior rib fractures and sternal fracture likely  secondary to CPR.  3. Endotracheal tube terminates 2.1 cm above the jacques. Consider retracting 2 cm.      Mary Leger PA-C  Critical Care Cardiology  Pager: [phone]

## 2021-12-09 NOTE — PLAN OF CARE
Assessment:   Cardiac: SR. Levophed and Vasopressin titrated to keep MAP > 65. A/V ECMO, Heparin gtt infusing.  Resp: CMV 60%/16/480/5.  Neuro: Fentanyl and Versed providing adequate sedation. Unable to provide sedation vacation d/t insistent coughing (either goes extremely hypertensive or hypotensive). Video EEG ongoing.   : Gutiérrez patent, UO minimum. Ongoing CRRT, Unable to pull any fluid due to ECMO chugging.   GI: Tube feedings via OG @ 30 cc/hr, plan to increase at 0800. Rectal tube in place.  Skin:  No concerns  Endocrine: No supplemental insulin required.      Interventions:  500 ml of LR given for ECMO chugging.    0030 Dr. Block notified after midnight assessment that L foot increase mottling and cold from 2000 assessment. At that time lactic unchanged, presser requirements unchanged, L leg warm, able to doppler pulse, and reperfusion cath working properly. Lactic acid trended q 2 hours and frequent pulse checks. Will notify MD if pressor or lactic increases.     0500 Dr. Block notified increased presser requirements, didn't respond to fluid bolus. L foot now warm and less mottled. Lactic unchanged.       Plan: Will continue to monitor/assess and update MD as needed. Post-arrest Head CT today.

## 2021-12-09 NOTE — PROGRESS NOTES
"CRRT STATUS NOTE    DATA:  Time:  6:40 AM  Pressures WNL:  Yes  Filter Status:  WDL    Problems Reported/Alarms Noted: None    Supplies Present:  Yes    ASSESSMENT:  Patient Net Fluid Balance:  Since admit + 4L, since midnight + 720 ml  Vital Signs: /78   Pulse 74   Temp 98.1  F (36.7  C)   Resp 16   Ht 1.753 m (5' 9\")   Wt 70 kg (154 lb 5.2 oz)   SpO2 100%   BMI 22.79 kg/m   [place] /46(78)  Labs:  K+ 4.3, Cr 1.63, ICA 4.6, Lact 2.3, Hgb 9.3  Goals of Therapy:  I=O    INTERVENTIONS:   None    PLAN:  Continue current plan of care.  I=O as pt tolerates. Notify CRRT RN with questions and concerns #[phone].     "

## 2021-12-09 NOTE — PROGRESS NOTES
ECMO Shift Summary:      ECMO Equipment:  Console serial number: [id number]  Circuit Lot number: [id number]  Oxygenator Lot number: [id number]        Patient remains on VA ECMO, all equipment is functioning and alarms are appropriately set. RPM's 3840 with flow range 4.0-4.3 L/min. Sweep gas is at 2 LPM and FiO2 80%. Circuit remains free of air, clot and fibrin. Cannulas are secure with no bleeding from site. Extremities are cool. Suctioned ETT for small amount of secretions.    Vent settings:  Ventilation Mode: CMV/AC  (Continuous Mandatory Ventilation/ Assist Control)  FiO2 (%): 60 %  Rate Set (breaths/minute): 16 breaths/min  Tidal Volume Set (mL): 480 mL  PEEP (cm H2O): 5 cmH2O  Oxygen Concentration (%): 60 %  Resp: 16  .    Heparin is running at 400 u/hr, ACT range 165-186    Urine output is minimal, on CRRT.  No product given.       Intake/Output Summary (Last 24 hours) at 2021 0620  Last data filed at 2021 0600  Gross per 24 hour   Intake 2372.48 ml   Output 1830 ml   Net 542.48 ml         CXR:  Recent Results (from the past 24 hour(s))   Echocardiogram Limited    Narrative    [id number]  [id number]  [id number]  [id number]^VERONICA^JAY^TASIA     [facility],[place]  Echocardiography Laboratory  [address] [place] [id number]     Name: MINERVA GARAY  MRN: [id number]  : 1965  Study Date: 2021 10:46 AM  Age: 56 yrs  Gender: Male  Patient Location: [facility]  Reason For Study: MI  Ordering Physician: JAY MARTIN  Performed By: Ulices Lopez     BSA: 1.9 m2  Height: 69 in  Weight: 169 lb  HR: 89  BP: 109/38 mmHg  ______________________________________________________________________________  Procedure  Limited Portable Echo Adult. VA ECMO turndown.  ______________________________________________________________________________  Interpretation Summary     Limited VA ECMO turndown from 4.1 L/min to 1.0 L/min.  Baseline (4.1 L/min): Normal LV size and severely  reduced LV function, LVEF=5-  10%. Septum midline. Moderately to severely reduced RV function on limited  views. Septum midline. The aortic valve opens with each cardiac cycle.  With turndown to 1.0L/min, there is no appreciable change in LV/RV size,  function, or septal position.  Trivial pericardial effusion is present.  This study was compared with the study from 12/6/21 and 12/7/21 .  No significant changes noted.  ______________________________________________________________________________  Right Ventricle  The right ventricle is normal size. Global right ventricular function is  moderately to severely reduced.     Mitral Valve  Trace mitral insufficiency is present.     Aortic Valve  The aortic valve opens with each cardiac cycle.     Vessels  The inferior vena cava was normal in size with preserved respiratory  variability. Venous ECMO cannula is seen traversing the IVC.     Pericardium  Trivial pericardial effusion is present.     Compared to Previous Study  This study was compared with the study from 12/6/21 and 12/7/21 . No  significant changes noted.  ______________________________________________________________________________  Report approved by: Gina Mathias 12/08/2021 01:23 PM     ______________________________________________________________________________      XR Chest Port 1 View    Impression    RESIDENT PRELIMINARY INTERPRETATION  IMPRESSION:     1. Stable position of endotracheal tube, ECMO cannula and intra-aortic  balloon pump marker.  2. Unchanged diffuse bilateral pulmonary opacities.               Labs:  Recent Labs   Lab 12/09/21  0349 12/09/21  0348 12/09/21  0150 12/08/21  2350 12/08/21  2143   PH  --  7.33* 7.33* 7.30* 7.30*   PCO2  --  43 43 46* 44   PO2  --  136* 119* 69* 72*   HCO3  --  23 23 23 22   O2PER 60 60  80 60 60 60       Lab Results   Component Value Date    HGB 9.3 (L) 12/09/2021    PHGB <30 12/09/2021     (L) 12/09/2021    FIBR 942 (H) 12/09/2021     INR 1.33 (H) 12/09/2021    PTT 49 (H) 12/09/2021    DD 4.93 (H) 12/09/2021    ANTCH 55 (L) 12/08/2021         Plan is to do a turndown today and possibly decannulate tomorrow.      Rio Finch, RT  ECMO Specialist  12/9/2021 6:20 AM

## 2021-12-09 NOTE — PROGRESS NOTES
ECMO Shift Summary:[id number]        ECMO Equipment:  Console serial number: [id number]  Circuit Lot number: [id number]  Oxygenator Lot number: [id number]       Patient remains on VA ECMO, all equipment is functioning and alarms are appropriately set. RPM's 3364 with flow range 3.1-3.3 L/min. Sweep gas is at 1 LPM and FiO2 80%. Circuit remains free of air, clot and fibrin. Cannulas are secure with no bleeding from site. Extremities are cool. Suctioned ETT for moderate amount of white-nataliya secretions.    Significant Shift Events: Sent HIIT panel and changed to argatroban directly to ECMO circuit and to both reperfusion cannula.    Vent settings:  Ventilation Mode: CMV/AC  (Continuous Mandatory Ventilation/ Assist Control)  FiO2 (%): 60 %  Rate Set (breaths/minute): 16 breaths/min  Tidal Volume Set (mL): 480 mL  PEEP (cm H2O): 8 cmH2O  Oxygen Concentration (%): 60 %  Resp: 16  .    Heparin is not running, using argatroban and following PTTs.    Urine output is as charted, blood loss was none. Product given included none.       Intake/Output Summary (Last 24 hours) at 12/9/2021 1545  Last data filed at 12/9/2021 1515  Gross per 24 hour   Intake 2895.36 ml   Output 2368 ml   Net 527.36 ml       ECHO:  No results found for this or any previous visit.  No results found for this or any previous visit.      CXR:  Recent Results (from the past 24 hour(s))   Cardiac Catheterization    Narrative    Left SFA distal perfusion catheter insertion.   XR Chest Port 1 View    Narrative    EXAM: XR CHEST PORT 1 VIEW  12/9/2021 2:09 AM     HISTORY:  Check endotracheal tube placement and ECLS cannula  placement. DO NOT log-roll patient.  Place film under patient using  patient safety handling process.       COMPARISON:  Chest x-ray 12/8/2021, 12/7/2021, 12/6/2021    FINDINGS  Technique: Supine AP view of the chest.     Devices: Inferior approach ECMO cannula terminates over the low SVC.  Intra-aortic balloon pump marker projects over  the proximal descending  thoracic aorta. Endotracheal tube terminates over the mid thoracic  trachea. Esophageal temperature probe terminates over the proximal  esophagus. Enteric tube passes below the left hemidiaphragm.    Lungs: Unchanged diffuse bilateral interstitial and airspace  opacities. No new focal airspace opacity.  No discernible  pneumothorax.  No definite pleural effusion.     Cardiovascular: Cardiomediastinal silhouette is  stable in size.      Impression    IMPRESSION:   1. Stable position of endotracheal tube, ECMO cannula and intra-aortic  balloon pump marker.  2. Unchanged diffuse bilateral pulmonary opacities.          I have personally reviewed the examination and initial interpretation  and I agree with the findings.    PHILIP BOWER MD         SYSTEM ID:  [id number]       Labs:  Recent Labs   Lab 12/09/21  1344 12/09/21  1146 12/09/21  0932 12/09/21  0802   PH 7.41 7.41 7.40 7.38   PCO2 40 39 39 39   PO2 79* 71* 72* 70*   HCO3 25 25 24 23   O2PER 60 60 60 60       Lab Results   Component Value Date    HGB 9.0 (L) 12/09/2021    PHGB <30 12/09/2021    PLT 98 (L) 12/09/2021    FIBR 942 (H) 12/09/2021    INR 1.22 (H) 12/09/2021    PTT 47 (H) 12/09/2021    DD 4.93 (H) 12/09/2021    ANTCH 98 12/09/2021         Plan is to continue to support and wean as able.      Stephany Hargrove, RT  ECMO Specialist  12/9/2021 3:45 PM

## 2021-12-09 NOTE — PLAN OF CARE
Major Shift Events: Pt remains sedated on VA ECMO. ECMO turndown this am. Currently at 4LPM, Sweep 1 and 60% with no chugging noted. IABP remains 1:1, 100% augmentation. Left leg becoming more cold/blotchy/dusky as day progressed, but pulses still present with doppler. Vascular surg consulted and saw pt. Pt went to cath lab at 1645 to get distal reperfusion catheter placed- back to 4E at 1830. CRRT restarted with goal I=O. TF started through OG.   Plan: Possible turndown again tomorrow. Remove fluid with CRRT as able. Brother and niece updated today via phone by myself and JAVI Garcia.   For vital signs and complete assessments, please see documentation flowsheets.

## 2021-12-10 ENCOUNTER — APPOINTMENT (OUTPATIENT)
Dept: GENERAL RADIOLOGY | Facility: CLINIC | Age: 56
End: 2021-12-10
Attending: NURSE PRACTITIONER
Payer: COMMERCIAL

## 2021-12-10 ENCOUNTER — APPOINTMENT (OUTPATIENT)
Dept: NEUROLOGY | Facility: CLINIC | Age: 56
End: 2021-12-10
Attending: NURSE PRACTITIONER
Payer: COMMERCIAL

## 2021-12-10 ENCOUNTER — APPOINTMENT (OUTPATIENT)
Dept: CARDIOLOGY | Facility: CLINIC | Age: 56
End: 2021-12-10
Attending: PHYSICIAN ASSISTANT
Payer: COMMERCIAL

## 2021-12-10 LAB
ACT BLD: 152 SECONDS (ref 74–150)
ACT BLD: 165 SECONDS (ref 74–150)
ACT BLD: 165 SECONDS (ref 74–150)
ACT BLD: 182 SECONDS (ref 74–150)
ALBUMIN SERPL-MCNC: 1.8 G/DL (ref 3.4–5)
ALBUMIN SERPL-MCNC: 1.9 G/DL (ref 3.4–5)
ALP SERPL-CCNC: 50 U/L (ref 40–150)
ALP SERPL-CCNC: 59 U/L (ref 40–150)
ALP SERPL-CCNC: 69 U/L (ref 40–150)
ALP SERPL-CCNC: 71 U/L (ref 40–150)
ALT SERPL W P-5'-P-CCNC: 74 U/L (ref 0–70)
ALT SERPL W P-5'-P-CCNC: 76 U/L (ref 0–70)
ANION GAP SERPL CALCULATED.3IONS-SCNC: 4 MMOL/L (ref 3–14)
ANION GAP SERPL CALCULATED.3IONS-SCNC: 5 MMOL/L (ref 3–14)
ANION GAP SERPL CALCULATED.3IONS-SCNC: 5 MMOL/L (ref 3–14)
ANION GAP SERPL CALCULATED.3IONS-SCNC: 7 MMOL/L (ref 3–14)
APTT PPP: 39 SECONDS (ref 22–38)
APTT PPP: 53 SECONDS (ref 22–38)
APTT PPP: 54 SECONDS (ref 22–38)
APTT PPP: 56 SECONDS (ref 22–38)
AST SERPL W P-5'-P-CCNC: 107 U/L (ref 0–45)
AST SERPL W P-5'-P-CCNC: 113 U/L (ref 0–45)
AST SERPL W P-5'-P-CCNC: 114 U/L (ref 0–45)
AST SERPL W P-5'-P-CCNC: 121 U/L (ref 0–45)
AT III ACT/NOR PPP CHRO: 57 % (ref 85–135)
ATRIAL RATE - MUSE: 90 BPM
BACTERIA SPT CULT: ABNORMAL
BASE EXCESS BLDA CALC-SCNC: -0.3 MMOL/L (ref -9–1.8)
BASE EXCESS BLDA CALC-SCNC: -0.4 MMOL/L (ref -9–1.8)
BASE EXCESS BLDA CALC-SCNC: -0.7 MMOL/L (ref -9–1.8)
BASE EXCESS BLDA CALC-SCNC: 0 MMOL/L (ref -9–1.8)
BASE EXCESS BLDA CALC-SCNC: 0.1 MMOL/L (ref -9–1.8)
BASE EXCESS BLDA CALC-SCNC: 0.2 MMOL/L (ref -9–1.8)
BASE EXCESS BLDA CALC-SCNC: 0.5 MMOL/L (ref -9–1.8)
BASE EXCESS BLDA CALC-SCNC: 0.6 MMOL/L (ref -9–1.8)
BASE EXCESS BLDA CALC-SCNC: 0.7 MMOL/L (ref -9–1.8)
BASE EXCESS BLDA CALC-SCNC: 0.8 MMOL/L (ref -9–1.8)
BASE EXCESS BLDA CALC-SCNC: 0.9 MMOL/L (ref -9–1.8)
BASE EXCESS BLDA CALC-SCNC: 1.2 MMOL/L (ref -9–1.8)
BASE EXCESS BLDA CALC-SCNC: 1.4 MMOL/L (ref -9–1.8)
BASE EXCESS BLDV CALC-SCNC: 0.4 MMOL/L (ref -7.7–1.9)
BASE EXCESS BLDV CALC-SCNC: 0.8 MMOL/L (ref -7.7–1.9)
BILIRUB DIRECT SERPL-MCNC: 0.2 MG/DL (ref 0–0.2)
BILIRUB SERPL-MCNC: 0.4 MG/DL (ref 0.2–1.3)
BILIRUB SERPL-MCNC: 0.4 MG/DL (ref 0.2–1.3)
BILIRUB SERPL-MCNC: 0.5 MG/DL (ref 0.2–1.3)
BILIRUB SERPL-MCNC: 0.5 MG/DL (ref 0.2–1.3)
BUN SERPL-MCNC: 44 MG/DL (ref 7–30)
BUN SERPL-MCNC: 44 MG/DL (ref 7–30)
BUN SERPL-MCNC: 45 MG/DL (ref 7–30)
BUN SERPL-MCNC: 45 MG/DL (ref 7–30)
BUN SERPL-MCNC: 47 MG/DL (ref 7–30)
BUN SERPL-MCNC: 47 MG/DL (ref 7–30)
CA-I BLD-MCNC: 4.4 MG/DL (ref 4.4–5.2)
CA-I BLD-MCNC: 4.4 MG/DL (ref 4.4–5.2)
CA-I BLD-MCNC: 4.5 MG/DL (ref 4.4–5.2)
CA-I BLD-MCNC: 4.5 MG/DL (ref 4.4–5.2)
CA-I BLD-MCNC: 4.7 MG/DL (ref 4.4–5.2)
CALCIUM SERPL-MCNC: 8 MG/DL (ref 8.5–10.1)
CALCIUM SERPL-MCNC: 8.1 MG/DL (ref 8.5–10.1)
CALCIUM SERPL-MCNC: 8.1 MG/DL (ref 8.5–10.1)
CALCIUM SERPL-MCNC: 8.2 MG/DL (ref 8.5–10.1)
CALCIUM SERPL-MCNC: 8.2 MG/DL (ref 8.5–10.1)
CALCIUM SERPL-MCNC: 8.5 MG/DL (ref 8.5–10.1)
CHLORIDE BLD-SCNC: 108 MMOL/L (ref 94–109)
CHLORIDE BLD-SCNC: 108 MMOL/L (ref 94–109)
CHLORIDE BLD-SCNC: 109 MMOL/L (ref 94–109)
CHLORIDE BLD-SCNC: 110 MMOL/L (ref 94–109)
CO2 SERPL-SCNC: 25 MMOL/L (ref 20–32)
CO2 SERPL-SCNC: 26 MMOL/L (ref 20–32)
CREAT SERPL-MCNC: 1.38 MG/DL (ref 0.66–1.25)
CREAT SERPL-MCNC: 1.38 MG/DL (ref 0.66–1.25)
CREAT SERPL-MCNC: 1.43 MG/DL (ref 0.66–1.25)
CREAT SERPL-MCNC: 1.48 MG/DL (ref 0.66–1.25)
CREAT SERPL-MCNC: 1.48 MG/DL (ref 0.66–1.25)
CREAT SERPL-MCNC: 1.58 MG/DL (ref 0.66–1.25)
CRP SERPL-MCNC: 350 MG/L (ref 0–8)
D DIMER PPP FEU-MCNC: <0.27 UG/ML FEU (ref 0–0.5)
D DIMER PPP FEU-MCNC: <0.27 UG/ML FEU (ref 0–0.5)
DIASTOLIC BLOOD PRESSURE - MUSE: NORMAL MMHG
ERYTHROCYTE [DISTWIDTH] IN BLOOD BY AUTOMATED COUNT: 13.7 % (ref 10–15)
ERYTHROCYTE [DISTWIDTH] IN BLOOD BY AUTOMATED COUNT: 13.7 % (ref 10–15)
ERYTHROCYTE [DISTWIDTH] IN BLOOD BY AUTOMATED COUNT: 13.9 % (ref 10–15)
ERYTHROCYTE [DISTWIDTH] IN BLOOD BY AUTOMATED COUNT: 13.9 % (ref 10–15)
ERYTHROCYTE [SEDIMENTATION RATE] IN BLOOD BY WESTERGREN METHOD: 116 MM/HR (ref 0–20)
FIBRINOGEN PPP-MCNC: 897 MG/DL (ref 170–490)
FIBRINOGEN PPP-MCNC: 985 MG/DL (ref 170–490)
GFR SERPL CREATININE-BSD FRML MDRD: 48 ML/MIN/1.73M2
GFR SERPL CREATININE-BSD FRML MDRD: 52 ML/MIN/1.73M2
GFR SERPL CREATININE-BSD FRML MDRD: 52 ML/MIN/1.73M2
GFR SERPL CREATININE-BSD FRML MDRD: 54 ML/MIN/1.73M2
GFR SERPL CREATININE-BSD FRML MDRD: 57 ML/MIN/1.73M2
GFR SERPL CREATININE-BSD FRML MDRD: 57 ML/MIN/1.73M2
GLUCOSE BLD-MCNC: 110 MG/DL (ref 70–99)
GLUCOSE BLD-MCNC: 114 MG/DL (ref 70–99)
GLUCOSE BLD-MCNC: 114 MG/DL (ref 70–99)
GLUCOSE BLD-MCNC: 127 MG/DL (ref 70–99)
GLUCOSE BLD-MCNC: 134 MG/DL (ref 70–99)
GLUCOSE BLD-MCNC: 134 MG/DL (ref 70–99)
GLUCOSE BLD-MCNC: 143 MG/DL (ref 70–99)
GLUCOSE BLD-MCNC: 151 MG/DL (ref 70–99)
GLUCOSE BLD-MCNC: 157 MG/DL (ref 70–99)
GLUCOSE BLD-MCNC: 165 MG/DL (ref 70–99)
GLUCOSE BLDC GLUCOMTR-MCNC: 109 MG/DL (ref 70–99)
GLUCOSE BLDC GLUCOMTR-MCNC: 117 MG/DL (ref 70–99)
GLUCOSE BLDC GLUCOMTR-MCNC: 134 MG/DL (ref 70–99)
GLUCOSE BLDC GLUCOMTR-MCNC: 137 MG/DL (ref 70–99)
GLUCOSE BLDC GLUCOMTR-MCNC: 142 MG/DL (ref 70–99)
GLUCOSE BLDC GLUCOMTR-MCNC: 144 MG/DL (ref 70–99)
GLUCOSE BLDC GLUCOMTR-MCNC: 144 MG/DL (ref 70–99)
GLUCOSE BLDC GLUCOMTR-MCNC: 147 MG/DL (ref 70–99)
GRAM STAIN RESULT: ABNORMAL
GRAM STAIN RESULT: NORMAL
GRAM STAIN RESULT: NORMAL
HCO3 BLD-SCNC: 25 MMOL/L (ref 21–28)
HCO3 BLD-SCNC: 25 MMOL/L (ref 21–28)
HCO3 BLD-SCNC: 26 MMOL/L (ref 21–28)
HCO3 BLDA-SCNC: 26 MMOL/L (ref 21–28)
HCO3 BLDA-SCNC: 27 MMOL/L (ref 21–28)
HCO3 BLDV-SCNC: 28 MMOL/L (ref 21–28)
HCO3 BLDV-SCNC: 28 MMOL/L (ref 21–28)
HCT VFR BLD AUTO: 25.3 % (ref 40–53)
HCT VFR BLD AUTO: 25.5 % (ref 40–53)
HCT VFR BLD AUTO: 26.5 % (ref 40–53)
HCT VFR BLD AUTO: 26.7 % (ref 40–53)
HGB BLD-MCNC: 8.5 G/DL (ref 13.3–17.7)
HGB BLD-MCNC: 8.5 G/DL (ref 13.3–17.7)
HGB BLD-MCNC: 8.6 G/DL (ref 13.3–17.7)
HGB BLD-MCNC: 8.8 G/DL (ref 13.3–17.7)
HGB FREE PLAS-MCNC: <30 MG/DL
INR PPP: 1.04 (ref 0.85–1.15)
INR PPP: 1.1 (ref 0.85–1.15)
INR PPP: 1.14 (ref 0.85–1.15)
INR PPP: 1.16 (ref 0.85–1.15)
INTERPRETATION ECG - MUSE: NORMAL
KOH PREPARATION: NORMAL
KOH PREPARATION: NORMAL
LACTATE SERPL-SCNC: 0.6 MMOL/L (ref 0.7–2)
LACTATE SERPL-SCNC: 0.7 MMOL/L (ref 0.7–2)
LACTATE SERPL-SCNC: 0.8 MMOL/L (ref 0.7–2)
LACTATE SERPL-SCNC: 1.3 MMOL/L (ref 0.7–2)
LDH SERPL L TO P-CCNC: 810 U/L (ref 85–227)
MAGNESIUM SERPL-MCNC: 2.6 MG/DL (ref 1.6–2.3)
MAGNESIUM SERPL-MCNC: 2.6 MG/DL (ref 1.6–2.3)
MAGNESIUM SERPL-MCNC: 2.7 MG/DL (ref 1.6–2.3)
MAGNESIUM SERPL-MCNC: 2.8 MG/DL (ref 1.6–2.3)
MCH RBC QN AUTO: 31.7 PG (ref 26.5–33)
MCH RBC QN AUTO: 31.8 PG (ref 26.5–33)
MCH RBC QN AUTO: 32 PG (ref 26.5–33)
MCH RBC QN AUTO: 32.2 PG (ref 26.5–33)
MCHC RBC AUTO-ENTMCNC: 32.5 G/DL (ref 31.5–36.5)
MCHC RBC AUTO-ENTMCNC: 33 G/DL (ref 31.5–36.5)
MCHC RBC AUTO-ENTMCNC: 33.3 G/DL (ref 31.5–36.5)
MCHC RBC AUTO-ENTMCNC: 33.6 G/DL (ref 31.5–36.5)
MCV RBC AUTO: 96 FL (ref 78–100)
MCV RBC AUTO: 96 FL (ref 78–100)
MCV RBC AUTO: 97 FL (ref 78–100)
MCV RBC AUTO: 98 FL (ref 78–100)
O2/TOTAL GAS SETTING VFR VENT: 60 %
O2/TOTAL GAS SETTING VFR VENT: 80 %
OXYHGB MFR BLD: 88 % (ref 92–100)
OXYHGB MFR BLD: 90 % (ref 92–100)
OXYHGB MFR BLD: 91 % (ref 92–100)
OXYHGB MFR BLD: 93 % (ref 92–100)
OXYHGB MFR BLD: 93 % (ref 92–100)
OXYHGB MFR BLD: 94 % (ref 92–100)
OXYHGB MFR BLD: 95 % (ref 92–100)
OXYHGB MFR BLD: 96 % (ref 92–100)
OXYHGB MFR BLDA: 99 % (ref 75–100)
OXYHGB MFR BLDA: 99 % (ref 75–100)
OXYHGB MFR BLDV: 65 %
OXYHGB MFR BLDV: 74 %
P AXIS - MUSE: 61 DEGREES
PCO2 BLD: 41 MM HG (ref 35–45)
PCO2 BLD: 41 MM HG (ref 35–45)
PCO2 BLD: 42 MM HG (ref 35–45)
PCO2 BLD: 42 MM HG (ref 35–45)
PCO2 BLD: 43 MM HG (ref 35–45)
PCO2 BLD: 44 MM HG (ref 35–45)
PCO2 BLD: 45 MM HG (ref 35–45)
PCO2 BLD: 45 MM HG (ref 35–45)
PCO2 BLDA: 50 MM HG (ref 35–45)
PCO2 BLDA: 57 MM HG (ref 35–45)
PCO2 BLDV: 57 MM HG (ref 40–50)
PCO2 BLDV: 62 MM HG (ref 40–50)
PF4 HEPARIN CMPLX AB SER QL: NEGATIVE
PH BLD: 7.36 [PH] (ref 7.35–7.45)
PH BLD: 7.37 [PH] (ref 7.35–7.45)
PH BLD: 7.37 [PH] (ref 7.35–7.45)
PH BLD: 7.38 [PH] (ref 7.35–7.45)
PH BLD: 7.39 [PH] (ref 7.35–7.45)
PH BLD: 7.4 [PH] (ref 7.35–7.45)
PH BLD: 7.4 [PH] (ref 7.35–7.45)
PH BLD: 7.41 [PH] (ref 7.35–7.45)
PH BLDA: 7.28 [PH] (ref 7.35–7.45)
PH BLDA: 7.32 [PH] (ref 7.35–7.45)
PH BLDV: 7.27 [PH] (ref 7.32–7.43)
PH BLDV: 7.3 [PH] (ref 7.32–7.43)
PHOSPHATE SERPL-MCNC: 4.2 MG/DL (ref 2.5–4.5)
PHOSPHATE SERPL-MCNC: 4.2 MG/DL (ref 2.5–4.5)
PHOSPHATE SERPL-MCNC: 4.3 MG/DL (ref 2.5–4.5)
PLATELET # BLD AUTO: 85 10E3/UL (ref 150–450)
PLATELET # BLD AUTO: 86 10E3/UL (ref 150–450)
PLATELET # BLD AUTO: 87 10E3/UL (ref 150–450)
PLATELET # BLD AUTO: 90 10E3/UL (ref 150–450)
PO2 BLD: 55 MM HG (ref 80–105)
PO2 BLD: 59 MM HG (ref 80–105)
PO2 BLD: 60 MM HG (ref 80–105)
PO2 BLD: 65 MM HG (ref 80–105)
PO2 BLD: 67 MM HG (ref 80–105)
PO2 BLD: 69 MM HG (ref 80–105)
PO2 BLD: 70 MM HG (ref 80–105)
PO2 BLD: 74 MM HG (ref 80–105)
PO2 BLD: 74 MM HG (ref 80–105)
PO2 BLD: 79 MM HG (ref 80–105)
PO2 BLD: 86 MM HG (ref 80–105)
PO2 BLDA: 277 MM HG (ref 80–105)
PO2 BLDA: 297 MM HG (ref 80–105)
PO2 BLDV: 38 MM HG (ref 25–47)
PO2 BLDV: 41 MM HG (ref 25–47)
POTASSIUM BLD-SCNC: 3.6 MMOL/L (ref 3.4–5.3)
POTASSIUM BLD-SCNC: 3.7 MMOL/L (ref 3.4–5.3)
POTASSIUM BLD-SCNC: 3.7 MMOL/L (ref 3.4–5.3)
POTASSIUM BLD-SCNC: 3.8 MMOL/L (ref 3.4–5.3)
PR INTERVAL - MUSE: 174 MS
PROT SERPL-MCNC: 6.1 G/DL (ref 6.8–8.8)
PROT SERPL-MCNC: 6.1 G/DL (ref 6.8–8.8)
PROT SERPL-MCNC: 6.5 G/DL (ref 6.8–8.8)
PROT SERPL-MCNC: 6.6 G/DL (ref 6.8–8.8)
QRS DURATION - MUSE: 102 MS
QT - MUSE: 354 MS
QTC - MUSE: 433 MS
R AXIS - MUSE: 57 DEGREES
RBC # BLD AUTO: 2.64 10E6/UL (ref 4.4–5.9)
RBC # BLD AUTO: 2.67 10E6/UL (ref 4.4–5.9)
RBC # BLD AUTO: 2.71 10E6/UL (ref 4.4–5.9)
RBC # BLD AUTO: 2.75 10E6/UL (ref 4.4–5.9)
SODIUM SERPL-SCNC: 140 MMOL/L (ref 133–144)
SODIUM SERPL-SCNC: 141 MMOL/L (ref 133–144)
SYSTOLIC BLOOD PRESSURE - MUSE: NORMAL MMHG
T AXIS - MUSE: 229 DEGREES
TROPONIN I SERPL HS-MCNC: 8438 NG/L
TROPONIN I SERPL HS-MCNC: 9072 NG/L
TROPONIN I SERPL HS-MCNC: 9703 NG/L
TROPONIN I SERPL HS-MCNC: ABNORMAL NG/L
UFH PPP CHRO-ACNC: <0.1 IU/ML
VENTRICULAR RATE- MUSE: 90 BPM
WBC # BLD AUTO: 17.4 10E3/UL (ref 4–11)
WBC # BLD AUTO: 19.4 10E3/UL (ref 4–11)
WBC # BLD AUTO: 19.6 10E3/UL (ref 4–11)
WBC # BLD AUTO: 20.1 10E3/UL (ref 4–11)

## 2021-12-10 PROCEDURE — 250N000009 HC RX 250

## 2021-12-10 PROCEDURE — 93325 DOPPLER ECHO COLOR FLOW MAPG: CPT | Mod: 26 | Performed by: INTERNAL MEDICINE

## 2021-12-10 PROCEDURE — 999N000157 HC STATISTIC RCP TIME EA 10 MIN

## 2021-12-10 PROCEDURE — 93325 DOPPLER ECHO COLOR FLOW MAPG: CPT

## 2021-12-10 PROCEDURE — 80053 COMPREHEN METABOLIC PANEL: CPT | Performed by: NURSE PRACTITIONER

## 2021-12-10 PROCEDURE — 85730 THROMBOPLASTIN TIME PARTIAL: CPT | Performed by: NURSE PRACTITIONER

## 2021-12-10 PROCEDURE — 85027 COMPLETE CBC AUTOMATED: CPT | Performed by: NURSE PRACTITIONER

## 2021-12-10 PROCEDURE — 99233 SBSQ HOSP IP/OBS HIGH 50: CPT | Performed by: INTERNAL MEDICINE

## 2021-12-10 PROCEDURE — 250N000013 HC RX MED GY IP 250 OP 250 PS 637: Performed by: STUDENT IN AN ORGANIZED HEALTH CARE EDUCATION/TRAINING PROGRAM

## 2021-12-10 PROCEDURE — 90947 DIALYSIS REPEATED EVAL: CPT

## 2021-12-10 PROCEDURE — 250N000012 HC RX MED GY IP 250 OP 636 PS 637: Performed by: PHYSICIAN ASSISTANT

## 2021-12-10 PROCEDURE — 82803 BLOOD GASES ANY COMBINATION: CPT | Performed by: NURSE PRACTITIONER

## 2021-12-10 PROCEDURE — 85652 RBC SED RATE AUTOMATED: CPT | Performed by: INTERNAL MEDICINE

## 2021-12-10 PROCEDURE — 93308 TTE F-UP OR LMTD: CPT | Mod: 26 | Performed by: INTERNAL MEDICINE

## 2021-12-10 PROCEDURE — 999N000075 HC STATISTIC IABP MONITORING

## 2021-12-10 PROCEDURE — 82248 BILIRUBIN DIRECT: CPT | Performed by: CLINICAL NURSE SPECIALIST

## 2021-12-10 PROCEDURE — 84100 ASSAY OF PHOSPHORUS: CPT | Performed by: CLINICAL NURSE SPECIALIST

## 2021-12-10 PROCEDURE — 86140 C-REACTIVE PROTEIN: CPT | Performed by: NURSE PRACTITIONER

## 2021-12-10 PROCEDURE — 33949 ECMO/ECLS DAILY MGMT ARTERY: CPT | Performed by: INTERNAL MEDICINE

## 2021-12-10 PROCEDURE — 83051 HEMOGLOBIN PLASMA: CPT | Performed by: NURSE PRACTITIONER

## 2021-12-10 PROCEDURE — 82330 ASSAY OF CALCIUM: CPT | Performed by: CLINICAL NURSE SPECIALIST

## 2021-12-10 PROCEDURE — 87077 CULTURE AEROBIC IDENTIFY: CPT | Performed by: INTERNAL MEDICINE

## 2021-12-10 PROCEDURE — 85347 COAGULATION TIME ACTIVATED: CPT

## 2021-12-10 PROCEDURE — 93321 DOPPLER ECHO F-UP/LMTD STD: CPT | Mod: 26 | Performed by: INTERNAL MEDICINE

## 2021-12-10 PROCEDURE — 83735 ASSAY OF MAGNESIUM: CPT | Performed by: NURSE PRACTITIONER

## 2021-12-10 PROCEDURE — 999N000015 HC STATISTIC ARTERIAL MONITORING DAILY

## 2021-12-10 PROCEDURE — 87205 SMEAR GRAM STAIN: CPT | Performed by: INTERNAL MEDICINE

## 2021-12-10 PROCEDURE — 82805 BLOOD GASES W/O2 SATURATION: CPT | Performed by: PHYSICIAN ASSISTANT

## 2021-12-10 PROCEDURE — 71045 X-RAY EXAM CHEST 1 VIEW: CPT

## 2021-12-10 PROCEDURE — 85027 COMPLETE CBC AUTOMATED: CPT | Performed by: CLINICAL NURSE SPECIALIST

## 2021-12-10 PROCEDURE — 94640 AIRWAY INHALATION TREATMENT: CPT | Mod: 76

## 2021-12-10 PROCEDURE — 83605 ASSAY OF LACTIC ACID: CPT | Performed by: INTERNAL MEDICINE

## 2021-12-10 PROCEDURE — 93010 ELECTROCARDIOGRAM REPORT: CPT | Performed by: INTERNAL MEDICINE

## 2021-12-10 PROCEDURE — 85384 FIBRINOGEN ACTIVITY: CPT | Performed by: NURSE PRACTITIONER

## 2021-12-10 PROCEDURE — 31624 DX BRONCHOSCOPE/LAVAGE: CPT | Mod: 59 | Performed by: INTERNAL MEDICINE

## 2021-12-10 PROCEDURE — 31624 DX BRONCHOSCOPE/LAVAGE: CPT

## 2021-12-10 PROCEDURE — 85610 PROTHROMBIN TIME: CPT | Performed by: NURSE PRACTITIONER

## 2021-12-10 PROCEDURE — 250N000009 HC RX 250: Performed by: PHYSICIAN ASSISTANT

## 2021-12-10 PROCEDURE — 84155 ASSAY OF PROTEIN SERUM: CPT | Performed by: NURSE PRACTITIONER

## 2021-12-10 PROCEDURE — 250N000011 HC RX IP 250 OP 636: Performed by: PHYSICIAN ASSISTANT

## 2021-12-10 PROCEDURE — 87210 SMEAR WET MOUNT SALINE/INK: CPT | Performed by: INTERNAL MEDICINE

## 2021-12-10 PROCEDURE — 93005 ELECTROCARDIOGRAM TRACING: CPT

## 2021-12-10 PROCEDURE — 95714 VEEG EA 12-26 HR UNMNTR: CPT

## 2021-12-10 PROCEDURE — 85520 HEPARIN ASSAY: CPT | Performed by: NURSE PRACTITIONER

## 2021-12-10 PROCEDURE — 250N000013 HC RX MED GY IP 250 OP 250 PS 637: Performed by: PHYSICIAN ASSISTANT

## 2021-12-10 PROCEDURE — 82947 ASSAY GLUCOSE BLOOD QUANT: CPT | Performed by: NURSE PRACTITIONER

## 2021-12-10 PROCEDURE — 250N000011 HC RX IP 250 OP 636: Performed by: STUDENT IN AN ORGANIZED HEALTH CARE EDUCATION/TRAINING PROGRAM

## 2021-12-10 PROCEDURE — 250N000013 HC RX MED GY IP 250 OP 250 PS 637: Performed by: INTERNAL MEDICINE

## 2021-12-10 PROCEDURE — 250N000009 HC RX 250: Performed by: CLINICAL NURSE SPECIALIST

## 2021-12-10 PROCEDURE — 250N000011 HC RX IP 250 OP 636: Performed by: NURSE PRACTITIONER

## 2021-12-10 PROCEDURE — 94640 AIRWAY INHALATION TREATMENT: CPT

## 2021-12-10 PROCEDURE — 71045 X-RAY EXAM CHEST 1 VIEW: CPT | Mod: 26 | Performed by: RADIOLOGY

## 2021-12-10 PROCEDURE — 0B9F8ZX DRAINAGE OF RIGHT LOWER LUNG LOBE, VIA NATURAL OR ARTIFICIAL OPENING ENDOSCOPIC, DIAGNOSTIC: ICD-10-PCS | Performed by: INTERNAL MEDICINE

## 2021-12-10 PROCEDURE — 85300 ANTITHROMBIN III ACTIVITY: CPT | Performed by: NURSE PRACTITIONER

## 2021-12-10 PROCEDURE — 84484 ASSAY OF TROPONIN QUANT: CPT | Performed by: NURSE PRACTITIONER

## 2021-12-10 PROCEDURE — 82805 BLOOD GASES W/O2 SATURATION: CPT | Performed by: NURSE PRACTITIONER

## 2021-12-10 PROCEDURE — 85379 FIBRIN DEGRADATION QUANT: CPT | Performed by: NURSE PRACTITIONER

## 2021-12-10 PROCEDURE — 95720 EEG PHY/QHP EA INCR W/VEEG: CPT | Performed by: PSYCHIATRY & NEUROLOGY

## 2021-12-10 PROCEDURE — 258N000003 HC RX IP 258 OP 636: Performed by: PHYSICIAN ASSISTANT

## 2021-12-10 PROCEDURE — 82330 ASSAY OF CALCIUM: CPT | Performed by: NURSE PRACTITIONER

## 2021-12-10 PROCEDURE — 99291 CRITICAL CARE FIRST HOUR: CPT | Mod: 25 | Performed by: INTERNAL MEDICINE

## 2021-12-10 PROCEDURE — 250N000013 HC RX MED GY IP 250 OP 250 PS 637: Performed by: NURSE PRACTITIONER

## 2021-12-10 PROCEDURE — 33949 ECMO/ECLS DAILY MGMT ARTERY: CPT

## 2021-12-10 PROCEDURE — 83615 LACTATE (LD) (LDH) ENZYME: CPT | Performed by: NURSE PRACTITIONER

## 2021-12-10 PROCEDURE — 200N000002 HC R&B ICU UMMC

## 2021-12-10 PROCEDURE — 94003 VENT MGMT INPAT SUBQ DAY: CPT

## 2021-12-10 RX ORDER — HEPARIN SODIUM 200 [USP'U]/100ML
3 INJECTION, SOLUTION INTRAVENOUS CONTINUOUS
Status: DISCONTINUED | OUTPATIENT
Start: 2021-12-10 | End: 2021-12-13

## 2021-12-10 RX ORDER — QUETIAPINE FUMARATE 25 MG/1
25 TABLET, FILM COATED ORAL AT BEDTIME
Status: DISCONTINUED | OUTPATIENT
Start: 2021-12-10 | End: 2021-12-11

## 2021-12-10 RX ORDER — FENTANYL CITRATE 50 UG/ML
50 INJECTION, SOLUTION INTRAMUSCULAR; INTRAVENOUS ONCE
Status: COMPLETED | OUTPATIENT
Start: 2021-12-10 | End: 2021-12-10

## 2021-12-10 RX ORDER — HEPARIN SODIUM 10000 [USP'U]/100ML
10-50 INJECTION, SOLUTION INTRAVENOUS CONTINUOUS
Status: DISCONTINUED | OUTPATIENT
Start: 2021-12-10 | End: 2021-12-13

## 2021-12-10 RX ORDER — FENTANYL CITRATE 50 UG/ML
25-50 INJECTION, SOLUTION INTRAMUSCULAR; INTRAVENOUS
Status: DISCONTINUED | OUTPATIENT
Start: 2021-12-10 | End: 2021-12-11

## 2021-12-10 RX ORDER — LIDOCAINE HYDROCHLORIDE 10 MG/ML
INJECTION, SOLUTION EPIDURAL; INFILTRATION; INTRACAUDAL; PERINEURAL
Status: COMPLETED
Start: 2021-12-10 | End: 2021-12-10

## 2021-12-10 RX ADMIN — Medication 50 MCG: at 16:30

## 2021-12-10 RX ADMIN — CALCIUM CHLORIDE, MAGNESIUM CHLORIDE, SODIUM CHLORIDE, SODIUM BICARBONATE, POTASSIUM CHLORIDE AND SODIUM PHOSPHATE DIBASIC DIHYDRATE 12.5 ML/KG/HR: 3.68; 3.05; 6.34; 3.09; .314; .187 INJECTION INTRAVENOUS at 00:55

## 2021-12-10 RX ADMIN — CALCIUM CHLORIDE, MAGNESIUM CHLORIDE, SODIUM CHLORIDE, SODIUM BICARBONATE, POTASSIUM CHLORIDE AND SODIUM PHOSPHATE DIBASIC DIHYDRATE 12.5 ML/KG/HR: 3.68; 3.05; 6.34; 3.09; .314; .187 INJECTION INTRAVENOUS at 14:29

## 2021-12-10 RX ADMIN — INSULIN ASPART 1 UNITS: 100 INJECTION, SOLUTION INTRAVENOUS; SUBCUTANEOUS at 11:54

## 2021-12-10 RX ADMIN — INSULIN ASPART 1 UNITS: 100 INJECTION, SOLUTION INTRAVENOUS; SUBCUTANEOUS at 17:24

## 2021-12-10 RX ADMIN — CALCIUM CHLORIDE, MAGNESIUM CHLORIDE, SODIUM CHLORIDE, SODIUM BICARBONATE, POTASSIUM CHLORIDE AND SODIUM PHOSPHATE DIBASIC DIHYDRATE 12.5 ML/KG/HR: 3.68; 3.05; 6.34; 3.09; .314; .187 INJECTION INTRAVENOUS at 09:18

## 2021-12-10 RX ADMIN — TICAGRELOR 90 MG: 90 TABLET ORAL at 19:59

## 2021-12-10 RX ADMIN — Medication 50 MCG: at 16:15

## 2021-12-10 RX ADMIN — IPRATROPIUM BROMIDE AND ALBUTEROL SULFATE 3 ML: 2.5; .5 SOLUTION RESPIRATORY (INHALATION) at 12:30

## 2021-12-10 RX ADMIN — CHLORHEXIDINE GLUCONATE 0.12% ORAL RINSE 15 ML: 1.2 LIQUID ORAL at 20:52

## 2021-12-10 RX ADMIN — MIDAZOLAM 4 MG: 1 INJECTION INTRAMUSCULAR; INTRAVENOUS at 16:15

## 2021-12-10 RX ADMIN — CALCIUM CHLORIDE, MAGNESIUM CHLORIDE, SODIUM CHLORIDE, SODIUM BICARBONATE, POTASSIUM CHLORIDE AND SODIUM PHOSPHATE DIBASIC DIHYDRATE 12.5 ML/KG/HR: 3.68; 3.05; 6.34; 3.09; .314; .187 INJECTION INTRAVENOUS at 03:31

## 2021-12-10 RX ADMIN — HEPARIN SODIUM 710 UNITS/HR: 1000 INJECTION INTRAVENOUS; SUBCUTANEOUS at 16:27

## 2021-12-10 RX ADMIN — Medication 50 MCG/HR: at 20:35

## 2021-12-10 RX ADMIN — CALCIUM CHLORIDE, MAGNESIUM CHLORIDE, SODIUM CHLORIDE, SODIUM BICARBONATE, POTASSIUM CHLORIDE AND SODIUM PHOSPHATE DIBASIC DIHYDRATE: 3.68; 3.05; 6.34; 3.09; .314; .187 INJECTION INTRAVENOUS at 03:31

## 2021-12-10 RX ADMIN — LIDOCAINE HYDROCHLORIDE 1 ML: 10 INJECTION, SOLUTION EPIDURAL; INFILTRATION; INTRACAUDAL; PERINEURAL at 16:34

## 2021-12-10 RX ADMIN — Medication 2 PACKET: at 07:37

## 2021-12-10 RX ADMIN — FENTANYL CITRATE 50 MCG: 50 INJECTION INTRAMUSCULAR; INTRAVENOUS at 18:43

## 2021-12-10 RX ADMIN — FENTANYL CITRATE 50 MCG: 50 INJECTION INTRAMUSCULAR; INTRAVENOUS at 19:51

## 2021-12-10 RX ADMIN — ASPIRIN 81 MG CHEWABLE TABLET 81 MG: 81 TABLET CHEWABLE at 07:52

## 2021-12-10 RX ADMIN — TICAGRELOR 90 MG: 90 TABLET ORAL at 07:30

## 2021-12-10 RX ADMIN — IPRATROPIUM BROMIDE AND ALBUTEROL SULFATE 3 ML: 2.5; .5 SOLUTION RESPIRATORY (INHALATION) at 15:31

## 2021-12-10 RX ADMIN — HEPARIN SODIUM 3 ML/HR: 200 INJECTION, SOLUTION INTRAVENOUS at 16:34

## 2021-12-10 RX ADMIN — Medication 2 PACKET: at 17:24

## 2021-12-10 RX ADMIN — INSULIN ASPART 1 UNITS: 100 INJECTION, SOLUTION INTRAVENOUS; SUBCUTANEOUS at 07:49

## 2021-12-10 RX ADMIN — CALCIUM CHLORIDE, MAGNESIUM CHLORIDE, SODIUM CHLORIDE, SODIUM BICARBONATE, POTASSIUM CHLORIDE AND SODIUM PHOSPHATE DIBASIC DIHYDRATE 12.5 ML/KG/HR: 3.68; 3.05; 6.34; 3.09; .314; .187 INJECTION INTRAVENOUS at 20:21

## 2021-12-10 RX ADMIN — HEPARIN SODIUM 3 ML/HR: 200 INJECTION, SOLUTION INTRAVENOUS at 16:33

## 2021-12-10 RX ADMIN — IPRATROPIUM BROMIDE AND ALBUTEROL SULFATE 3 ML: 2.5; .5 SOLUTION RESPIRATORY (INHALATION) at 07:50

## 2021-12-10 RX ADMIN — THIAMINE HCL TAB 100 MG 100 MG: 100 TAB at 07:30

## 2021-12-10 RX ADMIN — Medication 2 PACKET: at 19:59

## 2021-12-10 RX ADMIN — IPRATROPIUM BROMIDE AND ALBUTEROL SULFATE 3 ML: 2.5; .5 SOLUTION RESPIRATORY (INHALATION) at 19:44

## 2021-12-10 RX ADMIN — CALCIUM CHLORIDE, MAGNESIUM CHLORIDE, SODIUM CHLORIDE, SODIUM BICARBONATE, POTASSIUM CHLORIDE AND SODIUM PHOSPHATE DIBASIC DIHYDRATE 12.5 ML/KG/HR: 3.68; 3.05; 6.34; 3.09; .314; .187 INJECTION INTRAVENOUS at 20:14

## 2021-12-10 RX ADMIN — CHLORHEXIDINE GLUCONATE 0.12% ORAL RINSE 15 ML: 1.2 LIQUID ORAL at 07:36

## 2021-12-10 RX ADMIN — FOLIC ACID 1 MG: 1 TABLET ORAL at 07:30

## 2021-12-10 RX ADMIN — QUETIAPINE FUMARATE 25 MG: 25 TABLET ORAL at 23:58

## 2021-12-10 RX ADMIN — Medication 40 MG: at 07:36

## 2021-12-10 RX ADMIN — Medication 50 MCG: at 16:35

## 2021-12-10 RX ADMIN — FENTANYL CITRATE 50 MCG: 50 INJECTION INTRAMUSCULAR; INTRAVENOUS at 20:14

## 2021-12-10 RX ADMIN — INSULIN ASPART 1 UNITS: 100 INJECTION, SOLUTION INTRAVENOUS; SUBCUTANEOUS at 04:40

## 2021-12-10 RX ADMIN — MULTIVIT AND MINERALS-FERROUS GLUCONATE 9 MG IRON/15 ML ORAL LIQUID 15 ML: at 07:30

## 2021-12-10 RX ADMIN — CEFTRIAXONE SODIUM 2 G: 2 INJECTION, POWDER, FOR SOLUTION INTRAMUSCULAR; INTRAVENOUS at 09:18

## 2021-12-10 ASSESSMENT — ACTIVITIES OF DAILY LIVING (ADL)
ADLS_ACUITY_SCORE: 11
ADLS_ACUITY_SCORE: 15
ADLS_ACUITY_SCORE: 11
ADLS_ACUITY_SCORE: 13
ADLS_ACUITY_SCORE: 15
ADLS_ACUITY_SCORE: 11
ADLS_ACUITY_SCORE: 15
ADLS_ACUITY_SCORE: 15
ADLS_ACUITY_SCORE: 11
ADLS_ACUITY_SCORE: 13
ADLS_ACUITY_SCORE: 15
ADLS_ACUITY_SCORE: 13
ADLS_ACUITY_SCORE: 15
ADLS_ACUITY_SCORE: 13

## 2021-12-10 ASSESSMENT — MIFFLIN-ST. JEOR: SCORE: 1522.38

## 2021-12-10 NOTE — PLAN OF CARE
Major Shift Events: Bedside turndown to 1LPM flow with IABP on standby - hemodynamically stable during entirety of study. Bedside bronchoscopy - cultures sent. Sedation off at 1000, then bolused for bronch, off again at 1600. Moving arms toward face, but not following commands. Aunt and niece updated on patient condition over the phone. Switched from bivalrudin to heparin as HIT panel resulted negative.     Plan: Possible decann tomorrow.    For vital signs and complete assessments, please see documentation flowsheets.

## 2021-12-10 NOTE — PROGRESS NOTES
ECMO Shift Summary:      ECMO Equipment:  Console serial number: [id number]  Circuit Lot number: [id number]  Oxygenator Lot number: [id number]    Patient remains on VA ECMO, all equipment is functioning and alarms are appropriately set. RPM's 3365 with flow range 3.09-3.25 L/min. Sweep gas is at 1 LPM and FiO2 80%. Circuit remains free of air, clot and fibrin. Cannulas are secure with no bleeding from site. Extremities are cool. Suctioned ETT for moderate amount of cloudy secretions.    Significant Shift Events: N/A    Vent settings:  Ventilation Mode: CMV/AC  (Continuous Mandatory Ventilation/ Assist Control)  FiO2 (%): 60 %  Rate Set (breaths/minute): 16 breaths/min  Tidal Volume Set (mL): 480 mL  PEEP (cm H2O): 8 cmH2O  Oxygen Concentration (%): 60 %  Resp: 17  .    Bivalrudin is running at 0.02 mg/kg/hr, PTT range 55-56, last  .    Urine output is as per charted, on CRRT, blood loss was minimal. Product was not given this shift.       Intake/Output Summary (Last 24 hours) at 12/10/2021 0605  Last data filed at 12/10/2021 0600  Gross per 24 hour   Intake 2484.58 ml   Output 3738 ml   Net -1253.42 ml       ECHO:  No results found for this or any previous visit.  No results found for this or any previous visit.      CXR:  Recent Results (from the past 24 hour(s))   XR Chest Port 1 View    Impression    RESIDENT PRELIMINARY INTERPRETATION  IMPRESSION:   1. ECMO cannula appears slightly retracted, now terminates over the  high right atrium.  2. Endotracheal tube appears slightly advanced, now terminates over T4  (previously T3)  3. Stable position of intra-aortic balloon pump marker.  4. Unchanged diffuse bilateral pulmonary opacities.       Labs:  Recent Labs   Lab 12/10/21  0343 12/10/21  0342 12/10/21  0151 12/09/21  2354 12/09/21  2151   PH 7.38  --  7.39 7.39 7.41   PCO2 44  --  42 43 40   PO2 70*  --  65* 70* 70*   HCO3 26  --  25 26 25   O2PER 80  60 60 60 60 60       Lab Results   Component Value  Date    HGB 8.6 (L) 12/10/2021    PHGB <30 12/10/2021    PLT 86 (L) 12/10/2021    FIBR 897 (H) 12/10/2021    INR 1.16 (H) 12/10/2021    PTT 56 (H) 12/10/2021    DD <0.27 12/10/2021    ANTCH 98 12/09/2021         Plan is to continue VA ECMO support and wean as able.      Danuta Davis RT  ECMO Specialist  12/10/2021 6:05 AM

## 2021-12-10 NOTE — PLAN OF CARE
Major Shift Events:  Pt remains intubated and sedated on VA ECMO via peripheral cannulation.  Sedated with Versed at 2 mg/hr with Fentanyl at 50 mcg/hr, RASS -4.  ECMO flows 3.25 lpm at 3365 RPM's with sweep at 1 and fiO2 80%.  Vent CMV 16/480/8/60%.  Able to wean vasoactive drips off during shift.  IABP 1:1 with 100% augmentation.  TF reached goal rate of 45 ml/hr, sliding scale insulin added.  CRRT goal intake = output, positive 450 ml since midnight but catching up with fluid gains from early mornning.  Brother (Walter) updated at bedside by RN and NP.     Plan: Continue fluid removal to goal of euvolemic at midnight without ECMO chugging if possible.  Able to use Vasopressors to achieve volume removal if necessary. Progressing towards goals, continue with plan of care.  For vital signs and complete assessments, please see documentation flowsheets.

## 2021-12-10 NOTE — PROGRESS NOTES
"    [facility]   Critical Care Cardiology - Progress Note    Bruce Blount MRN: [id number]  Age: 56 year old, : 1965  Date: 12/10/2021    Assessment and Plan:  Bruce Blount is a 56 year old male with PMHx current tobacco use and ETOH abuse who presents to [facility] after out of hospital cardiac arrest.      Per EMS and brother, patient was in his usual state of health prior to arrest. Patients brother heard a \"thump\" and found patient unresponsive and agonal breathing. 911 called and CPR initiated. EMS arrived within 4 minutes. Initial rhythm VF--> shocked x ~7 with ROSC upon arrival to ED. Total CPR team per EMS ~ 40 minutes. 3 mg Epinephrine, 300 mg Amio given via EMS. Patient EKG reveals Valentín inferior/posterior leads with reciprocal changes. Patient initially presented with an Igel and was intubated with ETT in the ED. He arrested and was again shocked in the ED with ROSC. Taken urgently to CCL where he underwent coronary angiogram and again arrested requiring manual CPR and was then cannulated on VA ECMO via right with 17 Sami cannula RCFA, 25 Fr cannula RCFV. Coronary angiogram revealed  of RCA and acute culprit lesion of LCx/OM1, s/p PCI to pLCx, mid LCx, and OM1 with TERRY. IABP placed for decreased pulsatility. Thermogard cooling cath placed and patient was transferred to ICU for further management.     Today's Plan:  - limited echo with ECMO turndown today  - bronchoscopy today  - follow HIT screen. Transition back to heparin if negative  - ongoing volume removal via CRRT (1000-2000cc goal today)  - stop bowel regimen given loose stools    Neurology  # Concern for anoxic brain injury    # Hx of possible ETOH abuse  Intubated, sedated. Cooled to 33 degrees. Now rewarmed. Initial head CT without acute pathology  - Continue versed, fentanyl for sedation with a RASS goal -2 to -3.  - consider head CT in the coming days  - Neuro-crit consulted and appreciate " recommendations.   - vEEG   - Palliative care consulted.   - Folate, Thiamine and multi vitamin for ETOH abuse. CIWA as needed when sedation weaned     Cardiovascular  # Refractory VF cardiac arrest 2/2 secondary to STEMI  # Cardiogenic shock requiring VA ECMO  # Ischemic Cardiomyopathy (EF 10-15%)  # S/p PCI pLCx, mLCx, OM1  # Residual RCA   # HTN, HLD  Peripheral V-A ECMO inserted via RCFA and RCFV 17/25 fr.  troponin peak 124K. Pulsatility improved. Off pressors   - limited echo with ECMO turndown today  - ECMO cares:   - Flows 3-3.25L. reduce to 2.5L today   - Sweep 1L   - ACT goal: N/A.  On bivalirudin given concern for HIT  - GDMT   - Continue ASA 81mg and ticagrelor 90mg BID    - Hold Lipitor for now given shock liver   - Hold ACE/ARB for now given likely reduced renal fxn after arrest   - Holding beta blocker given shock  -  of RCA not intervened upon    Pulmonary  # Acute hypoxemic respiratory failure requiring intubation  # Probable aspiration pneumonia  # Rib Fractures  # Sternal Fracture  # Tobacco Abuse (2PPD)  Vent Settings: CMV 16/480/8/60%. AB.38/44/74/26  - goal CO2 40-50  - Wean vent as able  - Daily CXR  - Serial ABGs   - duoneb Q6 hours    Gastrointestinal, Nutrition  # Shock liver 2/2 cardiac arrest, improving  # Loose Stool  No known medical hx.   - Monitor LFTs BID  - TF at goal  - Bowel regimen: hold for now given loose stools  - GI Prophylaxis: Protonix 40 mg daily    Renal, Electrolytes  # Acute Renal Injury  # Lactic acidosis  # Hypoalbuminemia  # Hyperkalemia, resolved   # Hypocalcemia, resolved  Unclear creatinine baseline, on admission 0.87. UOP poor  with hyperkalemia. Started on CRRT. LA peaked again at 2.7 in the setting of LLE limb ischemia, now normalized.  - volume removal; goal net negative 1000-2000cc  - renal consult; appreciate recommendations  - lactic acid Q6 hours  - Monitor urine output    Infectious Disease  # Aspiration Pneumonia (klebsiella, staph  aureus)  # Leukocytosis  # Lactic acidosis, resolved  WBC 19.4. Afebrile overnight.  - Monitor for signs of infection  - Daily blood cultures while on ECMO   Cultures thus far:              - sputum 12/6: staph aureus, staph dysgalactia, klebsiella   - sputum 12/7: staph aureus   - sputum 12/8: gram positive cocci  Antibiotics              - Vancomycin 12/6 - 12/7 (MRSA negative)              - Zosyn 12/6 - 12/9   - Rocephin 12/9 - present (plan for 7-10 day course).    Hematology  # Anemia  # Concern for HIT  # Thrombocytopenia  # Loss of bilateral DP pulses, resolved  Hgb stable. No e/o bleeding. US with monophasic flow to DP. LLE distal reperfusion cannula placed 12/8 in the setting of worsened LLE mottling.  Never lost Dopplerable pulses.  Platelet continuing to fall 12/9 - transitioned to bivalirudin and HIT screen was sent  - follow HIT screen  - Transfuse for Hgb < 8  - DVT PPX: Bivalrudin for now    Endocrinology  # Hyperglycemia   No known medical history. Hemoglobin A1c 5.6%  - insulin gtt as needed    MSK/Skin  # Mottling LLE s/p LLE distal reperfusion cannula, improved  Pulses remain intact, however skin appears more mottled. Probable PAD. Exacerbated by IABP. Worsened throughout the day 12/8 with rising LA, CK.  LLE distal reperfusion cannula placed  - continue to monitor  - continue bivalrubin via distal reperfusion cannula    Pertinent Lines  R femoral arterial and venous ECMO cannulae December 6, 2021 (ECMO cannula)  RCFA arterial line December 6, 2021 (R distal reperfusion cannula)  LCFA arterial line December 8, 2021 (L distal reperfusion cannula)  L femoral arterial line December 6, 2021 (IABP)  Rectal tube December 6, 2021  R radial arterial line December 6, 2021  ETT December 6, 2021  Gutiérrez catheter December 6, 2021  OG tube December 6, 2021    ICU Cares:  Daily CXR: ETT 3cm from jacques.  IABP well positioned.  Worsened perihilar opacities  Fluids/Feeds: TF at goal. Fluids not indicated  DVT  Prophylaxis: bivalrudin ggt  GI Prophylaxis: protonix  Bowel Regimen: hold given loose stools  Anticipated Floor Transfer: N/A    Family Update: brother, updated by me    Patient seen and discussed with Dr. Marlo Fry.  Assessment and plan as above.    Mary Leger PA-C  Critical Care Cardiology  Pager: [phone]      Interval History:  Ongoing fluid removal without issue.  Large loose stool overnight. No other acute events. Large clot in L distal reperfusion cannula yesterday evening.    Objective Findings:  Temp:  [97.3  F (36.3  C)-99.1  F (37.3  C)] 99.1  F (37.3  C)  Pulse:  [] 101  Resp:  [16-20] 17  MAP:  [66 mmHg-107 mmHg] 106 mmHg  Arterial Line BP: ()/(34-68) 156/63  FiO2 (%):  [60 %] 60 %  SpO2:  [91 %-100 %] 94 %    I/O:  Intake/Output Summary (Last 24 hours) at 12/10/2021 0854  Last data filed at 12/10/2021 0800  Gross per 24 hour   Intake 2327.13 ml   Output 4166 ml   Net -1838.87 ml     Physical Exam:  GEN: intubated, sedated  HEENT: no appreciable cranial trauma. Pupils 3mm, reactive  Pulm: coarse breath sounds bilaterally  Cardiac: regular rhythm. Tachycardia. No murmur, rub, gallop  ECMO cannula: insertion site clean, dry, intact. No appreciable hematoma  GI: soft, non distended  Neuro: pupils reactive.  Otherwise sedated  Integument: no appreciable skin breakdown  Vascular: LLE toes cold, however foot symmetrically warm when compared to right foot      Medications:    aspirin  81 mg Per Feeding Tube Daily     cefTRIAXone  2 g Intravenous Q24H     chlorhexidine  15 mL Swish & Spit BID     folic acid  1 mg Oral or Feeding Tube Daily     insulin aspart  1-6 Units Subcutaneous Q4H     ipratropium - albuterol 0.5 mg/2.5 mg/3 mL  3 mL Nebulization 4x daily     multivitamins w/minerals  15 mL Per Feeding Tube Daily     pantoprazole  40 mg Oral QAM AC     polyethylene glycol  17 g Oral Daily     protein modular  2 packet Per Feeding Tube 4x Daily     senna-docusate  2 tablet Oral or  Feeding Tube BID     thiamine  100 mg Oral or Feeding Tube Daily     ticagrelor  90 mg Oral or Feeding Tube BID       Bivalirudin (ANGIOMAX) 0.02 mg/mL in 0.9% NaCl FOR REPERFUSION CATHETER 3 mL/hr at 12/09/21 1406     Bivalirudin (ANGIOMAX) 0.02 mg/mL in 0.9% NaCl FOR REPERFUSION CATHETER 3 mL/hr at 12/09/21 1351     bivalirudin ANTICOAGULANT (ANGIOMAX) 250 mg/250 mL in 0.9% Sodium Chloride 0.02 mg/kg/hr (12/10/21 0700)     dextrose       dextrose       CRRT replacement solution 12.5 mL/kg/hr (12/10/21 0331)     fentaNYL 50 mcg/hr (12/10/21 0700)     midazolam 2 mg/hr (12/10/21 0700)     - MEDICATION INSTRUCTIONS -       norepinephrine Stopped (12/09/21 1515)     CRRT replacement solution 200 mL/hr at 12/10/21 0331     CRRT replacement solution 12.5 mL/kg/hr (12/10/21 0331)     vasopressin Stopped (12/09/21 1700)         Labs:   [facility]  Recent Labs   Lab 12/10/21  0348 12/10/21  0343 12/10/21  0342 12/10/21  0017 12/09/21  2008 12/09/21  1959 12/09/21  1956 12/09/21  1541 12/09/21  1538 12/09/21  1455 12/09/21  0937 12/09/21  0933 12/09/21  0806 12/09/21  0349 12/08/21  1407 12/08/21  1005   NA  --   --  140  --   --  141  141  --  142  --   --   --  143  --  143   < > 144  144   POTASSIUM  --   --  3.6  --   --  3.8  3.8  --  3.8  --   --   --  3.9  --  4.3   < > 5.4*  5.4*   CHLORIDE  --   --  110*  --   --  111*  110*  --  111*  --   --   --  112*  --  114*   < > 117*  117*   CO2  --   --  26  --   --  25  26  --  27  --   --   --  24  --  22   < > 20  20   ANIONGAP  --   --  4  --   --  5  5  --  4  --   --   --  7  --  7   < > 7  7   * 157* 165* 117*   < > 144*  145*   < > 137*   < >  --    < > 166*   < > 151*   < > 124*  124*   BUN  --   --  45*  --   --  40*  40*  --  38*  --  39*  --  39*  --  38*   < > 42*  42*   CR  --   --  1.58*  --   --  1.48*  1.53*  --  1.48*  --  1.48*  --  1.53*  --  1.63*   < > 2.02*  2.02*   GFRESTIMATED  --   --  48*  --   --  52*  50*  --  52*  --  52*  --   50*  --  46*   < > 36*  36*   BRIDGETTE  --   --  8.5  --   --  8.0*  8.0*  --  7.8*  --   --   --  8.0*  --  8.7   < > 7.8*  7.8*   MAG  --   --  2.6*  --   --  2.5*  2.5*  --  2.4*  --   --   --  2.2  --  2.3   < > 2.4*   PHOS  --   --  4.2  --   --  4.1  --   --   --   --   --   --   --  4.6*  --  6.6*   PROTTOTAL  --   --  6.1*  --   --  5.9*  --  5.8*  --   --   --  5.4*  --  5.3*   < > 5.2*   ALBUMIN  --   --  1.8*  --   --  1.7*  1.7*  --  1.6*  --   --   --  1.6*  --  1.6*   < > 1.8*  1.8*   BILITOTAL  --   --  0.5  --   --  0.5  --  0.6  --   --   --  0.9  --  0.8   < > 0.7   ALKPHOS  --   --  50  --   --  44  --  41  --   --   --  38*  --  37*   < > 34*   AST  --   --  121*  --   --  124*  --  136*  --   --   --  145*  --  165*   < > 194*   ALT  --   --  74*  --   --  74*  --  73*  --   --   --  74*  --  75*   < > 85*    < > = values in this interval not displayed.     CBC  Recent Labs   Lab 12/10/21  0342 12/09/21  1959 12/09/21  1541 12/09/21  0933   WBC 19.4* 17.0* 15.9* 18.0*   RBC 2.71* 2.77* 2.77* 2.79*   HGB 8.6* 8.8* 8.8* 9.0*   HCT 26.5* 26.6* 26.5* 26.6*   MCV 98 96 96 95   MCH 31.7 31.8 31.8 32.3   MCHC 32.5 33.1 33.2 33.8   RDW 13.9 13.7 13.7 13.8   PLT 86* 88* 99* 98*     Arterial Blood Gas  Recent Labs   Lab 12/10/21  0608 12/10/21  0343 12/10/21  0342 12/10/21  0151 12/09/21  2354   PH 7.38 7.38  --  7.39 7.39   PCO2 44 44  --  42 43   PO2 74* 70*  --  65* 70*   HCO3 26 26  --  25 26   O2PER 60 80  60 60 60 60         Pertinent Imaging Studies:  Coronary angiogram:   Refractory VT/VF cardiac arrest.  Cardiogenic shock.  STEMI involving the OM1 as culprit.  Three vessel severe CAD involving the  of the RCA, mid LCx and OM1 severe disease with occlusion of OM1 as culprit in STEMI, and moderate proximal LAD lesions likely hemodynamically significant.  CPR  VA ECMO placement.  IABP placement.  Thermogard placement with initiation of hypothermia protocol.  Right radial arterial line  placement.  PCI with three drug eluting stents to the proximal, mid LCx and OM1.    LE Arterial Duplex, bilateral:  1. Right leg: Patent arteries with post obstructive diminished waveforms likely due to ECMO cannula.    2. Left leg: Patent arteries.     Echo:   Technically difficult study.  VA ECMO at 2.8L/min.  Left ventricular function is decreased. The ejection fraction is 10-15% (severely reduced).  Global right ventricular function is severely reduced.  Septum midline.  The aortic valve appears to remain closed.  Trivial pericardial effusion is present.  Previous study not available for comparison.    CT Head:  No acute intracranial pathology.    CT Chest, Abdomen, Pelvis:  1. Bilateral dependent consolidative opacities. Differential diagnosis includes atelectasis, aspiration, infection.  2. Bilateral anterior rib fractures and sternal fracture likely secondary to CPR.  3. Endotracheal tube terminates 2.1 cm above the jacques. Consider retracting 2 cm.      Mary Leger PA-C  Critical Care Cardiology  Pager: [phone]

## 2021-12-10 NOTE — PROCEDURES
Cardiac ICU Procedure Note  Procedure:  Bronchoscopy  Indication:  Acute hypoxia  Attending Provider:  Dr. Vince Fry  Medications: versed, and fentanyl   Time Out:  Performed     The patient's medical record has been reviewed.  The necessary history and physical examination was performed.  Consent was gathered from the family.  The proposed procedure and the patient's identification were verified prior to the procedure by the physician and the nurse.      The patient was assessed for the adequacy for the procedure and to receive medications.   Mental Status:  Intubated and sedated  Airway examination: ETT  Pulmonary:  Diminished breath sounds b/l  CV:  RRR  ASA rdGrdrrdarddrderd:rd rd3rd The patient was too unstable to move to a negative pressure room for the procedure.  The bronchoscopy was performed at the bedside.      Immediately before administration of medications the patient was re-assessed for adequacy to receive sedatives including the heart rate, respiratory rate, mental status, oxygen saturation, blood pressure and adequacy of pulmonary ventilation. These same parameters were continuously monitored throughout the procedure.      Maneuvers / Procedure:   The bronchoscope was inserted through the mouth via ETT.A complete airway examination was performed from the distal trachea to the subsegmental level in each lobe of both lungs. There were no endobronchial lesion, there were scant secretions in all lobes and the airway were mildly to moderately friable.  A BAL was collected in the right lower lobe.     Marlo Fry MD  (c) [phone]

## 2021-12-10 NOTE — PROGRESS NOTES
ECMO Shift Summary: 07:00-19:00      ECMO Equipment:  Console serial number: [id number]  Circuit Lot number: [id number]  Oxygenator Lot number: [id number]      Patient remains on VA ECMO, all equipment is functioning and alarms are appropriately set. RPM's 3100 with flow range 2.5 L/min. Sweep gas is at 1 LPM and FiO2 80%. Circuit remains free of air, clot and fibrin. Cannulas are secure with no bleeding from site. Extremities are warm to touch. Suctioned ETT for a moderate amount of thick tan secretions.    Significant Shift Events: We performed a turndown trial with ECHO we also did a bronchoscopy, and removed two large clots one from each reperfusion catheter which appeared after we stopped Bivalirudin for an hour prior to the Bronchoscopy. Hit panel was negative so we restarted heparin.    Vent settings:  Ventilation Mode: CMV/AC  (Continuous Mandatory Ventilation/ Assist Control)  FiO2 (%): 60 %  Rate Set (breaths/minute): 16 breaths/min  Tidal Volume Set (mL): 480 mL  PEEP (cm H2O): 8 cmH2O  Oxygen Concentration (%): 100 %  Resp: 23  .    Heparin is running at 710 u/hr, ACT range 165 prior to restarting the heparin.    Urine output is as charted, blood loss was minimal. No product given.       Intake/Output Summary (Last 24 hours) at 12/10/2021 1735  Last data filed at 12/10/2021 1718  Gross per 24 hour   Intake 2241.97 ml   Output 4281 ml   Net -2039.03 ml       ECHO:  No results found for this or any previous visit.  No results found for this or any previous visit.      CXR:  Recent Results (from the past 24 hour(s))   XR Chest Port 1 View    Narrative    EXAM: XR CHEST PORT 1 VIEW  12/10/2021 1:23 AM     HISTORY:  Check endotracheal tube placement and ECLS cannula  placement. DO NOT log-roll patient.  Place film under patient using  patient safety handling process.       COMPARISON:  Chest x-ray 12/8/2021, 12/7/2021, 12/6/2021    FINDINGS  Technique: Supine AP view of the chest.     Devices: Inferior  approach ECMO cannula terminates at the high right  atrium. Inferior approach central venous catheter terminates over the  right atrium. Intra-aortic balloon pump marker projects over the  proximal descending thoracic aorta. Endotracheal tube terminates at  T4. Esophageal temperature probe terminates over the proximal  esophagus. Enteric tube passes below the left hemidiaphragm.    Lungs: Unchanged diffuse bilateral interstitial and airspace  opacities. No new focal airspace opacity.  No discernible  pneumothorax.  No definite pleural effusion.     Cardiovascular: Cardiomediastinal silhouette is  stable in size.      Impression    IMPRESSION:   1. ECMO cannula appears slightly retracted, now terminates over the  high right atrium.  2. Endotracheal tube appears slightly advanced, now terminates over T4  (previously T3)  3. Stable position of intra-aortic balloon pump marker.  4. Slightly increased diffuse bilateral pulmonary opacities.    I have personally reviewed the examination and initial interpretation  and I agree with the findings.    YELENA WAGONER MD         SYSTEM ID:  [id number]   Echocardiogram Limited    Narrative    [id number]  [id number]  [id number]  [id number]^VERONICA^JAY^TASIA     [facility],[place]  Echocardiography Laboratory  [address] [place] [id number]     Name: MINERVA GARAY  MRN: [id number]  : 1965  Study Date: 12/10/2021 12:13 PM  Age: 56 yrs  Gender: Male  Patient Location: [facility]  Reason For Study: MI  Ordering Physician: JAY MARTIN  Performed By: Mile Galo RDCS, NELSY     BSA: 1.9 m2  Height: 69 in  Weight: 169 lb  BP: 131/43 mmHg  ______________________________________________________________________________  Procedure  Limited Portable Echo Adult.  ______________________________________________________________________________  Interpretation Summary  VA ECMO turndown from 2.5LPM+ IABP to 1LPM and IABP off.  Baseline EF 15-20%. At  1LPM and IABP off EF 25-30%.  ______________________________________________________________________________  Left Ventricle  VA ECMO turndown from 2.5LPM+ IABP to 1LPM and IABP off.  Baseline EF 15-20%. At 1LPM and IABP off EF 25-30%.     Right Ventricle  Right ventricular function, chamber size, wall motion, and thickness are  normal.     Pericardium  No pericardial effusion is present.     ______________________________________________________________________________  Report approved by: Gina Nickerson 12/10/2021 12:51 PM     ______________________________________________________________________________          Labs:  Recent Labs   Lab 12/10/21  1639 12/10/21  1636 12/10/21  1416 12/10/21  1214 12/10/21  1151   PH 7.37  --  7.41 7.40 7.39   PCO2 44  --  41 43 42   PO2 55*  --  79* 67* 70*   HCO3 26  --  26 26 26   O2PER 60  80 60 60 60 60       Lab Results   Component Value Date    HGB 8.8 (L) 12/10/2021    PHGB <30 12/10/2021    PLT 87 (L) 12/10/2021    FIBR 985 (H) 12/10/2021    INR 1.10 12/10/2021    PTT 53 (H) 12/10/2021    DD <0.27 12/10/2021    ANTCH 57 (L) 12/10/2021         Plan is to remain on ECMO overnight and hopefully decannulate in the OR tomorrow.      Lobo Pillai RT  ECMO Specialist  12/10/2021 5:35 PM

## 2021-12-10 NOTE — PROGRESS NOTES
ECMO [place] STUDY  December 10, 2021    Inotropes/Pressors: none  PTT: 54    Ventilation Mode: CMV/AC  (Continuous Mandatory Ventilation/ Assist Control)  FiO2 (%): 60 %  Rate Set (breaths/minute): 16 breaths/min  Tidal Volume Set (mL): 480 mL  PEEP (cm H2O): 8 cmH2O  Oxygen Concentration (%): 60 %  Resp: 19       Flow  Maria Del Carmen BP IABP BP HR O2 Sat  MVO2  IABP   2.5L 141/40-90 101/64 - 100 101 95  66.1  On  1.5L 138/43 - 90 109/62 - 99 96 94  70.4  On  1.0L 143/39 - 93 102/59 - 97 96 93  68.0  On  1.0L 120/62 - 90 130/78 - 90 96 93  69.3  Standby    ABG at lowest Flow: 7.4/43/67/26  P/F Ratio: 111         Summary:  --improving EF with decreasing flows  --inproved EF compared to the last turndown  --Hemodynamically stable with turndown requiring no pressors  --Oxygenation and ventilation borderline with turndown    Plan:  --The patient is not ready for decannulation.    Mary Leger PA-C  Critical Care Cardiology  [phone]

## 2021-12-10 NOTE — PLAN OF CARE
Major Shift Events:  Pt withdraws to pain, good productive cough, Calcium replaced, CRRT circuit restarted, pt tolerating fluid removal well and remains off pressors, pulses dopplerable, rectal tube placed d/t liquid stools.   Plan: Likely turndown today. Continue to monitor and notify MD of questions or concerns.  For vital signs and complete assessments, please see documentation flowsheets.

## 2021-12-10 NOTE — PROGRESS NOTES
[facility]  Palliative Care Daily Progress Note        Palliative Care was consulted for family support and goals of care, in the setting of ECMO. After discussing with bedside nurse today, it appears that patient is making some improvements and family is coping appropriately.        We will continue to chart check and see 1-2 x's per week. Please page us if more involvement from our team would be helpful.      Thank you for the opportunity to be involved in the care of this patient.     KATHARINA Crane CNP  Palliative Care Consult Team  Pager: [phone]     [facility] Inpatient Team Consult pager [phone] (M-F 8-4:30)  After-hours Answering Service [phone]   Palliative Clinic: [phone]

## 2021-12-11 ENCOUNTER — ANESTHESIA EVENT (OUTPATIENT)
Dept: SURGERY | Facility: CLINIC | Age: 56
End: 2021-12-11
Payer: COMMERCIAL

## 2021-12-11 ENCOUNTER — APPOINTMENT (OUTPATIENT)
Dept: NEUROLOGY | Facility: CLINIC | Age: 56
End: 2021-12-11
Attending: NURSE PRACTITIONER
Payer: COMMERCIAL

## 2021-12-11 ENCOUNTER — APPOINTMENT (OUTPATIENT)
Dept: GENERAL RADIOLOGY | Facility: CLINIC | Age: 56
End: 2021-12-11
Attending: NURSE PRACTITIONER
Payer: COMMERCIAL

## 2021-12-11 LAB
ACT BLD: 161 SECONDS (ref 74–150)
ACT BLD: 161 SECONDS (ref 74–150)
ACT BLD: 165 SECONDS (ref 74–150)
ACT BLD: 169 SECONDS (ref 74–150)
ACT BLD: 169 SECONDS (ref 74–150)
ACT BLD: 173 SECONDS (ref 74–150)
ACT BLD: 177 SECONDS (ref 74–150)
ALBUMIN SERPL-MCNC: 1.8 G/DL (ref 3.4–5)
ALBUMIN SERPL-MCNC: 1.9 G/DL (ref 3.4–5)
ALBUMIN SERPL-MCNC: 2 G/DL (ref 3.4–5)
ALBUMIN SERPL-MCNC: 2 G/DL (ref 3.4–5)
ALP SERPL-CCNC: 65 U/L (ref 40–150)
ALP SERPL-CCNC: 66 U/L (ref 40–150)
ALP SERPL-CCNC: 67 U/L (ref 40–150)
ALP SERPL-CCNC: 68 U/L (ref 40–150)
ALT SERPL W P-5'-P-CCNC: 66 U/L (ref 0–70)
ALT SERPL W P-5'-P-CCNC: 67 U/L (ref 0–70)
ALT SERPL W P-5'-P-CCNC: 68 U/L (ref 0–70)
ALT SERPL W P-5'-P-CCNC: 69 U/L (ref 0–70)
ANION GAP SERPL CALCULATED.3IONS-SCNC: 6 MMOL/L (ref 3–14)
ANION GAP SERPL CALCULATED.3IONS-SCNC: 7 MMOL/L (ref 3–14)
APTT PPP: 48 SECONDS (ref 22–38)
APTT PPP: 48 SECONDS (ref 22–38)
APTT PPP: 49 SECONDS (ref 22–38)
APTT PPP: 58 SECONDS (ref 22–38)
APTT PPP: 66 SECONDS (ref 22–38)
AST SERPL W P-5'-P-CCNC: 100 U/L (ref 0–45)
AST SERPL W P-5'-P-CCNC: 86 U/L (ref 0–45)
AST SERPL W P-5'-P-CCNC: 92 U/L (ref 0–45)
AST SERPL W P-5'-P-CCNC: 99 U/L (ref 0–45)
AT III ACT/NOR PPP CHRO: 65 % (ref 85–135)
BACTERIA BLD CULT: NO GROWTH
BACTERIA BLD CULT: NO GROWTH
BACTERIA SPT CULT: ABNORMAL
BACTERIA SPT CULT: ABNORMAL
BASE EXCESS BLDA CALC-SCNC: -0.1 MMOL/L (ref -9–1.8)
BASE EXCESS BLDA CALC-SCNC: -0.4 MMOL/L (ref -9–1.8)
BASE EXCESS BLDA CALC-SCNC: 0 MMOL/L (ref -9–1.8)
BASE EXCESS BLDA CALC-SCNC: 0.2 MMOL/L (ref -9–1.8)
BASE EXCESS BLDA CALC-SCNC: 0.3 MMOL/L (ref -9–1.8)
BASE EXCESS BLDA CALC-SCNC: 0.3 MMOL/L (ref -9–1.8)
BASE EXCESS BLDA CALC-SCNC: 0.4 MMOL/L (ref -9–1.8)
BASE EXCESS BLDA CALC-SCNC: 0.4 MMOL/L (ref -9–1.8)
BASE EXCESS BLDA CALC-SCNC: 0.5 MMOL/L (ref -9–1.8)
BASE EXCESS BLDA CALC-SCNC: 0.5 MMOL/L (ref -9–1.8)
BASE EXCESS BLDA CALC-SCNC: 0.9 MMOL/L (ref -9–1.8)
BASE EXCESS BLDA CALC-SCNC: 0.9 MMOL/L (ref -9–1.8)
BASE EXCESS BLDV CALC-SCNC: 0.5 MMOL/L (ref -7.7–1.9)
BASE EXCESS BLDV CALC-SCNC: 0.7 MMOL/L (ref -7.7–1.9)
BILIRUB DIRECT SERPL-MCNC: 0.1 MG/DL (ref 0–0.2)
BILIRUB SERPL-MCNC: 0.4 MG/DL (ref 0.2–1.3)
BILIRUB SERPL-MCNC: 0.5 MG/DL (ref 0.2–1.3)
BUN SERPL-MCNC: 42 MG/DL (ref 7–30)
BUN SERPL-MCNC: 43 MG/DL (ref 7–30)
BUN SERPL-MCNC: 43 MG/DL (ref 7–30)
BUN SERPL-MCNC: 44 MG/DL (ref 7–30)
BUN SERPL-MCNC: 44 MG/DL (ref 7–30)
BUN SERPL-MCNC: 47 MG/DL (ref 7–30)
CA-I BLD-MCNC: 4.4 MG/DL (ref 4.4–5.2)
CA-I BLD-MCNC: 4.5 MG/DL (ref 4.4–5.2)
CALCIUM SERPL-MCNC: 8 MG/DL (ref 8.5–10.1)
CALCIUM SERPL-MCNC: 8.1 MG/DL (ref 8.5–10.1)
CALCIUM SERPL-MCNC: 8.1 MG/DL (ref 8.5–10.1)
CALCIUM SERPL-MCNC: 8.2 MG/DL (ref 8.5–10.1)
CHLORIDE BLD-SCNC: 107 MMOL/L (ref 94–109)
CHLORIDE BLD-SCNC: 108 MMOL/L (ref 94–109)
CO2 SERPL-SCNC: 25 MMOL/L (ref 20–32)
CO2 SERPL-SCNC: 26 MMOL/L (ref 20–32)
CO2 SERPL-SCNC: 26 MMOL/L (ref 20–32)
CREAT SERPL-MCNC: 1.18 MG/DL (ref 0.66–1.25)
CREAT SERPL-MCNC: 1.24 MG/DL (ref 0.66–1.25)
CREAT SERPL-MCNC: 1.29 MG/DL (ref 0.66–1.25)
CRP SERPL-MCNC: 210 MG/L (ref 0–8)
D DIMER PPP FEU-MCNC: <0.27 UG/ML FEU (ref 0–0.5)
D DIMER PPP FEU-MCNC: >20 UG/ML FEU (ref 0–0.5)
ERYTHROCYTE [DISTWIDTH] IN BLOOD BY AUTOMATED COUNT: 13.7 % (ref 10–15)
ERYTHROCYTE [SEDIMENTATION RATE] IN BLOOD BY WESTERGREN METHOD: 99 MM/HR (ref 0–20)
FIBRINOGEN PPP-MCNC: 1028 MG/DL (ref 170–490)
FIBRINOGEN PPP-MCNC: 61 MG/DL (ref 170–490)
FIBRINOGEN PPP-MCNC: 961 MG/DL (ref 170–490)
GFR SERPL CREATININE-BSD FRML MDRD: 62 ML/MIN/1.73M2
GFR SERPL CREATININE-BSD FRML MDRD: 65 ML/MIN/1.73M2
GFR SERPL CREATININE-BSD FRML MDRD: 69 ML/MIN/1.73M2
GLUCOSE BLD-MCNC: 114 MG/DL (ref 70–99)
GLUCOSE BLD-MCNC: 117 MG/DL (ref 70–99)
GLUCOSE BLD-MCNC: 128 MG/DL (ref 70–99)
GLUCOSE BLD-MCNC: 128 MG/DL (ref 70–99)
GLUCOSE BLD-MCNC: 137 MG/DL (ref 70–99)
GLUCOSE BLD-MCNC: 143 MG/DL (ref 70–99)
GLUCOSE BLD-MCNC: 145 MG/DL (ref 70–99)
GLUCOSE BLD-MCNC: 150 MG/DL (ref 70–99)
GLUCOSE BLDC GLUCOMTR-MCNC: 106 MG/DL (ref 70–99)
GLUCOSE BLDC GLUCOMTR-MCNC: 127 MG/DL (ref 70–99)
GLUCOSE BLDC GLUCOMTR-MCNC: 131 MG/DL (ref 70–99)
GLUCOSE BLDC GLUCOMTR-MCNC: 137 MG/DL (ref 70–99)
GLUCOSE BLDC GLUCOMTR-MCNC: 138 MG/DL (ref 70–99)
GLUCOSE BLDC GLUCOMTR-MCNC: 141 MG/DL (ref 70–99)
GLUCOSE BLDC GLUCOMTR-MCNC: 153 MG/DL (ref 70–99)
GRAM STAIN RESULT: ABNORMAL
GRAM STAIN RESULT: ABNORMAL
HCO3 BLD-SCNC: 25 MMOL/L (ref 21–28)
HCO3 BLD-SCNC: 25 MMOL/L (ref 21–28)
HCO3 BLD-SCNC: 26 MMOL/L (ref 21–28)
HCO3 BLDA-SCNC: 27 MMOL/L (ref 21–28)
HCO3 BLDA-SCNC: 27 MMOL/L (ref 21–28)
HCO3 BLDV-SCNC: 28 MMOL/L (ref 21–28)
HCO3 BLDV-SCNC: 28 MMOL/L (ref 21–28)
HCT VFR BLD AUTO: 23.4 % (ref 40–53)
HCT VFR BLD AUTO: 23.8 % (ref 40–53)
HCT VFR BLD AUTO: 24 % (ref 40–53)
HCT VFR BLD AUTO: 24.9 % (ref 40–53)
HGB BLD-MCNC: 7.6 G/DL (ref 13.3–17.7)
HGB BLD-MCNC: 7.9 G/DL (ref 13.3–17.7)
HGB BLD-MCNC: 8.1 G/DL (ref 13.3–17.7)
HGB BLD-MCNC: 8.3 G/DL (ref 13.3–17.7)
HGB FREE PLAS-MCNC: 50 MG/DL
INR PPP: 1.04 (ref 0.85–1.15)
INR PPP: 1.06 (ref 0.85–1.15)
INR PPP: 1.07 (ref 0.85–1.15)
LACTATE SERPL-SCNC: 0.5 MMOL/L (ref 0.7–2)
LACTATE SERPL-SCNC: 0.7 MMOL/L (ref 0.7–2)
LACTATE SERPL-SCNC: 0.7 MMOL/L (ref 0.7–2)
LACTATE SERPL-SCNC: 1 MMOL/L (ref 0.7–2)
LDH SERPL L TO P-CCNC: 750 U/L (ref 85–227)
MAGNESIUM SERPL-MCNC: 2.7 MG/DL (ref 1.6–2.3)
MAGNESIUM SERPL-MCNC: 2.9 MG/DL (ref 1.6–2.3)
MAGNESIUM SERPL-MCNC: 3 MG/DL (ref 1.6–2.3)
MAGNESIUM SERPL-MCNC: 3 MG/DL (ref 1.6–2.3)
MCH RBC QN AUTO: 31.1 PG (ref 26.5–33)
MCH RBC QN AUTO: 31.6 PG (ref 26.5–33)
MCH RBC QN AUTO: 31.9 PG (ref 26.5–33)
MCH RBC QN AUTO: 32.3 PG (ref 26.5–33)
MCHC RBC AUTO-ENTMCNC: 32.5 G/DL (ref 31.5–36.5)
MCHC RBC AUTO-ENTMCNC: 33.2 G/DL (ref 31.5–36.5)
MCHC RBC AUTO-ENTMCNC: 33.3 G/DL (ref 31.5–36.5)
MCHC RBC AUTO-ENTMCNC: 33.8 G/DL (ref 31.5–36.5)
MCV RBC AUTO: 95 FL (ref 78–100)
MCV RBC AUTO: 96 FL (ref 78–100)
O2/TOTAL GAS SETTING VFR VENT: 60 %
O2/TOTAL GAS SETTING VFR VENT: 70 %
O2/TOTAL GAS SETTING VFR VENT: 80 %
O2/TOTAL GAS SETTING VFR VENT: 80 %
OXYHGB MFR BLD: 94 % (ref 92–100)
OXYHGB MFR BLD: 95 % (ref 92–100)
OXYHGB MFR BLD: 96 % (ref 92–100)
OXYHGB MFR BLD: 97 % (ref 92–100)
OXYHGB MFR BLD: 98 % (ref 92–100)
OXYHGB MFR BLDA: 98 % (ref 75–100)
OXYHGB MFR BLDA: 98 % (ref 75–100)
OXYHGB MFR BLDV: 68 %
OXYHGB MFR BLDV: 69 %
PCO2 BLD: 43 MM HG (ref 35–45)
PCO2 BLD: 44 MM HG (ref 35–45)
PCO2 BLD: 45 MM HG (ref 35–45)
PCO2 BLD: 46 MM HG (ref 35–45)
PCO2 BLD: 47 MM HG (ref 35–45)
PCO2 BLD: 50 MM HG (ref 35–45)
PCO2 BLDA: 53 MM HG (ref 35–45)
PCO2 BLDA: 53 MM HG (ref 35–45)
PCO2 BLDV: 58 MM HG (ref 40–50)
PCO2 BLDV: 59 MM HG (ref 40–50)
PH BLD: 7.34 [PH] (ref 7.35–7.45)
PH BLD: 7.34 [PH] (ref 7.35–7.45)
PH BLD: 7.35 [PH] (ref 7.35–7.45)
PH BLD: 7.36 [PH] (ref 7.35–7.45)
PH BLD: 7.37 [PH] (ref 7.35–7.45)
PH BLD: 7.38 [PH] (ref 7.35–7.45)
PH BLDA: 7.31 [PH] (ref 7.35–7.45)
PH BLDA: 7.31 [PH] (ref 7.35–7.45)
PH BLDV: 7.28 [PH] (ref 7.32–7.43)
PH BLDV: 7.28 [PH] (ref 7.32–7.43)
PHOSPHATE SERPL-MCNC: 3.8 MG/DL (ref 2.5–4.5)
PHOSPHATE SERPL-MCNC: 4.2 MG/DL (ref 2.5–4.5)
PHOSPHATE SERPL-MCNC: 4.5 MG/DL (ref 2.5–4.5)
PLATELET # BLD AUTO: 72 10E3/UL (ref 150–450)
PLATELET # BLD AUTO: 73 10E3/UL (ref 150–450)
PLATELET # BLD AUTO: 75 10E3/UL (ref 150–450)
PLATELET # BLD AUTO: 75 10E3/UL (ref 150–450)
PO2 BLD: 102 MM HG (ref 80–105)
PO2 BLD: 116 MM HG (ref 80–105)
PO2 BLD: 121 MM HG (ref 80–105)
PO2 BLD: 137 MM HG (ref 80–105)
PO2 BLD: 76 MM HG (ref 80–105)
PO2 BLD: 79 MM HG (ref 80–105)
PO2 BLD: 81 MM HG (ref 80–105)
PO2 BLD: 83 MM HG (ref 80–105)
PO2 BLD: 84 MM HG (ref 80–105)
PO2 BLD: 90 MM HG (ref 80–105)
PO2 BLD: 90 MM HG (ref 80–105)
PO2 BLD: 91 MM HG (ref 80–105)
PO2 BLDA: 293 MM HG (ref 80–105)
PO2 BLDA: 318 MM HG (ref 80–105)
PO2 BLDV: 40 MM HG (ref 25–47)
PO2 BLDV: 40 MM HG (ref 25–47)
POTASSIUM BLD-SCNC: 3.7 MMOL/L (ref 3.4–5.3)
POTASSIUM BLD-SCNC: 3.9 MMOL/L (ref 3.4–5.3)
POTASSIUM BLD-SCNC: 3.9 MMOL/L (ref 3.4–5.3)
PROT SERPL-MCNC: 6.4 G/DL (ref 6.8–8.8)
PROT SERPL-MCNC: 6.6 G/DL (ref 6.8–8.8)
PROT SERPL-MCNC: 6.8 G/DL (ref 6.8–8.8)
PROT SERPL-MCNC: 6.8 G/DL (ref 6.8–8.8)
RBC # BLD AUTO: 2.44 10E6/UL (ref 4.4–5.9)
RBC # BLD AUTO: 2.48 10E6/UL (ref 4.4–5.9)
RBC # BLD AUTO: 2.51 10E6/UL (ref 4.4–5.9)
RBC # BLD AUTO: 2.63 10E6/UL (ref 4.4–5.9)
SODIUM SERPL-SCNC: 139 MMOL/L (ref 133–144)
SODIUM SERPL-SCNC: 140 MMOL/L (ref 133–144)
SODIUM SERPL-SCNC: 140 MMOL/L (ref 133–144)
TROPONIN I SERPL HS-MCNC: 4946 NG/L
TROPONIN I SERPL HS-MCNC: 5534 NG/L
TROPONIN I SERPL HS-MCNC: 6155 NG/L
TROPONIN I SERPL HS-MCNC: 7440 NG/L
UFH PPP CHRO-ACNC: 0.12 IU/ML
UFH PPP CHRO-ACNC: 0.14 IU/ML
UFH PPP CHRO-ACNC: <0.1 IU/ML
UFH PPP CHRO-ACNC: <0.1 IU/ML
WBC # BLD AUTO: 11.3 10E3/UL (ref 4–11)
WBC # BLD AUTO: 13.2 10E3/UL (ref 4–11)
WBC # BLD AUTO: 13.9 10E3/UL (ref 4–11)
WBC # BLD AUTO: 13.9 10E3/UL (ref 4–11)

## 2021-12-11 PROCEDURE — 85027 COMPLETE CBC AUTOMATED: CPT | Performed by: CLINICAL NURSE SPECIALIST

## 2021-12-11 PROCEDURE — 95718 EEG PHYS/QHP 2-12 HR W/VEEG: CPT | Performed by: PSYCHIATRY & NEUROLOGY

## 2021-12-11 PROCEDURE — 90947 DIALYSIS REPEATED EVAL: CPT

## 2021-12-11 PROCEDURE — 84484 ASSAY OF TROPONIN QUANT: CPT | Performed by: NURSE PRACTITIONER

## 2021-12-11 PROCEDURE — 82803 BLOOD GASES ANY COMBINATION: CPT | Performed by: NURSE PRACTITIONER

## 2021-12-11 PROCEDURE — 85610 PROTHROMBIN TIME: CPT | Performed by: STUDENT IN AN ORGANIZED HEALTH CARE EDUCATION/TRAINING PROGRAM

## 2021-12-11 PROCEDURE — 82947 ASSAY GLUCOSE BLOOD QUANT: CPT | Performed by: NURSE PRACTITIONER

## 2021-12-11 PROCEDURE — 95714 VEEG EA 12-26 HR UNMNTR: CPT

## 2021-12-11 PROCEDURE — 85300 ANTITHROMBIN III ACTIVITY: CPT | Performed by: NURSE PRACTITIONER

## 2021-12-11 PROCEDURE — 33949 ECMO/ECLS DAILY MGMT ARTERY: CPT | Performed by: INTERNAL MEDICINE

## 2021-12-11 PROCEDURE — 83051 HEMOGLOBIN PLASMA: CPT | Performed by: NURSE PRACTITIONER

## 2021-12-11 PROCEDURE — 86140 C-REACTIVE PROTEIN: CPT | Performed by: NURSE PRACTITIONER

## 2021-12-11 PROCEDURE — 85730 THROMBOPLASTIN TIME PARTIAL: CPT | Performed by: NURSE PRACTITIONER

## 2021-12-11 PROCEDURE — 85379 FIBRIN DEGRADATION QUANT: CPT | Performed by: NURSE PRACTITIONER

## 2021-12-11 PROCEDURE — 83735 ASSAY OF MAGNESIUM: CPT | Performed by: NURSE PRACTITIONER

## 2021-12-11 PROCEDURE — 999N000157 HC STATISTIC RCP TIME EA 10 MIN

## 2021-12-11 PROCEDURE — 85384 FIBRINOGEN ACTIVITY: CPT | Performed by: STUDENT IN AN ORGANIZED HEALTH CARE EDUCATION/TRAINING PROGRAM

## 2021-12-11 PROCEDURE — 250N000013 HC RX MED GY IP 250 OP 250 PS 637: Performed by: PHYSICIAN ASSISTANT

## 2021-12-11 PROCEDURE — 250N000011 HC RX IP 250 OP 636: Performed by: PHYSICIAN ASSISTANT

## 2021-12-11 PROCEDURE — 82805 BLOOD GASES W/O2 SATURATION: CPT | Performed by: NURSE PRACTITIONER

## 2021-12-11 PROCEDURE — 80053 COMPREHEN METABOLIC PANEL: CPT | Performed by: NURSE PRACTITIONER

## 2021-12-11 PROCEDURE — 250N000013 HC RX MED GY IP 250 OP 250 PS 637: Performed by: STUDENT IN AN ORGANIZED HEALTH CARE EDUCATION/TRAINING PROGRAM

## 2021-12-11 PROCEDURE — 99233 SBSQ HOSP IP/OBS HIGH 50: CPT | Performed by: INTERNAL MEDICINE

## 2021-12-11 PROCEDURE — 94640 AIRWAY INHALATION TREATMENT: CPT | Mod: 76

## 2021-12-11 PROCEDURE — 85610 PROTHROMBIN TIME: CPT | Performed by: NURSE PRACTITIONER

## 2021-12-11 PROCEDURE — 84100 ASSAY OF PHOSPHORUS: CPT | Performed by: CLINICAL NURSE SPECIALIST

## 2021-12-11 PROCEDURE — 250N000011 HC RX IP 250 OP 636: Performed by: NURSE PRACTITIONER

## 2021-12-11 PROCEDURE — 85520 HEPARIN ASSAY: CPT | Performed by: NURSE PRACTITIONER

## 2021-12-11 PROCEDURE — 85730 THROMBOPLASTIN TIME PARTIAL: CPT | Performed by: STUDENT IN AN ORGANIZED HEALTH CARE EDUCATION/TRAINING PROGRAM

## 2021-12-11 PROCEDURE — 999N000015 HC STATISTIC ARTERIAL MONITORING DAILY

## 2021-12-11 PROCEDURE — 83735 ASSAY OF MAGNESIUM: CPT | Performed by: CLINICAL NURSE SPECIALIST

## 2021-12-11 PROCEDURE — 84155 ASSAY OF PROTEIN SERUM: CPT | Performed by: NURSE PRACTITIONER

## 2021-12-11 PROCEDURE — 85652 RBC SED RATE AUTOMATED: CPT | Performed by: INTERNAL MEDICINE

## 2021-12-11 PROCEDURE — 250N000013 HC RX MED GY IP 250 OP 250 PS 637: Performed by: INTERNAL MEDICINE

## 2021-12-11 PROCEDURE — 200N000002 HC R&B ICU UMMC

## 2021-12-11 PROCEDURE — 83615 LACTATE (LD) (LDH) ENZYME: CPT | Performed by: NURSE PRACTITIONER

## 2021-12-11 PROCEDURE — 71045 X-RAY EXAM CHEST 1 VIEW: CPT

## 2021-12-11 PROCEDURE — 85347 COAGULATION TIME ACTIVATED: CPT

## 2021-12-11 PROCEDURE — 85027 COMPLETE CBC AUTOMATED: CPT | Performed by: NURSE PRACTITIONER

## 2021-12-11 PROCEDURE — 250N000011 HC RX IP 250 OP 636: Performed by: STUDENT IN AN ORGANIZED HEALTH CARE EDUCATION/TRAINING PROGRAM

## 2021-12-11 PROCEDURE — 85384 FIBRINOGEN ACTIVITY: CPT | Performed by: NURSE PRACTITIONER

## 2021-12-11 PROCEDURE — 33949 ECMO/ECLS DAILY MGMT ARTERY: CPT

## 2021-12-11 PROCEDURE — 99291 CRITICAL CARE FIRST HOUR: CPT | Mod: 25 | Performed by: INTERNAL MEDICINE

## 2021-12-11 PROCEDURE — 83605 ASSAY OF LACTIC ACID: CPT | Performed by: INTERNAL MEDICINE

## 2021-12-11 PROCEDURE — 93005 ELECTROCARDIOGRAM TRACING: CPT

## 2021-12-11 PROCEDURE — 250N000009 HC RX 250: Performed by: PHYSICIAN ASSISTANT

## 2021-12-11 PROCEDURE — 93010 ELECTROCARDIOGRAM REPORT: CPT | Performed by: INTERNAL MEDICINE

## 2021-12-11 PROCEDURE — 94640 AIRWAY INHALATION TREATMENT: CPT

## 2021-12-11 PROCEDURE — 94003 VENT MGMT INPAT SUBQ DAY: CPT

## 2021-12-11 PROCEDURE — 999N000075 HC STATISTIC IABP MONITORING

## 2021-12-11 PROCEDURE — 82330 ASSAY OF CALCIUM: CPT | Performed by: NURSE PRACTITIONER

## 2021-12-11 PROCEDURE — 71045 X-RAY EXAM CHEST 1 VIEW: CPT | Mod: 26 | Performed by: STUDENT IN AN ORGANIZED HEALTH CARE EDUCATION/TRAINING PROGRAM

## 2021-12-11 PROCEDURE — 250N000013 HC RX MED GY IP 250 OP 250 PS 637: Performed by: NURSE PRACTITIONER

## 2021-12-11 PROCEDURE — 250N000009 HC RX 250: Performed by: CLINICAL NURSE SPECIALIST

## 2021-12-11 PROCEDURE — 82248 BILIRUBIN DIRECT: CPT | Performed by: CLINICAL NURSE SPECIALIST

## 2021-12-11 PROCEDURE — 258N000003 HC RX IP 258 OP 636: Performed by: PHYSICIAN ASSISTANT

## 2021-12-11 RX ORDER — QUETIAPINE FUMARATE 50 MG/1
50 TABLET, FILM COATED ORAL EVERY 8 HOURS
Status: DISCONTINUED | OUTPATIENT
Start: 2021-12-11 | End: 2021-12-13

## 2021-12-11 RX ORDER — PROPOFOL 10 MG/ML
5-75 INJECTION, EMULSION INTRAVENOUS CONTINUOUS
Status: DISCONTINUED | OUTPATIENT
Start: 2021-12-11 | End: 2021-12-13

## 2021-12-11 RX ADMIN — Medication 40 MG: at 07:19

## 2021-12-11 RX ADMIN — TICAGRELOR 90 MG: 90 TABLET ORAL at 07:19

## 2021-12-11 RX ADMIN — Medication 710 UNITS: at 00:13

## 2021-12-11 RX ADMIN — Medication 2 PACKET: at 07:20

## 2021-12-11 RX ADMIN — CALCIUM CHLORIDE, MAGNESIUM CHLORIDE, SODIUM CHLORIDE, SODIUM BICARBONATE, POTASSIUM CHLORIDE AND SODIUM PHOSPHATE DIBASIC DIHYDRATE 12.5 ML/KG/HR: 3.68; 3.05; 6.34; 3.09; .314; .187 INJECTION INTRAVENOUS at 07:20

## 2021-12-11 RX ADMIN — IPRATROPIUM BROMIDE AND ALBUTEROL SULFATE 3 ML: 2.5; .5 SOLUTION RESPIRATORY (INHALATION) at 07:56

## 2021-12-11 RX ADMIN — MIDAZOLAM 2 MG: 1 INJECTION INTRAMUSCULAR; INTRAVENOUS at 15:44

## 2021-12-11 RX ADMIN — PROPOFOL 10 MCG/KG/MIN: 10 INJECTION, EMULSION INTRAVENOUS at 17:44

## 2021-12-11 RX ADMIN — CHLORHEXIDINE GLUCONATE 0.12% ORAL RINSE 15 ML: 1.2 LIQUID ORAL at 20:15

## 2021-12-11 RX ADMIN — Medication 50 MCG: at 09:30

## 2021-12-11 RX ADMIN — Medication 2 PACKET: at 11:34

## 2021-12-11 RX ADMIN — CALCIUM CHLORIDE, MAGNESIUM CHLORIDE, SODIUM CHLORIDE, SODIUM BICARBONATE, POTASSIUM CHLORIDE AND SODIUM PHOSPHATE DIBASIC DIHYDRATE 12.5 ML/KG/HR: 3.68; 3.05; 6.34; 3.09; .314; .187 INJECTION INTRAVENOUS at 12:39

## 2021-12-11 RX ADMIN — CALCIUM CHLORIDE, MAGNESIUM CHLORIDE, SODIUM CHLORIDE, SODIUM BICARBONATE, POTASSIUM CHLORIDE AND SODIUM PHOSPHATE DIBASIC DIHYDRATE: 3.68; 3.05; 6.34; 3.09; .314; .187 INJECTION INTRAVENOUS at 04:35

## 2021-12-11 RX ADMIN — FOLIC ACID 1 MG: 1 TABLET ORAL at 07:19

## 2021-12-11 RX ADMIN — THIAMINE HCL TAB 100 MG 100 MG: 100 TAB at 07:19

## 2021-12-11 RX ADMIN — HEPARIN SODIUM 1400 UNITS/HR: 1000 INJECTION INTRAVENOUS; SUBCUTANEOUS at 12:05

## 2021-12-11 RX ADMIN — CALCIUM CHLORIDE, MAGNESIUM CHLORIDE, SODIUM CHLORIDE, SODIUM BICARBONATE, POTASSIUM CHLORIDE AND SODIUM PHOSPHATE DIBASIC DIHYDRATE 12.5 ML/KG/HR: 3.68; 3.05; 6.34; 3.09; .314; .187 INJECTION INTRAVENOUS at 01:46

## 2021-12-11 RX ADMIN — Medication 100 MCG: at 10:30

## 2021-12-11 RX ADMIN — CHLORHEXIDINE GLUCONATE 0.12% ORAL RINSE 15 ML: 1.2 LIQUID ORAL at 07:19

## 2021-12-11 RX ADMIN — CALCIUM CHLORIDE, MAGNESIUM CHLORIDE, SODIUM CHLORIDE, SODIUM BICARBONATE, POTASSIUM CHLORIDE AND SODIUM PHOSPHATE DIBASIC DIHYDRATE 12.5 ML/KG/HR: 3.68; 3.05; 6.34; 3.09; .314; .187 INJECTION INTRAVENOUS at 23:56

## 2021-12-11 RX ADMIN — ASPIRIN 81 MG CHEWABLE TABLET 81 MG: 81 TABLET CHEWABLE at 07:19

## 2021-12-11 RX ADMIN — MULTIVIT AND MINERALS-FERROUS GLUCONATE 9 MG IRON/15 ML ORAL LIQUID 15 ML: at 07:19

## 2021-12-11 RX ADMIN — Medication 2 PACKET: at 19:54

## 2021-12-11 RX ADMIN — Medication 50 MCG: at 13:30

## 2021-12-11 RX ADMIN — CALCIUM CHLORIDE, MAGNESIUM CHLORIDE, SODIUM CHLORIDE, SODIUM BICARBONATE, POTASSIUM CHLORIDE AND SODIUM PHOSPHATE DIBASIC DIHYDRATE 12.5 ML/KG/HR: 3.68; 3.05; 6.34; 3.09; .314; .187 INJECTION INTRAVENOUS at 18:31

## 2021-12-11 RX ADMIN — QUETIAPINE FUMARATE 50 MG: 50 TABLET ORAL at 17:24

## 2021-12-11 RX ADMIN — TICAGRELOR 90 MG: 90 TABLET ORAL at 19:55

## 2021-12-11 RX ADMIN — Medication 50 MCG: at 08:30

## 2021-12-11 RX ADMIN — INSULIN ASPART 1 UNITS: 100 INJECTION, SOLUTION INTRAVENOUS; SUBCUTANEOUS at 19:52

## 2021-12-11 RX ADMIN — Medication 2 PACKET: at 16:05

## 2021-12-11 RX ADMIN — MIDAZOLAM 2 MG: 1 INJECTION INTRAMUSCULAR; INTRAVENOUS at 14:31

## 2021-12-11 RX ADMIN — Medication 50 MCG: at 09:00

## 2021-12-11 RX ADMIN — Medication 50 MCG: at 13:15

## 2021-12-11 RX ADMIN — IPRATROPIUM BROMIDE AND ALBUTEROL SULFATE 3 ML: 2.5; .5 SOLUTION RESPIRATORY (INHALATION) at 15:24

## 2021-12-11 RX ADMIN — IPRATROPIUM BROMIDE AND ALBUTEROL SULFATE 3 ML: 2.5; .5 SOLUTION RESPIRATORY (INHALATION) at 21:15

## 2021-12-11 RX ADMIN — CEFTRIAXONE SODIUM 2 G: 2 INJECTION, POWDER, FOR SOLUTION INTRAMUSCULAR; INTRAVENOUS at 09:37

## 2021-12-11 RX ADMIN — INSULIN ASPART 1 UNITS: 100 INJECTION, SOLUTION INTRAVENOUS; SUBCUTANEOUS at 04:04

## 2021-12-11 RX ADMIN — Medication 100 MCG/HR: at 13:20

## 2021-12-11 ASSESSMENT — ACTIVITIES OF DAILY LIVING (ADL)
ADLS_ACUITY_SCORE: 15

## 2021-12-11 ASSESSMENT — MIFFLIN-ST. JEOR: SCORE: 1532.38

## 2021-12-11 NOTE — PROGRESS NOTES
CRRT STATUS NOTE    DATA:  Time:  7:41 AM  Pressures WNL:  YES  Filter Status:  WDL    Problems Reported/Alarms Noted:   none    Supplies Present:  YES    ASSESSMENT:  Patient Net Fluid Balance: -861.5 mls since 12 midnight   Vital Signs:  Temp 99.8 esophageal,HR SR 90s, MAPS 70s to 80s, vent support,IABP support, VA ECMO support  Labs:  Creat. 1.29, K+ 3.7, elevated Troponins, hgb 7.9, plts 73  Goals of Therapy:  I=o   OVERSHOT GOAL    INTERVENTIONS:    NONE    PLAN:   MONITOR ALL PARAMETERS AND CONTINUE PLAN OF CARE TRY TO REMAIN CLOSER TO GOAL OF I=o

## 2021-12-11 NOTE — PROGRESS NOTES
ECMO Shift Summary: 3319-5826      ECMO Equipment:  Console serial number: [id number]  Circuit Lot number: [id number]  Oxygenator Lot number: [id number]        Patient remains on VA ECMO, all equipment is functioning and alarms are appropriately set. RPM's 3100 with flow range 2.68-2.86 L/min. Sweep gas is at 1 LPM and FiO2 80%. Circuit remains free of air, clot and fibrin. Cannulas are secure with no bleeding from site. Extremities are warm.     Significant Shift Events: A modified turndown was performed @ 1.5L for 30 minutes.     Vent settings:  Ventilation Mode: CMV/AC  (Continuous Mandatory Ventilation/ Assist Control)  FiO2 (%): 60 %  Rate Set (breaths/minute): 16 breaths/min  Tidal Volume Set (mL): 480 mL  PEEP (cm H2O): 8 cmH2O  Oxygen Concentration (%): 60 %  Resp: 17  .    Heparin is running at 1500 u/hr, ACT range 165-177.    Urine output is 15-30 ml/hr on CRRT, blood loss was minimal. No product given.       Intake/Output Summary (Last 24 hours) at 12/11/2021 1721  Last data filed at 12/11/2021 1600  Gross per 24 hour   Intake 1488.86 ml   Output 3457 ml   Net -1968.14 ml       ECHO:  No results found for this or any previous visit.  No results found for this or any previous visit.      CXR:  Recent Results (from the past 24 hour(s))   XR Chest Port 1 View    Narrative    EXAM: XR CHEST PORT 1 VIEW  12/11/2021 4:43 AM     HISTORY:  Check endotracheal tube placement and ECLS cannula  placement.     COMPARISON:  Radiographs 12/10/2021, 12/9/2021, 12/8/2021    FINDINGS  Technique: Semiupright AP view of the chest.     Devices: Endotracheal tube terminates at T4-T5. Esophageal temperature  probe terminates at T3. Intraaortic balloon pump marker projects over  the proximal descending thoracic aorta. Inferior approach ECMO cannula  terminates at the superior cavoatrial junction. Inferior approach  central venous catheter terminates over the high right atrium.    Lungs: Diffuse bilateral mixed pulmonary  opacities are not  substantially changed.  No discernible pneumothorax.  No definite  pleural effusion.     Cardiovascular: Cardiomediastinal silhouette is  stable in size.      Impression    IMPRESSION:     1. Endotracheal tube appears slightly advanced compared to prior  x-ray. Inferior approach ECMO cannula appears slightly advanced.  Additional support devices are similar to prior x-ray 12/10/2021.  2. Persistent bilateral pulmonary opacities.    I have personally reviewed the examination and initial interpretation  and I agree with the findings.    ORAL TEJADA MD         SYSTEM ID:  [id number]       Labs:  Recent Labs   Lab 12/11/21  1552 12/11/21  1551 12/11/21  1407 12/11/21  1128 12/11/21  0941   PH 7.35  --  7.37 7.38 7.36   PCO2 46*  --  45 43 46*   PO2 81  --  90 90 76*   HCO3 26  --  26 26 26   O2PER 80  60 60 60 60 60       Lab Results   Component Value Date    HGB 8.1 (L) 12/11/2021    PHGB 50 (H) 12/11/2021    PLT 75 (L) 12/11/2021    FIBR 61 (LL) 12/11/2021    INR 1.04 12/11/2021    PTT 58 (H) 12/11/2021    DD <0.27 12/11/2021    ANTCH 65 (L) 12/11/2021         Plan is to continue VA ECMO support and potential decannulation tomorrow or Monday.      Garrett Simmons, RT  ECMO Specialist  12/11/2021 5:21 PM

## 2021-12-11 NOTE — PROGRESS NOTES
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    Cannulation Site:  17 Fr in the R femoral artery  25 Fr in the R femoral vein  IABP    Interval events: no significant overnight events TF turned off due to emesis    Physical Exam:  Temp:  [98.4  F (36.9  C)-99.7  F (37.6  C)] 98.4  F (36.9  C)  Pulse:  [] 86  Resp:  [16-25] 21  MAP:  [64 mmHg-131 mmHg] 78 mmHg  Arterial Line BP: ()/() 112/58  FiO2 (%):  [60 %-100 %] 60 %  SpO2:  [90 %-100 %] 94 %    Intake/Output Summary (Last 24 hours) at 2021 1456  Last data filed at 2021 1429  Gross per 24 hour   Intake 1151.42 ml   Output 955 ml   Net 196.42 ml    Ventilation Mode: CMV/AC  (Continuous Mandatory Ventilation/ Assist Control)  FiO2 (%): 60 %  Rate Set (breaths/minute): 16 breaths/min  Tidal Volume Set (mL): 480 mL  PEEP (cm H2O): 8 cmH2O  Oxygen Concentration (%): 60 %  Resp: 21       Labs:  Recent Labs   Lab 21  1407 21  1128 21  0941 21  0728   PH 7.37 7.38 7.36 7.38   PCO2 45 43 46* 43   PO2 90 90 76* 84   HCO3 26 26 26 25   O2PER 60 60 60 60      Recent Labs   Lab 21  0940 21  0357 12/10/21  2200 12/10/21  1636   WBC 13.9* 13.2* 17.4* 20.1*   HGB 8.3* 7.9* 8.5* 8.8*     Creatinine   Date Value Ref Range Status   2021 1.24 0.66 - 1.25 mg/dL Final   2021 1.24 0.66 - 1.25 mg/dL Final   2021 1.29 (H) 0.66 - 1.25 mg/dL Final   12/10/2021 1.38 (H) 0.66 - 1.25 mg/dL Final   12/10/2021 1.38 (H) 0.66 - 1.25 mg/dL Final       Blood Flow (Circuit) LPM: 3.3 LPM  Gas Flow  LPM: 3 LPM  Gas FiO2   %: 60 %  ACT  (seconds): 288 seconds  Blood Temp  (degrees C): 32.6 C  Pulse Oximetry  (SpO2%): 100 %  Arterial Pressure  mmH mmHg      ECMO Issues including assessments and plan on DOS 2021:  Neuro: Sedated for mechanical ventilationand ECMO.  No acute  distress.  NIRS R 79 L is upper 46 Dopplerable lower ext pulses RASS goal: -3 reprefusion cannula bilaerally   CV: Cardiogenic shock.  Hemodynamically stable off pressors  Pulm: Keep vent settings at rest settings as above. Remains on ecmo due to pulmonary issues  FEN/Renal: Electrolytes stable w/ replacement protocols in place, cr 1.24 uop minimal on crrt k 3.9  Heme: ACT goal:180-200 leaving flows low 2-2.5 Hemoglobin 8.3 Minimal oozing around the ECMO cannulas  ID: Receiving empiric antibiotics  Cannulae: Position is acceptable on exam and the available imaging.  Distal perfusion cannulas are in place and patent.      I have personally reviewed the ECMO flows, oxygenation and CO2 clearance, anticoagulation, and cannula position.  I have also personally assessed the patient's systemic response with hemodynamics, oxygenation, ventilation, and bleeding.       The patient requires continued ECMO support and management in the ICU.  I have discussed patient care and treatment plan with the primary team.      Marlo Fry MD  Critical Care Cardiology  [phone]    December 11, 2021

## 2021-12-11 NOTE — PLAN OF CARE
Major Shift Events:  Frequent coughing resulting in frequent flexion of all extremeties. PRN fentanyl given x2, gtt ordered and currently @ 100mcg/hr. CRRT goal net neg /hr met. ECMO settings remain the same, flowing @ 2.5, 3100, FiO2 80%. No products given. Several episodes of emesis, TF off since midnight per MD. D-dimer > 20 this am, MD aware.    Plan: Decann? Decrease vent settings as able.    For vital signs and complete assessments, please see documentation flowsheets.

## 2021-12-11 NOTE — PROGRESS NOTES
Nephrology Progress Note  12/11/2021         Assessment & Recommendations:   Bruce Blount is a 56 yom with hx of ETOH and tobacco use who presents to [facility] after out of hospital arrest on IABP and ECMO.  Had witnessed arrest at home (brother heard him fall and called 911), found in VF and coded by EMS.  UOP dwindling and has mild hyperkalemia after rewarming, nephrology consulted for ESTER and management of K.       Interval History :   Mr Blount continues on CRRT started 12/8 to manage mild hyperkalemia and volume.  Net even yesterday, net negative so far today but this is mainly due to GI losses.  K now low on 4k baths likely due to GI losses.  May give a break from CRRT soon and see how things trend with UOP and chemistries, continuing CRRT for now.       Assessment & Recommendations:   Oliguric ESTER stage III-Presentation Cr 1.0, normal imaging on CT with no hydro.  Cr up after arrest and K 5.4 in setting of ongoing rewarming from cooling protocol.  Still making some UOP but with need for IABP, ECMO and 2 pressors ESTER is unlikely to improve in the short term so started CRRT 12/8. Now with improved hemodynamics, considering discontinuation of CRRT. But if needs RRT being on ECMO, there will be ECMO chugging. So for now continue on CRRT and once he de cannulate, will consider iHD.                -CRRT, mix of 4k baths, 50-100cc/hr net negative fluid goal.                  -Access is via ECMO circuit.      Volume-Mild edema, tolerated UF of 2L in past 24 hours. Will aim for similar UF if tolerates.      Electrolytes/pH- stable     BMD-Ca 8.1, Mg mildly high but phos normalized while being on CRRT     VF arrest-EF currently 5-10%.       Anemia-Hgb 8.5, down from admission, acute management per team.       Recommendations were communicated to primary team via this note.     Cheryl Guillory MD   [phone]    Interval History :   Nursing and provider notes from last 24 hours reviewed.  In the last 24 hours Bruce VERA  "Mine with improved hemodynamics. Continued to be on ECMO and CRRT for support.    Review of Systems:   Unable to assess as pt was still intubated and sedated    Physical Exam:   I/O last 3 completed shifts:  In: 1611.77 [I.V.:716.77; NG/GT:460]  Out: 3718 [Urine:692; Other:2276; Stool:750]   /78   Pulse 86   Temp 98.4  F (36.9  C)   Resp 21   Ht 1.753 m (5' 9\")   Wt 71.2 kg (156 lb 15.5 oz)   SpO2 94%   BMI 23.18 kg/m       General : Pt sedated and intubated, not in acute distress   Lungs : anterior lung fields are clear  Cardiac : S1, S2 present  Abdomen : Soft/ND/NT  LE : Edema noted  Dialysis Access :  Via ECMO circuit    Labs:   All labs reviewed by me  Electrolytes/Renal - Recent Labs   Lab Test 12/11/21  1411 12/11/21  1132 12/11/21  0941 12/11/21  0940 12/11/21  0358 12/11/21  0357 12/10/21  2355 12/10/21  2200   NA  --   --   --  140  140  --  139  --  140  140   POTASSIUM  --   --   --  3.9  3.9  --  3.7  --  3.8  3.8   CHLORIDE  --   --   --  108  108  --  108  --  108  108   CO2  --   --   --  25  25  --  25  --  25  25   BUN  --   --   --  44*  44*  --  47*  --  47*  47*   CR  --   --   --  1.24  1.24  --  1.29*  --  1.38*  1.38*   * 127* 117* 128*  128*   < > 150*   < > 114*  114*  110*   BRIDGETTE  --   --   --  8.1*  8.1*  --  8.2*  --  8.1*  8.1*   MAG  --   --   --  3.0*  --  2.7*  --  2.8*   PHOS  --   --   --  4.2  --  3.8  --  4.2    < > = values in this interval not displayed.       CBC -   Recent Labs   Lab Test 12/11/21  0940 12/11/21 0357 12/10/21  2200   WBC 13.9* 13.2* 17.4*   HGB 8.3* 7.9* 8.5*   PLT 75* 73* 85*       LFTs -   Recent Labs   Lab Test 12/11/21  0940 12/11/21  0357 12/10/21  2200   ALKPHOS 68 67 69   BILITOTAL 0.5 0.4 0.4   ALT 69 68 76*   * 99* 107*   PROTTOTAL 6.8 6.4* 6.5*   ALBUMIN 2.0*  2.0* 1.8* 1.9*  1.9*       Current Medications:    aspirin  81 mg Per Feeding Tube Daily     cefTRIAXone  2 g Intravenous Q24H     chlorhexidine  " 15 mL Swish & Spit BID     folic acid  1 mg Oral or Feeding Tube Daily     insulin aspart  1-6 Units Subcutaneous Q4H     ipratropium - albuterol 0.5 mg/2.5 mg/3 mL  3 mL Nebulization 4x daily     multivitamins w/minerals  15 mL Per Feeding Tube Daily     pantoprazole  40 mg Oral QAM AC     protein modular  2 packet Per Feeding Tube 4x Daily     QUEtiapine  25 mg Oral At Bedtime     thiamine  100 mg Oral or Feeding Tube Daily     ticagrelor  90 mg Oral or Feeding Tube BID       dextrose       dextrose       CRRT replacement solution 12.5 mL/kg/hr (12/11/21 1239)     fentaNYL 100 mcg/hr (12/11/21 1400)     heparin (PRESSURE BAG) 2 unit/mL in 0.9% NaCl 3 mL/hr (12/10/21 1633)     heparin (PRESSURE BAG) 2 unit/mL in 0.9% NaCl 3 mL/hr (12/10/21 1634)     HEParin 1,400 Units/hr (12/11/21 1400)     midazolam Stopped (12/10/21 1000)     niCARdipine Stopped (12/10/21 0844)     CRRT replacement solution 200 mL/hr at 12/11/21 0435     CRRT replacement solution 12.5 mL/kg/hr (12/11/21 1239)     Cheryl Guillory MD

## 2021-12-11 NOTE — PROGRESS NOTES
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    Cannulation Site:  17 Fr in the R femoral artery  25 Fr in the R femoral vein  IABP    Interval events: pulsatility up to 50mmHg, sedation off at 10a    Physical Exam:  Temp:  [98.4  F (36.9  C)-99.7  F (37.6  C)] 99  F (37.2  C)  Pulse:  [] 107  Resp:  [17-23] 23  MAP:  [66 mmHg-106 mmHg] 88 mmHg  Arterial Line BP: ()/(21-63) 132/46  FiO2 (%):  [60 %-100 %] 60 %  SpO2:  [90 %-100 %] 90 %    Intake/Output Summary (Last 24 hours) at 2021 1456  Last data filed at 2021 1429  Gross per 24 hour   Intake 1151.42 ml   Output 955 ml   Net 196.42 ml    Ventilation Mode: CMV/AC  (Continuous Mandatory Ventilation/ Assist Control)  FiO2 (%): 60 %  Rate Set (breaths/minute): 16 breaths/min  Tidal Volume Set (mL): 480 mL  PEEP (cm H2O): 8 cmH2O  Oxygen Concentration (%): 100 %  Resp: 23       Labs:  Recent Labs   Lab 12/10/21  1753 12/10/21  1639 12/10/21  1636 12/10/21  1416 12/10/21  1214   PH 7.36 7.37  --  7.41 7.40   PCO2 45 44  --  41 43   PO2 74* 55*  --  79* 67*   HCO3 25 26  --  26 26   O2PER 60 60  80 60 60 60      Recent Labs   Lab 12/10/21  1636 12/10/21  0958 12/10/21  0342 21  1959   WBC 20.1* 19.6* 19.4* 17.0*   HGB 8.8* 8.5* 8.6* 8.8*     Creatinine   Date Value Ref Range Status   12/10/2021 1.43 (H) 0.66 - 1.25 mg/dL Final   12/10/2021 1.48 (H) 0.66 - 1.25 mg/dL Final   12/10/2021 1.48 (H) 0.66 - 1.25 mg/dL Final   12/10/2021 1.58 (H) 0.66 - 1.25 mg/dL Final       Blood Flow (Circuit) LPM: 3.3 LPM  Gas Flow  LPM: 3 LPM  Gas FiO2   %: 60 %  ACT  (seconds): 288 seconds  Blood Temp  (degrees C): 32.6 C  Pulse Oximetry  (SpO2%): 100 %  Arterial Pressure  mmH mmHg      ECMO Issues including assessments and plan on DOS 12/10/2021:  Neuro: Sedated for mechanical ventilationand ECMO.  No acute distress.   NIRS R 43 but up and down seems like a contact issue and L is upper 50s Dopplerable lower ext pulses RASS goal: -3 reprefusion cannula placed on left improved extremity flow  CV: Cardiogenic shock.  Hemodynamically stable off pressors Pulsatility:50 mmHg  Pulm: Keep vent settings at rest settings as above. Remains on ecmo due to p:F111, FEN/Renal: Electrolytes stable w/ replacement protocols in place, cr 1.48 uop minimal on crrt k 3.7  Heme: ACT goal:180-200 leaving flows low 2-2.5 Hemoglobin 8.5 Minimal oozing around the ECMO cannulas  ID: Receiving empiric antibiotics  Cannulae: Position is acceptable on exam and the available imaging.  Distal perfusion cannulas are in place and patent.      I have personally reviewed the ECMO flows, oxygenation and CO2 clearance, anticoagulation, and cannula position.  I have also personally assessed the patient's systemic response with hemodynamics, oxygenation, ventilation, and bleeding.       The patient requires continued ECMO support and management in the ICU.  I have discussed patient care and treatment plan with the primary team.      Marlo Fyr MD  Critical Care Cardiology  [phone]    December 10, 2021

## 2021-12-11 NOTE — PROGRESS NOTES
ECMO Shift Summary: 19:00-7:00      ECMO Equipment:  Console serial number: [id number]  Circuit Lot number: [id number]  Oxygenator Lot number: [id number]      Patient remains on VA ECMO, all equipment is functioning and alarms are appropriately set. RPM's 3100 with flow range 2.68-2.86 L/min. Sweep gas is at 1 LPM and FiO2 80%. Circuit remains free of air, clot and fibrin. Cannulas are secure with no bleeding from site. Extremities are warm.     Significant Shift Events: None    Vent settings:  Ventilation Mode: CMV/AC  (Continuous Mandatory Ventilation/ Assist Control)  FiO2 (%): 70 %  Rate Set (breaths/minute): 16 breaths/min  Tidal Volume Set (mL): 480 mL  PEEP (cm H2O): 8 cmH2O  Oxygen Concentration (%): 70 %  Resp: 18  .    Heparin is running at 1300 u/hr, ACT range 152-173.    Urine output is as per charted, on CRRT, blood loss was minimal. Product was not given this shift.       Intake/Output Summary (Last 24 hours) at 2021 0522  Last data filed at 2021 0500  Gross per 24 hour   Intake 1893.93 ml   Output 3980 ml   Net -2086.07 ml       ECHO:  No results found for this or any previous visit.  No results found for this or any previous visit.      CXR:  Recent Results (from the past 24 hour(s))   Echocardiogram Limited    Narrative    [id number]  [id number]  [id number]  [id number]^VERONICA^JAY^TASIA     [facility]  Echocardiography Laboratory  [address] [place] [id number]     Name: MINERVA GARAY  MRN: [id number]  : 1965  Study Date: 12/10/2021 12:13 PM  Age: 56 yrs  Gender: Male  Patient Location: [facility]  Reason For Study: MI  Ordering Physician: JAY MARTIN  Performed By: Mile Galo RDCS, NEHAT     BSA: 1.9 m2  Height: 69 in  Weight: 169 lb  BP: 131/43 mmHg  ______________________________________________________________________________  Procedure  Limited Portable Echo  Adult.  ______________________________________________________________________________  Interpretation Summary  VA ECMO turndown from 2.5LPM+ IABP to 1LPM and IABP off.  Baseline EF 15-20%. At 1LPM and IABP off EF 25-30%.  ______________________________________________________________________________  Left Ventricle  VA ECMO turndown from 2.5LPM+ IABP to 1LPM and IABP off.  Baseline EF 15-20%. At 1LPM and IABP off EF 25-30%.     Right Ventricle  Right ventricular function, chamber size, wall motion, and thickness are  normal.     Pericardium  No pericardial effusion is present.     ______________________________________________________________________________  Report approved by: Gina Nickerson 12/10/2021 12:51 PM     ______________________________________________________________________________          Labs:  Recent [facility]   Lab 12/11/21  0358 12/11/21  0356 12/11/21  0155 12/10/21  4405 12/10/21  2200   PH 7.37  --  7.38 7.37 7.39   PCO2 45  --  44 45 43   PO2 91  --  102 83 60*   HCO3 26  --  26 26 26   O2PER 70  70 80 70 70 60       Lab Results   Component Value Date    HGB 7.9 (L) 12/11/2021    PHGB 50 (H) 12/11/2021    PLT 73 (L) 12/11/2021    FIBR 961 (H) 12/11/2021    INR 1.04 12/11/2021    PTT 48 (H) 12/11/2021    DD >20.00 (HH) 12/11/2021    ANTCH 57 (L) 12/10/2021         Plan is possible decannulation today.      Danuta Davis, RT  ECMO Specialist  12/11/2021 5:22 AM

## 2021-12-12 ENCOUNTER — APPOINTMENT (OUTPATIENT)
Dept: GENERAL RADIOLOGY | Facility: CLINIC | Age: 56
End: 2021-12-12
Attending: NURSE PRACTITIONER
Payer: COMMERCIAL

## 2021-12-12 ENCOUNTER — APPOINTMENT (OUTPATIENT)
Dept: CARDIOLOGY | Facility: CLINIC | Age: 56
End: 2021-12-12
Payer: COMMERCIAL

## 2021-12-12 ENCOUNTER — ANESTHESIA (OUTPATIENT)
Dept: SURGERY | Facility: CLINIC | Age: 56
End: 2021-12-12
Payer: COMMERCIAL

## 2021-12-12 LAB
ABO/RH(D): NORMAL
ACT BLD: 160 SECONDS (ref 74–150)
ACT BLD: 160 SECONDS (ref 74–150)
ACT BLD: 165 SECONDS (ref 74–150)
ACT BLD: 169 SECONDS (ref 74–150)
ALBUMIN SERPL-MCNC: 1.8 G/DL (ref 3.4–5)
ALBUMIN SERPL-MCNC: 1.9 G/DL (ref 3.4–5)
ALP SERPL-CCNC: 69 U/L (ref 40–150)
ALP SERPL-CCNC: 69 U/L (ref 40–150)
ALP SERPL-CCNC: 70 U/L (ref 40–150)
ALP SERPL-CCNC: 74 U/L (ref 40–150)
ALT SERPL W P-5'-P-CCNC: 57 U/L (ref 0–70)
ALT SERPL W P-5'-P-CCNC: 59 U/L (ref 0–70)
ALT SERPL W P-5'-P-CCNC: 61 U/L (ref 0–70)
ALT SERPL W P-5'-P-CCNC: 64 U/L (ref 0–70)
ANION GAP SERPL CALCULATED.3IONS-SCNC: 11 MMOL/L (ref 3–14)
ANION GAP SERPL CALCULATED.3IONS-SCNC: 11 MMOL/L (ref 3–14)
ANION GAP SERPL CALCULATED.3IONS-SCNC: 6 MMOL/L (ref 3–14)
ANION GAP SERPL CALCULATED.3IONS-SCNC: 8 MMOL/L (ref 3–14)
ANTIBODY SCREEN: NEGATIVE
APTT PPP: 57 SECONDS (ref 22–38)
APTT PPP: 63 SECONDS (ref 22–38)
APTT PPP: 74 SECONDS (ref 22–38)
APTT PPP: 76 SECONDS (ref 22–38)
AST SERPL W P-5'-P-CCNC: 64 U/L (ref 0–45)
AST SERPL W P-5'-P-CCNC: 66 U/L (ref 0–45)
AST SERPL W P-5'-P-CCNC: 76 U/L (ref 0–45)
AST SERPL W P-5'-P-CCNC: 82 U/L (ref 0–45)
AT III ACT/NOR PPP CHRO: 85 % (ref 85–135)
BACTERIA SPT CULT: ABNORMAL
BACTERIA SPT CULT: ABNORMAL
BASE EXCESS BLDA CALC-SCNC: -0.3 MMOL/L (ref -9–1.8)
BASE EXCESS BLDA CALC-SCNC: -0.4 MMOL/L (ref -9–1.8)
BASE EXCESS BLDA CALC-SCNC: -0.4 MMOL/L (ref -9–1.8)
BASE EXCESS BLDA CALC-SCNC: -0.6 MMOL/L (ref -9–1.8)
BASE EXCESS BLDA CALC-SCNC: -0.7 MMOL/L (ref -9–1.8)
BASE EXCESS BLDA CALC-SCNC: -0.9 MMOL/L (ref -9–1.8)
BASE EXCESS BLDA CALC-SCNC: -1 MMOL/L (ref -9–1.8)
BASE EXCESS BLDA CALC-SCNC: 0 MMOL/L (ref -9–1.8)
BASE EXCESS BLDA CALC-SCNC: 0.2 MMOL/L (ref -9–1.8)
BASE EXCESS BLDA CALC-SCNC: 0.4 MMOL/L (ref -9–1.8)
BASE EXCESS BLDA CALC-SCNC: 0.7 MMOL/L (ref -9–1.8)
BASE EXCESS BLDV CALC-SCNC: 0.2 MMOL/L (ref -7.7–1.9)
BASE EXCESS BLDV CALC-SCNC: 0.7 MMOL/L (ref -7.7–1.9)
BILIRUB DIRECT SERPL-MCNC: 0.1 MG/DL (ref 0–0.2)
BILIRUB SERPL-MCNC: 0.3 MG/DL (ref 0.2–1.3)
BILIRUB SERPL-MCNC: 0.4 MG/DL (ref 0.2–1.3)
BUN SERPL-MCNC: 46 MG/DL (ref 7–30)
BUN SERPL-MCNC: 50 MG/DL (ref 7–30)
BUN SERPL-MCNC: 50 MG/DL (ref 7–30)
CA-I BLD-MCNC: 4.4 MG/DL (ref 4.4–5.2)
CA-I BLD-MCNC: 4.6 MG/DL (ref 4.4–5.2)
CA-I BLD-MCNC: 4.7 MG/DL (ref 4.4–5.2)
CALCIUM SERPL-MCNC: 8.2 MG/DL (ref 8.5–10.1)
CALCIUM SERPL-MCNC: 8.2 MG/DL (ref 8.5–10.1)
CALCIUM SERPL-MCNC: 8.4 MG/DL (ref 8.5–10.1)
CALCIUM SERPL-MCNC: 8.4 MG/DL (ref 8.5–10.1)
CALCIUM SERPL-MCNC: 8.6 MG/DL (ref 8.5–10.1)
CALCIUM SERPL-MCNC: 8.6 MG/DL (ref 8.5–10.1)
CHLORIDE BLD-SCNC: 104 MMOL/L (ref 94–109)
CHLORIDE BLD-SCNC: 104 MMOL/L (ref 94–109)
CHLORIDE BLD-SCNC: 105 MMOL/L (ref 94–109)
CHLORIDE BLD-SCNC: 107 MMOL/L (ref 94–109)
CO2 SERPL-SCNC: 24 MMOL/L (ref 20–32)
CO2 SERPL-SCNC: 24 MMOL/L (ref 20–32)
CO2 SERPL-SCNC: 25 MMOL/L (ref 20–32)
CREAT SERPL-MCNC: 1.08 MG/DL (ref 0.66–1.25)
CREAT SERPL-MCNC: 1.26 MG/DL (ref 0.66–1.25)
CREAT SERPL-MCNC: 1.37 MG/DL (ref 0.66–1.25)
CREAT SERPL-MCNC: 1.37 MG/DL (ref 0.66–1.25)
CRP SERPL-MCNC: 210 MG/L (ref 0–8)
D DIMER PPP FEU-MCNC: >20 UG/ML FEU (ref 0–0.5)
D DIMER PPP FEU-MCNC: >20 UG/ML FEU (ref 0–0.5)
ERYTHROCYTE [DISTWIDTH] IN BLOOD BY AUTOMATED COUNT: 13.8 % (ref 10–15)
ERYTHROCYTE [DISTWIDTH] IN BLOOD BY AUTOMATED COUNT: 13.9 % (ref 10–15)
ERYTHROCYTE [SEDIMENTATION RATE] IN BLOOD BY WESTERGREN METHOD: 79 MM/HR (ref 0–20)
FIBRINOGEN PPP-MCNC: 860 MG/DL (ref 170–490)
FIBRINOGEN PPP-MCNC: 970 MG/DL (ref 170–490)
GFR SERPL CREATININE-BSD FRML MDRD: 57 ML/MIN/1.73M2
GFR SERPL CREATININE-BSD FRML MDRD: 57 ML/MIN/1.73M2
GFR SERPL CREATININE-BSD FRML MDRD: 63 ML/MIN/1.73M2
GFR SERPL CREATININE-BSD FRML MDRD: 76 ML/MIN/1.73M2
GLUCOSE BLD-MCNC: 116 MG/DL (ref 70–99)
GLUCOSE BLD-MCNC: 117 MG/DL (ref 70–99)
GLUCOSE BLD-MCNC: 123 MG/DL (ref 70–99)
GLUCOSE BLD-MCNC: 123 MG/DL (ref 70–99)
GLUCOSE BLD-MCNC: 148 MG/DL (ref 70–99)
GLUCOSE BLD-MCNC: 160 MG/DL (ref 70–99)
GLUCOSE BLD-MCNC: 160 MG/DL (ref 70–99)
GLUCOSE BLD-MCNC: 161 MG/DL (ref 70–99)
GLUCOSE BLD-MCNC: 167 MG/DL (ref 70–99)
GLUCOSE BLD-MCNC: 167 MG/DL (ref 70–99)
GLUCOSE BLD-MCNC: 173 MG/DL (ref 70–99)
GLUCOSE BLDC GLUCOMTR-MCNC: 128 MG/DL (ref 70–99)
GLUCOSE BLDC GLUCOMTR-MCNC: 137 MG/DL (ref 70–99)
GLUCOSE BLDC GLUCOMTR-MCNC: 142 MG/DL (ref 70–99)
GLUCOSE BLDC GLUCOMTR-MCNC: 144 MG/DL (ref 70–99)
GLUCOSE BLDC GLUCOMTR-MCNC: 152 MG/DL (ref 70–99)
GLUCOSE BLDC GLUCOMTR-MCNC: 152 MG/DL (ref 70–99)
GRAM STAIN RESULT: ABNORMAL
GRAM STAIN RESULT: ABNORMAL
HCO3 BLD-SCNC: 25 MMOL/L (ref 21–28)
HCO3 BLD-SCNC: 26 MMOL/L (ref 21–28)
HCO3 BLD-SCNC: 27 MMOL/L (ref 21–28)
HCO3 BLDA-SCNC: 26 MMOL/L (ref 21–28)
HCO3 BLDA-SCNC: 26 MMOL/L (ref 21–28)
HCO3 BLDV-SCNC: 27 MMOL/L (ref 21–28)
HCO3 BLDV-SCNC: 28 MMOL/L (ref 21–28)
HCT VFR BLD AUTO: 22.7 % (ref 40–53)
HCT VFR BLD AUTO: 22.7 % (ref 40–53)
HCT VFR BLD AUTO: 23.2 % (ref 40–53)
HCT VFR BLD AUTO: 23.4 % (ref 40–53)
HGB BLD-MCNC: 7.4 G/DL (ref 13.3–17.7)
HGB BLD-MCNC: 7.5 G/DL (ref 13.3–17.7)
HGB BLD-MCNC: 7.6 G/DL (ref 13.3–17.7)
HGB BLD-MCNC: 7.7 G/DL (ref 13.3–17.7)
HGB FREE PLAS-MCNC: 30 MG/DL
INR PPP: 1.06 (ref 0.85–1.15)
INR PPP: 1.07 (ref 0.85–1.15)
INR PPP: 1.08 (ref 0.85–1.15)
INR PPP: 1.09 (ref 0.85–1.15)
LACTATE SERPL-SCNC: 0.7 MMOL/L (ref 0.7–2)
LACTATE SERPL-SCNC: 0.7 MMOL/L (ref 0.7–2)
LACTATE SERPL-SCNC: 0.8 MMOL/L (ref 0.7–2)
LACTATE SERPL-SCNC: 0.8 MMOL/L (ref 0.7–2)
LDH SERPL L TO P-CCNC: 729 U/L (ref 85–227)
MAGNESIUM SERPL-MCNC: 2.8 MG/DL (ref 1.6–2.3)
MAGNESIUM SERPL-MCNC: 3.1 MG/DL (ref 1.6–2.3)
MCH RBC QN AUTO: 31.1 PG (ref 26.5–33)
MCH RBC QN AUTO: 31.2 PG (ref 26.5–33)
MCH RBC QN AUTO: 31.6 PG (ref 26.5–33)
MCH RBC QN AUTO: 32 PG (ref 26.5–33)
MCHC RBC AUTO-ENTMCNC: 32.5 G/DL (ref 31.5–36.5)
MCHC RBC AUTO-ENTMCNC: 32.6 G/DL (ref 31.5–36.5)
MCHC RBC AUTO-ENTMCNC: 33 G/DL (ref 31.5–36.5)
MCHC RBC AUTO-ENTMCNC: 33.2 G/DL (ref 31.5–36.5)
MCV RBC AUTO: 96 FL (ref 78–100)
O2/TOTAL GAS SETTING VFR VENT: 40 %
O2/TOTAL GAS SETTING VFR VENT: 60 %
O2/TOTAL GAS SETTING VFR VENT: 70 %
OXYHGB MFR BLD: 94 % (ref 92–100)
OXYHGB MFR BLD: 95 % (ref 92–100)
OXYHGB MFR BLD: 96 % (ref 92–100)
OXYHGB MFR BLD: 97 % (ref 92–100)
OXYHGB MFR BLD: 98 % (ref 92–100)
OXYHGB MFR BLD: 98 % (ref 92–100)
OXYHGB MFR BLDA: 98 % (ref 75–100)
OXYHGB MFR BLDA: 98 % (ref 75–100)
OXYHGB MFR BLDV: 54 %
OXYHGB MFR BLDV: 67 %
PCO2 BLD: 43 MM HG (ref 35–45)
PCO2 BLD: 44 MM HG (ref 35–45)
PCO2 BLD: 45 MM HG (ref 35–45)
PCO2 BLD: 46 MM HG (ref 35–45)
PCO2 BLD: 46 MM HG (ref 35–45)
PCO2 BLD: 47 MM HG (ref 35–45)
PCO2 BLD: 48 MM HG (ref 35–45)
PCO2 BLD: 50 MM HG (ref 35–45)
PCO2 BLDA: 50 MM HG (ref 35–45)
PCO2 BLDA: 53 MM HG (ref 35–45)
PCO2 BLDV: 56 MM HG (ref 40–50)
PCO2 BLDV: 59 MM HG (ref 40–50)
PH BLD: 7.31 [PH] (ref 7.35–7.45)
PH BLD: 7.33 [PH] (ref 7.35–7.45)
PH BLD: 7.34 [PH] (ref 7.35–7.45)
PH BLD: 7.34 [PH] (ref 7.35–7.45)
PH BLD: 7.35 [PH] (ref 7.35–7.45)
PH BLD: 7.36 [PH] (ref 7.35–7.45)
PH BLD: 7.36 [PH] (ref 7.35–7.45)
PH BLD: 7.37 [PH] (ref 7.35–7.45)
PH BLDA: 7.3 [PH] (ref 7.35–7.45)
PH BLDA: 7.32 [PH] (ref 7.35–7.45)
PH BLDV: 7.28 [PH] (ref 7.32–7.43)
PH BLDV: 7.3 [PH] (ref 7.32–7.43)
PHOSPHATE SERPL-MCNC: 4.6 MG/DL (ref 2.5–4.5)
PHOSPHATE SERPL-MCNC: 4.6 MG/DL (ref 2.5–4.5)
PHOSPHATE SERPL-MCNC: 4.7 MG/DL (ref 2.5–4.5)
PLATELET # BLD AUTO: 77 10E3/UL (ref 150–450)
PLATELET # BLD AUTO: 78 10E3/UL (ref 150–450)
PLATELET # BLD AUTO: 79 10E3/UL (ref 150–450)
PLATELET # BLD AUTO: 82 10E3/UL (ref 150–450)
PO2 BLD: 106 MM HG (ref 80–105)
PO2 BLD: 110 MM HG (ref 80–105)
PO2 BLD: 138 MM HG (ref 80–105)
PO2 BLD: 145 MM HG (ref 80–105)
PO2 BLD: 162 MM HG (ref 80–105)
PO2 BLD: 77 MM HG (ref 80–105)
PO2 BLD: 86 MM HG (ref 80–105)
PO2 BLD: 87 MM HG (ref 80–105)
PO2 BLD: 89 MM HG (ref 80–105)
PO2 BLD: 90 MM HG (ref 80–105)
PO2 BLD: 98 MM HG (ref 80–105)
PO2 BLDA: 215 MM HG (ref 80–105)
PO2 BLDA: 267 MM HG (ref 80–105)
PO2 BLDV: 31 MM HG (ref 25–47)
PO2 BLDV: 39 MM HG (ref 25–47)
POTASSIUM BLD-SCNC: 3.5 MMOL/L (ref 3.4–5.3)
POTASSIUM BLD-SCNC: 3.5 MMOL/L (ref 3.4–5.3)
POTASSIUM BLD-SCNC: 3.7 MMOL/L (ref 3.4–5.3)
POTASSIUM BLD-SCNC: 3.8 MMOL/L (ref 3.4–5.3)
PROT SERPL-MCNC: 6.7 G/DL (ref 6.8–8.8)
PROT SERPL-MCNC: 6.9 G/DL (ref 6.8–8.8)
RBC # BLD AUTO: 2.37 10E6/UL (ref 4.4–5.9)
RBC # BLD AUTO: 2.37 10E6/UL (ref 4.4–5.9)
RBC # BLD AUTO: 2.41 10E6/UL (ref 4.4–5.9)
RBC # BLD AUTO: 2.44 10E6/UL (ref 4.4–5.9)
SODIUM SERPL-SCNC: 138 MMOL/L (ref 133–144)
SODIUM SERPL-SCNC: 139 MMOL/L (ref 133–144)
SODIUM SERPL-SCNC: 139 MMOL/L (ref 133–144)
SPECIMEN EXPIRATION DATE: NORMAL
TROPONIN I SERPL HS-MCNC: 3385 NG/L
TROPONIN I SERPL HS-MCNC: 3649 NG/L
TROPONIN I SERPL HS-MCNC: 4338 NG/L
TROPONIN I SERPL HS-MCNC: 4981 NG/L
UFH PPP CHRO-ACNC: 0.19 IU/ML
UFH PPP CHRO-ACNC: 0.27 IU/ML
UFH PPP CHRO-ACNC: 0.35 IU/ML
UFH PPP CHRO-ACNC: 0.37 IU/ML
WBC # BLD AUTO: 12.3 10E3/UL (ref 4–11)
WBC # BLD AUTO: 12.3 10E3/UL (ref 4–11)
WBC # BLD AUTO: 12.5 10E3/UL (ref 4–11)
WBC # BLD AUTO: 13.2 10E3/UL (ref 4–11)

## 2021-12-12 PROCEDURE — 84100 ASSAY OF PHOSPHORUS: CPT | Performed by: CLINICAL NURSE SPECIALIST

## 2021-12-12 PROCEDURE — 85610 PROTHROMBIN TIME: CPT | Performed by: NURSE PRACTITIONER

## 2021-12-12 PROCEDURE — 83735 ASSAY OF MAGNESIUM: CPT | Performed by: NURSE PRACTITIONER

## 2021-12-12 PROCEDURE — 99233 SBSQ HOSP IP/OBS HIGH 50: CPT | Mod: GC | Performed by: PSYCHIATRY & NEUROLOGY

## 2021-12-12 PROCEDURE — 82803 BLOOD GASES ANY COMBINATION: CPT | Performed by: NURSE PRACTITIONER

## 2021-12-12 PROCEDURE — 85520 HEPARIN ASSAY: CPT | Performed by: NURSE PRACTITIONER

## 2021-12-12 PROCEDURE — 94640 AIRWAY INHALATION TREATMENT: CPT | Mod: 76

## 2021-12-12 PROCEDURE — 84155 ASSAY OF PROTEIN SERUM: CPT | Performed by: NURSE PRACTITIONER

## 2021-12-12 PROCEDURE — 99233 SBSQ HOSP IP/OBS HIGH 50: CPT | Performed by: INTERNAL MEDICINE

## 2021-12-12 PROCEDURE — 85347 COAGULATION TIME ACTIVATED: CPT

## 2021-12-12 PROCEDURE — 250N000013 HC RX MED GY IP 250 OP 250 PS 637: Performed by: INTERNAL MEDICINE

## 2021-12-12 PROCEDURE — 86850 RBC ANTIBODY SCREEN: CPT | Performed by: SURGERY

## 2021-12-12 PROCEDURE — 93308 TTE F-UP OR LMTD: CPT

## 2021-12-12 PROCEDURE — 250N000009 HC RX 250: Performed by: STUDENT IN AN ORGANIZED HEALTH CARE EDUCATION/TRAINING PROGRAM

## 2021-12-12 PROCEDURE — 82248 BILIRUBIN DIRECT: CPT | Performed by: CLINICAL NURSE SPECIALIST

## 2021-12-12 PROCEDURE — 999N000015 HC STATISTIC ARTERIAL MONITORING DAILY

## 2021-12-12 PROCEDURE — 85027 COMPLETE CBC AUTOMATED: CPT | Performed by: NURSE PRACTITIONER

## 2021-12-12 PROCEDURE — 94640 AIRWAY INHALATION TREATMENT: CPT

## 2021-12-12 PROCEDURE — 90947 DIALYSIS REPEATED EVAL: CPT

## 2021-12-12 PROCEDURE — 82330 ASSAY OF CALCIUM: CPT | Performed by: NURSE PRACTITIONER

## 2021-12-12 PROCEDURE — 33949 ECMO/ECLS DAILY MGMT ARTERY: CPT | Performed by: INTERNAL MEDICINE

## 2021-12-12 PROCEDURE — 85652 RBC SED RATE AUTOMATED: CPT | Performed by: INTERNAL MEDICINE

## 2021-12-12 PROCEDURE — 82805 BLOOD GASES W/O2 SATURATION: CPT | Performed by: INTERNAL MEDICINE

## 2021-12-12 PROCEDURE — 85379 FIBRIN DEGRADATION QUANT: CPT | Performed by: NURSE PRACTITIONER

## 2021-12-12 PROCEDURE — 82947 ASSAY GLUCOSE BLOOD QUANT: CPT | Performed by: NURSE PRACTITIONER

## 2021-12-12 PROCEDURE — 85384 FIBRINOGEN ACTIVITY: CPT | Performed by: NURSE PRACTITIONER

## 2021-12-12 PROCEDURE — 71045 X-RAY EXAM CHEST 1 VIEW: CPT

## 2021-12-12 PROCEDURE — 99291 CRITICAL CARE FIRST HOUR: CPT | Mod: 25 | Performed by: INTERNAL MEDICINE

## 2021-12-12 PROCEDURE — 999N000075 HC STATISTIC IABP MONITORING

## 2021-12-12 PROCEDURE — 83605 ASSAY OF LACTIC ACID: CPT | Performed by: INTERNAL MEDICINE

## 2021-12-12 PROCEDURE — 250N000011 HC RX IP 250 OP 636: Performed by: PHYSICIAN ASSISTANT

## 2021-12-12 PROCEDURE — 85730 THROMBOPLASTIN TIME PARTIAL: CPT | Performed by: NURSE PRACTITIONER

## 2021-12-12 PROCEDURE — 258N000003 HC RX IP 258 OP 636: Performed by: NURSE PRACTITIONER

## 2021-12-12 PROCEDURE — 250N000011 HC RX IP 250 OP 636: Performed by: STUDENT IN AN ORGANIZED HEALTH CARE EDUCATION/TRAINING PROGRAM

## 2021-12-12 PROCEDURE — 84484 ASSAY OF TROPONIN QUANT: CPT | Performed by: NURSE PRACTITIONER

## 2021-12-12 PROCEDURE — 258N000003 HC RX IP 258 OP 636: Performed by: PHYSICIAN ASSISTANT

## 2021-12-12 PROCEDURE — 93308 TTE F-UP OR LMTD: CPT | Mod: 26 | Performed by: INTERNAL MEDICINE

## 2021-12-12 PROCEDURE — 86140 C-REACTIVE PROTEIN: CPT | Performed by: NURSE PRACTITIONER

## 2021-12-12 PROCEDURE — 80051 ELECTROLYTE PANEL: CPT | Performed by: NURSE PRACTITIONER

## 2021-12-12 PROCEDURE — 999N000157 HC STATISTIC RCP TIME EA 10 MIN

## 2021-12-12 PROCEDURE — 80051 ELECTROLYTE PANEL: CPT | Performed by: CLINICAL NURSE SPECIALIST

## 2021-12-12 PROCEDURE — 93005 ELECTROCARDIOGRAM TRACING: CPT

## 2021-12-12 PROCEDURE — 250N000009 HC RX 250: Performed by: CLINICAL NURSE SPECIALIST

## 2021-12-12 PROCEDURE — 85300 ANTITHROMBIN III ACTIVITY: CPT | Performed by: NURSE PRACTITIONER

## 2021-12-12 PROCEDURE — 83615 LACTATE (LD) (LDH) ENZYME: CPT | Performed by: NURSE PRACTITIONER

## 2021-12-12 PROCEDURE — 33949 ECMO/ECLS DAILY MGMT ARTERY: CPT

## 2021-12-12 PROCEDURE — 250N000013 HC RX MED GY IP 250 OP 250 PS 637: Performed by: STUDENT IN AN ORGANIZED HEALTH CARE EDUCATION/TRAINING PROGRAM

## 2021-12-12 PROCEDURE — 71045 X-RAY EXAM CHEST 1 VIEW: CPT | Mod: 26 | Performed by: STUDENT IN AN ORGANIZED HEALTH CARE EDUCATION/TRAINING PROGRAM

## 2021-12-12 PROCEDURE — 85014 HEMATOCRIT: CPT | Performed by: CLINICAL NURSE SPECIALIST

## 2021-12-12 PROCEDURE — 250N000013 HC RX MED GY IP 250 OP 250 PS 637: Performed by: NURSE PRACTITIONER

## 2021-12-12 PROCEDURE — 250N000009 HC RX 250: Performed by: NURSE PRACTITIONER

## 2021-12-12 PROCEDURE — 200N000002 HC R&B ICU UMMC

## 2021-12-12 PROCEDURE — 93010 ELECTROCARDIOGRAM REPORT: CPT | Mod: 59 | Performed by: INTERNAL MEDICINE

## 2021-12-12 PROCEDURE — 250N000013 HC RX MED GY IP 250 OP 250 PS 637: Performed by: PHYSICIAN ASSISTANT

## 2021-12-12 PROCEDURE — 83051 HEMOGLOBIN PLASMA: CPT | Performed by: NURSE PRACTITIONER

## 2021-12-12 PROCEDURE — 250N000009 HC RX 250: Performed by: PHYSICIAN ASSISTANT

## 2021-12-12 PROCEDURE — 86923 COMPATIBILITY TEST ELECTRIC: CPT | Performed by: INTERNAL MEDICINE

## 2021-12-12 PROCEDURE — 82805 BLOOD GASES W/O2 SATURATION: CPT | Performed by: NURSE PRACTITIONER

## 2021-12-12 PROCEDURE — 94003 VENT MGMT INPAT SUBQ DAY: CPT

## 2021-12-12 RX ORDER — NOREPINEPHRINE BITARTRATE 0.06 MG/ML
.01-.6 INJECTION, SOLUTION INTRAVENOUS CONTINUOUS
Status: DISCONTINUED | OUTPATIENT
Start: 2021-12-12 | End: 2021-12-17

## 2021-12-12 RX ORDER — NOREPINEPHRINE BITARTRATE 0.06 MG/ML
INJECTION, SOLUTION INTRAVENOUS
Status: DISCONTINUED
Start: 2021-12-12 | End: 2021-12-13 | Stop reason: HOSPADM

## 2021-12-12 RX ADMIN — PROPOFOL 10 MCG/KG/MIN: 10 INJECTION, EMULSION INTRAVENOUS at 17:31

## 2021-12-12 RX ADMIN — QUETIAPINE FUMARATE 50 MG: 50 TABLET ORAL at 09:20

## 2021-12-12 RX ADMIN — QUETIAPINE FUMARATE 50 MG: 50 TABLET ORAL at 02:43

## 2021-12-12 RX ADMIN — Medication 40 MG: at 07:18

## 2021-12-12 RX ADMIN — Medication 2 PACKET: at 16:06

## 2021-12-12 RX ADMIN — CALCIUM CHLORIDE, MAGNESIUM CHLORIDE, SODIUM CHLORIDE, SODIUM BICARBONATE, POTASSIUM CHLORIDE AND SODIUM PHOSPHATE DIBASIC DIHYDRATE 12.5 ML/KG/HR: 3.68; 3.05; 6.34; 3.09; .314; .187 INJECTION INTRAVENOUS at 16:56

## 2021-12-12 RX ADMIN — INSULIN ASPART 1 UNITS: 100 INJECTION, SOLUTION INTRAVENOUS; SUBCUTANEOUS at 19:56

## 2021-12-12 RX ADMIN — Medication 0.01 MCG/KG/MIN: at 21:12

## 2021-12-12 RX ADMIN — Medication 2 PACKET: at 19:31

## 2021-12-12 RX ADMIN — CHLORHEXIDINE GLUCONATE 0.12% ORAL RINSE 15 ML: 1.2 LIQUID ORAL at 07:18

## 2021-12-12 RX ADMIN — TICAGRELOR 90 MG: 90 TABLET ORAL at 19:31

## 2021-12-12 RX ADMIN — INSULIN ASPART 1 UNITS: 100 INJECTION, SOLUTION INTRAVENOUS; SUBCUTANEOUS at 12:17

## 2021-12-12 RX ADMIN — TICAGRELOR 90 MG: 90 TABLET ORAL at 07:18

## 2021-12-12 RX ADMIN — CALCIUM CHLORIDE, MAGNESIUM CHLORIDE, SODIUM CHLORIDE, SODIUM BICARBONATE, POTASSIUM CHLORIDE AND SODIUM PHOSPHATE DIBASIC DIHYDRATE 12.5 ML/KG/HR: 3.68; 3.05; 6.34; 3.09; .314; .187 INJECTION INTRAVENOUS at 22:08

## 2021-12-12 RX ADMIN — CALCIUM CHLORIDE 1 G: 100 INJECTION, SOLUTION INTRAVENOUS at 07:18

## 2021-12-12 RX ADMIN — MULTIVIT AND MINERALS-FERROUS GLUCONATE 9 MG IRON/15 ML ORAL LIQUID 15 ML: at 07:17

## 2021-12-12 RX ADMIN — QUETIAPINE FUMARATE 50 MG: 50 TABLET ORAL at 17:31

## 2021-12-12 RX ADMIN — HEPARIN SODIUM 2050 UNITS/HR: 1000 INJECTION INTRAVENOUS; SUBCUTANEOUS at 06:07

## 2021-12-12 RX ADMIN — THIAMINE HCL TAB 100 MG 100 MG: 100 TAB at 07:18

## 2021-12-12 RX ADMIN — HEPARIN SODIUM 2200 UNITS/HR: 1000 INJECTION INTRAVENOUS; SUBCUTANEOUS at 19:30

## 2021-12-12 RX ADMIN — Medication 2 PACKET: at 07:19

## 2021-12-12 RX ADMIN — CEFTRIAXONE SODIUM 2 G: 2 INJECTION, POWDER, FOR SOLUTION INTRAMUSCULAR; INTRAVENOUS at 09:20

## 2021-12-12 RX ADMIN — ASPIRIN 81 MG CHEWABLE TABLET 81 MG: 81 TABLET CHEWABLE at 07:17

## 2021-12-12 RX ADMIN — CALCIUM CHLORIDE, MAGNESIUM CHLORIDE, SODIUM CHLORIDE, SODIUM BICARBONATE, POTASSIUM CHLORIDE AND SODIUM PHOSPHATE DIBASIC DIHYDRATE 12.5 ML/KG/HR: 3.68; 3.05; 6.34; 3.09; .314; .187 INJECTION INTRAVENOUS at 05:32

## 2021-12-12 RX ADMIN — Medication 710 UNITS: at 04:09

## 2021-12-12 RX ADMIN — CALCIUM CHLORIDE, MAGNESIUM CHLORIDE, SODIUM CHLORIDE, SODIUM BICARBONATE, POTASSIUM CHLORIDE AND SODIUM PHOSPHATE DIBASIC DIHYDRATE 12.5 ML/KG/HR: 3.68; 3.05; 6.34; 3.09; .314; .187 INJECTION INTRAVENOUS at 05:31

## 2021-12-12 RX ADMIN — IPRATROPIUM BROMIDE AND ALBUTEROL SULFATE 3 ML: 2.5; .5 SOLUTION RESPIRATORY (INHALATION) at 16:14

## 2021-12-12 RX ADMIN — INSULIN ASPART 1 UNITS: 100 INJECTION, SOLUTION INTRAVENOUS; SUBCUTANEOUS at 03:45

## 2021-12-12 RX ADMIN — FOLIC ACID 1 MG: 1 TABLET ORAL at 07:18

## 2021-12-12 RX ADMIN — CALCIUM CHLORIDE, MAGNESIUM CHLORIDE, SODIUM CHLORIDE, SODIUM BICARBONATE, POTASSIUM CHLORIDE AND SODIUM PHOSPHATE DIBASIC DIHYDRATE 12.5 ML/KG/HR: 3.68; 3.05; 6.34; 3.09; .314; .187 INJECTION INTRAVENOUS at 12:17

## 2021-12-12 RX ADMIN — Medication 100 MCG/HR: at 10:20

## 2021-12-12 RX ADMIN — IPRATROPIUM BROMIDE AND ALBUTEROL SULFATE 3 ML: 2.5; .5 SOLUTION RESPIRATORY (INHALATION) at 20:12

## 2021-12-12 RX ADMIN — INSULIN ASPART 1 UNITS: 100 INJECTION, SOLUTION INTRAVENOUS; SUBCUTANEOUS at 16:06

## 2021-12-12 RX ADMIN — CHLORHEXIDINE GLUCONATE 0.12% ORAL RINSE 15 ML: 1.2 LIQUID ORAL at 20:20

## 2021-12-12 RX ADMIN — IPRATROPIUM BROMIDE AND ALBUTEROL SULFATE 3 ML: 2.5; .5 SOLUTION RESPIRATORY (INHALATION) at 08:34

## 2021-12-12 RX ADMIN — IPRATROPIUM BROMIDE AND ALBUTEROL SULFATE 3 ML: 2.5; .5 SOLUTION RESPIRATORY (INHALATION) at 12:50

## 2021-12-12 RX ADMIN — Medication 2 PACKET: at 12:18

## 2021-12-12 ASSESSMENT — ACTIVITIES OF DAILY LIVING (ADL)
ADLS_ACUITY_SCORE: 15
ADLS_ACUITY_SCORE: 17
ADLS_ACUITY_SCORE: 15
ADLS_ACUITY_SCORE: 17
ADLS_ACUITY_SCORE: 15
ADLS_ACUITY_SCORE: 15
ADLS_ACUITY_SCORE: 17
ADLS_ACUITY_SCORE: 15

## 2021-12-12 ASSESSMENT — MIFFLIN-ST. JEOR: SCORE: 1517.38

## 2021-12-12 NOTE — PLAN OF CARE
Major Shift Events:  ECMO flowing at 2.8, 3000 rpm, 70%. TF off for decann. CRRT continues at goal. Prop off, fentanyl and seroquel used. Neuro unchanged. EKG reads acute STEMI this am, MD aware and will continue to monitor. No hemodynamic changes.     Plan: possible ECHO and decann this am    For vital signs and complete assessments, please see documentation flowsheets.

## 2021-12-12 NOTE — PROGRESS NOTES
"    [facility]   Critical Care Cardiology - Progress Note    Bruce Blount MRN: [id number]  Age: 56 year old, : 1965  Date: 2021    Assessment and Plan:  Bruce Blount is a 56 year old male with PMHx current tobacco use and ETOH abuse who presents to [facility] after out of hospital cardiac arrest.      Per EMS and brother, patient was in his usual state of health prior to arrest. Patients brother heard a \"thump\" and found patient unresponsive and agonal breathing. 911 called and CPR initiated. EMS arrived within 4 minutes. Initial rhythm VF--> shocked x ~7 with ROSC upon arrival to ED. Total CPR team per EMS ~ 40 minutes. 3 mg Epinephrine, 300 mg Amio given via EMS. Patient EKG reveals Valentín inferior/posterior leads with reciprocal changes. Patient initially presented with an Igel and was intubated with ETT in the ED. He arrested and was again shocked in the ED with ROSC. Taken urgently to CCL where he underwent coronary angiogram and again arrested requiring manual CPR and was then cannulated on VA ECMO via right with 17 [place] cannula RCFA, 25 Fr cannula RCFV. Coronary angiogram revealed  of RCA and acute culprit lesion of LCx/OM1, s/p PCI to pLCx, mid LCx, and OM1 with TERRY. IABP placed for decreased pulsatility. Thermogard cooling cath placed and patient was transferred to ICU for further management.     Today's Plan:  - Increase in CO2 during ecmo turn down. Will increase RR to prevent this.  - Plan for OR decannulation tomorrow morning  - Underwent ECMO turn down this morning.     Neurology  # Concern for anoxic brain injury    # Hx of possible ETOH abuse  Intubated, sedated. Cooled to 33 degrees. Now rewarmed. Initial head CT without acute pathology. Able to follow commands this morning per NCC.   - Continue versed, fentanyl for sedation with a RASS goal -2 to -3.  - Neuro-crit consulted signed off  - Palliative care consulted.   - Folate, Thiamine and multi vitamin " for ETOH abuse. CIWA as needed when sedation weaned     Cardiovascular  # Refractory VF cardiac arrest 2/2 secondary to STEMI  # Cardiogenic shock requiring VA ECMO  # Ischemic Cardiomyopathy (EF 10-15%)  # S/p PCI pLCx, mLCx, OM1  # Residual RCA   # HTN, HLD  Peripheral V-A ECMO inserted via RCFA and RCFV 17/25 fr.  troponin peak 124K. Pulsatility improved. Has remained off pressors 12/9.   - plan for decannulation tomorrow.   - Did turndown study today  - ECMO cares:   - Flows 2.5-3l   - Sweep 1L   - ACT goal: N/A.  On bivalirudin given concern for HIT  - GDMT   - Continue ASA 81mg and ticagrelor 90mg BID    - Hold Lipitor for now given shock liver   - Hold ACE/ARB for now given likely reduced renal fxn after arrest   - Holding beta blocker given shock  -  of RCA not intervened upon    Pulmonary  # Acute hypoxemic respiratory failure requiring intubation  # Probable aspiration pneumonia  # Rib Fractures  # Sternal Fracture  # Tobacco Abuse (2PPD)  Had CO2 retention during with subsequent decrease in PH after ECMO turn down. Will increase RR to avoid this during decannulation.   Vent Settings: CMV 18/480/8/60%  - goal CO2 40-50  - Wean vent as able  - Daily CXR  - Serial ABGs   - duoneb Q6 hours    Gastrointestinal, Nutrition  # Shock liver 2/2 cardiac arrest, improving  # Loose Stool  No known medical hx.   - Monitor LFTs BID  - TF at goal  - Bowel regimen: hold for now given loose stools  - GI Prophylaxis: Protonix 40 mg daily    Renal, Electrolytes  # Acute Renal Injury  # Lactic acidosis  # Hypoalbuminemia  # Hyperkalemia, resolved   # Hypocalcemia, resolved  Unclear creatinine baseline, on admission 0.87. UOP poor 12/8 with hyperkalemia. Started on CRRT. LA peaked again at 2.7 in the setting of LLE limb ischemia, now normalized.  - volume removal; goal net negative 1000-2000cc  - renal consult; appreciate recommendations  - lactic acid Q6 hours  - Monitor urine output    Infectious Disease  # Aspiration  Pneumonia (klebsiella, staph aureus)  # Leukocytosis, improving  # Lactic acidosis, resolved  WBC 12.5. Afebrile overnight.  - Monitor for signs of infection  - Daily blood cultures while on ECMO   Cultures thus far:              - sputum 12/6: staph aureus, staph dysgalactia, klebsiella   - sputum 12/7: staph aureus   - sputum 12/8: gram positive cocci  Antibiotics              - Vancomycin 12/6 - 12/7 (MRSA negative)              - Zosyn 12/6 - 12/9   - Rocephin 12/9 - present (plan for 7).    Hematology  # Anemia  # Concern for HIT  # Thrombocytopenia  # Loss of bilateral DP pulses, resolved  Hgb stable. No e/o bleeding. US with monophasic flow to DP. LLE distal reperfusion cannula placed 12/8 in the setting of worsened LLE mottling.  Never lost Dopplerable pulses.  Platelet continuing to fall 12/9 - transitioned to bivalirudin and HIT screen was sent  - follow HIT screen  - Transfuse for Hgb < 8  - DVT PPX: Bivalrudin for now    Endocrinology  # Hyperglycemia   No known medical history. Hemoglobin A1c 5.6%  - insulin gtt as needed    MSK/Skin  # Mottling LLE s/p LLE distal reperfusion cannula, improved  Pulses remain intact, however skin appears more mottled. Probable PAD. Exacerbated by IABP. Worsened throughout the day 12/8 with rising LA, CK.  LLE distal reperfusion cannula placed  - continue to monitor  - continue bivalrubin via distal reperfusion cannula    Pertinent Lines  R femoral arterial and venous ECMO cannulae December 6, 2021 (ECMO cannula)  RCFA arterial line December 6, 2021 (R distal reperfusion cannula)  LCFA arterial line December 8, 2021 (L distal reperfusion cannula)  L femoral arterial line December 6, 2021 (IABP)  Rectal tube December 6, 2021  R radial arterial line December 6, 2021  ETT December 6, 2021  Gutiérrez catheter December 6, 2021  OG tube December 6, 2021    ICU Cares:  Daily CXR: ETT 3cm from jacques.  IABP well positioned.  Worsened perihilar opacities  Fluids/Feeds: TF at goal.  Fluids not indicated  DVT Prophylaxis: bivalrudin ggt  GI Prophylaxis: protonix  Bowel Regimen: hold given loose stools  Anticipated Floor Transfer: N/A    Patient seen and discussed with Dr. Marlo Fry.  Assessment and plan as above.    Jorge Reyes Castro, MD, MS  Cardiology fellow      Interval History:  No acute events overnight.  ECG this morning reporting STEMI although no evident changes compare to previous ECGs. Stable troponin and Hemodynamics.      Objective Findings:  Temp:  [96.9  F (36.1  C)-98.6  F (37  C)] 98.4  F (36.9  C)  Pulse:  [] 95  Resp:  [16-22] 16  MAP:  [61 mmHg-115 mmHg] 71 mmHg  Arterial Line BP: ()/(31-98) 108/42  FiO2 (%):  [60 %] 60 %  SpO2:  [90 %-100 %] 96 %    I/O:  Intake/Output Summary (Last 24 hours) at 12/10/2021 0854  Last data filed at 12/10/2021 0800  Gross per 24 hour   Intake 2327.13 ml   Output 4166 ml   Net -1838.87 ml     Physical Exam:  GEN: intubated, and sedated.   HEENT: no appreciable cranial trauma. Pupils 3mm, reactive  Pulm: passive movement of air in both lungs  Cardiac: regular rhythm. Tachycardia. No murmur, rub, gallop  ECMO cannula: insertion site clean, dry, intact. No appreciable hematoma  GI: soft, non distended  Neuro: pupils reactive.  Otherwise sedated  Integument: no appreciable skin breakdown  Vascular: LLE toes cold, however foot symmetrically warm when compared to right foot      Medications:    aspirin  81 mg Per Feeding Tube Daily     cefTRIAXone  2 g Intravenous Q24H     chlorhexidine  15 mL Swish & Spit BID     folic acid  1 mg Oral or Feeding Tube Daily     insulin aspart  1-6 Units Subcutaneous Q4H     ipratropium - albuterol 0.5 mg/2.5 mg/3 mL  3 mL Nebulization 4x daily     multivitamins w/minerals  15 mL Per Feeding Tube Daily     pantoprazole  40 mg Oral QAM AC     protein modular  2 packet Per Feeding Tube 4x Daily     QUEtiapine  50 mg Oral Q8H     thiamine  100 mg Oral or Feeding Tube Daily     ticagrelor  90 mg Oral or  Feeding Tube BID       dextrose       dextrose       CRRT replacement solution 12.5 mL/kg/hr (12/12/21 1217)     fentaNYL 100 mcg/hr (12/12/21 1300)     heparin (PRESSURE BAG) 2 unit/mL in 0.9% NaCl 3 mL/hr (12/10/21 1633)     heparin (PRESSURE BAG) 2 unit/mL in 0.9% NaCl 3 mL/hr (12/10/21 1634)     HEParin 2,200 Units/hr (12/12/21 1300)     midazolam Stopped (12/10/21 1000)     CRRT replacement solution 200 mL/hr at 12/11/21 0435     CRRT replacement solution 12.5 mL/kg/hr (12/12/21 1217)     propofol (DIPRIVAN) infusion 10 mcg/kg/min (12/12/21 1300)         Labs:   [facility]  Recent Labs   Lab 12/12/21  1203 12/12/21  0949 12/12/21  0939 12/12/21  0338 12/12/21  0337 12/11/21  2354 12/11/21  2154 12/11/21  1552 12/11/21  1551 12/11/21  0941 12/11/21  0940   NA  --   --  139  139  --  138  --  139  139  --  139  --  140  140   POTASSIUM  --   --  3.7  3.7  --  3.8  --  3.7  3.7  --  3.7  --  3.9  3.9   CHLORIDE  --   --  104  104  --  107  --  107  107  --  107  --  108  108   CO2  --   --  24  24  --  25  --  26  26  --  25  --  25  25   ANIONGAP  --   --  11  11  --  6  --  6  6  --  7  --  7  7   * 116* 123*  123*  117*   < > 161*  144*   < > 117*  117*  114*   < > 145*   < > 128*  128*   BUN  --   --  46*  46*  --  46*  --  43*  43*  --  42*  --  44*  44*   CR  --   --  1.26*  1.26*  --  1.08  --  1.24  1.24  --  1.18  --  1.24  1.24   GFRESTIMATED  --   --  63  63  --  76  --  65  65  --  69  --  65  65   BRIDGETTE  --   --  8.6  8.6  --  8.2*  --  8.0*  8.0*  --  8.0*  --  8.1*  8.1*   MAG  --   --  2.8*  --  2.8*  --  3.0*  --  2.9*  --  3.0*   PHOS  --   --  4.7*  --  4.6*  --  4.5  --   --   --  4.2   PROTTOTAL  --   --  6.7*  --  6.7*  --  6.6*  --  6.8  --  6.8   ALBUMIN  --   --  1.8*  1.8*  --  1.9*  --  1.8*  1.8*  --  1.9*  --  2.0*  2.0*   BILITOTAL  --   --  0.3  --  0.4  --  0.4  --  0.4  --  0.5   ALKPHOS  --   --  70  --  69  --  65  --  66  --  68   AST  --   --   76*  --  82*  --  86*  --  92*  --  100*   ALT  --   --  61  --  64  --  66  --  67  --  69    < > = values in this interval not displayed.     CBC  Recent Labs   Lab 12/12/21  0939 12/12/21  0338 12/11/21  2154 12/11/21  1551   WBC 12.5* 12.3* 11.3* 13.9*   RBC 2.41* 2.44* 2.44* 2.51*   HGB 7.7* 7.6* 7.6* 8.1*   HCT 23.2* 23.4* 23.4* 24.0*   MCV 96 96 96 96   MCH 32.0 31.1 31.1 32.3   MCHC 33.2 32.5 32.5 33.8   RDW 13.8 13.8 13.7 13.7   PLT 79* 77* 72* 75*     Arterial Blood Gas  Recent Labs   Lab 12/12/21  1159 12/12/21  0949 12/12/21  0744 12/12/21  0734   PH 7.34* 7.35 7.33* 7.31*   PCO2 47* 46* 48* 50*   PO2 145* 162* 90 86   HCO3 26 26 25 25   O2PER 60 60 60 60         Pertinent Imaging Studies:  Coronary angiogram:   Refractory VT/VF cardiac arrest.  Cardiogenic shock.  STEMI involving the OM1 as culprit.  Three vessel severe CAD involving the  of the RCA, mid LCx and OM1 severe disease with occlusion of OM1 as culprit in STEMI, and moderate proximal LAD lesions likely hemodynamically significant.  CPR  VA ECMO placement.  IABP placement.  Thermogard placement with initiation of hypothermia protocol.  Right radial arterial line placement.  PCI with three drug eluting stents to the proximal, mid LCx and OM1.    LE Arterial Duplex, bilateral:  1. Right leg: Patent arteries with post obstructive diminished waveforms likely due to ECMO cannula.    2. Left leg: Patent arteries.     Echo:   Technically difficult study.  VA ECMO at 2.8L/min.  Left ventricular function is decreased. The ejection fraction is 10-15% (severely reduced).  Global right ventricular function is severely reduced.  Septum midline.  The aortic valve appears to remain closed.  Trivial pericardial effusion is present.  Previous study not available for comparison.    CT Head:  No acute intracranial pathology.    CT Chest, Abdomen, Pelvis:  1. Bilateral dependent consolidative opacities. Differential diagnosis includes atelectasis, aspiration,  infection.  2. Bilateral anterior rib fractures and sternal fracture likely secondary to CPR.  3. Endotracheal tube terminates 2.1 cm above the jacques. Consider retracting 2 cm.      Jorge Reyes Castro, MD, MS  Cardiology Fellow

## 2021-12-12 NOTE — PROGRESS NOTES
ECMO Shift Summary: 1900-0700      ECMO Equipment:  Console serial number: [id number]  Circuit Lot number: [id number]  Oxygenator Lot number: [id number]      Patient remains on VA ECMO, all equipment is functioning and alarms are appropriately set. RPM's 3000 with flow range 2.5-3.0 L/min. Sweep gas is at 1 LPM and FiO2 70%. Circuit remains free of air, clot and fibrin. Cannulas are secure with no bleeding from site. Extremities are warm.     Significant Shift Events: None    Vent settings:  Ventilation Mode: CMV/AC  (Continuous Mandatory Ventilation/ Assist Control)  FiO2 (%): 60 %  Rate Set (breaths/minute): 16 breaths/min  Tidal Volume Set (mL): 550 mL  PEEP (cm H2O): 8 cmH2O  Oxygen Concentration (%): 60 %  Resp: 16  .    Heparin is running at 2050 u/hr, ACT range 160-169. Increased gtt x2 and bolus of 710 units x1     Urine output is aprox 30ml/hr , blood loss was none. No blood product given.       Intake/Output Summary (Last 24 hours) at 12/12/2021 0506  Last data filed at 12/12/2021 0500  Gross per 24 hour   Intake 1650.29 ml   Output 3355 ml   Net -1704.71 ml       ECHO:  No results found for this or any previous visit.  No results found for this or any previous visit.      CXR:  Recent Results (from the past 24 hour(s))   XR Chest Port 1 View    Impression    RESIDENT PRELIMINARY INTERPRETATION  IMPRESSION:     1. Stable support devices.  2. Persistent bilateral pulmonary opacities.       Labs:  Recent Labs   Lab 12/12/21  0338 12/12/21  0204 12/11/21  2354 12/11/21  2154   PH 7.34* 7.35 7.35 7.34*   PCO2 48* 48* 48* 50*   PO2 106* 89 138* 137*   HCO3 26 26 27 26   O2PER 60  60  70 60 60 60       Lab Results   Component Value Date    HGB 7.6 (L) 12/12/2021    PHGB 30 (H) 12/12/2021    PLT 77 (L) 12/12/2021    FIBR 970 (H) 12/12/2021    INR 1.08 12/12/2021    PTT 57 (H) 12/12/2021    DD >20.00 (HH) 12/12/2021    ANTCH 65 (L) 12/11/2021         Plan is turndown this morning and possible  decannulation.      Maylin Harris, RT  ECMO Specialist  12/12/2021 5:06 AM

## 2021-12-12 NOTE — PLAN OF CARE
Major Shift Events: Started following commands when sedation paused. FiO2 weaned on vent and circuit. Pt remains on CRRT, ECMO, IABP, vent.    Plan: Decann tomorrow 12/13.    For vital signs and complete assessments, please see documentation flowsheets.

## 2021-12-12 NOTE — PROGRESS NOTES
"[facility]: [place]  NeuroCritical Care Progress Note    Patient Name:  Bruce Blount  MRN:  [id number]    :  1965  Date of Service:  2021  Primary care provider:  No Ref-Primary, Physician      A/P:   Bruce Blount is a 56 year old male admitted on 2021 without significant PMH who presents for CA. Per brother, he heard a thump and found pt unresponsive and agonal breathing. EMS called and arrived in 34 minutes where pt was found to be in VF and shocked x7 with ROSC upon ED arrival. 3 epi and 300 amio given. On EKG pt had ST elevation and was taken to cath lab where he arrested again and was cannulated for VA ECMO and placement of IABP.     50 Fentanyl   CTH with no acute pathology   Pt rewarmed  No seizures on EEG    Per Nursing pt following commands off sedation. During my exam pt was moving all extremities with no apparent focal weakness. With CT and EEG unremarkable pt has good chance of meaningful neurological recovery. We will sign off at this time.       NEURO RECS  - discontinue  vEEG   - We will sign off at this time, please call #[phone] with any questions      Physical Examination:   /78   Pulse 96   Temp 98.4  F (36.9  C)   Resp 16   Ht 1.753 m (5' 9\")   Wt 69.7 kg (153 lb 10.6 oz)   SpO2 98%   BMI 22.69 kg/m    General: Adult male patient, lying in bed, NAD  HEENT: ETT  Cardiac: tele  Pulm: vent  Abdomen: Soft, bowel sounds present  Extremities: Warm, no edema  Skin: No rash or lesion      Neuro:  Pt is sedated with equal and reactive pupils. Questionably followed commands. +cough/gag. spontaneous movement in 4/4 extremties    I have personally reviewed all labs and imaging for this patient.      Patient discussed with attending neurologist, Dr. Delfin Barrera, DNP  Ascom: *[phone]  "

## 2021-12-12 NOTE — PROGRESS NOTES
CRRT STATUS NOTE    DATA:  Time:  5:30 AM  Pressures WNL:  YES  Filter Status:  WDL    Problems Reported/Alarms Noted:  None reported by bedside RN.    Supplies Present:  YES    ASSESSMENT:  Patient Net Fluid Balance:  12/11 -1970cc and -430cc since midnight  Vital Signs:  T 98.4, HR 90's, MAP>65  Labs:  Na 138, Cr 10.8, Hgb7.6, Plt 77  Goals of Therapy:  50-100cc/hr net negative    INTERVENTIONS:   None required overnight.    PLAN:  Continue with plan of care. Call CRRT resource with questions or concerns at #[phone].

## 2021-12-12 NOTE — PROGRESS NOTES
Nephrology Progress Note  12/12/2021         Assessment & Recommendations:   Bruce Blount is a 56 yom with hx of ETOH and tobacco use who presents to [facility] after out of hospital arrest on IABP and ECMO.  Had witnessed arrest at home (brother heard him fall and called 911), found in VF and coded by EMS.  UOP dwindling and has mild hyperkalemia after rewarming, nephrology consulted for ESTER and management of K.       Interval History :   Mr Blount continues on CRRT started 12/8 to manage mild hyperkalemia and volume.  Net even yesterday, net negative so far today.  K now low on 4k baths likely due to GI losses.  May give a break from CRRT soon and see how things trend with UOP and chemistries, continuing CRRT for now.       Assessment & Recommendations:   Oliguric ESTER stage III-Presentation Cr 1.0, normal imaging on CT with no hydro.  Cr up after arrest and K 5.4 in setting of ongoing rewarming from cooling protocol.  Still making some UOP but with need for IABP, ECMO and 2 pressors ESTER is unlikely to improve in the short term so started CRRT 12/8. Now with improved hemodynamics, considering discontinuation of CRRT. But if needs RRT being on ECMO, there will be ECMO chugging. So for now continue on CRRT and once he de cannulate, will consider iHD.                -CRRT, mix of 4k baths, 50-100cc/hr net negative fluid goal.                  -Access is via ECMO circuit.      Volume-Mild edema, tolerated UF of 2L in past 24 hours. Will aim for similar UF if tolerates.      Electrolytes/pH- stable     BMD-Ca 8.1, Mg mildly high but phos normalized while being on CRRT     VF arrest-EF currently 5-10%.       Anemia-Hgb 8.5, down from admission, acute management per team.       Recommendations were communicated to primary team via this note.      Cheryl Guillory MD   [phone]     Interval History :   Nursing and provider notes from last 24 hours reviewed.  In the last 24 hours Bruce Blount with improved hemodynamics.  "Continued to be on ECMO and CRRT for support.     Review of Systems:   Unable to assess as pt was still intubated and sedated    Physical Exam:   I/O last 3 completed shifts:  In: 1916.44 [I.V.:956.44; NG/GT:490]  Out: 3676 [Urine:601; Other:2125; Stool:950]   /78   Pulse 95   Temp 98.4  F (36.9  C)   Resp 18   Ht 1.753 m (5' 9\")   Wt 69.7 kg (153 lb 10.6 oz)   SpO2 95%   BMI 22.69 kg/m       General : Pt sedated, not in acute distress   Lungs : anterior lung fields are clear  Cardiac : S1, S2 present  Abdomen : Soft/ND/NT  LE : Edema noted  Dialysis Access : ECMO circuit    Labs:   All labs reviewed by me  Electrolytes/Renal - Recent Labs   Lab Test 12/12/21  1549 12/12/21  1544 12/12/21  1543 12/12/21  0949 12/12/21  0939 12/12/21  0338 12/12/21  0337 12/11/21  2354 12/11/21  2154   NA  --   --  138  --  139  139  --  138  --  139  139   POTASSIUM  --   --  3.7  --  3.7  3.7  --  3.8  --  3.7  3.7   CHLORIDE  --   --  105  --  104  104  --  107  --  107  107   CO2  --   --  25  --  24  24  --  25  --  26  26   BUN  --   --  46*  --  46*  46*  --  46*  --  43*  43*   CR  --   --  1.26*  --  1.26*  1.26*  --  1.08  --  1.24  1.24   * 160* 173*   < > 123*  123*  117*   < > 161*  144*   < > 117*  117*  114*   BRIDGETTE  --   --  8.2*  --  8.6  8.6  --  8.2*  --  8.0*  8.0*   MAG  --   --  2.8*  --  2.8*  --  2.8*  --  3.0*   PHOS  --   --   --   --  4.7*  --  4.6*  --  4.5    < > = values in this interval not displayed.       CBC -   Recent Labs   Lab Test 12/12/21  1543 12/12/21  0939 12/12/21  0338   WBC 12.3* 12.5* 12.3*   HGB 7.5* 7.7* 7.6*   PLT 78* 79* 77*       LFTs -   Recent Labs   Lab Test 12/12/21  1543 12/12/21  0939 12/12/21  0337   ALKPHOS 69 70 69   BILITOTAL 0.3 0.3 0.4   ALT 59 61 64   AST 66* 76* 82*   PROTTOTAL 6.7* 6.7* 6.7*   ALBUMIN 1.8* 1.8*  1.8* 1.9*     Current Medications:    aspirin  81 mg Per Feeding Tube Daily     cefTRIAXone  2 g Intravenous Q24H     " chlorhexidine  15 mL Swish & Spit BID     folic acid  1 mg Oral or Feeding Tube Daily     insulin aspart  1-6 Units Subcutaneous Q4H     ipratropium - albuterol 0.5 mg/2.5 mg/3 mL  3 mL Nebulization 4x daily     multivitamins w/minerals  15 mL Per Feeding Tube Daily     pantoprazole  40 mg Oral QAM AC     protein modular  2 packet Per Feeding Tube 4x Daily     QUEtiapine  50 mg Oral Q8H     thiamine  100 mg Oral or Feeding Tube Daily     ticagrelor  90 mg Oral or Feeding Tube BID       dextrose       dextrose       CRRT replacement solution 12.5 mL/kg/hr (12/12/21 1217)     fentaNYL 100 mcg/hr (12/12/21 1600)     heparin (PRESSURE BAG) 2 unit/mL in 0.9% NaCl 3 mL/hr (12/10/21 1633)     heparin (PRESSURE BAG) 2 unit/mL in 0.9% NaCl 3 mL/hr (12/10/21 1634)     HEParin 2,200 Units/hr (12/12/21 1600)     midazolam Stopped (12/10/21 1000)     norepinephrine Stopped (12/12/21 1607)     CRRT replacement solution 200 mL/hr at 12/11/21 0435     CRRT replacement solution 12.5 mL/kg/hr (12/12/21 1217)     propofol (DIPRIVAN) infusion 10 mcg/kg/min (12/12/21 1600)     Cheryl Guillory MD

## 2021-12-12 NOTE — PROGRESS NOTES
CRRT STATUS NOTE    DATA:  Time:  1727  Pressures WNL:  YES  Filter Status:  WDL    Problems Reported/Alarms Noted:  None    Supplies Present:  YES    ASSESSMENT:  Patient Net Fluid Balance:  -808cc since midnight  Vital Signs:     Temp: 98.4  F (36.9  C) Temp src: Bladder   Pulse: 97   Resp: 18 SpO2: 99 % O2 Device: Mechanical Ventilator      Labs: K 3.7, Mg 2.8, Phos 4.7, Cr 1.26, iCa 4.6, Hgb 7.5              Goals of Therapy: 50-100cc/hr net negative      INTERVENTIONS:   None this shift.    PLAN:  Continue with plan of care. Contact CRRT resource with any questions or concerns.

## 2021-12-12 NOTE — PROGRESS NOTES
ECMO Shift Summary: [id number]      ECMO Equipment:  Console serial number: [id number]  Circuit Lot number: [id number]  Oxygenator Lot number: [id number]    Patient remains on VA ECMO, all equipment is functioning and alarms are appropriately set. RPM's 2700 with flow range 2.5 L/min. Sweep gas is at 1 LPM and FiO2 60%. Circuit remains free of air, clot and fibrin. Cannulas are secure with no bleeding from site. Extremities are warm.     Significant Shift Events: A turndown with ECHO was performed this morning (see MD note for detail).  ECMO flow was turned down to 2.5L in anticipation of decannulation tomorrow.    Vent settings:  Ventilation Mode: CMV/AC  (Continuous Mandatory Ventilation/ Assist Control)  FiO2 (%): 40 %  Rate Set (breaths/minute): 18 breaths/min  Tidal Volume Set (mL): 550 mL  PEEP (cm H2O): 8 cmH2O  Oxygen Concentration (%): 40 %  Resp: 18  .    Heparin is running at 2200 u/hr, ACT range 165-169.    Urine output is as charted on CRRT, blood loss was minimal. No product given.       Intake/Output Summary (Last 24 hours) at 12/12/2021 1738  Last data filed at 12/12/2021 1700  Gross per 24 hour   Intake 1994.61 ml   Output 3322 ml   Net -1327.39 ml       ECHO:  No results found for this or any previous visit.  No results found for this or any previous visit.      CXR:  Recent Results (from the past 24 hour(s))   XR Chest Port 1 View    Narrative    EXAM: XR CHEST PORT 1 VIEW  12/12/2021 2:29 AM     HISTORY:  Check endotracheal tube placement and ECLS cannula  placement.     COMPARISON:  Radiographs 12/11/2021, 12/10/2021, 12/9/2021, 12/8/2021    FINDINGS  Technique: Semiupright AP view of the chest.     Devices: Stable position of inferior approach cannula terminating over  the low SVC. Intra-aortic balloon pump marker projects over the high  descending thoracic aorta. Stable position of endotracheal tube.  Esophageal temperature probe projects over the proximal esophagus.  Partially visualized and  enteric tube.    Lungs: Diffuse bilateral mixed pulmonary opacities slightly decreased.   No discernible pneumothorax.  No definite pleural effusion.     Cardiovascular: Cardiomediastinal silhouette is  stable in size.      Impression    IMPRESSION:     1. Stable support devices.  2. Persistent but slightly decreased bilateral pulmonary opacities.    I have personally reviewed the examination and initial interpretation  and I agree with the findings.    ORAL TEJADA MD         SYSTEM ID:  [id number]   Echo Limited    Narrative    [id number]  [id number]  [id number]  [id number]^CHERIE^CIARAN     [facility],[place]  Echocardiography Laboratory  [address] [place] [id number]     Name: MINERVA GARAY  MRN: [id number]  : 1965  Study Date: 2021 07:43 AM  Age: 56 yrs  Gender: Male  Patient Location: [facility]  Reason For Study: ECMO Turndown  Ordering Physician: CIARAN CRESPO  Performed By: CHIP Gonzales     BSA: 1.9 m2  Height: 69 in  Weight: 156 lb  BP: 103/64 mmHg  ______________________________________________________________________________  Procedure  Limited Portable Echo Adult.  ______________________________________________________________________________  Interpretation Summary  Limited echo for VA ECMO turndown.  Baseline ECMO at 2.9LPM with IABP, LVEF 15%.  With turndown to 1.0LPM and IABP off, LVEF 20%.  ______________________________________________________________________________  Right Ventricle  Global right ventricular function is mildly reduced.     Pericardium  No pericardial effusion is present.     ______________________________________________________________________________  Report approved by: Gina Graham 2021 10:16 AM     ______________________________________________________________________________          Labs:  Recent Labs   Lab 21  1544 21  1543 21  1408 21  1159 21  0949   PH 7.36  --  7.37 7.34* 7.35    PCO2 45  --  44 47* 46*   PO2 87  --  98 145* 162*   HCO3 25  --  26 26 26   O2PER 40  60 40 40 60 60       Lab Results   Component Value Date    HGB 7.5 (L) 12/12/2021    PHGB 30 (H) 12/12/2021    PLT 78 (L) 12/12/2021    FIBR 860 (H) 12/12/2021    INR 1.09 12/12/2021    PTT 76 (H) 12/12/2021    DD >20.00 (HH) 12/12/2021    ANTCH 85 12/12/2021         Plan is continue VA ECMO support and decannulation tomorrow.      Garrett Simmons, RT  ECMO Specialist  12/12/2021 5:38 PM

## 2021-12-12 NOTE — PROGRESS NOTES
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    Cannulation Site:  17 Fr in the R femoral artery  25 Fr in the R femoral vein  IABP    Interval events: no significant overnight waking up and following commands    Physical Exam:  Temp:  [96.9  F (36.1  C)-98.6  F (37  C)] 98.4  F (36.9  C)  Pulse:  [] 97  Resp:  [16-19] 18  MAP:  [61 mmHg-85 mmHg] 69 mmHg  Arterial Line BP: ()/(33-77) 100/39  FiO2 (%):  [40 %-60 %] 40 %  SpO2:  [90 %-100 %] 99 %    Intake/Output Summary (Last 24 hours) at 2021 1456  Last data filed at 2021 1429  Gross per 24 hour   Intake 1151.42 ml   Output 955 ml   Net 196.42 ml    Ventilation Mode: CMV/AC  (Continuous Mandatory Ventilation/ Assist Control)  FiO2 (%): 40 %  Rate Set (breaths/minute): 18 breaths/min  Tidal Volume Set (mL): 550 mL  PEEP (cm H2O): 8 cmH2O  Oxygen Concentration (%): 40 %  Resp: 18       Labs:  Recent Labs   Lab 21  1544 21  1543 21  1408 21  1159 21  0949   PH 7.36  --  7.37 7.34* 7.35   PCO2 45  --  44 47* 46*   PO2 87  --  98 145* 162*   HCO3 25  --  26 26 26   O2PER 40  60 40 40 60 60      Recent Labs   Lab 21  1543 21  0939 21  0338 21  2154   WBC 12.3* 12.5* 12.3* 11.3*   HGB 7.5* 7.7* 7.6* 7.6*     Creatinine   Date Value Ref Range Status   2021 1.26 (H) 0.66 - 1.25 mg/dL Final   2021 1.26 (H) 0.66 - 1.25 mg/dL Final   2021 1.26 (H) 0.66 - 1.25 mg/dL Final   2021 1.08 0.66 - 1.25 mg/dL Final       Blood Flow (Circuit) LPM: 3.3 LPM  Gas Flow  LPM: 3 LPM  Gas FiO2   %: 60 %  ACT  (seconds): 288 seconds  Blood Temp  (degrees C): 32.6 C  Pulse Oximetry  (SpO2%): 100 %  Arterial Pressure  mmH mmHg      ECMO Issues including assessments and plan on DOS 2021:  Neuro: Sedated for mechanical ventilation and ECMO.  No acute  distress.  NIRS R 84 L is upper 75 Dopplerable lower ext pulses RASS goal: -3 reprefusion cannula bilaerally   CV: Cardiogenic shock.  Hemodynamically stable off pressors pulsatility 35mmHg  Pulm: Keep vent settings at rest settings as above. Remains on ecmo due to pulmonary issues turn down for 30min on 1.5 flow with mild co2 retention moderate vent support  FEN/Renal: Electrolytes stable w/ replacement protocols in place, cr 1.08 on crrt uop minimal k 3.8  Heme: ACT goal:180-200 leaving flows low 2-2.5 or for ne if needed for small doses Hemoglobin 7.6 Minimal oozing around the ECMO cannulas  ID: Receiving empiric antibiotics  Cannulae: Position is acceptable on exam and the available imaging.  Distal perfusion cannulas are in place and patent.      I have personally reviewed the ECMO flows, oxygenation and CO2 clearance, anticoagulation, and cannula position.  I have also personally assessed the patient's systemic response with hemodynamics, oxygenation, ventilation, and bleeding.       The patient requires continued ECMO support and management in the ICU.  I have discussed patient care and treatment plan with the primary team.      Marlo Fry MD  Critical Care Cardiology  [phone]    December 12, 2021

## 2021-12-12 NOTE — PLAN OF CARE
Major Shift Events: Pt remains on VA ECMO and CRRT. No issues with either circuit. Pt increasingly agitated today, bending legs and reaching for tube but not redirectable whatsoever - low dose propofol started in addition to fentanyl drip. Family updated via phone - no one present at bedside.    Plan: Possible decann tomorrow with Dr Perez.    For vital signs and complete assessments, please see documentation flowsheets.

## 2021-12-12 NOTE — PROGRESS NOTES
ECMO [place] STUDY  December 12, 2021    Inotropes/Pressors: none  PTT: 63    Ventilation Mode: CMV/AC  (Continuous Mandatory Ventilation/ Assist Control)  FiO2 (%): 60 %  Rate Set (breaths/minute): 16 breaths/min  Tidal Volume Set (mL): 550 mL  PEEP (cm H2O): 8 cmH2O  Oxygen Concentration (%): 60 %  Resp: 16       Flow  Maria Del Carmen BP IABP BP HR O2 Sat  MVO2  IABP     3L 109/43             73/42   95 94  55.1  On    2L 101/40  82/46  98 94  58.4  On  1.0L 103/53  97/53  96 95  69.8  On  1.0L 102/55             95/53  96 95  69.3  Standby    ABG at lowest Flow: 7.35/46/90/25         Summary:  --improving EF with decreasing flows  --inproved EF compared to the last turndown  --Hemodynamically stable with turndown requiring no pressors  --Oxygenation stable during turn down    Plan:  -Plan to decannulate tomorrow morning.       Jorge Reyes Castro, MD, MS  Cardiology Fellow

## 2021-12-12 NOTE — PROGRESS NOTES
"    [facility]   Critical Care Cardiology - Progress Note    Bruce Blount MRN: [id number]  Age: 56 year old, : 1965  Date: 12/10/2021    Assessment and Plan:  Bruce Blount is a 56 year old male with PMHx current tobacco use and ETOH abuse who presents to [facility] after out of hospital cardiac arrest.      Per EMS and brother, patient was in his usual state of health prior to arrest. Patients brother heard a \"thump\" and found patient unresponsive and agonal breathing. 911 called and CPR initiated. EMS arrived within 4 minutes. Initial rhythm VF--> shocked x ~7 with ROSC upon arrival to ED. Total CPR team per EMS ~ 40 minutes. 3 mg Epinephrine, 300 mg Amio given via EMS. Patient EKG reveals Valentín inferior/posterior leads with reciprocal changes. Patient initially presented with an Igel and was intubated with ETT in the ED. He arrested and was again shocked in the ED with ROSC. Taken urgently to CCL where he underwent coronary angiogram and again arrested requiring manual CPR and was then cannulated on VA ECMO via right with 17 Indonesian cannula RCFA, 25 Fr cannula RCFV. Coronary angiogram revealed  of RCA and acute culprit lesion of LCx/OM1, s/p PCI to pLCx, mid LCx, and OM1 with TERRY. IABP placed for decreased pulsatility. Thermogard cooling cath placed and patient was transferred to ICU for further management.     Today's Plan:  - Will start propofol for sedation  - Will consider Statin tomorrow  - Scheduled seroquel  - Plan for OR tomorrow at 9AM for decannulation. Will attempt to get TTE with turn down study in AM prior to OR    Neurology  # Concern for anoxic brain injury    # Hx of possible ETOH abuse  Intubated, sedated. Cooled to 33 degrees. Now rewarmed. Initial head CT without acute pathology  - Continue versed, fentanyl for sedation with a RASS goal -2 to -3.  - consider head CT in the coming days  - Neuro-crit consulted and appreciate recommendations.   - vEEG   - " Palliative care consulted.   - Folate, Thiamine and multi vitamin for ETOH abuse. CIWA as needed when sedation weaned     Cardiovascular  # Refractory VF cardiac arrest 2/2 secondary to STEMI  # Cardiogenic shock requiring VA ECMO  # Ischemic Cardiomyopathy (EF 10-15%)  # S/p PCI pLCx, mLCx, OM1  # Residual RCA   # HTN, HLD  Peripheral V-A ECMO inserted via RCFA and RCFV 17/25 fr.  troponin peak 124K. Pulsatility improved. Off pressors   - plan for decannulation tomorrow. Will attempt turn down study with TTE tomorrow AM prior OR. Did decently on turndown study on 12/10.  - ECMO cares:   - Flows 3-3.25L. reduce to 2.5L   - Sweep 1L   - ACT goal: N/A.  On bivalirudin given concern for HIT  - GDMT   - Continue ASA 81mg and ticagrelor 90mg BID    - Hold Lipitor for now given shock liver   - Hold ACE/ARB for now given likely reduced renal fxn after arrest   - Holding beta blocker given shock  -  of RCA not intervened upon    Pulmonary  # Acute hypoxemic respiratory failure requiring intubation  # Probable aspiration pneumonia  # Rib Fractures  # Sternal Fracture  # Tobacco Abuse (2PPD)  Vent Settings: CMV 16/480/8/60%. AB.38/44/74/26  - goal CO2 40-50  - Wean vent as able  - Daily CXR  - Serial ABGs   - duoneb Q6 hours    Gastrointestinal, Nutrition  # Shock liver 2/2 cardiac arrest, improving  # Loose Stool  No known medical hx.   - Monitor LFTs BID  - TF at goal  - Bowel regimen: hold for now given loose stools  - GI Prophylaxis: Protonix 40 mg daily    Renal, Electrolytes  # Acute Renal Injury  # Lactic acidosis  # Hypoalbuminemia  # Hyperkalemia, resolved   # Hypocalcemia, resolved  Unclear creatinine baseline, on admission 0.87. UOP poor  with hyperkalemia. Started on CRRT. LA peaked again at 2.7 in the setting of LLE limb ischemia, now normalized.  - volume removal; goal net negative 1000-2000cc  - renal consult; appreciate recommendations  - lactic acid Q6 hours  - Monitor urine  output    Infectious Disease  # Aspiration Pneumonia (klebsiella, staph aureus)  # Leukocytosis  # Lactic acidosis, resolved  WBC 19.4. Afebrile overnight.  - Monitor for signs of infection  - Daily blood cultures while on ECMO   Cultures thus far:              - sputum 12/6: staph aureus, staph dysgalactia, klebsiella   - sputum 12/7: staph aureus   - sputum 12/8: gram positive cocci  Antibiotics              - Vancomycin 12/6 - 12/7 (MRSA negative)              - Zosyn 12/6 - 12/9   - Rocephin 12/9 - present (plan for 7-10 day course).    Hematology  # Anemia  # Concern for HIT  # Thrombocytopenia  # Loss of bilateral DP pulses, resolved  Hgb stable. No e/o bleeding. US with monophasic flow to DP. LLE distal reperfusion cannula placed 12/8 in the setting of worsened LLE mottling.  Never lost Dopplerable pulses.  Platelet continuing to fall 12/9 - transitioned to bivalirudin and HIT screen was sent  - follow HIT screen  - Transfuse for Hgb < 8  - DVT PPX: Bivalrudin for now    Endocrinology  # Hyperglycemia   No known medical history. Hemoglobin A1c 5.6%  - insulin gtt as needed    MSK/Skin  # Mottling LLE s/p LLE distal reperfusion cannula, improved  Pulses remain intact, however skin appears more mottled. Probable PAD. Exacerbated by IABP. Worsened throughout the day 12/8 with rising LA, CK.  LLE distal reperfusion cannula placed  - continue to monitor  - continue bivalrubin via distal reperfusion cannula    Pertinent Lines  R femoral arterial and venous ECMO cannulae December 6, 2021 (ECMO cannula)  RCFA arterial line December 6, 2021 (R distal reperfusion cannula)  LCFA arterial line December 8, 2021 (L distal reperfusion cannula)  L femoral arterial line December 6, 2021 (IABP)  Rectal tube December 6, 2021  R radial arterial line December 6, 2021  ETT December 6, 2021  Gutiérrez catheter December 6, 2021  OG tube December 6, 2021    ICU Cares:  Daily CXR: ETT 3cm from jacques.  IABP well positioned.  Worsened  perihilar opacities  Fluids/Feeds: TF at goal. Fluids not indicated  DVT Prophylaxis: bivalrudin ggt  GI Prophylaxis: protonix  Bowel Regimen: hold given loose stools  Anticipated Floor Transfer: N/A    Patient seen and discussed with Dr. Marlo Fry.  Assessment and plan as above.    Nathan Benedict MD  Cardiology Fellow      Interval History:  Reviewed events from last 24 hours.  No acute events overnight.  No new symptoms.      Objective Findings:  Temp:  [98.2  F (36.8  C)-99.5  F (37.5  C)] 98.2  F (36.8  C)  Pulse:  [] 93  Resp:  [16-22] 16  MAP:  [61 mmHg-131 mmHg] 70 mmHg  Arterial Line BP: ()/() 112/40  FiO2 (%):  [60 %-70 %] 60 %  SpO2:  [90 %-100 %] 98 %    I/O:  Intake/Output Summary (Last 24 hours) at 12/10/2021 0854  Last data filed at 12/10/2021 0800  Gross per 24 hour   Intake 2327.13 ml   Output 4166 ml   Net -1838.87 ml     Physical Exam:  GEN: intubated, sedated  HEENT: no appreciable cranial trauma. Pupils 3mm, reactive  Pulm: coarse breath sounds bilaterally  Cardiac: regular rhythm. Tachycardia. No murmur, rub, gallop  ECMO cannula: insertion site clean, dry, intact. No appreciable hematoma  GI: soft, non distended  Neuro: pupils reactive.  Otherwise sedated  Integument: no appreciable skin breakdown  Vascular: LLE toes cold, however foot symmetrically warm when compared to right foot      Medications:    aspirin  81 mg Per Feeding Tube Daily     cefTRIAXone  2 g Intravenous Q24H     chlorhexidine  15 mL Swish & Spit BID     folic acid  1 mg Oral or Feeding Tube Daily     insulin aspart  1-6 Units Subcutaneous Q4H     ipratropium - albuterol 0.5 mg/2.5 mg/3 mL  3 mL Nebulization 4x daily     multivitamins w/minerals  15 mL Per Feeding Tube Daily     pantoprazole  40 mg Oral QAM AC     protein modular  2 packet Per Feeding Tube 4x Daily     QUEtiapine  50 mg Oral Q8H     thiamine  100 mg Oral or Feeding Tube Daily     ticagrelor  90 mg Oral or Feeding Tube BID       dextrose        dextrose       CRRT replacement solution 12.5 mL/kg/hr (12/11/21 1831)     fentaNYL 100 mcg/hr (12/11/21 2100)     heparin (PRESSURE BAG) 2 unit/mL in 0.9% NaCl 3 mL/hr (12/10/21 1633)     heparin (PRESSURE BAG) 2 unit/mL in 0.9% NaCl 3 mL/hr (12/10/21 1634)     HEParin 1,500 Units/hr (12/11/21 2000)     midazolam Stopped (12/10/21 1000)     niCARdipine Stopped (12/10/21 0844)     CRRT replacement solution 200 mL/hr at 12/11/21 0435     CRRT replacement solution 12.5 mL/kg/hr (12/11/21 1831)     propofol (DIPRIVAN) infusion 5 mcg/kg/min (12/11/21 2030)         Labs:   [facility]  Recent Labs   Lab 12/11/21  1950 12/11/21  1602 12/11/21  1552 12/11/21  1551 12/11/21  0941 12/11/21  0940 12/11/21  0358 12/11/21  0357 12/10/21  2355 12/10/21  2200 12/10/21  1153 12/10/21  0959   NA  --   --   --  139  --  140  140  --  139  --  140  140   < > 140  140   POTASSIUM  --   --   --  3.7  --  3.9  3.9  --  3.7  --  3.8  3.8   < > 3.7  3.7   CHLORIDE  --   --   --  107  --  108  108  --  108  --  108  108   < > 109  109   CO2  --   --   --  25  --  25  25  --  25  --  25  25   < > 26  26   ANIONGAP  --   --   --  7  --  7  7  --  6  --  7  7   < > 5  5   * 131* 137* 145*   < > 128*  128*   < > 150*   < > 114*  114*  110*   < > 134*  134*  137*   BUN  --   --   --  42*  --  44*  44*  --  47*  --  47*  47*   < > 44*  44*   CR  --   --   --  1.18  --  1.24  1.24  --  1.29*  --  1.38*  1.38*   < > 1.48*  1.48*   GFRESTIMATED  --   --   --  69  --  65  65  --  62  --  57*  57*   < > 52*  52*   BRIDGETTE  --   --   --  8.0*  --  8.1*  8.1*  --  8.2*  --  8.1*  8.1*   < > 8.2*  8.2*   MAG  --   --   --  2.9*  --  3.0*  --  2.7*  --  2.8*   < > 2.6*   PHOS  --   --   --   --   --  4.2  --  3.8  --  4.2  --  4.3   PROTTOTAL  --   --   --  6.8  --  6.8  --  6.4*  --  6.5*   < > 6.1*   ALBUMIN  --   --   --  1.9*  --  2.0*  2.0*  --  1.8*  --  1.9*  1.9*   < > 1.8*  1.8*   BILITOTAL  --   --   --  0.4   --  0.5  --  0.4  --  0.4   < > 0.5   ALKPHOS  --   --   --  66  --  68  --  67  --  69   < > 59   AST  --   --   --  92*  --  100*  --  99*  --  107*   < > 114*   ALT  --   --   --  67  --  69  --  68  --  76*   < > 74*    < > = values in this interval not displayed.     CBC  Recent Labs   Lab 12/11/21  1551 12/11/21  0940 12/11/21  0357 12/10/21  2200   WBC 13.9* 13.9* 13.2* 17.4*   RBC 2.51* 2.63* 2.48* 2.67*   HGB 8.1* 8.3* 7.9* 8.5*   HCT 24.0* 24.9* 23.8* 25.5*   MCV 96 95 96 96   MCH 32.3 31.6 31.9 31.8   MCHC 33.8 33.3 33.2 33.3   RDW 13.7 13.7 13.7 13.7   PLT 75* 75* 73* 85*     Arterial Blood Gas  Recent Labs   Lab 12/11/21  1950 12/11/21  1731 12/11/21  1552 12/11/21  1551 12/11/21  1407   PH 7.34* 7.38 7.35  --  7.37   PCO2 47* 43 46*  --  45   PO2 116* 79* 81  --  90   HCO3 25 26 26  --  26   O2PER 60 60 80  60 60 60         Pertinent Imaging Studies:  Coronary angiogram:   Refractory VT/VF cardiac arrest.  Cardiogenic shock.  STEMI involving the OM1 as culprit.  Three vessel severe CAD involving the  of the RCA, mid LCx and OM1 severe disease with occlusion of OM1 as culprit in STEMI, and moderate proximal LAD lesions likely hemodynamically significant.  CPR  VA ECMO placement.  IABP placement.  Thermogard placement with initiation of hypothermia protocol.  Right radial arterial line placement.  PCI with three drug eluting stents to the proximal, mid LCx and OM1.    LE Arterial Duplex, bilateral:  1. Right leg: Patent arteries with post obstructive diminished waveforms likely due to ECMO cannula.    2. Left leg: Patent arteries.     Echo:   Technically difficult study.  VA ECMO at 2.8L/min.  Left ventricular function is decreased. The ejection fraction is 10-15% (severely reduced).  Global right ventricular function is severely reduced.  Septum midline.  The aortic valve appears to remain closed.  Trivial pericardial effusion is present.  Previous study not available for comparison.    CT Head:  No  acute intracranial pathology.    CT Chest, Abdomen, Pelvis:  1. Bilateral dependent consolidative opacities. Differential diagnosis includes atelectasis, aspiration, infection.  2. Bilateral anterior rib fractures and sternal fracture likely secondary to CPR.  3. Endotracheal tube terminates 2.1 cm above the jacques. Consider retracting 2 cm.      Nathan Benedict MD  Cardiology Fellow

## 2021-12-13 ENCOUNTER — APPOINTMENT (OUTPATIENT)
Dept: GENERAL RADIOLOGY | Facility: CLINIC | Age: 56
End: 2021-12-13
Attending: NURSE PRACTITIONER
Payer: COMMERCIAL

## 2021-12-13 LAB
ACT BLD: 152 SECONDS (ref 74–150)
ACT BLD: 165 SECONDS (ref 74–150)
ACT BLD: 165 SECONDS (ref 74–150)
ACT BLD: 173 SECONDS (ref 74–150)
ACT BLD: 203 SECONDS (ref 74–150)
ALBUMIN SERPL-MCNC: 1.7 G/DL (ref 3.4–5)
ALBUMIN SERPL-MCNC: 1.8 G/DL (ref 3.4–5)
ALP SERPL-CCNC: 73 U/L (ref 40–150)
ALP SERPL-CCNC: 77 U/L (ref 40–150)
ALP SERPL-CCNC: 78 U/L (ref 40–150)
ALP SERPL-CCNC: 81 U/L (ref 40–150)
ALT SERPL W P-5'-P-CCNC: 44 U/L (ref 0–70)
ALT SERPL W P-5'-P-CCNC: 47 U/L (ref 0–70)
ALT SERPL W P-5'-P-CCNC: 47 U/L (ref 0–70)
ALT SERPL W P-5'-P-CCNC: 54 U/L (ref 0–70)
ANION GAP SERPL CALCULATED.3IONS-SCNC: 11 MMOL/L (ref 3–14)
ANION GAP SERPL CALCULATED.3IONS-SCNC: 8 MMOL/L (ref 3–14)
ANION GAP SERPL CALCULATED.3IONS-SCNC: 9 MMOL/L (ref 3–14)
APTT PPP: 36 SECONDS (ref 22–38)
APTT PPP: 40 SECONDS (ref 22–38)
APTT PPP: 74 SECONDS (ref 22–38)
AST SERPL W P-5'-P-CCNC: 44 U/L (ref 0–45)
AST SERPL W P-5'-P-CCNC: 47 U/L (ref 0–45)
AST SERPL W P-5'-P-CCNC: 52 U/L (ref 0–45)
AST SERPL W P-5'-P-CCNC: 60 U/L (ref 0–45)
AT III ACT/NOR PPP CHRO: 78 % (ref 85–135)
ATRIAL RATE - MUSE: 100 BPM
ATRIAL RATE - MUSE: 100 BPM
ATRIAL RATE - MUSE: 90 BPM
BACTERIA BLD CULT: NO GROWTH
BACTERIA SPEC CULT: ABNORMAL
BACTERIA SPEC CULT: ABNORMAL
BASE EXCESS BLDA CALC-SCNC: -0.3 MMOL/L (ref -9–1.8)
BASE EXCESS BLDA CALC-SCNC: -0.7 MMOL/L (ref -9–1.8)
BASE EXCESS BLDA CALC-SCNC: -1.1 MMOL/L (ref -9–1.8)
BASE EXCESS BLDA CALC-SCNC: -1.3 MMOL/L (ref -9–1.8)
BASE EXCESS BLDA CALC-SCNC: -1.5 MMOL/L (ref -9–1.8)
BASE EXCESS BLDA CALC-SCNC: -1.7 MMOL/L (ref -9–1.8)
BASE EXCESS BLDA CALC-SCNC: -1.9 MMOL/L (ref -9.6–2)
BASE EXCESS BLDA CALC-SCNC: -1.9 MMOL/L (ref -9–1.8)
BASE EXCESS BLDA CALC-SCNC: -2 MMOL/L (ref -9–1.8)
BASE EXCESS BLDA CALC-SCNC: -2.4 MMOL/L (ref -9–1.8)
BASE EXCESS BLDA CALC-SCNC: -2.6 MMOL/L (ref -9–1.8)
BASE EXCESS BLDA CALC-SCNC: -2.9 MMOL/L (ref -9.6–2)
BASE EXCESS BLDA CALC-SCNC: -3.2 MMOL/L (ref -9.6–2)
BASE EXCESS BLDA CALC-SCNC: -4.3 MMOL/L (ref -9.6–2)
BASE EXCESS BLDV CALC-SCNC: -0.8 MMOL/L (ref -7.7–1.9)
BASE EXCESS BLDV CALC-SCNC: -1 MMOL/L (ref -7.7–1.9)
BILIRUB DIRECT SERPL-MCNC: <0.1 MG/DL (ref 0–0.2)
BILIRUB SERPL-MCNC: 0.3 MG/DL (ref 0.2–1.3)
BILIRUB SERPL-MCNC: 0.4 MG/DL (ref 0.2–1.3)
BLD PROD TYP BPU: NORMAL
BLOOD COMPONENT TYPE: NORMAL
BUN SERPL-MCNC: 52 MG/DL (ref 7–30)
BUN SERPL-MCNC: 56 MG/DL (ref 7–30)
BUN SERPL-MCNC: 56 MG/DL (ref 7–30)
BUN SERPL-MCNC: 59 MG/DL (ref 7–30)
BUN SERPL-MCNC: 62 MG/DL (ref 7–30)
CA-I BLD-MCNC: 4.3 MG/DL (ref 4.4–5.2)
CA-I BLD-MCNC: 4.4 MG/DL (ref 4.4–5.2)
CA-I BLD-MCNC: 4.5 MG/DL (ref 4.4–5.2)
CA-I BLD-MCNC: 4.6 MG/DL (ref 4.4–5.2)
CA-I BLD-MCNC: 4.7 MG/DL (ref 4.4–5.2)
CA-I BLD-MCNC: 5 MG/DL (ref 4.4–5.2)
CALCIUM SERPL-MCNC: 8.2 MG/DL (ref 8.5–10.1)
CALCIUM SERPL-MCNC: 8.2 MG/DL (ref 8.5–10.1)
CALCIUM SERPL-MCNC: 8.3 MG/DL (ref 8.5–10.1)
CALCIUM SERPL-MCNC: 8.6 MG/DL (ref 8.5–10.1)
CALCIUM SERPL-MCNC: 8.6 MG/DL (ref 8.5–10.1)
CHLORIDE BLD-SCNC: 104 MMOL/L (ref 94–109)
CHLORIDE BLD-SCNC: 106 MMOL/L (ref 94–109)
CHLORIDE BLD-SCNC: 106 MMOL/L (ref 94–109)
CHLORIDE BLD-SCNC: 107 MMOL/L (ref 94–109)
CHLORIDE BLD-SCNC: 109 MMOL/L (ref 94–109)
CK SERPL-CCNC: 214 U/L (ref 30–300)
CO2 SERPL-SCNC: 22 MMOL/L (ref 20–32)
CO2 SERPL-SCNC: 22 MMOL/L (ref 20–32)
CO2 SERPL-SCNC: 23 MMOL/L (ref 20–32)
CO2 SERPL-SCNC: 23 MMOL/L (ref 20–32)
CO2 SERPL-SCNC: 24 MMOL/L (ref 20–32)
CODING SYSTEM: NORMAL
CREAT SERPL-MCNC: 1.37 MG/DL (ref 0.66–1.25)
CREAT SERPL-MCNC: 1.51 MG/DL (ref 0.66–1.25)
CREAT SERPL-MCNC: 1.54 MG/DL (ref 0.66–1.25)
CREAT SERPL-MCNC: 1.62 MG/DL (ref 0.66–1.25)
CREAT SERPL-MCNC: 1.78 MG/DL (ref 0.66–1.25)
CROSSMATCH: NORMAL
CRP SERPL-MCNC: 210 MG/L (ref 0–8)
D DIMER PPP FEU-MCNC: >20 UG/ML FEU (ref 0–0.5)
D DIMER PPP FEU-MCNC: >20 UG/ML FEU (ref 0–0.5)
DIASTOLIC BLOOD PRESSURE - MUSE: NORMAL MMHG
ERYTHROCYTE [DISTWIDTH] IN BLOOD BY AUTOMATED COUNT: 13.8 % (ref 10–15)
ERYTHROCYTE [DISTWIDTH] IN BLOOD BY AUTOMATED COUNT: 14.4 % (ref 10–15)
ERYTHROCYTE [DISTWIDTH] IN BLOOD BY AUTOMATED COUNT: 14.5 % (ref 10–15)
ERYTHROCYTE [SEDIMENTATION RATE] IN BLOOD BY WESTERGREN METHOD: 86 MM/HR (ref 0–20)
FIBRINOGEN PPP-MCNC: 830 MG/DL (ref 170–490)
FIBRINOGEN PPP-MCNC: 932 MG/DL (ref 170–490)
GFR SERPL CREATININE-BSD FRML MDRD: 42 ML/MIN/1.73M2
GFR SERPL CREATININE-BSD FRML MDRD: 47 ML/MIN/1.73M2
GFR SERPL CREATININE-BSD FRML MDRD: 50 ML/MIN/1.73M2
GFR SERPL CREATININE-BSD FRML MDRD: 51 ML/MIN/1.73M2
GFR SERPL CREATININE-BSD FRML MDRD: 57 ML/MIN/1.73M2
GLUCOSE BLD-MCNC: 154 MG/DL (ref 70–99)
GLUCOSE BLD-MCNC: 164 MG/DL (ref 70–99)
GLUCOSE BLD-MCNC: 165 MG/DL (ref 70–99)
GLUCOSE BLD-MCNC: 169 MG/DL (ref 70–99)
GLUCOSE BLD-MCNC: 173 MG/DL (ref 70–99)
GLUCOSE BLD-MCNC: 176 MG/DL (ref 70–99)
GLUCOSE BLD-MCNC: 176 MG/DL (ref 70–99)
GLUCOSE BLD-MCNC: 179 MG/DL (ref 70–99)
GLUCOSE BLD-MCNC: 179 MG/DL (ref 70–99)
GLUCOSE BLD-MCNC: 183 MG/DL (ref 70–99)
GLUCOSE BLD-MCNC: 185 MG/DL (ref 70–99)
GLUCOSE BLD-MCNC: 204 MG/DL (ref 70–99)
GLUCOSE BLD-MCNC: 209 MG/DL (ref 70–99)
GLUCOSE BLDC GLUCOMTR-MCNC: 138 MG/DL (ref 70–99)
GLUCOSE BLDC GLUCOMTR-MCNC: 143 MG/DL (ref 70–99)
GLUCOSE BLDC GLUCOMTR-MCNC: 155 MG/DL (ref 70–99)
GLUCOSE BLDC GLUCOMTR-MCNC: 164 MG/DL (ref 70–99)
GLUCOSE BLDC GLUCOMTR-MCNC: 170 MG/DL (ref 70–99)
HCO3 BLD-SCNC: 23 MMOL/L (ref 21–28)
HCO3 BLD-SCNC: 24 MMOL/L (ref 21–28)
HCO3 BLD-SCNC: 25 MMOL/L (ref 21–28)
HCO3 BLD-SCNC: 26 MMOL/L (ref 21–28)
HCO3 BLDA-SCNC: 22 MMOL/L (ref 21–28)
HCO3 BLDA-SCNC: 23 MMOL/L (ref 21–28)
HCO3 BLDA-SCNC: 23 MMOL/L (ref 21–28)
HCO3 BLDA-SCNC: 24 MMOL/L (ref 21–28)
HCO3 BLDA-SCNC: 25 MMOL/L (ref 21–28)
HCO3 BLDV-SCNC: 25 MMOL/L (ref 21–28)
HCO3 BLDV-SCNC: 26 MMOL/L (ref 21–28)
HCT VFR BLD AUTO: 23 % (ref 40–53)
HCT VFR BLD AUTO: 23.7 % (ref 40–53)
HCT VFR BLD AUTO: 24.5 % (ref 40–53)
HGB BLD-MCNC: 7.6 G/DL (ref 13.3–17.7)
HGB BLD-MCNC: 7.7 G/DL (ref 13.3–17.7)
HGB BLD-MCNC: 7.8 G/DL (ref 13.3–17.7)
HGB BLD-MCNC: 8 G/DL (ref 13.3–17.7)
HGB BLD-MCNC: 8 G/DL (ref 13.3–17.7)
HGB BLD-MCNC: 8.2 G/DL (ref 13.3–17.7)
HGB BLD-MCNC: 8.3 G/DL (ref 13.3–17.7)
HGB FREE PLAS-MCNC: 40 MG/DL
INR PPP: 1.11 (ref 0.85–1.15)
INR PPP: 1.13 (ref 0.85–1.15)
INR PPP: 1.13 (ref 0.85–1.15)
INTERPRETATION ECG - MUSE: NORMAL
ISSUE DATE AND TIME: NORMAL
LACTATE BLD-SCNC: 0.8 MMOL/L
LACTATE SERPL-SCNC: 0.7 MMOL/L (ref 0.7–2)
LACTATE SERPL-SCNC: 0.7 MMOL/L (ref 0.7–2)
LACTATE SERPL-SCNC: 0.8 MMOL/L (ref 0.7–2)
LACTATE SERPL-SCNC: 1.1 MMOL/L (ref 0.7–2)
LDH SERPL L TO P-CCNC: 666 U/L (ref 85–227)
MAGNESIUM SERPL-MCNC: 2.9 MG/DL (ref 1.6–2.3)
MCH RBC QN AUTO: 31.3 PG (ref 26.5–33)
MCH RBC QN AUTO: 31.4 PG (ref 26.5–33)
MCH RBC QN AUTO: 31.4 PG (ref 26.5–33)
MCHC RBC AUTO-ENTMCNC: 32.5 G/DL (ref 31.5–36.5)
MCHC RBC AUTO-ENTMCNC: 32.7 G/DL (ref 31.5–36.5)
MCHC RBC AUTO-ENTMCNC: 33 G/DL (ref 31.5–36.5)
MCV RBC AUTO: 95 FL (ref 78–100)
MCV RBC AUTO: 96 FL (ref 78–100)
MCV RBC AUTO: 96 FL (ref 78–100)
O2/TOTAL GAS SETTING VFR VENT: 100 %
O2/TOTAL GAS SETTING VFR VENT: 40 %
O2/TOTAL GAS SETTING VFR VENT: 40 %
O2/TOTAL GAS SETTING VFR VENT: 50 %
OXYHGB MFR BLD: 95 % (ref 92–100)
OXYHGB MFR BLD: 96 % (ref 92–100)
OXYHGB MFR BLD: 97 % (ref 92–100)
OXYHGB MFR BLD: 98 % (ref 92–100)
OXYHGB MFR BLDA: 98 % (ref 75–100)
OXYHGB MFR BLDA: 98 % (ref 92–100)
OXYHGB MFR BLDV: 71 %
OXYHGB MFR BLDV: 74 % (ref 70–75)
P AXIS - MUSE: 56 DEGREES
P AXIS - MUSE: 65 DEGREES
P AXIS - MUSE: 73 DEGREES
PCO2 BLD: 41 MM HG (ref 35–45)
PCO2 BLD: 42 MM HG (ref 35–45)
PCO2 BLD: 42 MM HG (ref 35–45)
PCO2 BLD: 45 MM HG (ref 35–45)
PCO2 BLD: 45 MM HG (ref 35–45)
PCO2 BLD: 46 MM HG (ref 35–45)
PCO2 BLD: 46 MM HG (ref 35–45)
PCO2 BLD: 47 MM HG (ref 35–45)
PCO2 BLD: 47 MM HG (ref 35–45)
PCO2 BLD: 48 MM HG (ref 35–45)
PCO2 BLD: 49 MM HG (ref 35–45)
PCO2 BLDA: 44 MM HG (ref 35–45)
PCO2 BLDA: 45 MM HG (ref 35–45)
PCO2 BLDA: 45 MM HG (ref 35–45)
PCO2 BLDA: 48 MM HG (ref 35–45)
PCO2 BLDA: 51 MM HG (ref 35–45)
PCO2 BLDV: 50 MM HG (ref 40–50)
PCO2 BLDV: 55 MM HG (ref 40–50)
PH BLD: 7.3 [PH] (ref 7.35–7.45)
PH BLD: 7.31 [PH] (ref 7.35–7.45)
PH BLD: 7.33 [PH] (ref 7.35–7.45)
PH BLD: 7.34 [PH] (ref 7.35–7.45)
PH BLD: 7.37 [PH] (ref 7.35–7.45)
PH BLD: 7.37 [PH] (ref 7.35–7.45)
PH BLDA: 7.29 [PH] (ref 7.35–7.45)
PH BLDA: 7.3 [PH] (ref 7.35–7.45)
PH BLDA: 7.3 [PH] (ref 7.35–7.45)
PH BLDA: 7.33 [PH] (ref 7.35–7.45)
PH BLDA: 7.34 [PH] (ref 7.35–7.45)
PH BLDV: 7.29 [PH] (ref 7.32–7.43)
PH BLDV: 7.31 [PH] (ref 7.32–7.43)
PHOSPHATE SERPL-MCNC: 5.3 MG/DL (ref 2.5–4.5)
PHOSPHATE SERPL-MCNC: 6.2 MG/DL (ref 2.5–4.5)
PLATELET # BLD AUTO: 107 10E3/UL (ref 150–450)
PLATELET # BLD AUTO: 87 10E3/UL (ref 150–450)
PLATELET # BLD AUTO: 88 10E3/UL (ref 150–450)
PO2 BLD: 104 MM HG (ref 80–105)
PO2 BLD: 105 MM HG (ref 80–105)
PO2 BLD: 112 MM HG (ref 80–105)
PO2 BLD: 119 MM HG (ref 80–105)
PO2 BLD: 125 MM HG (ref 80–105)
PO2 BLD: 131 MM HG (ref 80–105)
PO2 BLD: 133 MM HG (ref 80–105)
PO2 BLD: 143 MM HG (ref 80–105)
PO2 BLD: 81 MM HG (ref 80–105)
PO2 BLD: 95 MM HG (ref 80–105)
PO2 BLD: 97 MM HG (ref 80–105)
PO2 BLDA: 374 MM HG (ref 80–105)
PO2 BLDA: 384 MM HG (ref 80–105)
PO2 BLDA: 391 MM HG (ref 80–105)
PO2 BLDA: 400 MM HG (ref 80–105)
PO2 BLDA: 484 MM HG (ref 80–105)
PO2 BLDV: 39 MM HG (ref 25–47)
PO2 BLDV: 43 MM HG (ref 25–47)
POTASSIUM BLD-SCNC: 3 MMOL/L (ref 3.5–5)
POTASSIUM BLD-SCNC: 3.4 MMOL/L (ref 3.4–5.3)
POTASSIUM BLD-SCNC: 3.4 MMOL/L (ref 3.5–5)
POTASSIUM BLD-SCNC: 3.4 MMOL/L (ref 3.5–5)
POTASSIUM BLD-SCNC: 3.6 MMOL/L (ref 3.4–5.3)
POTASSIUM BLD-SCNC: 3.6 MMOL/L (ref 3.5–5)
POTASSIUM BLD-SCNC: 3.8 MMOL/L (ref 3.4–5.3)
PR INTERVAL - MUSE: 104 MS
PR INTERVAL - MUSE: 118 MS
PR INTERVAL - MUSE: 136 MS
PROT SERPL-MCNC: 6.4 G/DL (ref 6.8–8.8)
PROT SERPL-MCNC: 6.5 G/DL (ref 6.8–8.8)
PROT SERPL-MCNC: 6.6 G/DL (ref 6.8–8.8)
PROT SERPL-MCNC: 6.8 G/DL (ref 6.8–8.8)
QRS DURATION - MUSE: 100 MS
QRS DURATION - MUSE: 106 MS
QRS DURATION - MUSE: 98 MS
QT - MUSE: 336 MS
QT - MUSE: 338 MS
QT - MUSE: 348 MS
QTC - MUSE: 425 MS
QTC - MUSE: 433 MS
QTC - MUSE: 436 MS
R AXIS - MUSE: 48 DEGREES
R AXIS - MUSE: 48 DEGREES
R AXIS - MUSE: 69 DEGREES
RBC # BLD AUTO: 2.42 10E6/UL (ref 4.4–5.9)
RBC # BLD AUTO: 2.46 10E6/UL (ref 4.4–5.9)
RBC # BLD AUTO: 2.55 10E6/UL (ref 4.4–5.9)
SARS-COV-2 RNA RESP QL NAA+PROBE: NEGATIVE
SODIUM BLD-SCNC: 138 MMOL/L (ref 133–144)
SODIUM BLD-SCNC: 139 MMOL/L (ref 133–144)
SODIUM BLD-SCNC: 139 MMOL/L (ref 133–144)
SODIUM BLD-SCNC: 140 MMOL/L (ref 133–144)
SODIUM SERPL-SCNC: 137 MMOL/L (ref 133–144)
SODIUM SERPL-SCNC: 138 MMOL/L (ref 133–144)
SODIUM SERPL-SCNC: 138 MMOL/L (ref 133–144)
SODIUM SERPL-SCNC: 139 MMOL/L (ref 133–144)
SODIUM SERPL-SCNC: 140 MMOL/L (ref 133–144)
SYSTOLIC BLOOD PRESSURE - MUSE: NORMAL MMHG
T AXIS - MUSE: 209 DEGREES
T AXIS - MUSE: 217 DEGREES
T AXIS - MUSE: 223 DEGREES
TROPONIN I SERPL HS-MCNC: 2846 NG/L
TROPONIN I SERPL HS-MCNC: 2859 NG/L
TROPONIN I SERPL HS-MCNC: 3357 NG/L
TROPONIN I SERPL HS-MCNC: 3392 NG/L
UFH PPP CHRO-ACNC: 0.39 IU/ML
UFH PPP CHRO-ACNC: <0.1 IU/ML
UFH PPP CHRO-ACNC: <0.1 IU/ML
UNIT ABO/RH: NORMAL
UNIT NUMBER: NORMAL
UNIT STATUS: NORMAL
UNIT TYPE ISBT: 6200
VENTRICULAR RATE- MUSE: 100 BPM
VENTRICULAR RATE- MUSE: 100 BPM
VENTRICULAR RATE- MUSE: 90 BPM
WBC # BLD AUTO: 15.1 10E3/UL (ref 4–11)
WBC # BLD AUTO: 19.2 10E3/UL (ref 4–11)
WBC # BLD AUTO: 19.9 10E3/UL (ref 4–11)

## 2021-12-13 PROCEDURE — P9016 RBC LEUKOCYTES REDUCED: HCPCS | Performed by: NURSE PRACTITIONER

## 2021-12-13 PROCEDURE — 258N000003 HC RX IP 258 OP 636: Performed by: NURSE PRACTITIONER

## 2021-12-13 PROCEDURE — 83735 ASSAY OF MAGNESIUM: CPT | Performed by: NURSE PRACTITIONER

## 2021-12-13 PROCEDURE — 85347 COAGULATION TIME ACTIVATED: CPT

## 2021-12-13 PROCEDURE — 85652 RBC SED RATE AUTOMATED: CPT | Performed by: INTERNAL MEDICINE

## 2021-12-13 PROCEDURE — 83735 ASSAY OF MAGNESIUM: CPT | Performed by: CLINICAL NURSE SPECIALIST

## 2021-12-13 PROCEDURE — 250N000011 HC RX IP 250 OP 636: Performed by: INTERNAL MEDICINE

## 2021-12-13 PROCEDURE — 83615 LACTATE (LD) (LDH) ENZYME: CPT | Performed by: NURSE PRACTITIONER

## 2021-12-13 PROCEDURE — 999N000185 HC STATISTIC TRANSPORT TIME EA 15 MIN

## 2021-12-13 PROCEDURE — 33968 REMOVE AORTIC ASSIST DEVICE: CPT

## 2021-12-13 PROCEDURE — 250N000013 HC RX MED GY IP 250 OP 250 PS 637: Performed by: STUDENT IN AN ORGANIZED HEALTH CARE EDUCATION/TRAINING PROGRAM

## 2021-12-13 PROCEDURE — 86140 C-REACTIVE PROTEIN: CPT | Performed by: NURSE PRACTITIONER

## 2021-12-13 PROCEDURE — 94003 VENT MGMT INPAT SUBQ DAY: CPT

## 2021-12-13 PROCEDURE — 85379 FIBRIN DEGRADATION QUANT: CPT | Performed by: NURSE PRACTITIONER

## 2021-12-13 PROCEDURE — 83051 HEMOGLOBIN PLASMA: CPT | Performed by: NURSE PRACTITIONER

## 2021-12-13 PROCEDURE — 36415 COLL VENOUS BLD VENIPUNCTURE: CPT | Performed by: CLINICAL NURSE SPECIALIST

## 2021-12-13 PROCEDURE — 250N000011 HC RX IP 250 OP 636: Performed by: NURSE ANESTHETIST, CERTIFIED REGISTERED

## 2021-12-13 PROCEDURE — 82947 ASSAY GLUCOSE BLOOD QUANT: CPT | Performed by: NURSE PRACTITIONER

## 2021-12-13 PROCEDURE — 999N000157 HC STATISTIC RCP TIME EA 10 MIN

## 2021-12-13 PROCEDURE — 250N000009 HC RX 250: Performed by: NURSE ANESTHETIST, CERTIFIED REGISTERED

## 2021-12-13 PROCEDURE — 83605 ASSAY OF LACTIC ACID: CPT | Performed by: INTERNAL MEDICINE

## 2021-12-13 PROCEDURE — 85027 COMPLETE CBC AUTOMATED: CPT | Performed by: CLINICAL NURSE SPECIALIST

## 2021-12-13 PROCEDURE — 272N000001 HC OR GENERAL SUPPLY STERILE: Performed by: THORACIC SURGERY (CARDIOTHORACIC VASCULAR SURGERY)

## 2021-12-13 PROCEDURE — 84132 ASSAY OF SERUM POTASSIUM: CPT | Performed by: NURSE PRACTITIONER

## 2021-12-13 PROCEDURE — 33949 ECMO/ECLS DAILY MGMT ARTERY: CPT | Performed by: STUDENT IN AN ORGANIZED HEALTH CARE EDUCATION/TRAINING PROGRAM

## 2021-12-13 PROCEDURE — 86923 COMPATIBILITY TEST ELECTRIC: CPT | Performed by: NURSE PRACTITIONER

## 2021-12-13 PROCEDURE — 85384 FIBRINOGEN ACTIVITY: CPT | Performed by: NURSE PRACTITIONER

## 2021-12-13 PROCEDURE — P9037 PLATE PHERES LEUKOREDU IRRAD: HCPCS | Performed by: INTERNAL MEDICINE

## 2021-12-13 PROCEDURE — 250N000009 HC RX 250: Performed by: PHYSICIAN ASSISTANT

## 2021-12-13 PROCEDURE — 87205 SMEAR GRAM STAIN: CPT | Performed by: INTERNAL MEDICINE

## 2021-12-13 PROCEDURE — 82810 BLOOD GASES O2 SAT ONLY: CPT

## 2021-12-13 PROCEDURE — 200N000002 HC R&B ICU UMMC

## 2021-12-13 PROCEDURE — 85520 HEPARIN ASSAY: CPT | Performed by: NURSE PRACTITIONER

## 2021-12-13 PROCEDURE — 84132 ASSAY OF SERUM POTASSIUM: CPT

## 2021-12-13 PROCEDURE — 99233 SBSQ HOSP IP/OBS HIGH 50: CPT | Performed by: INTERNAL MEDICINE

## 2021-12-13 PROCEDURE — 250N000011 HC RX IP 250 OP 636: Performed by: CLINICAL NURSE SPECIALIST

## 2021-12-13 PROCEDURE — 250N000011 HC RX IP 250 OP 636: Performed by: THORACIC SURGERY (CARDIOTHORACIC VASCULAR SURGERY)

## 2021-12-13 PROCEDURE — 85610 PROTHROMBIN TIME: CPT | Performed by: NURSE PRACTITIONER

## 2021-12-13 PROCEDURE — 999N000075 HC STATISTIC IABP MONITORING

## 2021-12-13 PROCEDURE — 250N000011 HC RX IP 250 OP 636: Performed by: STUDENT IN AN ORGANIZED HEALTH CARE EDUCATION/TRAINING PROGRAM

## 2021-12-13 PROCEDURE — 80053 COMPREHEN METABOLIC PANEL: CPT | Performed by: NURSE PRACTITIONER

## 2021-12-13 PROCEDURE — 360N000079 HC SURGERY LEVEL 6, PER MIN: Performed by: THORACIC SURGERY (CARDIOTHORACIC VASCULAR SURGERY)

## 2021-12-13 PROCEDURE — 250N000009 HC RX 250: Performed by: CLINICAL NURSE SPECIALIST

## 2021-12-13 PROCEDURE — P9016 RBC LEUKOCYTES REDUCED: HCPCS | Performed by: INTERNAL MEDICINE

## 2021-12-13 PROCEDURE — 93005 ELECTROCARDIOGRAM TRACING: CPT

## 2021-12-13 PROCEDURE — 82805 BLOOD GASES W/O2 SATURATION: CPT | Performed by: NURSE PRACTITIONER

## 2021-12-13 PROCEDURE — 250N000011 HC RX IP 250 OP 636

## 2021-12-13 PROCEDURE — 85300 ANTITHROMBIN III ACTIVITY: CPT | Performed by: NURSE PRACTITIONER

## 2021-12-13 PROCEDURE — 84484 ASSAY OF TROPONIN QUANT: CPT | Performed by: NURSE PRACTITIONER

## 2021-12-13 PROCEDURE — 82803 BLOOD GASES ANY COMBINATION: CPT | Performed by: NURSE PRACTITIONER

## 2021-12-13 PROCEDURE — 33984 ECMO/ECLS RMVL PRPH CANNULA: CPT | Mod: GC | Performed by: THORACIC SURGERY (CARDIOTHORACIC VASCULAR SURGERY)

## 2021-12-13 PROCEDURE — 33968 REMOVE AORTIC ASSIST DEVICE: CPT | Mod: 59 | Performed by: STUDENT IN AN ORGANIZED HEALTH CARE EDUCATION/TRAINING PROGRAM

## 2021-12-13 PROCEDURE — 250N000024 HC ISOFLURANE, PER MIN: Performed by: THORACIC SURGERY (CARDIOTHORACIC VASCULAR SURGERY)

## 2021-12-13 PROCEDURE — 999N000065 XR CHEST PORT 1 VIEW

## 2021-12-13 PROCEDURE — 258N000003 HC RX IP 258 OP 636: Performed by: NURSE ANESTHETIST, CERTIFIED REGISTERED

## 2021-12-13 PROCEDURE — U0005 INFEC AGEN DETEC AMPLI PROBE: HCPCS | Performed by: STUDENT IN AN ORGANIZED HEALTH CARE EDUCATION/TRAINING PROGRAM

## 2021-12-13 PROCEDURE — 250N000013 HC RX MED GY IP 250 OP 250 PS 637: Performed by: PHYSICIAN ASSISTANT

## 2021-12-13 PROCEDURE — 85730 THROMBOPLASTIN TIME PARTIAL: CPT | Performed by: NURSE PRACTITIONER

## 2021-12-13 PROCEDURE — 74018 RADEX ABDOMEN 1 VIEW: CPT | Mod: 26 | Performed by: RADIOLOGY

## 2021-12-13 PROCEDURE — 99291 CRITICAL CARE FIRST HOUR: CPT | Mod: 25 | Performed by: STUDENT IN AN ORGANIZED HEALTH CARE EDUCATION/TRAINING PROGRAM

## 2021-12-13 PROCEDURE — 93010 ELECTROCARDIOGRAM REPORT: CPT | Performed by: INTERNAL MEDICINE

## 2021-12-13 PROCEDURE — 84132 ASSAY OF SERUM POTASSIUM: CPT | Performed by: CLINICAL NURSE SPECIALIST

## 2021-12-13 PROCEDURE — 04QK0ZZ REPAIR RIGHT FEMORAL ARTERY, OPEN APPROACH: ICD-10-PCS | Performed by: THORACIC SURGERY (CARDIOTHORACIC VASCULAR SURGERY)

## 2021-12-13 PROCEDURE — 82248 BILIRUBIN DIRECT: CPT | Performed by: CLINICAL NURSE SPECIALIST

## 2021-12-13 PROCEDURE — 82330 ASSAY OF CALCIUM: CPT | Performed by: NURSE PRACTITIONER

## 2021-12-13 PROCEDURE — 86923 COMPATIBILITY TEST ELECTRIC: CPT | Performed by: INTERNAL MEDICINE

## 2021-12-13 PROCEDURE — 90947 DIALYSIS REPEATED EVAL: CPT

## 2021-12-13 PROCEDURE — 06QM0ZZ REPAIR RIGHT FEMORAL VEIN, OPEN APPROACH: ICD-10-PCS | Performed by: THORACIC SURGERY (CARDIOTHORACIC VASCULAR SURGERY)

## 2021-12-13 PROCEDURE — 94640 AIRWAY INHALATION TREATMENT: CPT

## 2021-12-13 PROCEDURE — 250N000009 HC RX 250: Performed by: NURSE PRACTITIONER

## 2021-12-13 PROCEDURE — 250N000011 HC RX IP 250 OP 636: Performed by: NURSE PRACTITIONER

## 2021-12-13 PROCEDURE — 94640 AIRWAY INHALATION TREATMENT: CPT | Mod: 76

## 2021-12-13 PROCEDURE — 84100 ASSAY OF PHOSPHORUS: CPT | Performed by: CLINICAL NURSE SPECIALIST

## 2021-12-13 PROCEDURE — 33949 ECMO/ECLS DAILY MGMT ARTERY: CPT

## 2021-12-13 PROCEDURE — 250N000013 HC RX MED GY IP 250 OP 250 PS 637: Performed by: INTERNAL MEDICINE

## 2021-12-13 PROCEDURE — 250N000009 HC RX 250: Performed by: THORACIC SURGERY (CARDIOTHORACIC VASCULAR SURGERY)

## 2021-12-13 PROCEDURE — 71045 X-RAY EXAM CHEST 1 VIEW: CPT

## 2021-12-13 PROCEDURE — 999N000065 XR ABDOMEN PORT 1 VIEWS

## 2021-12-13 PROCEDURE — 85027 COMPLETE CBC AUTOMATED: CPT | Performed by: NURSE PRACTITIONER

## 2021-12-13 PROCEDURE — 82550 ASSAY OF CK (CPK): CPT | Performed by: NURSE PRACTITIONER

## 2021-12-13 PROCEDURE — 71045 X-RAY EXAM CHEST 1 VIEW: CPT | Mod: 26 | Performed by: STUDENT IN AN ORGANIZED HEALTH CARE EDUCATION/TRAINING PROGRAM

## 2021-12-13 PROCEDURE — 71045 X-RAY EXAM CHEST 1 VIEW: CPT | Mod: 26 | Performed by: RADIOLOGY

## 2021-12-13 PROCEDURE — 999N000015 HC STATISTIC ARTERIAL MONITORING DAILY

## 2021-12-13 PROCEDURE — 370N000017 HC ANESTHESIA TECHNICAL FEE, PER MIN: Performed by: THORACIC SURGERY (CARDIOTHORACIC VASCULAR SURGERY)

## 2021-12-13 PROCEDURE — 250N000013 HC RX MED GY IP 250 OP 250 PS 637: Performed by: NURSE PRACTITIONER

## 2021-12-13 RX ORDER — HEPARIN SODIUM 1000 [USP'U]/ML
INJECTION, SOLUTION INTRAVENOUS; SUBCUTANEOUS PRN
Status: DISCONTINUED | OUTPATIENT
Start: 2021-12-13 | End: 2021-12-13

## 2021-12-13 RX ORDER — SODIUM CHLORIDE, SODIUM LACTATE, POTASSIUM CHLORIDE, CALCIUM CHLORIDE 600; 310; 30; 20 MG/100ML; MG/100ML; MG/100ML; MG/100ML
INJECTION, SOLUTION INTRAVENOUS CONTINUOUS PRN
Status: DISCONTINUED | OUTPATIENT
Start: 2021-12-13 | End: 2021-12-13

## 2021-12-13 RX ORDER — SODIUM CHLORIDE, SODIUM GLUCONATE, SODIUM ACETATE, POTASSIUM CHLORIDE AND MAGNESIUM CHLORIDE 526; 502; 368; 37; 30 MG/100ML; MG/100ML; MG/100ML; MG/100ML; MG/100ML
INJECTION, SOLUTION INTRAVENOUS CONTINUOUS PRN
Status: DISCONTINUED | OUTPATIENT
Start: 2021-12-13 | End: 2021-12-13

## 2021-12-13 RX ORDER — FENTANYL CITRATE 50 UG/ML
INJECTION, SOLUTION INTRAMUSCULAR; INTRAVENOUS PRN
Status: DISCONTINUED | OUTPATIENT
Start: 2021-12-13 | End: 2021-12-13

## 2021-12-13 RX ORDER — PROTAMINE SULFATE 10 MG/ML
INJECTION, SOLUTION INTRAVENOUS PRN
Status: DISCONTINUED | OUTPATIENT
Start: 2021-12-13 | End: 2021-12-13

## 2021-12-13 RX ORDER — HEPARIN SODIUM 5000 [USP'U]/.5ML
5000 INJECTION, SOLUTION INTRAVENOUS; SUBCUTANEOUS EVERY 8 HOURS
Status: DISCONTINUED | OUTPATIENT
Start: 2021-12-13 | End: 2022-01-03 | Stop reason: HOSPADM

## 2021-12-13 RX ORDER — MIDAZOLAM HCL IN 0.9 % NACL/PF 1 MG/ML
1-8 PLASTIC BAG, INJECTION (ML) INTRAVENOUS CONTINUOUS
Status: DISCONTINUED | OUTPATIENT
Start: 2021-12-13 | End: 2021-12-14

## 2021-12-13 RX ORDER — DEXMEDETOMIDINE HYDROCHLORIDE 4 UG/ML
.2-1.2 INJECTION, SOLUTION INTRAVENOUS CONTINUOUS
Status: DISCONTINUED | OUTPATIENT
Start: 2021-12-13 | End: 2021-12-15

## 2021-12-13 RX ORDER — CALCIUM CHLORIDE 100 MG/ML
INJECTION INTRAVENOUS; INTRAVENTRICULAR PRN
Status: DISCONTINUED | OUTPATIENT
Start: 2021-12-13 | End: 2021-12-13

## 2021-12-13 RX ADMIN — CHLORHEXIDINE GLUCONATE 0.12% ORAL RINSE 15 ML: 1.2 LIQUID ORAL at 20:03

## 2021-12-13 RX ADMIN — ROCURONIUM BROMIDE 20 MG: 50 INJECTION, SOLUTION INTRAVENOUS at 12:22

## 2021-12-13 RX ADMIN — MULTIVIT AND MINERALS-FERROUS GLUCONATE 9 MG IRON/15 ML ORAL LIQUID 15 ML: at 07:39

## 2021-12-13 RX ADMIN — Medication 100 MCG/HR: at 06:52

## 2021-12-13 RX ADMIN — CALCIUM CHLORIDE, MAGNESIUM CHLORIDE, SODIUM CHLORIDE, SODIUM BICARBONATE, POTASSIUM CHLORIDE AND SODIUM PHOSPHATE DIBASIC DIHYDRATE 12.5 ML/KG/HR: 3.68; 3.05; 6.34; 3.09; .314; .187 INJECTION INTRAVENOUS at 03:32

## 2021-12-13 RX ADMIN — ROCURONIUM BROMIDE 50 MG: 50 INJECTION, SOLUTION INTRAVENOUS at 10:53

## 2021-12-13 RX ADMIN — PROPOFOL 20 MCG/KG/MIN: 10 INJECTION, EMULSION INTRAVENOUS at 18:25

## 2021-12-13 RX ADMIN — MIDAZOLAM 2 MG: 1 INJECTION INTRAMUSCULAR; INTRAVENOUS at 20:45

## 2021-12-13 RX ADMIN — INSULIN ASPART 1 UNITS: 100 INJECTION, SOLUTION INTRAVENOUS; SUBCUTANEOUS at 07:45

## 2021-12-13 RX ADMIN — FENTANYL CITRATE 250 MCG: 50 INJECTION, SOLUTION INTRAMUSCULAR; INTRAVENOUS at 10:53

## 2021-12-13 RX ADMIN — SUGAMMADEX 200 MG: 100 INJECTION, SOLUTION INTRAVENOUS at 13:15

## 2021-12-13 RX ADMIN — INSULIN ASPART 1 UNITS: 100 INJECTION, SOLUTION INTRAVENOUS; SUBCUTANEOUS at 20:53

## 2021-12-13 RX ADMIN — Medication 2 PACKET: at 17:02

## 2021-12-13 RX ADMIN — CEFTRIAXONE SODIUM 2 G: 2 INJECTION, POWDER, FOR SOLUTION INTRAMUSCULAR; INTRAVENOUS at 09:00

## 2021-12-13 RX ADMIN — PROPOFOL 20 MCG/KG/MIN: 10 INJECTION, EMULSION INTRAVENOUS at 02:42

## 2021-12-13 RX ADMIN — SODIUM CHLORIDE, SODIUM GLUCONATE, SODIUM ACETATE, POTASSIUM CHLORIDE AND MAGNESIUM CHLORIDE: 526; 502; 368; 37; 30 INJECTION, SOLUTION INTRAVENOUS at 09:37

## 2021-12-13 RX ADMIN — INSULIN ASPART 1 UNITS: 100 INJECTION, SOLUTION INTRAVENOUS; SUBCUTANEOUS at 16:07

## 2021-12-13 RX ADMIN — POTASSIUM CHLORIDE 20 MEQ: 29.8 INJECTION, SOLUTION INTRAVENOUS at 04:45

## 2021-12-13 RX ADMIN — EPINEPHRINE 0.05 MCG/KG/MIN: 1 INJECTION PARENTERAL at 15:08

## 2021-12-13 RX ADMIN — QUETIAPINE FUMARATE 50 MG: 50 TABLET ORAL at 01:50

## 2021-12-13 RX ADMIN — CALCIUM CHLORIDE 500 MG: 100 INJECTION INTRAVENOUS; INTRAVENTRICULAR at 12:17

## 2021-12-13 RX ADMIN — EPINEPHRINE 0.03 MCG/KG/MIN: 1 INJECTION PARENTERAL at 09:53

## 2021-12-13 RX ADMIN — HEPARIN SODIUM 8000 UNITS: 1000 INJECTION INTRAVENOUS; SUBCUTANEOUS at 10:20

## 2021-12-13 RX ADMIN — Medication 2 PACKET: at 20:53

## 2021-12-13 RX ADMIN — CHLORHEXIDINE GLUCONATE 0.12% ORAL RINSE 15 ML: 1.2 LIQUID ORAL at 07:39

## 2021-12-13 RX ADMIN — TICAGRELOR 90 MG: 90 TABLET ORAL at 07:39

## 2021-12-13 RX ADMIN — Medication 2 PACKET: at 07:40

## 2021-12-13 RX ADMIN — IPRATROPIUM BROMIDE AND ALBUTEROL SULFATE 3 ML: 2.5; .5 SOLUTION RESPIRATORY (INHALATION) at 19:44

## 2021-12-13 RX ADMIN — IPRATROPIUM BROMIDE AND ALBUTEROL SULFATE 3 ML: 2.5; .5 SOLUTION RESPIRATORY (INHALATION) at 07:45

## 2021-12-13 RX ADMIN — CALCIUM CHLORIDE, MAGNESIUM CHLORIDE, SODIUM CHLORIDE, SODIUM BICARBONATE, POTASSIUM CHLORIDE AND SODIUM PHOSPHATE DIBASIC DIHYDRATE 12.5 ML/KG/HR: 3.68; 3.05; 6.34; 3.09; .314; .187 INJECTION INTRAVENOUS at 03:33

## 2021-12-13 RX ADMIN — Medication 4 MG/HR: at 21:13

## 2021-12-13 RX ADMIN — FOLIC ACID 1 MG: 1 TABLET ORAL at 07:39

## 2021-12-13 RX ADMIN — PROTAMINE SULFATE 50 MG: 10 INJECTION, SOLUTION INTRAVENOUS at 11:52

## 2021-12-13 RX ADMIN — Medication 40 MG: at 07:39

## 2021-12-13 RX ADMIN — MIDAZOLAM 2 MG: 1 INJECTION INTRAMUSCULAR; INTRAVENOUS at 17:38

## 2021-12-13 RX ADMIN — DEXMEDETOMIDINE HYDROCHLORIDE 0.2 MCG/KG/HR: 4 INJECTION, SOLUTION INTRAVENOUS at 16:26

## 2021-12-13 RX ADMIN — THIAMINE HCL TAB 100 MG 100 MG: 100 TAB at 07:39

## 2021-12-13 RX ADMIN — MIDAZOLAM 5 MG: 1 INJECTION INTRAMUSCULAR; INTRAVENOUS at 09:37

## 2021-12-13 RX ADMIN — HEPARIN SODIUM 5000 UNITS: 5000 INJECTION, SOLUTION INTRAVENOUS; SUBCUTANEOUS at 20:50

## 2021-12-13 RX ADMIN — TICAGRELOR 90 MG: 90 TABLET ORAL at 20:50

## 2021-12-13 RX ADMIN — INSULIN ASPART 1 UNITS: 100 INJECTION, SOLUTION INTRAVENOUS; SUBCUTANEOUS at 03:58

## 2021-12-13 RX ADMIN — CALCIUM CHLORIDE, MAGNESIUM CHLORIDE, SODIUM CHLORIDE, SODIUM BICARBONATE, POTASSIUM CHLORIDE AND SODIUM PHOSPHATE DIBASIC DIHYDRATE: 3.68; 3.05; 6.34; 3.09; .314; .187 INJECTION INTRAVENOUS at 03:33

## 2021-12-13 RX ADMIN — ASPIRIN 81 MG CHEWABLE TABLET 81 MG: 81 TABLET CHEWABLE at 07:39

## 2021-12-13 RX ADMIN — ROCURONIUM BROMIDE 50 MG: 50 INJECTION, SOLUTION INTRAVENOUS at 09:40

## 2021-12-13 ASSESSMENT — ACTIVITIES OF DAILY LIVING (ADL)
ADLS_ACUITY_SCORE: 15
ADLS_ACUITY_SCORE: 13
ADLS_ACUITY_SCORE: 15
ADLS_ACUITY_SCORE: 13
ADLS_ACUITY_SCORE: 15

## 2021-12-13 ASSESSMENT — MIFFLIN-ST. JEOR: SCORE: 1497.38

## 2021-12-13 NOTE — CONSULTS
[facility] Cardiovascular Surgery Consult     Bruce Blount MRN# [id number]   YOB: 1965 Age: 56 year old      Date of Admission: 12/6/2021    Primary care provider: No Ref-Primary, Physician    Referring Cardiologist(s): Marlo Nguyen MD    Date of Service: December 13, 2021    Reason for consult: VA ECMO decannulation           Assessment and Plan:   Bruce Blount is a 56 year old male with VF arrest requiring PCI and VA ECMO. He underwent successful turndown study yesterday and is scheduled for decannulation today. The risks include but are not limited to bleeding, infection, stroke, heart attack, shock, possible repeat need for ECMO, dysrhythmia, respiratory failure, kidney or liver injury, nerve injury, bowel or limb ischemia, and death. The patient brother understands these risks and consents for Bruce to undergo the operation.    1) ECMO decannulation today in OR  2) on antibiotics, blood crossmatched    Allen Padilla MD  Cardiothoracic Surgery  [phone]             Chief Complaint:   VF arrest on VA ECMO         History of Present Illness:   Mr. Bruce Blount is a 56 year old male with cardiac risk factors of ETOH and tobacco abuse without prior medical care who was admitted after being found down at home by his brother. CPR was initiated immediately and EMS defibrillated x 7 until ROSC was obtained. He was placed on VA ECMO upon arrival to [facility] due to recurrent arrhythmias. He underwent PCI to the circumflex and OM. An IABP was placed due to loss of pulsatility. Yesterday he had left lower extremity ischemia and a distal reperfusion catheter was placed in the left SFA that improved the ischemia. He has had some myocardial recovery and a turndown study yesterday was suggestive of successful wean from VA ECMO.                   Past Medical History:   No past medical history on file.          Past Surgical History:     Past Surgical History:   Procedure Laterality Date     CV ARTERIAL  LINE PLACEMENT N/A 12/6/2021    Procedure: Arterial Line Placement;  Surgeon: Brayan Thompson MD;  Location: [facility] CARDIAC CATH LAB     CV ARTERIAL LINE PLACEMENT N/A 12/8/2021    Procedure: Arterial Line Placement;  Surgeon: Brayan Thompson MD;  Location: [facility] CARDIAC CATH LAB     CV CENTRAL VENOUS CATHETER PLACEMENT N/A 12/6/2021    Procedure: Central Venous Catheter Placement;  Surgeon: Brayan Thompson MD;  Location: [facility] CARDIAC CATH LAB     CV CORONARY ANGIOGRAM N/A 12/6/2021    Procedure: CV CORONARY ANGIOGRAM;  Surgeon: Brayan Thompson MD;  Location: [facility] CARDIAC CATH LAB     CV CPR WITH CHEST COMPRESSION N/A 12/6/2021    Procedure: CPR with Chest Compression System;  Surgeon: Brayan Thompson MD;  Location: [facility] CARDIAC CATH LAB     CV EXTRACORPERAL MEMBRANE OXYGENATION N/A 12/6/2021    Procedure: Extracorporeal Membrance Oxygenation;  Surgeon: Brayan Thompson MD;  Location: [facility] CARDIAC CATH LAB     CV PCI STENT DRUG ELUTING N/A 12/6/2021    Procedure: Percutaneous Coronary Intervention Stent Drug Eluting;  Surgeon: Brayan Thompson MD;  Location: [facility] CARDIAC CATH LAB     CV THERAPEUTIC HYPOTHERMIA N/A 12/6/2021    Procedure: Therapeutic Hypothermia;  Surgeon: Brayan Thompson MD;  Location: [facility] CARDIAC CATH LAB              Social History:     Social History     Socioeconomic History     Marital status: Single     Spouse name: Not on file     Number of children: Not on file     Years of education: Not on file     Highest education level: Not on file   Occupational History     Not on file   Tobacco Use     Smoking status: Not on file     Smokeless tobacco: Not on file   Substance and Sexual Activity     Alcohol use: Not on file     Drug use: Not on file     Sexual activity: Not on file   Other Topics Concern     Not on file   Social History Narrative     Not  on file     Social Determinants of Health     Financial Resource Strain: Not on file   Food Insecurity: Not on file   Transportation Needs: Not on file   Physical Activity: Not on file   Stress: Not on file   Social Connections: Not on file   Intimate Partner Violence: Not on file   Housing Stability: Not on file            Family History:   No family history on file.          Immunizations:     There is no immunization history on file for this patient.          Allergies:    No Active Allergies          Medications:     Current Facility-Administered Medications   Medication     [Auto Hold] aspirin (ASA) chewable tablet 81 mg     [Auto Hold] calcium chloride in  mL intermittent infusion 1 g     [Auto Hold] calcium gluconate 2 g in 100 mL sodium chloride intermittent infusion (premix)     [Auto Hold] calcium gluconate 4 g in sodium chloride 0.9 % 100 mL intermittent infusion     [Auto Hold] chlorhexidine (PERIDEX) 0.12 % solution 15 mL     dextrose 10% infusion     dextrose 10% infusion     [Auto Hold] glucose gel 15-30 g    Or     [Auto Hold] dextrose 50 % injection 25-50 mL    Or     [Auto Hold] glucagon injection 1 mg     dialysate for CVVHD & CVVHDF (Phoxillum BK4/2.5)     EPINEPHrine (ADRENALIN) 5 mg in sodium chloride 0.9 % 250 mL infusion     fentaNYL (SUBLIMAZE) infusion     [Auto Hold] folic acid (FOLVITE) tablet 1 mg     heparin 2 unit/mL in 0.9% NaCl (for IABP side REPERFUSION CATHETER 1)     heparin 2 unit/mL in 0.9% NaCl (for REPERFUSION CATHETER 2)     [Auto Hold] heparin ANTICOAGULANT bolus dose from infusion pump 353.5-707 Units     heparin infusion 25,000 units in D5W 250 mL ANTICOAGULANT     [Auto Hold] insulin aspart (NovoLOG) injection (RAPID ACTING)     [Auto Hold] ipratropium - albuterol 0.5 mg/2.5 mg/3 mL (DUONEB) neb solution 3 mL     [Auto Hold] lidocaine (LMX4) cream     [Auto Hold] lidocaine 1 % 0.1-1 mL     [Auto Hold] magnesium sulfate 2 g in water intermittent infusion      midazolam (VERSED) drip - ADULT 100 mg/100 mL in NS (pre-mix)     [Auto Hold] midazolam (VERSED) injection 1-3 mg     [Auto Hold] multivitamins w/minerals liquid 15 mL     [Auto Hold] naloxone (NARCAN) injection 0.2 mg    Or     [Auto Hold] naloxone (NARCAN) injection 0.4 mg    Or     [Auto Hold] naloxone (NARCAN) injection 0.2 mg    Or     [Auto Hold] naloxone (NARCAN) injection 0.4 mg     norepinephrine (LEVOPHED) 16 mg in  mL infusion MAX CONC CENTRAL LINE     [Auto Hold] pantoprazole (PROTONIX) 2 mg/mL suspension 40 mg     POST-filter replacement solution for CVVHD & CVVHDF (Phoxillum BK4/2.5)     [Auto Hold] potassium chloride 20 mEq in 50 mL intermittent infusion     PRE-filter replacement solution for CVVHD & CVVHDF (Phoxillum BK4/2.5)     propofol (DIPRIVAN) infusion     [Auto Hold] protein modular (PROSOURCE TF) 2 packet     [Auto Hold] QUEtiapine (SEROquel) tablet 50 mg     [Auto Hold] sodium chloride (PF) 0.9% PF flush 3 mL     [Auto Hold] sodium phosphate 15 mmol in D5W intermittent infusion     [Auto Hold] thiamine (B-1) tablet 100 mg     [Auto Hold] ticagrelor (BRILINTA) tablet 90 mg             Review of Systems:     A 10 point ROS was performed and is negative other than HPI.             Physical Exam:        Temp:  [96.9  F (36.1  C)-98.4  F (36.9  C)] 97.5  F (36.4  C)  Pulse:  [] 90  Resp:  [16-21] 18  MAP:  [60 mmHg-88 mmHg] 88 mmHg  Arterial Line BP: ()/(29-52) 132/50  FiO2 (%):  [40 %-60 %] 50 %  SpO2:  [91 %-100 %] 96 %    Gen: RASS -4, unable to assess CAM  Neck: No JVD, trachea midline  ENT: EOMI, anicteric  Lungs: intubated, ventilated  CV: regular rhythm, normal rate  Abd: soft, NT, nD  Ext: right VA ECMO percutaneous cannulas in place w/o bleeding, left groin IABP and reperfusion catheter in place.   Neuro: sedated, RASS -4    Labs:  Lab Results   Component Value Date    WBC 15.1 (H) 12/13/2021    HGB 8.0 (L) 12/13/2021    HCT 24.5 (L) 12/13/2021    PLT 87 (L) 12/13/2021      12/13/2021    POTASSIUM 3.4 12/13/2021    CHLORIDE 104 12/13/2021    CO2 22 12/13/2021    BUN 52 (H) 12/13/2021    CR 1.37 (H) 12/13/2021     (H) 12/13/2021    SED 86 (H) 12/13/2021    DD >20.00 (HH) 12/13/2021    AST 60 (H) 12/13/2021    ALT 54 12/13/2021    ALKPHOS 73 12/13/2021    BILITOTAL 0.4 12/13/2021    INR 1.13 12/13/2021       Imaging:  Transthoracic echocardiogram (12/12/2021): I have personally reviewed these images  LVEF 15% on 2.9 LPM flow, increaseddto 20% on 1.0 LPM flow

## 2021-12-13 NOTE — PROGRESS NOTES
"  Cardiology ICU Progress Note    Brief HPI: Bruce Blount is a 56 year old male with PMHx current tobacco use and ETOH abuse who presents to [facility] after out of hospital cardiac arrest.      Per EMS and brother, patient was in his usual state of health prior to arrest. Patients brother heard a \"thump\" and found patient unresponsive and agonal breathing. 911 called and CPR initiated. EMS arrived within 4 minutes. Initial rhythm VF--> shocked x ~7 with ROSC upon arrival to ED. Total CPR team per EMS ~ 40 minutes. 3 mg Epinephrine, 300 mg Amio given via EMS. Patient EKG reveals Valentín inferior/posterior leads with reciprocal changes. Patient initially presented with an Igel and was intubated with ETT in the ED. He arrested and was again shocked in the ED with ROSC. Taken urgently to CCL where he underwent coronary angiogram and again arrested requiring manual CPR and was then cannulated on VA ECMO via right with 17 Tajik cannula RCFA, 25 Fr cannula RCFV. Coronary angiogram revealed  of RCA and acute culprit lesion of LCx/OM1, s/p PCI to pLCx, mid LCx, and OM1 with TERRY. IABP placed for decreased pulsatility. Thermogard cooling cath placed and patient was transferred to ICU for further management.        Subjective and Interval: Remains intubated and sedated, critically ill in the ICU. Yesterday, successful turndown. CVTS consulted for ECMO Decannulation. HIT negative, back on Heparin    Assessment and plan by system:   Today's changes:  Decannulation  IABP removal this afternoon  Bedrest 6 hours post decann/IABP pull  Wean sedation post bedrest  DVT prophy tonight  Stop Seroquel  Stop CIWA  Will need HD line if kidney function worsens, so far, UOP improving and lytes/labs stable.     Neurology  # Concern for anoxic brain injury    # Hx of possible ETOH abuse  Intubated, sedated. Cooled to 33 degrees. Now rewarmed. Initial head CT without acute pathology. Able to follow commands.   - Continue fentanyl, Propofol for " sedation with a RASS goal -2 to -3.    Sedation/analgesia:  Propofol 20 mcg/kg/mn, Fentanyl 100 mcg/hour  - Seroquel 50 mg q8h- stop now, PRN if needed  - Neuro-crit consulted signed off  - Palliative care consulted.   - Folate, Thiamine and multi vitamin for ETOH abuse. Discontinue CIWAweaned     Cardiovascular  # Refractory VF cardiac arrest 2/2 secondary to STEMI  # Cardiogenic shock requiring VA ECMO  # Ischemic Cardiomyopathy (EF 10-15%)  # S/p PCI pLCx, mLCx, OM1  # Residual RCA   # HTN, HLD  Peripheral V-A ECMO inserted via RCFA and RCFV 17/25 fr.  troponin peak 124K. Pulsatility improved.  - plan for decannulation this morning  - Did turndown study yesterday to 1L with improvement in LVEF from 15-20%    Pressors/Inotropes/Antiarrythmics:  - Norepi 0.06 mcg/kg/mn    IABP 1:1 100% augmentation  - ECMO:                 - Flows 2.3-2.56                 - Sweep 1L                 - ACT goal: 180-200  - GDMT                 - Continue ASA 81mg and ticagrelor 90mg BID                  - Hold Lipitor for now given shock liver                 - Hold ACE/ARB for now given likely reduced renal fxn after arrest                 - Holding beta blocker given shock  -  of RCA not intervened upon     Pulmonary  # Acute hypoxemic respiratory failure requiring intubation  # Klebsiella pneumonia  # Rib Fractures  # Sternal Fracture  # Tobacco Abuse (2PPD)  Had CO2 retention during with subsequent decrease in PH after ECMO turn down. Will increase RR to avoid this during decannulation.   Vent Settings: CMV 18/550/8/50%  CXR with stable opacities/mild improvement, ETT 3 cm above jacques  - goal CO2 40-50  - Wean vent as able  - Daily CXR  - Serial ABGs   - duoneb Q6 hours  - Consider Nicotine replacement     Gastrointestinal, Nutrition  # Shock liver 2/2 cardiac arrest, improving  # Loose Stool  No known medical hx.   ALT 54 (57) AST 64 (60)  - Monitor LFTs   - TF at goal  - Bowel regimen: hold for now given loose stools  -  GI Prophylaxis: Protonix 40 mg daily     Renal, Electrolytes  # Acute Renal Injury requiring CRRT  # Lactic acidosis  # Hypoalbuminemia  # Hyperkalemia, resolved   # Hypocalcemia, resolved  Unclear creatinine baseline, on admission 0.87. UOP poor 12/8 with hyperkalemia. Started on CRRT. LA peaked again at 2.7 in the setting of LLE limb ischemia, now normalized.   ml yesterday, 90 ml since midnight. Net neg 1.8 L yesterday, net neg 2.5L since admission.  - volume removal; goal net negative 500 ml today. May not need CRRT, trial lasix if UP sluggish  - renal consult; appreciate recommendations  - lactic acid Q6 hours  - Monitor urine output     Infectious Disease  # Aspiration Pneumonia (klebsiella, staph aureus)  # Leukocytosis, improving  # Lactic acidosis, resolved  WBC 15.1 (12.3). Afebrile overnight. Afebrile, tmax 98.4  - Monitor for signs of infection  - Daily blood cultures while on ECMO   Cultures thus far:                 - sputum 12/6: staph aureus, staph dysgalactia, klebsiella                 - sputum 12/7: staph aureus                 - sputum 12/8: gram positive cocci      - Sputum 12/9- staph aureus and klebsiella      - Sputum 12/10- staph aureus and klebsiella  Antibiotics              - Vancomycin 12/6 - 12/7 (MRSA negative)              - Zosyn 12/6 - 12/9              - Rocephin 12/9 - present (plan for 7).     Hematology  # Anemia of critical illness  # Concern for HIT initially- negative  # Thrombocytopenia  # Loss of bilateral DP pulses, resolved  Hgb stable. 8 (7.4) Plt stable 87 (78) No e/o bleeding. US with monophasic flow to DP. LLE distal reperfusion cannula placed 12/8 in the setting of worsened LLE mottling.  Never lost Dopplerable pulses.  Platelet continuing to fall 12/9 - transitioned to bivalirudin and HIT screen was sent which was negative, resumed back on Heparin now  - Transfuse for Hgb < 8  - DVT PPX: Heparin gtt as above     Endocrinology  # Hyperglycemia   No known  "medical history. Hemoglobin A1c 5.6%  - insulin gtt as needed     MSK/Skin  # Mottling LLE s/p LLE distal reperfusion cannula, improved  Pulses remain intact. Probable PAD. Exacerbated by IABP. Worsened throughout the day 12/8 with rising LA, CK.  LLE distal reperfusion cannula placed  - continue to monitor  - continue Heparin gtt until decann, then DVT Heparin tonight     Pertinent Lines  R femoral arterial and venous ECMO cannulae December 6, 2021 (ECMO cannula)  RCFA arterial line December 6, 2021 (R distal reperfusion cannula)  LCFA arterial line December 8, 2021 (L distal reperfusion cannula)  L femoral arterial line December 6, 2021 (IABP)  Rectal tube December 6, 2021  R radial arterial line December 6, 2021  ETT December 6, 2021  Gutiérrez catheter December 6, 2021  OG tube December 6, 2021     Patient seen and discussed with staff physician.    KATHARINA Peterson, CNP  [facility]  Interventional Cardiology-CSI Service  Pager [phone]     Objective:  Most recent vital signs:  /78   Pulse 90   Temp 98.4  F (36.9  C)   Resp 18   Ht 1.753 m (5' 9\")   Wt 67.7 kg (149 lb 4 oz)   SpO2 97%   BMI 22.04 kg/m    Temp:  [96.9  F (36.1  C)-98.4  F (36.9  C)] 98.4  F (36.9  C)  Pulse:  [] 90  Resp:  [16-21] 18  MAP:  [60 mmHg-85 mmHg] 62 mmHg  Arterial Line BP: ()/(29-52) 91/30  FiO2 (%):  [40 %-60 %] 50 %  SpO2:  [91 %-100 %] 97 %  Wt Readings from Last 2 Encounters:   12/13/21 67.7 kg (149 lb 4 oz)   09/23/19 77.5 kg (170 lb 12.8 oz)       Intake/Output Summary (Last 24 hours) at 12/13/2021 0745  Last data filed at 12/13/2021 0700  Gross per 24 hour   Intake 3070.16 ml   Output 4172 ml   Net -1101.84 ml     Physical exam:  General: In bed, in NAD  HEENT: PERRL, no scleral icterus or injection  CARDIAC: RRR, no m/r/g appreciated. Peripheral pulses dopplered  RESP: Mechanical ventilation; CTAB, no wheezes, rhonchi or crackles appreciated.  GI: soft, BS " hypoactive  : Gutiérrez  EXTREMITIES: NO LE edema, pulses dopplered. Femoral access site w/o bleeding, dressing c/d/i.    SKIN: No acute lesions appreciated  NEURO: Intubated and sedated    Labs (Past three days):  CBC  Recent Labs   Lab 12/13/21  0353 12/12/21  2136 12/12/21  1543 12/12/21  0939   WBC 15.1* 13.2* 12.3* 12.5*   RBC 2.55* 2.37* 2.37* 2.41*   HGB 8.0* 7.4* 7.5* 7.7*   HCT 24.5* 22.7* 22.7* 23.2*   MCV 96 96 96 96   MCH 31.4 31.2 31.6 32.0   MCHC 32.7 32.6 33.0 33.2   RDW 13.8 13.9 13.8 13.8   PLT 87* 82* 78* 79*     BMP  Recent Labs   Lab 12/13/21  0355 12/13/21  0353 12/12/21  2358 12/12/21  2136 12/12/21  1544 12/12/21  1543 12/12/21  0949 12/12/21  0939 12/12/21  0338 12/12/21  0337   NA  --  137  --  138  138  --  138  --  139  139  --  138   POTASSIUM  --  3.4  --  3.5  3.5  --  3.7  --  3.7  3.7  --  3.8   CHLORIDE  --  104  --  105  105  --  105  --  104  104  --  107   CO2  --  22  --  25  25  --  25  --  24  24  --  25   ANIONGAP  --  11  --  8  8  --  8  --  11  11  --  6   * 183*  169* 138* 167*  167*  160*   < > 173*   < > 123*  123*  117*   < > 161*  144*   BUN  --  52*  --  50*  50*  --  46*  --  46*  46*  --  46*   CR  --  1.37*  --  1.37*  1.37*  --  1.26*  --  1.26*  1.26*  --  1.08   GFRESTIMATED  --  57*  --  57*  57*  --  63  --  63  63  --  76   BRIDGETTE  --  8.2*  --  8.4*  8.4*  --  8.2*  --  8.6  8.6  --  8.2*   MAG  --  2.9*  --  3.1*  --  2.8*  --  2.8*  --  2.8*   PHOS  --  5.3*  --  4.6*  --   --   --  4.7*  --  4.6*    < > = values in this interval not displayed.     Troponins:     INR  Recent Labs   Lab 12/13/21  0353 12/12/21 2136 12/12/21  1543 12/12/21  0939   INR 1.13 1.07 1.09 1.06     Liver panel  Recent Labs   Lab 12/13/21  0353 12/12/21 2136 12/12/21  1543 12/12/21  0939   PROTTOTAL 6.8 6.9 6.7* 6.7*   ALBUMIN 1.8* 1.9*  1.9* 1.8* 1.8*  1.8*   BILITOTAL 0.4 0.3 0.3 0.3   ALKPHOS 73 74 69 70   AST 60* 64* 66* 76*   ALT 54 57 59 61        Imaging/procedure results:  XR Chest Port 1 View  Narrative: EXAM: XR CHEST PORT 1 VIEW  12/13/2021 1:30 AM      HISTORY: Check endotracheal tube placement and ECLS cannula placement.    COMPARISON: Chest x-ray 12/12/2021, CT CAP 12/6/2021    FINDINGS: Portable supine AP radiograph of the chest. The enteric tube  courses inferior to the diaphragm and out of the field of view. The  tip of the endotracheal tube projects approximately 3 cm above the  jacques. The IABP marker projects over the descending thoracic aorta,  at the level of the jacques. Inferior approach ECMO cannula tip  projects over the low SVC. Additional inferior approach catheter tip  projects over the low SVC. Esophageal temperature probe tip projects  over the mid esophagus.    The trachea is midline. The cardiac silhouette is not enlarged. Stable  diffuse bilateral mixed interstitial and airspace opacities. No  pleural effusion or pneumothorax. The visualized upper abdomen is  unremarkable. Right clavicle deformity.  Impression: IMPRESSION:   1. The tip of the endotracheal tube projects approximately 3 cm above  the jacques.  2. Inferior approach ECMO cannula tip projects over the low SVC.  3. Stable bilateral mixed interstitial and airspace opacities.         I have personally reviewed the examination and initial interpretation  and I agree with the findings.    HARESH ÁLVAREZ MD         SYSTEM ID:  [id number]

## 2021-12-13 NOTE — ANESTHESIA PROCEDURE NOTES
Perioperative TREMAINE Procedure Note    Staff -        Anesthesiologist:  Freddie Agrawal MD       Resident/Fellow: Damien Garcia MD       Performed By: anesthesiologist and fellow  Preanesthesia Checklist:  Patient identified, IV assessed, risks and benefits discussed, monitors and equipment assessed, procedure being performed at surgeon's request and anesthesia consent obtained.    TREMAINE Probe Insertion    Probe Status PRE Insertion: NO obvious damage  Probe type:  Adult 3D  Bite block used:   Oral Airway  Insertion Technique: Easy, no oropharyngeal manipulation  Insertion complications: None obvious  Billing Report:TREMAINE report by Anesthesiologist (See Separate Report note)  Probe Status POST Removal: NO obvious damage    TREMAINE Report  General Procedure Information  Images for this study have been archived.  Modalities: 2D, 3D, CW Doppler, PW Doppler and Color flow mapping  Diagnostic indications for TREMAINE:   Cardiomyopathy, other  Echocardiographic and Doppler Measurements  Right Ventricle:  Cavity size dilated.   Hypertrophy not present.   Thrombus not present.    Global function moderately impaired.     Left Ventricle:  Cavity size dilated.   Hypertrophy present.   Thrombus not present.   Global Function moderately impaired.   Ejection Fraction 35%.      Ventricular Regional Function:  1- Basal Anteroseptal:  hypokinetic  2- Basal Anterior:  hypokinetic  3- Basal Anterolateral:  hypokinetic  4- Basal Inferolateral:  hypokinetic  5- Basal Inferior:  hypokinetic  6- Basal Inferoseptal:  hypokinetic  7- Mid Anteroseptal:  hypokinetic  8- Mid Anterior:  hypokinetic  9- Mid Anterolateral:  hypokinetic  10- Mid Inferolateral:  hypokinetic  11- Mid Inferior:  hypokinetic  12- Mid Inferoseptal:  hypokinetic  13- Apical Anterior:  hypokinetic  14- Apical Lateral:  hypokinetic  15- Apical Inferior:  hypokinetic  16- Apical Septal:  hypokinetic  17- [place]:  hypokinetic    Valves  Aortic Valve: Annulus normal.  Stenosis  not present.  Regurgitation absent.  Leaflets normal.  Leaflet motions normal.    Mitral Valve: Annulus normal.  Stenosis not present.  Regurgitation +1.  Leaflets normal.  Leaflet motions normal.    Tricuspid Valve: Annulus normal.  Stenosis not present.  Regurgitation absent.  Leaflet motions normal.    Pulmonic Valve: Annulus normal.  Stenosis not present.  Regurgitation absent.      Aorta: Ascending Aorta: Size normal.  Dissection not present.  Plaque thickness less than 3 mm.  Mobile plaque not present.    Aortic Arch: Size normal.   Dissection not present.   Plaque thickness less than 3 mm.   Mobile plaque not present.    Descending Aorta: Size normal.   Dissection not present.   Plaque thickness less than 3 mm.   Mobile plaque not present.        Right Atrium:  Size dilated.   Spontaneous echo contrast not present.   Thrombus not present.   Tumor not present.   Device not present.     Left Atrium: Size dilated.  Spontaneous echo contrast not present.  Thrombus not present.  Tumor not present.  Device not present.    Left atrial appendage normal.     Atrial Septum: Intra-atrial septal morphology normal.     Ventricular Septum: Intra-ventricular septum morphology normal.     Diastolic Function Measurements:  Diastolic Dysfunction Grade= indeterminate. E= ms. A= ms. E/A Ratio= . DT=  ms.  S/D= .  IVRT= ms.  Other Findings:   Pericardium:  normal. Pleural Effusion:  none. Pulmonary Arteries:  normal. Pulmonary Venous Flow:  blunted (decreased) systolic flow. No coronary sinus catheter present.   Post Intervention Findings  Procedure(s) performed:  ECMO Decannulation. Global function:  Unchanged. Regional wall motion: Unchanged. Surgeon(s) notified of all postintervention findings: Yes.       Echocardiogram Comments

## 2021-12-13 NOTE — PROGRESS NOTES
Nephrology Progress Note  12/13/2021         Bruce Blount is a 56 yom with hx of ETOH and tobacco use who presents to [facility] after out of hospital arrest on IABP and ECMO.  Had witnessed arrest at home (brother heard him fall and called 911), found in VF and coded by EMS.  UOP dwindling and has mild hyperkalemia after rewarming, nephrology consulted for ESTER and management of K.       Interval History :   Mr Blount continues on CRRT started 12/8 to manage mild hyperkalemia and volume.  Net negative yesterday and has ~500cc of UOP, unclear if this is providing adequate clearance but with hypokalemia we can see how his chemistries trend by tomorrow am before placing new HD access.  Can give diuretic post OR if he becomes significantly net positive.       Assessment & Recommendations:   ESTER-Presentation Cr 1.0, normal imaging on CT with no hydro.  Cr up after arrest and K 5.4 in setting of ongoing rewarming from cooling protocol.  Still making some UOP but with need for IABP, ECMO and 2 pressors ESTER is unlikely to improve in the short term so started CRRT 12/8.  ECMO decannulation today, will see if he needs iHD or if he is recovering in the next 24h.                  -Stopping CRRT this am with ECMO decannulation.      -Can see how renal fx trends the next 24h before placing access, K running low and is making UOP so do not anticipate any major issues before tomorrow am.        Volume-Mild edema, net negative yesterday and had ~500cc of UOP.  Decannulating ECMO today, can use diuretic if needed post OR to be net even.      Electrolytes/pH-K 3.4, bicarb 22.  Holding on restarting RRT post ECMO decannulation.        BMD-Ca 8.2, Mg and Phos mildly up consistent with ESTER.       VF arrest-EF currently 5-10%.  Decannulating ECMO today.       Anemia-Hgb 7.7, down from admission, acute management per team.       Seen and discussed with Dr Flannery.      Recommendations were communicated to primary team via verbal  "communication.        Gideon Arevalo, APRN CNS  Clinical Nurse Specialist  [phone]    Review of Systems:   I reviewed the following systems:  ROS not done due to vent/sedation.     Physical Exam:   I/O last 3 completed shifts:  In: 3011.89 [I.V.:1226.89; NG/GT:475]  Out: 4125 [Urine:401; Other:2624; Stool:1100]   /78   Pulse 83   Temp 97.3  F (36.3  C)   Resp 20   Ht 1.753 m (5' 9\")   Wt 67.7 kg (149 lb 4 oz)   SpO2 100%   BMI 22.04 kg/m       GENERAL APPEARANCE: Intubated and sedated, on IABP and ECMO.    EYES: No scleral icterus  NECK:  Difficult to assess JVD  Pulmonary: lungs clear to auscultation with equal breath sounds bilaterally, no clubbing or cyanosis  CV: Regular rhythm, normal rate, no rub   - Edema +1 generalized.   GI: soft, nontender, normal bowel sounds  MS: no evidence of inflammation in joints, no muscle tenderness  : + Gutiérrez  SKIN: no rash, warm, dry  NEURO: Intubated and sedated.     Labs:   All labs reviewed by me  Electrolytes/Renal - Recent Labs   Lab Test 12/13/21  1322 12/13/21  1321 12/13/21  1223 12/13/21  1124 12/13/21  1102 12/13/21  0355 12/13/21  0353 12/12/21  2358 12/12/21  2136 12/12/21  1544 12/12/21  1543 12/12/21  0949 12/12/21  0939   NA  --   --  138 139 140   < > 137  --  138  138  --  138  --  139  139   POTASSIUM  --   --  3.4* 3.0* 3.4*   < > 3.4  --  3.5  3.5  --  3.7  --  3.7  3.7   CHLORIDE  --   --   --   --   --   --  104  --  105  105  --  105  --  104  104   CO2  --   --   --   --   --   --  22  --  25  25  --  25  --  24  24   BUN  --   --   --   --   --   --  52*  --  50*  50*  --  46*  --  46*  46*   CR  --   --   --   --   --   --  1.37*  --  1.37*  1.37*  --  1.26*  --  1.26*  1.26*   * 170* 185* 204* 209*   < > 183*  169*   < > 167*  167*  160*   < > 173*   < > 123*  123*  117*   BRIDGETTE  --   --   --   --   --   --  8.2*  --  8.4*  8.4*  --  8.2*  --  8.6  8.6   MAG  --   --   --   --   --   --  2.9*  --  3.1*  --  " 2.8*  --  2.8*   PHOS  --   --   --   --   --   --  5.3*  --  4.6*  --   --   --  4.7*    < > = values in this interval not displayed.       CBC -   Recent Labs   Lab Test 12/13/21  1323 12/13/21  1223 12/13/21  1124 12/13/21  1033 12/13/21  0353 12/12/21 2136   WBC 19.2*  --   --   --  15.1* 13.2*   HGB 7.7* 8.0* 7.8*   < > 8.0* 7.4*   PLT 88*  --   --   --  87* 82*    < > = values in this interval not displayed.       LFTs -   Recent Labs   Lab Test 12/13/21  0353 12/12/21 2136 12/12/21  1543   ALKPHOS 73 74 69   BILITOTAL 0.4 0.3 0.3   ALT 54 57 59   AST 60* 64* 66*   PROTTOTAL 6.8 6.9 6.7*   ALBUMIN 1.8* 1.9*  1.9* 1.8*       Iron Panel - No lab results found.        Current Medications:    aspirin  81 mg Per Feeding Tube Daily     chlorhexidine  15 mL Swish & Spit BID     folic acid  1 mg Oral or Feeding Tube Daily     insulin aspart  1-6 Units Subcutaneous Q4H     ipratropium - albuterol 0.5 mg/2.5 mg/3 mL  3 mL Nebulization 4x daily     multivitamins w/minerals  15 mL Per Feeding Tube Daily     pantoprazole  40 mg Oral QAM AC     protein modular  2 packet Per Feeding Tube 4x Daily     thiamine  100 mg Oral or Feeding Tube Daily     ticagrelor  90 mg Oral or Feeding Tube BID       dextrose       dextrose       CRRT replacement solution 12.5 mL/kg/hr (12/13/21 0332)     fentaNYL 100 mcg/hr (12/13/21 0937)     heparin (PRESSURE BAG) 2 unit/mL in 0.9% NaCl 3 mL/hr (12/13/21 0800)     heparin (PRESSURE BAG) 2 unit/mL in 0.9% NaCl 3 mL/hr (12/10/21 1634)     HEParin Stopped (12/13/21 0910)     midazolam Stopped (12/10/21 1000)     norepinephrine 0.09 mcg/kg/min (12/13/21 1330)     CRRT replacement solution 200 mL/hr at 12/13/21 0333     CRRT replacement solution 12.5 mL/kg/hr (12/13/21 0333)     propofol (DIPRIVAN) infusion 30 mcg/kg/min (12/13/21 1236)

## 2021-12-13 NOTE — ANESTHESIA POSTPROCEDURE EVALUATION
Patient: Bruce Blount    Procedure: Procedure(s):  REMOVAL, CANNULA, ADULT, FOR ECMO       Diagnosis:History of extracorporeal membrane oxygenation [Z92.81]  Diagnosis Additional Information: No value filed.    Anesthesia Type:  General    Note:  Disposition: ICU            ICU Sign Out: Anesthesiologist/ICU physician sign out WAS performed   Postop Pain Control: Uneventful            Sign Out: Well controlled pain   PONV: No   Neuro/Psych: Uneventful            Sign Out: PLANNED postop sedation   Airway/Respiratory: Uneventful            Sign Out: AIRWAY IN SITU/Resp. Support               Airway in situ/Resp. Support: ETT                 Reason: Planned Pre-op   CV/Hemodynamics: Uneventful            Sign Out: Acceptable CV status   Other NRE:    DID A NON-ROUTINE EVENT OCCUR?            Last vitals:  Vitals Value Taken Time   BP     Temp     Pulse     Resp     SpO2         Electronically Signed By: Freddie Agrawal MD  December 13, 2021  1:34 PM

## 2021-12-13 NOTE — PROCEDURES
Intraaortic Balloon Pump Removal    Bruce Blount was intubated and sedated throughout the procedure.  The heparin gtt was d/c'd and ACT was 150 at time of removal.  His BP was stable w/ pump momentarily on standby immediately prior to removal.  The balloon pump and sheath were removed w/o issue. Manual pressure was held and hemostasis achieved after 27 minutes. The groin site was c/d/i and vital signs were stable. B/l LE were warm, no signs of distal ischemia were visible, and the b/l LE dorsalis pedis pulses were detectable by doppler. Dusky toes left side visible prior to today, unchanged. No clot was visible on the balloon pump after removal. Trace blood loss during procedure.    Malu Hernandez NP  Cardiology   December 13, 2021

## 2021-12-13 NOTE — OP NOTE
OPERATIVE DATE: 12/13/2021    PRE-OPERATIVE DIAGNOSIS:  1) Coronary artery disease  2) ST elevation myocardial infarction  3) Ventricular fibrillation  4) Cardiac Arrest  5) Acute hypoxic respiratory failure  6) Peripheral extremity ischemia, left lower extremity  7) Cardiogenic shock  8) Acute renal failure  9) Tobacco abuse    POST-OPERATIVE DIAGNOSIS:  1) Coronary artery disease  2) ST elevation myocardial infarction  3) Ventricular fibrillation  4) Cardiac Arrest  5) Acute hypoxic respiratory failure  6) Peripheral extremity ischemia, left lower extremity  7) Cardiogenic shock  8) Acute renal failure  9) Tobacco abuse    PROCEDURE:  1) ECMO turndown study with TREMAINE by anesthesia  2) Peripheral ECMO decannulation, open  3) Primary repair right common femoral artery and vein    SURGEON: Allen Padilla MD    ASSISTANT: Erin Horton MD    ANESTHESIA: GETA    ESTIMATED BLOOD LOSS: 100 mL    INDICATIONS:  Mr. MINERVA GARAY is a 56 year old male admitted with VF cardiac arrest s/p PCI and placed on peripheral VA ECMO for organ perfusion. I was asked to evaluate for ECMO decannulation. Risks and benefits of the operation were explained to their family including, but not limited to, bleeding, infection, stroke and even death. They understood these risks and agreed to proceed electively.    OPERATIVE REPORT:  The patient was transferred to the operating room and positioned supine on the OR table. General anesthesia was initiated by the anesthesia team. Endotracheal intubation and IV access was already in place. The patients chest, abdomen and bilateral lower extremities were clipped, prepped and draped in sterile fashion. A pre-procedure time-out was performed confirming the correct patient, correct site and correct procedure.    A transesophageal echocardiogram was performed. The patient was given 8000  units IV heparin. Flow on the circuit was decreased to 1L. An ABG after 5 minutes was stable.    A transverse skin  incision was made above the right groin crease. The femoral vessels were isolated. Vessel loops were placed around the femoral artery proximal and distal to the access site. A 4-0 prolene pursestring was placed around the venous cannula. Flow on the circuit was stopped. The venous cannula was removed. The site was oversewed with 4-0 prolene. Next the arterial cannula was removed. The site was repaired with interrupted 5-0 prolene sutures. There was a triphasic doppler signal proximal and distal to the repair after releasing the clamps.    The distal reperfusion catheters were removed and bled back from bilateral groins. It was made hemostatic with manual pressure for 25 minutes.    The wound was made hemostatic. The incision was closed in layers using vicryl sutures. Nylon sutures were used to approximate the skin. A negative pressure wound dressing was applied.       The patient was then transferred from the operating bed to an ICU bed and transferred to the ICU in critical, but stable, condition.    All needle, sponge and instrument counts were correct at the end of the case.    Allen Padilla MD  Cardiothoracic Surgery Staff  [phone]

## 2021-12-13 NOTE — PROGRESS NOTES
CRRT STATUS NOTE    DATA:  Time:  5:36 AM  Pressures WNL:  YES  Filter Status:  WDL    Problems Reported/Alarms Noted:  Circuit timed out and restarted.    Supplies Present:  YES    ASSESSMENT:  Patient Net Fluid Balance:  12/12 -1775cc and +300cc since midnight  Vital Signs:  T 98.7, Hr 90's, MAP>65 on levophed  Labs:  , K 3.4, Cr 1.37, Hgb 8.0, Plt 87  Goals of Therapy:  50-100cc/hr net negative    INTERVENTIONS:   Circuit timed out and restarted.    PLAN:  Continue with plan of care. Call CRRT resource RN with questions or concerns at #[phone].

## 2021-12-13 NOTE — PROGRESS NOTES
ECMO Shift Summary:       ECMO Equipment:  Console serial number: [id number]  Circuit Lot number: [id number]  Oxygenator Lot number: [id number]      Patient remains on VA ECMO, all equipment is functioning and alarms are appropriately set. RPM's 2700 with flow range 2.3-2.56 L/min. Sweep gas is at 1 LPM and FiO2 60%. Circuit remains free of air, clot and fibrin. Cannulas are secure with no bleeding from site. Extremities are warm.    Significant Shift Events: periods of hypotension, restarted Levo, 1 unit of PRBC's given.    Vent settings:  Ventilation Mode: CMV/AC  (Continuous Mandatory Ventilation/ Assist Control)  FiO2 (%): 50 %  Rate Set (breaths/minute): 18 breaths/min  Tidal Volume Set (mL): 550 mL  PEEP (cm H2O): 8 cmH2O  Oxygen Concentration (%): 50 %  Resp: 18  .    Heparin is running at 2200 u/hr, ACT range 165-169    Urine output is minimal, remains on CRRT, blood loss was none. Product given included 1 unit of PRBC's.       Intake/Output Summary (Last 24 hours) at 2021 0542  Last data filed at 2021 0500  Gross per 24 hour   Intake 2942.99 ml   Output 3975 ml   Net -1032.01 ml       ECHO:  No results found for this or any previous visit.  No results found for this or any previous visit.      CXR:  Recent Results (from the past 24 hour(s))   Echo Limited    Narrative    [id number]  [id number]  [id number]  [id number]^CHERIE^CIARAN     [facility]  Echocardiography Laboratory  [address] [place] [id number]     Name: MINERVA GARAY  MRN: [id number]  : 1965  Study Date: 2021 07:43 AM  Age: 56 yrs  Gender: Male  Patient Location: [facility]  Reason For Study: ECMO Turndown  Ordering Physician: CIARAN CRESPO  Performed By: CHIP Gonzales     BSA: 1.9 m2  Height: 69 in  Weight: 156 lb  BP: 103/64 mmHg  ______________________________________________________________________________  Procedure  Limited Portable Echo  Adult.  ______________________________________________________________________________  Interpretation Summary  Limited echo for VA ECMO turndown.  Baseline ECMO at 2.9LPM with IABP, LVEF 15%.  With turndown to 1.0LPM and IABP off, LVEF 20%.  ______________________________________________________________________________  Right Ventricle  Global right ventricular function is mildly reduced.     Pericardium  No pericardial effusion is present.     ______________________________________________________________________________  Report approved by: Gina Graham 12/12/2021 10:16 AM     ______________________________________________________________________________          Labs:  Recent Labs   Lab 12/13/21  0354 12/13/21  0353 12/13/21  0207 12/12/21  2353 12/12/21  2136   PH  --  7.33* 7.34* 7.34* 7.37   PCO2  --  46* 47* 46* 43   PO2  --  143* 125* 119* 77*   HCO3  --  25 26 25 25   O2PER 100 50  50 50 50 40       Lab Results   Component Value Date    HGB 8.0 (L) 12/13/2021    PHGB 30 (H) 12/12/2021    PLT 87 (L) 12/13/2021    FIBR 932 (H) 12/13/2021    INR 1.13 12/13/2021    PTT 74 (H) 12/13/2021    DD >20.00 (HH) 12/13/2021    ANTCH 85 12/12/2021         Plan is for decannulation today.      Maylin Harris, RT  ECMO Specialist  12/13/2021 5:42 AM

## 2021-12-13 NOTE — ANESTHESIA PREPROCEDURE EVALUATION
Anesthesia Pre-Procedure Evaluation    Patient: Bruce Blount   MRN: [id number] : 1965        Preoperative Diagnosis: History of extracorporeal membrane oxygenation [Z92.81]    Procedure : Procedure(s):  REMOVAL, CANNULA, ADULT, FOR ECMO          No past medical history on file.   Past Surgical History:   Procedure Laterality Date     CV ARTERIAL LINE PLACEMENT N/A 2021    Procedure: Arterial Line Placement;  Surgeon: Brayan Thompson MD;  Location: [facility] CARDIAC CATH LAB     CV ARTERIAL LINE PLACEMENT N/A 2021    Procedure: Arterial Line Placement;  Surgeon: Brayan Thompson MD;  Location: [facility] CARDIAC CATH LAB     CV CENTRAL VENOUS CATHETER PLACEMENT N/A 2021    Procedure: Central Venous Catheter Placement;  Surgeon: Brayan Thompson MD;  Location: [facility] CARDIAC CATH LAB     CV CORONARY ANGIOGRAM N/A 2021    Procedure: CV CORONARY ANGIOGRAM;  Surgeon: Brayan Thompson MD;  Location: [facility] CARDIAC CATH LAB     CV CPR WITH CHEST COMPRESSION N/A 2021    Procedure: CPR with Chest Compression System;  Surgeon: Brayan Thompson MD;  Location:  HEART CARDIAC CATH LAB     CV EXTRACORPERAL MEMBRANE OXYGENATION N/A 2021    Procedure: Extracorporeal Membrance Oxygenation;  Surgeon: Brayan Thompson MD;  Location:  HEART CARDIAC CATH LAB     CV PCI STENT DRUG ELUTING N/A 2021    Procedure: Percutaneous Coronary Intervention Stent Drug Eluting;  Surgeon: Brayan Thompson MD;  Location: [facility] CARDIAC CATH LAB     CV THERAPEUTIC HYPOTHERMIA N/A 2021    Procedure: Therapeutic Hypothermia;  Surgeon: Brayan Thompson MD;  Location: [facility] CARDIAC CATH LAB      No Active Allergies   Social History     Tobacco Use     Smoking status: Not on file     Smokeless tobacco: Not on file   Substance Use Topics     Alcohol use: Not on file      Wt Readings from  Last 1 Encounters:   12/13/21 67.7 kg (149 lb 4 oz)        Anesthesia Evaluation            ROS/MED HX  ENT/Pulmonary:       Neurologic:       Cardiovascular:     (+) -Peripheral Vascular Disease-CAD -past MI --CHF etiology: ischemic      METS/Exercise Tolerance:     Hematologic:     (+) anemia,     Musculoskeletal:       GI/Hepatic:       Renal/Genitourinary:     (+) renal disease, type: ARF, Pt requires dialysis, type: Hemodialysis,     Endo:       Psychiatric/Substance Use:       Infectious Disease:       Malignancy:       Other:            Physical Exam    Airway   unable to assess          Respiratory Devices and Support         Dental       (+) chipped and loose      Cardiovascular             Pulmonary                   OUTSIDE LABS:  CBC:   Lab Results   Component Value Date    WBC 15.1 (H) 12/13/2021    WBC 13.2 (H) 12/12/2021    HGB 8.0 (L) 12/13/2021    HGB 7.8 (L) 12/13/2021    HCT 24.5 (L) 12/13/2021    HCT 22.7 (L) 12/12/2021    PLT 87 (L) 12/13/2021    PLT 82 (L) 12/12/2021     BMP:   Lab Results   Component Value Date     12/13/2021     12/13/2021    POTASSIUM 3.4 (L) 12/13/2021    POTASSIUM 3.0 (L) 12/13/2021    CHLORIDE 104 12/13/2021    CHLORIDE 105 12/12/2021    CHLORIDE 105 12/12/2021    CO2 22 12/13/2021    CO2 25 12/12/2021    CO2 25 12/12/2021    BUN 52 (H) 12/13/2021    BUN 50 (H) 12/12/2021    BUN 50 (H) 12/12/2021    CR 1.37 (H) 12/13/2021    CR 1.37 (H) 12/12/2021    CR 1.37 (H) 12/12/2021     (H) 12/13/2021     (H) 12/13/2021     COAGS:   Lab Results   Component Value Date    PTT 74 (H) 12/13/2021    INR 1.13 12/13/2021    FIBR 932 (H) 12/13/2021     POC: No results found for: BGM, HCG, HCGS  HEPATIC:   Lab Results   Component Value Date    ALBUMIN 1.8 (L) 12/13/2021    PROTTOTAL 6.8 12/13/2021    ALT 54 12/13/2021    AST 60 (H) 12/13/2021    ALKPHOS 73 12/13/2021    BILITOTAL 0.4 12/13/2021     OTHER:   Lab Results   Component Value Date    PH 7.29 (L)  12/13/2021    LACT 0.8 12/13/2021    A1C 5.6 12/09/2021    BRIDGETTE 8.2 (L) 12/13/2021    PHOS 5.3 (H) 12/13/2021    MAG 2.9 (H) 12/13/2021    .0 (H) 12/13/2021    SED 86 (H) 12/13/2021       Anesthesia Plan    ASA Status:  4   NPO Status:  NPO Appropriate    Anesthesia Type: General.     - Airway: ETT   Induction: N/a.   Maintenance: Balanced.   Techniques and Equipment:     - Lines/Monitors: Arterial Line, Central Line, TREMAINE            TREMAINE Absolute Contra-indication: NONE            TREMAINE Relative Contra-indication: NONE     Consents    Anesthesia Plan(s) and associated risks, benefits, and realistic alternatives discussed. Questions answered and patient/representative(s) expressed understanding.    - Discussed:     - Discussed with:  Implied consent/emergency         Postoperative Care            Comments:                Freddie Agrawal MD

## 2021-12-13 NOTE — OR NURSING
Balloon pump not found in LDAs.    Balloon pump in left groin. Came to OR with two stat locks holding pump in place. Surgeon (Randy) sutured balloon pump in both proximal and distal hubs. After case, distal hub sutures were cut and replaced with stat lock and outline in marker. Sterile dressing change completed on site.

## 2021-12-13 NOTE — ANESTHESIA CARE TRANSFER NOTE
Patient: Bruce Blount    Procedure: Procedure(s):  REMOVAL, CANNULA, ADULT, FOR ECMO       Diagnosis: History of extracorporeal membrane oxygenation [Z92.81]  Diagnosis Additional Information: No value filed.    Anesthesia Type:   General     Note:    Oropharynx: endotracheal tube in place and ventilatory support  Level of Consciousness: iatrogenic sedation  Patient oxygen source: Ambu.  Level of Supplemental Oxygen (L/min / FiO2): 100%  Independent Airway: airway patency not satisfactory and stable    Vital Signs Stable: post-procedure vital signs reviewed and stable  Report to RN Given: handoff report given  Patient transferred to: ICU  Comments: Patient transported from OR to ICU with CRNA, MDA, RT, and cardiac resident. VSS on gtts charted in Epic.   ICU Handoff: Call for PAUSE to initiate/utilize ICU HANDOFF, Identified Patient, Identified Responsible Provider, Reviewed the Pertinent Medical History, Discussed Surgical Course, Reviewed Intra-OP Anesthesia Management and Issues during Anesthesia, Set Expectations for Post Procedure Period and Allowed Opportunity for Questions and Acknowledgement of Understanding      Vitals:  Vitals Value Taken Time   BP     Temp     Pulse     Resp     SpO2         Electronically Signed By: KATHARINA Jones CRNA  December 13, 2021  1:26 PM

## 2021-12-13 NOTE — PROGRESS NOTES
[facility], Procedure Note           Intra-Aortic Balloon Pump Discontinuation:       Bruce Blount  MRN# [id number]   December 13, 2021, 2:24 PM             IABP discontinued: December 13, 2021, 2:15 PM   Removed by: Kitty Hernandez CNP   Catheter saved: No      Recorded by Heidi Walker

## 2021-12-13 NOTE — PLAN OF CARE
Major Shift Events: Pt taken to OR for decannulation at 0930- back to 4E around 1330. RASS +2- -3, on Prop/fent. Wakes and follows commands. Becomes easily agitated when awake. HR sinus rhythm, frequent ectopy. IABP pulled at bedside at 1530. Pt on bedrest until 2230 per CSI. Small hematoma under IABP site. BPs very labile with pt awake/sleep status- on epi and levo. Plan to wean to just Levo if possible. CSI notified and aware of increasing pressor needs. Vent changed to AB/550/8 40%, incessant cough at times. TF at goal. CRRT off when ECMO cannulas pulled. Plan to reassess need for CRRT tmrw per Nephrology.     Plan: Wean sedation and pressors as able.       For vital signs and complete assessments, please see documentation flowsheets.     Problem: Adjustment to Therapy (Extracorporeal Life Support)  Goal: Optimal Coping with Therapy  Outcome: Improving     Problem: Bleeding (Extracorporeal Life Support)  Goal: Absence of Bleeding  Outcome: Improving

## 2021-12-13 NOTE — ANESTHESIA PROCEDURE NOTES
Central Line/PA Catheter Placement    Pre-Procedure   Staff -        Anesthesiologist:  Freddie Agrawal MD       Performed By: anesthesiologist       Location: OR       Pre-Anesthestic Checklist: patient identified, IV checked, site marked, risks and benefits discussed, informed consent, monitors and equipment checked, pre-op evaluation and at physician/surgeon's request  Timeout:       Correct Patient: Yes        Correct Procedure: Yes        Correct Site: Yes        Correct Position: Yes        Correct Laterality: Yes     Procedure   Procedure: central line       Laterality: right       Insertion Site: internal jugular.       Patient Position: supine  Sterile Prep        All elements of maximal sterile barrier technique followed       Patient Prep/Sterile Barriers: draped, hand hygiene, gloves , hat , mask , draped, gown, sterile gel and probe cover       Skin prep: Chloraprep  Insertion/Injection        Technique: ultrasound guided        1. Ultrasound was used to evaluate the access site.       2. Vein evaluated via ultrasound for patency/adequacy.       3. Using real-time ultrasound the needle/catheter was observed entering the artery/vein.       Catheter Type/Size: 8.5 Fr, 20 cm 4-lumen  Narrative         Secured by: suture       Tegaderm and Biopatch dressing used.       Complications: None apparent,        blood aspirated from all lumens,        Verification method: Placement to be verified post-op

## 2021-12-13 NOTE — PROVIDER NOTIFICATION
Kitty ORTEGA NP notified of small hematoma around old IABP site shortly after IABP removal, associated with pt coughing. Pt more hypotensive, on epi 0.09 and Levo 0.1.

## 2021-12-14 ENCOUNTER — APPOINTMENT (OUTPATIENT)
Dept: GENERAL RADIOLOGY | Facility: CLINIC | Age: 56
End: 2021-12-14
Attending: NURSE PRACTITIONER
Payer: COMMERCIAL

## 2021-12-14 ENCOUNTER — APPOINTMENT (OUTPATIENT)
Dept: CARDIOLOGY | Facility: CLINIC | Age: 56
End: 2021-12-14
Attending: NURSE PRACTITIONER
Payer: COMMERCIAL

## 2021-12-14 LAB
ALBUMIN SERPL-MCNC: 1.5 G/DL (ref 3.4–5)
ALBUMIN SERPL-MCNC: 1.6 G/DL (ref 3.4–5)
ALBUMIN SERPL-MCNC: 1.6 G/DL (ref 3.4–5)
ALBUMIN SERPL-MCNC: 1.7 G/DL (ref 3.4–5)
ALBUMIN UR-MCNC: 70 MG/DL
ALP SERPL-CCNC: 84 U/L (ref 40–150)
ALP SERPL-CCNC: 85 U/L (ref 40–150)
ALP SERPL-CCNC: 90 U/L (ref 40–150)
ALP SERPL-CCNC: 94 U/L (ref 40–150)
ALT SERPL W P-5'-P-CCNC: 41 U/L (ref 0–70)
ALT SERPL W P-5'-P-CCNC: 43 U/L (ref 0–70)
ANION GAP SERPL CALCULATED.3IONS-SCNC: 11 MMOL/L (ref 3–14)
ANION GAP SERPL CALCULATED.3IONS-SCNC: 8 MMOL/L (ref 3–14)
ANION GAP SERPL CALCULATED.3IONS-SCNC: 8 MMOL/L (ref 3–14)
ANION GAP SERPL CALCULATED.3IONS-SCNC: 9 MMOL/L (ref 3–14)
APPEARANCE UR: CLEAR
APTT PPP: 36 SECONDS (ref 22–38)
AST SERPL W P-5'-P-CCNC: 43 U/L (ref 0–45)
AST SERPL W P-5'-P-CCNC: 43 U/L (ref 0–45)
AST SERPL W P-5'-P-CCNC: 45 U/L (ref 0–45)
AST SERPL W P-5'-P-CCNC: 49 U/L (ref 0–45)
AT III ACT/NOR PPP CHRO: 88 % (ref 85–135)
BACTERIA BLD CULT: NO GROWTH
BASE EXCESS BLDA CALC-SCNC: -1.9 MMOL/L (ref -9–1.8)
BASE EXCESS BLDA CALC-SCNC: -2.2 MMOL/L (ref -9–1.8)
BASE EXCESS BLDA CALC-SCNC: -2.2 MMOL/L (ref -9–1.8)
BASE EXCESS BLDA CALC-SCNC: -2.3 MMOL/L (ref -9–1.8)
BASE EXCESS BLDA CALC-SCNC: -2.6 MMOL/L (ref -9–1.8)
BASE EXCESS BLDA CALC-SCNC: -5.2 MMOL/L (ref -9–1.8)
BASE EXCESS BLDV CALC-SCNC: -1 MMOL/L (ref -7.7–1.9)
BILIRUB SERPL-MCNC: 0.2 MG/DL (ref 0.2–1.3)
BILIRUB SERPL-MCNC: 0.2 MG/DL (ref 0.2–1.3)
BILIRUB SERPL-MCNC: 0.3 MG/DL (ref 0.2–1.3)
BILIRUB SERPL-MCNC: 0.4 MG/DL (ref 0.2–1.3)
BILIRUB UR QL STRIP: NEGATIVE
BLD PROD TYP BPU: NORMAL
BLOOD COMPONENT TYPE: NORMAL
BUN SERPL-MCNC: 67 MG/DL (ref 7–30)
BUN SERPL-MCNC: 70 MG/DL (ref 7–30)
BUN SERPL-MCNC: 70 MG/DL (ref 7–30)
BUN SERPL-MCNC: 74 MG/DL (ref 7–30)
CA-I BLD-MCNC: 4.5 MG/DL (ref 4.4–5.2)
CA-I BLD-MCNC: 4.5 MG/DL (ref 4.4–5.2)
CA-I BLD-MCNC: 4.6 MG/DL (ref 4.4–5.2)
CALCIUM SERPL-MCNC: 8 MG/DL (ref 8.5–10.1)
CALCIUM SERPL-MCNC: 8.1 MG/DL (ref 8.5–10.1)
CALCIUM SERPL-MCNC: 8.2 MG/DL (ref 8.5–10.1)
CALCIUM SERPL-MCNC: 8.2 MG/DL (ref 8.5–10.1)
CHLORIDE BLD-SCNC: 109 MMOL/L (ref 94–109)
CHLORIDE BLD-SCNC: 112 MMOL/L (ref 94–109)
CHLORIDE BLD-SCNC: 116 MMOL/L (ref 94–109)
CHLORIDE BLD-SCNC: 120 MMOL/L (ref 94–109)
CO2 SERPL-SCNC: 20 MMOL/L (ref 20–32)
CO2 SERPL-SCNC: 21 MMOL/L (ref 20–32)
CO2 SERPL-SCNC: 22 MMOL/L (ref 20–32)
CO2 SERPL-SCNC: 23 MMOL/L (ref 20–32)
CODING SYSTEM: NORMAL
COLOR UR AUTO: ABNORMAL
CREAT SERPL-MCNC: 1.61 MG/DL (ref 0.66–1.25)
CREAT SERPL-MCNC: 1.63 MG/DL (ref 0.66–1.25)
CREAT SERPL-MCNC: 1.64 MG/DL (ref 0.66–1.25)
CREAT SERPL-MCNC: 1.77 MG/DL (ref 0.66–1.25)
CROSSMATCH: NORMAL
CRP SERPL-MCNC: 230 MG/L (ref 0–8)
ERYTHROCYTE [DISTWIDTH] IN BLOOD BY AUTOMATED COUNT: 14 % (ref 10–15)
ERYTHROCYTE [DISTWIDTH] IN BLOOD BY AUTOMATED COUNT: 14.3 % (ref 10–15)
ERYTHROCYTE [DISTWIDTH] IN BLOOD BY AUTOMATED COUNT: 14.4 % (ref 10–15)
ERYTHROCYTE [SEDIMENTATION RATE] IN BLOOD BY WESTERGREN METHOD: 140 MM/HR (ref 0–20)
FIBRINOGEN PPP-MCNC: 830 MG/DL (ref 170–490)
GFR SERPL CREATININE-BSD FRML MDRD: 42 ML/MIN/1.73M2
GFR SERPL CREATININE-BSD FRML MDRD: 46 ML/MIN/1.73M2
GFR SERPL CREATININE-BSD FRML MDRD: 46 ML/MIN/1.73M2
GFR SERPL CREATININE-BSD FRML MDRD: 47 ML/MIN/1.73M2
GLUCOSE BLD-MCNC: 126 MG/DL (ref 70–99)
GLUCOSE BLD-MCNC: 134 MG/DL (ref 70–99)
GLUCOSE BLD-MCNC: 138 MG/DL (ref 70–99)
GLUCOSE BLD-MCNC: 169 MG/DL (ref 70–99)
GLUCOSE BLD-MCNC: 173 MG/DL (ref 70–99)
GLUCOSE BLD-MCNC: 178 MG/DL (ref 70–99)
GLUCOSE BLD-MCNC: 194 MG/DL (ref 70–99)
GLUCOSE BLDC GLUCOMTR-MCNC: 119 MG/DL (ref 70–99)
GLUCOSE BLDC GLUCOMTR-MCNC: 129 MG/DL (ref 70–99)
GLUCOSE BLDC GLUCOMTR-MCNC: 146 MG/DL (ref 70–99)
GLUCOSE BLDC GLUCOMTR-MCNC: 155 MG/DL (ref 70–99)
GLUCOSE BLDC GLUCOMTR-MCNC: 179 MG/DL (ref 70–99)
GLUCOSE BLDC GLUCOMTR-MCNC: 188 MG/DL (ref 70–99)
GLUCOSE UR STRIP-MCNC: NEGATIVE MG/DL
GRANULAR CAST: 4 /LPF
HCO3 BLD-SCNC: 19 MMOL/L (ref 21–28)
HCO3 BLD-SCNC: 22 MMOL/L (ref 21–28)
HCO3 BLD-SCNC: 22 MMOL/L (ref 21–28)
HCO3 BLD-SCNC: 23 MMOL/L (ref 21–28)
HCO3 BLDV-SCNC: 24 MMOL/L (ref 21–28)
HCT VFR BLD AUTO: 21.6 % (ref 40–53)
HCT VFR BLD AUTO: 22.3 % (ref 40–53)
HCT VFR BLD AUTO: 23.3 % (ref 40–53)
HCT VFR BLD AUTO: 23.6 % (ref 40–53)
HCT VFR BLD AUTO: 23.7 % (ref 40–53)
HGB BLD-MCNC: 7 G/DL (ref 13.3–17.7)
HGB BLD-MCNC: 7.3 G/DL (ref 13.3–17.7)
HGB BLD-MCNC: 7.7 G/DL (ref 13.3–17.7)
HGB BLD-MCNC: 7.8 G/DL (ref 13.3–17.7)
HGB BLD-MCNC: 7.8 G/DL (ref 13.3–17.7)
HGB FREE PLAS-MCNC: 30 MG/DL
HGB UR QL STRIP: ABNORMAL
INR PPP: 1.12 (ref 0.85–1.15)
ISSUE DATE AND TIME: NORMAL
KETONES UR STRIP-MCNC: NEGATIVE MG/DL
LACTATE SERPL-SCNC: 0.8 MMOL/L (ref 0.7–2)
LACTATE SERPL-SCNC: 0.9 MMOL/L (ref 0.7–2)
LACTATE SERPL-SCNC: 0.9 MMOL/L (ref 0.7–2)
LACTATE SERPL-SCNC: 1.1 MMOL/L (ref 0.7–2)
LDH SERPL L TO P-CCNC: 529 U/L (ref 85–227)
LEUKOCYTE ESTERASE UR QL STRIP: NEGATIVE
LVEF ECHO: NORMAL
MAGNESIUM SERPL-MCNC: 2.8 MG/DL (ref 1.6–2.3)
MAGNESIUM SERPL-MCNC: 2.9 MG/DL (ref 1.6–2.3)
MCH RBC QN AUTO: 30.7 PG (ref 26.5–33)
MCH RBC QN AUTO: 31 PG (ref 26.5–33)
MCH RBC QN AUTO: 31.2 PG (ref 26.5–33)
MCH RBC QN AUTO: 31.2 PG (ref 26.5–33)
MCH RBC QN AUTO: 31.5 PG (ref 26.5–33)
MCHC RBC AUTO-ENTMCNC: 32.4 G/DL (ref 31.5–36.5)
MCHC RBC AUTO-ENTMCNC: 32.7 G/DL (ref 31.5–36.5)
MCHC RBC AUTO-ENTMCNC: 32.9 G/DL (ref 31.5–36.5)
MCHC RBC AUTO-ENTMCNC: 33 G/DL (ref 31.5–36.5)
MCHC RBC AUTO-ENTMCNC: 33.1 G/DL (ref 31.5–36.5)
MCV RBC AUTO: 94 FL (ref 78–100)
MCV RBC AUTO: 94 FL (ref 78–100)
MCV RBC AUTO: 95 FL (ref 78–100)
MCV RBC AUTO: 95 FL (ref 78–100)
MCV RBC AUTO: 96 FL (ref 78–100)
MUCOUS THREADS #/AREA URNS LPF: PRESENT /LPF
NITRATE UR QL: NEGATIVE
O2/TOTAL GAS SETTING VFR VENT: 10 %
O2/TOTAL GAS SETTING VFR VENT: 35 %
O2/TOTAL GAS SETTING VFR VENT: 35 %
O2/TOTAL GAS SETTING VFR VENT: 40 %
O2/TOTAL GAS SETTING VFR VENT: 40 %
O2/TOTAL GAS SETTING VFR VENT: 50 %
O2/TOTAL GAS SETTING VFR VENT: 50 %
O2/TOTAL GAS SETTING VFR VENT: 6 %
O2/TOTAL GAS SETTING VFR VENT: 60 %
OXYHGB MFR BLD: 92 % (ref 92–100)
OXYHGB MFR BLD: 92 % (ref 92–100)
OXYHGB MFR BLD: 93 % (ref 92–100)
OXYHGB MFR BLD: 95 % (ref 92–100)
OXYHGB MFR BLD: 96 % (ref 92–100)
OXYHGB MFR BLD: 96 % (ref 92–100)
OXYHGB MFR BLD: 98 % (ref 92–100)
OXYHGB MFR BLDV: 59 % (ref 70–75)
PCO2 BLD: 30 MM HG (ref 35–45)
PCO2 BLD: 32 MM HG (ref 35–45)
PCO2 BLD: 36 MM HG (ref 35–45)
PCO2 BLD: 37 MM HG (ref 35–45)
PCO2 BLD: 39 MM HG (ref 35–45)
PCO2 BLD: 40 MM HG (ref 35–45)
PCO2 BLD: 40 MM HG (ref 35–45)
PCO2 BLD: 42 MM HG (ref 35–45)
PCO2 BLDV: 41 MM HG (ref 40–50)
PH BLD: 7.35 [PH] (ref 7.35–7.45)
PH BLD: 7.37 [PH] (ref 7.35–7.45)
PH BLD: 7.37 [PH] (ref 7.35–7.45)
PH BLD: 7.38 [PH] (ref 7.35–7.45)
PH BLD: 7.39 [PH] (ref 7.35–7.45)
PH BLD: 7.4 [PH] (ref 7.35–7.45)
PH BLD: 7.41 [PH] (ref 7.35–7.45)
PH BLD: 7.44 [PH] (ref 7.35–7.45)
PH BLDV: 7.38 [PH] (ref 7.32–7.43)
PH UR STRIP: 5.5 [PH] (ref 5–7)
PHOSPHATE SERPL-MCNC: 2.8 MG/DL (ref 2.5–4.5)
PHOSPHATE SERPL-MCNC: 5.2 MG/DL (ref 2.5–4.5)
PLAT MORPH BLD: NORMAL
PLATELET # BLD AUTO: 152 10E3/UL (ref 150–450)
PLATELET # BLD AUTO: 153 10E3/UL (ref 150–450)
PLATELET # BLD AUTO: 163 10E3/UL (ref 150–450)
PLATELET # BLD AUTO: 172 10E3/UL (ref 150–450)
PLATELET # BLD AUTO: 178 10E3/UL (ref 150–450)
PO2 BLD: 149 MM HG (ref 80–105)
PO2 BLD: 65 MM HG (ref 80–105)
PO2 BLD: 69 MM HG (ref 80–105)
PO2 BLD: 75 MM HG (ref 80–105)
PO2 BLD: 76 MM HG (ref 80–105)
PO2 BLD: 76 MM HG (ref 80–105)
PO2 BLD: 87 MM HG (ref 80–105)
PO2 BLD: 91 MM HG (ref 80–105)
PO2 BLDV: 32 MM HG (ref 25–47)
POTASSIUM BLD-SCNC: 3 MMOL/L (ref 3.4–5.3)
POTASSIUM BLD-SCNC: 3.2 MMOL/L (ref 3.4–5.3)
POTASSIUM BLD-SCNC: 3.5 MMOL/L (ref 3.4–5.3)
POTASSIUM BLD-SCNC: 3.6 MMOL/L (ref 3.4–5.3)
PROCALCITONIN SERPL-MCNC: 7.85 NG/ML
PROT SERPL-MCNC: 6.3 G/DL (ref 6.8–8.8)
PROT SERPL-MCNC: 6.4 G/DL (ref 6.8–8.8)
PROT SERPL-MCNC: 6.5 G/DL (ref 6.8–8.8)
PROT SERPL-MCNC: 6.6 G/DL (ref 6.8–8.8)
RBC # BLD AUTO: 2.28 10E6/UL (ref 4.4–5.9)
RBC # BLD AUTO: 2.34 10E6/UL (ref 4.4–5.9)
RBC # BLD AUTO: 2.47 10E6/UL (ref 4.4–5.9)
RBC # BLD AUTO: 2.48 10E6/UL (ref 4.4–5.9)
RBC # BLD AUTO: 2.52 10E6/UL (ref 4.4–5.9)
RBC MORPH BLD: NORMAL
RBC URINE: 3 /HPF
S100 CA BINDING PROTEIN B SER-MCNC: 1041 NG/L
S100 CA BINDING PROTEIN B SER-MCNC: 154 NG/L
S100 CA BINDING PROTEIN B SER-MCNC: 454 NG/L
SODIUM SERPL-SCNC: 141 MMOL/L (ref 133–144)
SODIUM SERPL-SCNC: 143 MMOL/L (ref 133–144)
SODIUM SERPL-SCNC: 147 MMOL/L (ref 133–144)
SODIUM SERPL-SCNC: 148 MMOL/L (ref 133–144)
SP GR UR STRIP: 1.02 (ref 1–1.03)
TRANSITIONAL EPI: <1 /HPF
TROPONIN I SERPL HS-MCNC: 2090 NG/L
TROPONIN I SERPL HS-MCNC: 2684 NG/L
TROPONIN I SERPL HS-MCNC: 2717 NG/L
UNIT ABO/RH: NORMAL
UNIT NUMBER: NORMAL
UNIT STATUS: NORMAL
UNIT TYPE ISBT: 6200
UROBILINOGEN UR STRIP-MCNC: NORMAL MG/DL
WBC # BLD AUTO: 15.8 10E3/UL (ref 4–11)
WBC # BLD AUTO: 17 10E3/UL (ref 4–11)
WBC # BLD AUTO: 17.1 10E3/UL (ref 4–11)
WBC # BLD AUTO: 19.3 10E3/UL (ref 4–11)
WBC # BLD AUTO: 21.3 10E3/UL (ref 4–11)
WBC URINE: 2 /HPF

## 2021-12-14 PROCEDURE — 71045 X-RAY EXAM CHEST 1 VIEW: CPT | Mod: 26 | Performed by: RADIOLOGY

## 2021-12-14 PROCEDURE — 250N000009 HC RX 250: Performed by: STUDENT IN AN ORGANIZED HEALTH CARE EDUCATION/TRAINING PROGRAM

## 2021-12-14 PROCEDURE — P9016 RBC LEUKOCYTES REDUCED: HCPCS | Performed by: INTERNAL MEDICINE

## 2021-12-14 PROCEDURE — 82947 ASSAY GLUCOSE BLOOD QUANT: CPT | Performed by: NURSE PRACTITIONER

## 2021-12-14 PROCEDURE — 85027 COMPLETE CBC AUTOMATED: CPT | Performed by: NURSE PRACTITIONER

## 2021-12-14 PROCEDURE — 93308 TTE F-UP OR LMTD: CPT | Mod: 26 | Performed by: STUDENT IN AN ORGANIZED HEALTH CARE EDUCATION/TRAINING PROGRAM

## 2021-12-14 PROCEDURE — 93325 DOPPLER ECHO COLOR FLOW MAPG: CPT

## 2021-12-14 PROCEDURE — 94660 CPAP INITIATION&MGMT: CPT

## 2021-12-14 PROCEDURE — 71045 X-RAY EXAM CHEST 1 VIEW: CPT

## 2021-12-14 PROCEDURE — 94003 VENT MGMT INPAT SUBQ DAY: CPT

## 2021-12-14 PROCEDURE — 74018 RADEX ABDOMEN 1 VIEW: CPT | Mod: 26 | Performed by: RADIOLOGY

## 2021-12-14 PROCEDURE — 84484 ASSAY OF TROPONIN QUANT: CPT | Performed by: NURSE PRACTITIONER

## 2021-12-14 PROCEDURE — 84100 ASSAY OF PHOSPHORUS: CPT | Performed by: NURSE PRACTITIONER

## 2021-12-14 PROCEDURE — 86140 C-REACTIVE PROTEIN: CPT | Performed by: NURSE PRACTITIONER

## 2021-12-14 PROCEDURE — 44500 INTRO GASTROINTESTINAL TUBE: CPT

## 2021-12-14 PROCEDURE — 250N000011 HC RX IP 250 OP 636: Performed by: STUDENT IN AN ORGANIZED HEALTH CARE EDUCATION/TRAINING PROGRAM

## 2021-12-14 PROCEDURE — 36592 COLLECT BLOOD FROM PICC: CPT | Performed by: NURSE PRACTITIONER

## 2021-12-14 PROCEDURE — 999N000157 HC STATISTIC RCP TIME EA 10 MIN

## 2021-12-14 PROCEDURE — 82330 ASSAY OF CALCIUM: CPT | Performed by: NURSE PRACTITIONER

## 2021-12-14 PROCEDURE — 999N000155 HC STATISTIC RAPCV CVP MONITORING

## 2021-12-14 PROCEDURE — 87205 SMEAR GRAM STAIN: CPT | Performed by: NURSE PRACTITIONER

## 2021-12-14 PROCEDURE — 250N000013 HC RX MED GY IP 250 OP 250 PS 637: Performed by: INTERNAL MEDICINE

## 2021-12-14 PROCEDURE — 87040 BLOOD CULTURE FOR BACTERIA: CPT | Performed by: NURSE PRACTITIONER

## 2021-12-14 PROCEDURE — 93010 ELECTROCARDIOGRAM REPORT: CPT | Performed by: INTERNAL MEDICINE

## 2021-12-14 PROCEDURE — 36415 COLL VENOUS BLD VENIPUNCTURE: CPT | Performed by: NURSE PRACTITIONER

## 2021-12-14 PROCEDURE — 250N000013 HC RX MED GY IP 250 OP 250 PS 637: Performed by: PHYSICIAN ASSISTANT

## 2021-12-14 PROCEDURE — 80053 COMPREHEN METABOLIC PANEL: CPT | Performed by: NURSE PRACTITIONER

## 2021-12-14 PROCEDURE — 200N000002 HC R&B ICU UMMC

## 2021-12-14 PROCEDURE — 36592 COLLECT BLOOD FROM PICC: CPT | Performed by: STUDENT IN AN ORGANIZED HEALTH CARE EDUCATION/TRAINING PROGRAM

## 2021-12-14 PROCEDURE — 84155 ASSAY OF PROTEIN SERUM: CPT | Performed by: NURSE PRACTITIONER

## 2021-12-14 PROCEDURE — 82805 BLOOD GASES W/O2 SATURATION: CPT | Performed by: NURSE PRACTITIONER

## 2021-12-14 PROCEDURE — 83605 ASSAY OF LACTIC ACID: CPT | Performed by: INTERNAL MEDICINE

## 2021-12-14 PROCEDURE — 999N000015 HC STATISTIC ARTERIAL MONITORING DAILY

## 2021-12-14 PROCEDURE — 84145 PROCALCITONIN (PCT): CPT | Performed by: STUDENT IN AN ORGANIZED HEALTH CARE EDUCATION/TRAINING PROGRAM

## 2021-12-14 PROCEDURE — 83735 ASSAY OF MAGNESIUM: CPT | Performed by: NURSE PRACTITIONER

## 2021-12-14 PROCEDURE — 258N000003 HC RX IP 258 OP 636: Performed by: NURSE PRACTITIONER

## 2021-12-14 PROCEDURE — 93005 ELECTROCARDIOGRAM TRACING: CPT

## 2021-12-14 PROCEDURE — 99291 CRITICAL CARE FIRST HOUR: CPT | Mod: 25 | Performed by: STUDENT IN AN ORGANIZED HEALTH CARE EDUCATION/TRAINING PROGRAM

## 2021-12-14 PROCEDURE — 83615 LACTATE (LD) (LDH) ENZYME: CPT | Performed by: NURSE PRACTITIONER

## 2021-12-14 PROCEDURE — 84155 ASSAY OF PROTEIN SERUM: CPT | Performed by: STUDENT IN AN ORGANIZED HEALTH CARE EDUCATION/TRAINING PROGRAM

## 2021-12-14 PROCEDURE — 85730 THROMBOPLASTIN TIME PARTIAL: CPT | Performed by: INTERNAL MEDICINE

## 2021-12-14 PROCEDURE — 94640 AIRWAY INHALATION TREATMENT: CPT | Mod: 76

## 2021-12-14 PROCEDURE — 999N000215 HC STATISTIC HFNC ADULT NON-CPAP

## 2021-12-14 PROCEDURE — 84100 ASSAY OF PHOSPHORUS: CPT | Performed by: STUDENT IN AN ORGANIZED HEALTH CARE EDUCATION/TRAINING PROGRAM

## 2021-12-14 PROCEDURE — 85300 ANTITHROMBIN III ACTIVITY: CPT | Performed by: NURSE PRACTITIONER

## 2021-12-14 PROCEDURE — 999N000065 XR ABDOMEN PORT 1 VIEWS

## 2021-12-14 PROCEDURE — 250N000009 HC RX 250: Performed by: PHYSICIAN ASSISTANT

## 2021-12-14 PROCEDURE — 83735 ASSAY OF MAGNESIUM: CPT | Performed by: STUDENT IN AN ORGANIZED HEALTH CARE EDUCATION/TRAINING PROGRAM

## 2021-12-14 PROCEDURE — 250N000013 HC RX MED GY IP 250 OP 250 PS 637: Performed by: STUDENT IN AN ORGANIZED HEALTH CARE EDUCATION/TRAINING PROGRAM

## 2021-12-14 PROCEDURE — 81001 URINALYSIS AUTO W/SCOPE: CPT | Performed by: NURSE PRACTITIONER

## 2021-12-14 PROCEDURE — 250N000011 HC RX IP 250 OP 636: Performed by: NURSE PRACTITIONER

## 2021-12-14 PROCEDURE — 258N000003 HC RX IP 258 OP 636: Performed by: STUDENT IN AN ORGANIZED HEALTH CARE EDUCATION/TRAINING PROGRAM

## 2021-12-14 PROCEDURE — 82803 BLOOD GASES ANY COMBINATION: CPT | Performed by: STUDENT IN AN ORGANIZED HEALTH CARE EDUCATION/TRAINING PROGRAM

## 2021-12-14 PROCEDURE — 85652 RBC SED RATE AUTOMATED: CPT | Performed by: INTERNAL MEDICINE

## 2021-12-14 PROCEDURE — 255N000002 HC RX 255 OP 636: Performed by: STUDENT IN AN ORGANIZED HEALTH CARE EDUCATION/TRAINING PROGRAM

## 2021-12-14 PROCEDURE — 93321 DOPPLER ECHO F-UP/LMTD STD: CPT | Mod: 26 | Performed by: STUDENT IN AN ORGANIZED HEALTH CARE EDUCATION/TRAINING PROGRAM

## 2021-12-14 PROCEDURE — 83605 ASSAY OF LACTIC ACID: CPT | Performed by: STUDENT IN AN ORGANIZED HEALTH CARE EDUCATION/TRAINING PROGRAM

## 2021-12-14 PROCEDURE — 85610 PROTHROMBIN TIME: CPT | Performed by: NURSE PRACTITIONER

## 2021-12-14 PROCEDURE — 71045 X-RAY EXAM CHEST 1 VIEW: CPT | Mod: 76

## 2021-12-14 PROCEDURE — 85027 COMPLETE CBC AUTOMATED: CPT | Performed by: STUDENT IN AN ORGANIZED HEALTH CARE EDUCATION/TRAINING PROGRAM

## 2021-12-14 PROCEDURE — 83051 HEMOGLOBIN PLASMA: CPT | Performed by: NURSE PRACTITIONER

## 2021-12-14 PROCEDURE — 94640 AIRWAY INHALATION TREATMENT: CPT

## 2021-12-14 PROCEDURE — 85018 HEMOGLOBIN: CPT | Performed by: INTERNAL MEDICINE

## 2021-12-14 PROCEDURE — 99233 SBSQ HOSP IP/OBS HIGH 50: CPT | Performed by: INTERNAL MEDICINE

## 2021-12-14 PROCEDURE — 85384 FIBRINOGEN ACTIVITY: CPT | Performed by: NURSE PRACTITIONER

## 2021-12-14 PROCEDURE — 250N000013 HC RX MED GY IP 250 OP 250 PS 637: Performed by: NURSE PRACTITIONER

## 2021-12-14 PROCEDURE — 93325 DOPPLER ECHO COLOR FLOW MAPG: CPT | Mod: 26 | Performed by: STUDENT IN AN ORGANIZED HEALTH CARE EDUCATION/TRAINING PROGRAM

## 2021-12-14 PROCEDURE — 250N000009 HC RX 250: Performed by: NURSE PRACTITIONER

## 2021-12-14 RX ORDER — OXYCODONE HYDROCHLORIDE 5 MG/1
5-10 TABLET ORAL EVERY 4 HOURS
Status: DISCONTINUED | OUTPATIENT
Start: 2021-12-14 | End: 2021-12-16

## 2021-12-14 RX ORDER — GABAPENTIN 300 MG/1
600 CAPSULE ORAL EVERY 8 HOURS
Status: DISCONTINUED | OUTPATIENT
Start: 2021-12-18 | End: 2021-12-14

## 2021-12-14 RX ORDER — GABAPENTIN 600 MG/1
1200 TABLET ORAL ONCE
Status: COMPLETED | OUTPATIENT
Start: 2021-12-14 | End: 2021-12-14

## 2021-12-14 RX ORDER — HALOPERIDOL 5 MG/ML
5 INJECTION INTRAMUSCULAR EVERY 6 HOURS PRN
Status: DISCONTINUED | OUTPATIENT
Start: 2021-12-14 | End: 2021-12-14

## 2021-12-14 RX ORDER — DIAZEPAM 10 MG/2ML
5-10 INJECTION, SOLUTION INTRAMUSCULAR; INTRAVENOUS EVERY 30 MIN PRN
Status: DISCONTINUED | OUTPATIENT
Start: 2021-12-14 | End: 2021-12-14

## 2021-12-14 RX ORDER — QUETIAPINE FUMARATE 50 MG/1
50 TABLET, FILM COATED ORAL ONCE
Status: COMPLETED | OUTPATIENT
Start: 2021-12-14 | End: 2021-12-14

## 2021-12-14 RX ORDER — GABAPENTIN 300 MG/1
300 CAPSULE ORAL EVERY 8 HOURS
Status: DISCONTINUED | OUTPATIENT
Start: 2021-12-20 | End: 2021-12-14

## 2021-12-14 RX ORDER — ACETAMINOPHEN 325 MG/1
650 TABLET ORAL EVERY 6 HOURS SCHEDULED
Status: DISCONTINUED | OUTPATIENT
Start: 2021-12-14 | End: 2021-12-18

## 2021-12-14 RX ORDER — ATORVASTATIN CALCIUM 20 MG/1
20 TABLET, FILM COATED ORAL EVERY EVENING
Status: DISCONTINUED | OUTPATIENT
Start: 2021-12-14 | End: 2021-12-18

## 2021-12-14 RX ORDER — FOLIC ACID 5 MG/ML
1 INJECTION, SOLUTION INTRAMUSCULAR; INTRAVENOUS; SUBCUTANEOUS ONCE
Status: COMPLETED | OUTPATIENT
Start: 2021-12-14 | End: 2021-12-14

## 2021-12-14 RX ORDER — ACETAMINOPHEN 325 MG/10.15ML
650 LIQUID ORAL EVERY 4 HOURS PRN
Status: DISCONTINUED | OUTPATIENT
Start: 2021-12-14 | End: 2021-12-17

## 2021-12-14 RX ORDER — HALOPERIDOL 5 MG/ML
2.5-5 INJECTION INTRAMUSCULAR EVERY 4 HOURS PRN
Status: DISCONTINUED | OUTPATIENT
Start: 2021-12-14 | End: 2021-12-14

## 2021-12-14 RX ORDER — AMINO AC/PROTEIN HYDR/WHEY PRO 10G-100/30
1 LIQUID (ML) ORAL 4 TIMES DAILY
Status: DISCONTINUED | OUTPATIENT
Start: 2021-12-14 | End: 2021-12-28

## 2021-12-14 RX ORDER — FOLIC ACID 1 MG/1
1 TABLET ORAL DAILY
Status: DISCONTINUED | OUTPATIENT
Start: 2021-12-17 | End: 2022-01-03 | Stop reason: HOSPADM

## 2021-12-14 RX ORDER — FOLIC ACID 5 MG/ML
1 INJECTION, SOLUTION INTRAMUSCULAR; INTRAVENOUS; SUBCUTANEOUS DAILY
Status: COMPLETED | OUTPATIENT
Start: 2021-12-15 | End: 2021-12-16

## 2021-12-14 RX ORDER — LIDOCAINE HYDROCHLORIDE 20 MG/ML
5 SOLUTION OROPHARYNGEAL ONCE
Status: DISCONTINUED | OUTPATIENT
Start: 2021-12-14 | End: 2021-12-15

## 2021-12-14 RX ORDER — POTASSIUM CHLORIDE 1.5 G/1.58G
40 POWDER, FOR SOLUTION ORAL ONCE
Status: COMPLETED | OUTPATIENT
Start: 2021-12-14 | End: 2021-12-14

## 2021-12-14 RX ORDER — MULTIPLE VITAMINS W/ MINERALS TAB 9MG-400MCG
1 TAB ORAL DAILY
Status: DISCONTINUED | OUTPATIENT
Start: 2021-12-14 | End: 2021-12-16

## 2021-12-14 RX ORDER — CEFTRIAXONE 2 G/1
2 INJECTION, POWDER, FOR SOLUTION INTRAMUSCULAR; INTRAVENOUS EVERY 24 HOURS
Status: COMPLETED | OUTPATIENT
Start: 2021-12-14 | End: 2021-12-15

## 2021-12-14 RX ORDER — OXYCODONE HYDROCHLORIDE 5 MG/1
5-10 TABLET ORAL EVERY 4 HOURS PRN
Status: DISCONTINUED | OUTPATIENT
Start: 2021-12-14 | End: 2021-12-17

## 2021-12-14 RX ORDER — GABAPENTIN 300 MG/1
900 CAPSULE ORAL EVERY 8 HOURS
Status: DISCONTINUED | OUTPATIENT
Start: 2021-12-15 | End: 2021-12-14

## 2021-12-14 RX ORDER — PROPOFOL 10 MG/ML
5-75 INJECTION, EMULSION INTRAVENOUS CONTINUOUS
Status: DISCONTINUED | OUTPATIENT
Start: 2021-12-14 | End: 2021-12-14

## 2021-12-14 RX ORDER — POTASSIUM CHLORIDE 750 MG/1
40 TABLET, EXTENDED RELEASE ORAL ONCE
Status: COMPLETED | OUTPATIENT
Start: 2021-12-14 | End: 2021-12-14

## 2021-12-14 RX ORDER — METOPROLOL TARTRATE 1 MG/ML
5 INJECTION, SOLUTION INTRAVENOUS EVERY 6 HOURS PRN
Status: DISCONTINUED | OUTPATIENT
Start: 2021-12-14 | End: 2021-12-14

## 2021-12-14 RX ORDER — HALOPERIDOL 5 MG/ML
5 INJECTION INTRAMUSCULAR EVERY 6 HOURS
Status: DISCONTINUED | OUTPATIENT
Start: 2021-12-14 | End: 2021-12-15

## 2021-12-14 RX ORDER — CLONIDINE HYDROCHLORIDE 0.1 MG/1
0.1 TABLET ORAL EVERY 8 HOURS
Status: DISCONTINUED | OUTPATIENT
Start: 2021-12-14 | End: 2021-12-14

## 2021-12-14 RX ORDER — POTASSIUM CHLORIDE 750 MG/1
20 TABLET, EXTENDED RELEASE ORAL ONCE
Status: COMPLETED | OUTPATIENT
Start: 2021-12-14 | End: 2021-12-14

## 2021-12-14 RX ORDER — POTASSIUM CHLORIDE 20MEQ/15ML
40 LIQUID (ML) ORAL ONCE
Status: DISCONTINUED | OUTPATIENT
Start: 2021-12-14 | End: 2021-12-14

## 2021-12-14 RX ORDER — LIDOCAINE 4 G/G
1 PATCH TOPICAL
Status: DISCONTINUED | OUTPATIENT
Start: 2021-12-14 | End: 2021-12-15

## 2021-12-14 RX ORDER — HALOPERIDOL 5 MG/ML
5 INJECTION INTRAMUSCULAR EVERY 6 HOURS
Status: DISCONTINUED | OUTPATIENT
Start: 2021-12-14 | End: 2021-12-14

## 2021-12-14 RX ORDER — DIAZEPAM 10 MG
10 TABLET ORAL EVERY 30 MIN PRN
Status: DISCONTINUED | OUTPATIENT
Start: 2021-12-14 | End: 2021-12-14

## 2021-12-14 RX ORDER — GABAPENTIN 100 MG/1
100 CAPSULE ORAL EVERY 8 HOURS
Status: DISCONTINUED | OUTPATIENT
Start: 2021-12-22 | End: 2021-12-14

## 2021-12-14 RX ADMIN — ATORVASTATIN CALCIUM 20 MG: 20 TABLET, FILM COATED ORAL at 20:01

## 2021-12-14 RX ADMIN — THIAMINE HYDROCHLORIDE 200 MG: 100 INJECTION, SOLUTION INTRAMUSCULAR; INTRAVENOUS at 21:16

## 2021-12-14 RX ADMIN — TICAGRELOR 90 MG: 90 TABLET ORAL at 20:01

## 2021-12-14 RX ADMIN — HALOPERIDOL LACTATE 5 MG: 5 INJECTION, SOLUTION INTRAMUSCULAR at 14:21

## 2021-12-14 RX ADMIN — CHLORHEXIDINE GLUCONATE 0.12% ORAL RINSE 15 ML: 1.2 LIQUID ORAL at 07:49

## 2021-12-14 RX ADMIN — DIAZEPAM 5 MG: 5 INJECTION, SOLUTION INTRAMUSCULAR; INTRAVENOUS at 20:00

## 2021-12-14 RX ADMIN — FOLIC ACID 1 MG: 1 TABLET ORAL at 07:48

## 2021-12-14 RX ADMIN — QUETIAPINE FUMARATE 50 MG: 50 TABLET ORAL at 08:37

## 2021-12-14 RX ADMIN — CHLORHEXIDINE GLUCONATE 0.12% ORAL RINSE 15 ML: 1.2 LIQUID ORAL at 20:44

## 2021-12-14 RX ADMIN — GABAPENTIN 1200 MG: 600 TABLET, FILM COATED ORAL at 20:00

## 2021-12-14 RX ADMIN — Medication 1 PACKET: at 20:44

## 2021-12-14 RX ADMIN — INSULIN ASPART 1 UNITS: 100 INJECTION, SOLUTION INTRAVENOUS; SUBCUTANEOUS at 04:28

## 2021-12-14 RX ADMIN — OXYCODONE HYDROCHLORIDE 10 MG: 5 TABLET ORAL at 16:36

## 2021-12-14 RX ADMIN — HEPARIN SODIUM 5000 UNITS: 5000 INJECTION, SOLUTION INTRAVENOUS; SUBCUTANEOUS at 20:01

## 2021-12-14 RX ADMIN — IPRATROPIUM BROMIDE AND ALBUTEROL SULFATE 3 ML: 2.5; .5 SOLUTION RESPIRATORY (INHALATION) at 11:57

## 2021-12-14 RX ADMIN — Medication 8 MG/HR: at 05:39

## 2021-12-14 RX ADMIN — Medication 0.14 MCG/KG/MIN: at 02:56

## 2021-12-14 RX ADMIN — LIDOCAINE 1 PATCH: 560 PATCH PERCUTANEOUS; TOPICAL; TRANSDERMAL at 17:34

## 2021-12-14 RX ADMIN — DEXMEDETOMIDINE HYDROCHLORIDE 1.2 MCG/KG/HR: 4 INJECTION, SOLUTION INTRAVENOUS at 21:15

## 2021-12-14 RX ADMIN — OXYCODONE HYDROCHLORIDE 10 MG: 5 TABLET ORAL at 21:02

## 2021-12-14 RX ADMIN — HEPARIN SODIUM 5000 UNITS: 5000 INJECTION, SOLUTION INTRAVENOUS; SUBCUTANEOUS at 12:42

## 2021-12-14 RX ADMIN — HUMAN ALBUMIN MICROSPHERES AND PERFLUTREN 6 ML: 10; .22 INJECTION, SOLUTION INTRAVENOUS at 09:59

## 2021-12-14 RX ADMIN — OXYCODONE HYDROCHLORIDE 10 MG: 5 TABLET ORAL at 08:47

## 2021-12-14 RX ADMIN — INSULIN ASPART 1 UNITS: 100 INJECTION, SOLUTION INTRAVENOUS; SUBCUTANEOUS at 15:20

## 2021-12-14 RX ADMIN — POTASSIUM CHLORIDE 40 MEQ: 1.5 POWDER, FOR SOLUTION ORAL at 06:26

## 2021-12-14 RX ADMIN — EPINEPHRINE 0.09 MCG/KG/MIN: 1 INJECTION PARENTERAL at 00:35

## 2021-12-14 RX ADMIN — ACETAMINOPHEN 650 MG: 325 TABLET, FILM COATED ORAL at 23:52

## 2021-12-14 RX ADMIN — Medication 2 PACKET: at 07:49

## 2021-12-14 RX ADMIN — HEPARIN SODIUM 5000 UNITS: 5000 INJECTION, SOLUTION INTRAVENOUS; SUBCUTANEOUS at 04:14

## 2021-12-14 RX ADMIN — MULTIVIT AND MINERALS-FERROUS GLUCONATE 9 MG IRON/15 ML ORAL LIQUID 15 ML: at 07:48

## 2021-12-14 RX ADMIN — Medication 1 PACKET: at 15:20

## 2021-12-14 RX ADMIN — ACETAMINOPHEN 650 MG: 325 SOLUTION ORAL at 06:26

## 2021-12-14 RX ADMIN — FOLIC ACID 1 MG: 5 INJECTION, SOLUTION INTRAMUSCULAR; INTRAVENOUS; SUBCUTANEOUS at 21:16

## 2021-12-14 RX ADMIN — INSULIN ASPART 1 UNITS: 100 INJECTION, SOLUTION INTRAVENOUS; SUBCUTANEOUS at 00:14

## 2021-12-14 RX ADMIN — TICAGRELOR 90 MG: 90 TABLET ORAL at 08:34

## 2021-12-14 RX ADMIN — ASPIRIN 81 MG CHEWABLE TABLET 81 MG: 81 TABLET CHEWABLE at 07:48

## 2021-12-14 RX ADMIN — ACETAMINOPHEN 650 MG: 325 TABLET, FILM COATED ORAL at 17:34

## 2021-12-14 RX ADMIN — HALOPERIDOL LACTATE 5 MG: 5 INJECTION, SOLUTION INTRAMUSCULAR at 23:52

## 2021-12-14 RX ADMIN — CEFTRIAXONE SODIUM 2 G: 2 INJECTION, POWDER, FOR SOLUTION INTRAMUSCULAR; INTRAVENOUS at 07:45

## 2021-12-14 RX ADMIN — THIAMINE HCL TAB 100 MG 100 MG: 100 TAB at 07:48

## 2021-12-14 RX ADMIN — Medication 1 TABLET: at 20:00

## 2021-12-14 RX ADMIN — POTASSIUM CHLORIDE 40 MEQ: 750 TABLET, EXTENDED RELEASE ORAL at 15:44

## 2021-12-14 RX ADMIN — DEXMEDETOMIDINE HYDROCHLORIDE 1.2 MCG/KG/HR: 4 INJECTION, SOLUTION INTRAVENOUS at 17:28

## 2021-12-14 RX ADMIN — Medication 40 MG: at 07:48

## 2021-12-14 RX ADMIN — HALOPERIDOL LACTATE 5 MG: 5 INJECTION, SOLUTION INTRAMUSCULAR at 16:35

## 2021-12-14 RX ADMIN — Medication 10 MG: at 22:03

## 2021-12-14 RX ADMIN — Medication 75 MCG/HR: at 06:13

## 2021-12-14 RX ADMIN — HALOPERIDOL LACTATE 5 MG: 5 INJECTION, SOLUTION INTRAMUSCULAR at 17:23

## 2021-12-14 RX ADMIN — INSULIN ASPART 1 UNITS: 100 INJECTION, SOLUTION INTRAVENOUS; SUBCUTANEOUS at 12:26

## 2021-12-14 RX ADMIN — IPRATROPIUM BROMIDE AND ALBUTEROL SULFATE 3 ML: 2.5; .5 SOLUTION RESPIRATORY (INHALATION) at 15:12

## 2021-12-14 RX ADMIN — ACETAMINOPHEN 650 MG: 325 TABLET, FILM COATED ORAL at 13:34

## 2021-12-14 RX ADMIN — POTASSIUM CHLORIDE 20 MEQ: 750 TABLET, EXTENDED RELEASE ORAL at 17:34

## 2021-12-14 RX ADMIN — INSULIN ASPART 1 UNITS: 100 INJECTION, SOLUTION INTRAVENOUS; SUBCUTANEOUS at 07:52

## 2021-12-14 RX ADMIN — IPRATROPIUM BROMIDE AND ALBUTEROL SULFATE 3 ML: 2.5; .5 SOLUTION RESPIRATORY (INHALATION) at 07:53

## 2021-12-14 RX ADMIN — OXYCODONE HYDROCHLORIDE 10 MG: 5 TABLET ORAL at 13:34

## 2021-12-14 ASSESSMENT — ACTIVITIES OF DAILY LIVING (ADL)
ADLS_ACUITY_SCORE: 13
ADLS_ACUITY_SCORE: 15
ADLS_ACUITY_SCORE: 13
ADLS_ACUITY_SCORE: 15
ADLS_ACUITY_SCORE: 13
ADLS_ACUITY_SCORE: 15
ADLS_ACUITY_SCORE: 15
ADLS_ACUITY_SCORE: 13
ADLS_ACUITY_SCORE: 13
ADLS_ACUITY_SCORE: 15
ADLS_ACUITY_SCORE: 13
ADLS_ACUITY_SCORE: 15
ADLS_ACUITY_SCORE: 13
ADLS_ACUITY_SCORE: 15
ADLS_ACUITY_SCORE: 13
ADLS_ACUITY_SCORE: 13

## 2021-12-14 ASSESSMENT — MIFFLIN-ST. JEOR: SCORE: 1501.38

## 2021-12-14 NOTE — PLAN OF CARE
Major Shift Events:      Neuro: Patient restless and easily agitated through out shift. Does not follow commands. Thrashes head back and forth. Easily aroused. Eyes constantly open, 2/2 pupils/sluggish. Propofol discontinued, versed gtt started. Versed and fentanyl bumps given when patient became increasingly agitated.    Cardiac: HR sinus rhythm/sinus tach. Titrated levophed and epinephrine to maintain map >65. Borderline fever through out shift, ice packs in place. Tmax 38.4 at beginning of shift, currently 38.0. PRN tylenol 650mg ordered this AM, given at 0630.    Resp: CMV setting, 40% FiO2. RR 20, PEEP 6, 550 TV. Frequent cough with thick rust secretions from ETT and oral.     GI/: Rectal tube in place, minimal output. Washburn intact with adequate urine output. Bag was leaking, washburn exchange at 0345.Tube feeds at goal of 45, Q4 30cc flushes.     IABP site soft, dressing CDI  Decannulation site WNL., to vac ulta. 0 output    AM potassium 3.2. 40meq given    Fentanyl 75mcg  Versed 8mg/hr  Epinephrine 0.04mcg  Levophed 0.09mcg    Plan: Reassess need for CRRT per Nephrology. Wean sedation and pressors as tolerated.     For vital signs and complete assessments, please see documentation flowsheets.

## 2021-12-14 NOTE — PROGRESS NOTES
New fever overnight. ESR increased to 140. CRP relatively unchanged. History of K oxytoca and MSSA pneumonia and just completed a 5 day course of ceftriaxone. Recent sputum culture from 12/13 shows +1 gram positive cocci.    K of 3.2 also noted.    - Will extend his ceftriaxone 2 gm q day extended by 2 days for a total of 7 days.   - APAP added PRN for fevers  - procal added to AM labs to compare to prior results  - Given 40 mEq of potassium chloride for hypokalemia    James Granados MD  [phone]

## 2021-12-14 NOTE — PROGRESS NOTES
CLINICAL NUTRITION SERVICES - REASSESSMENT NOTE     Nutrition Prescription    Malnutrition Status:    Severe malnutrition in the context of acute on chronic illness    Interventions by Registered Dietitian (RD):  Adjusted TF regimen to meet estimated nutrition needs, now that pt decannulated and off CRRT:  - Novasource Renal @ 45 ml/hr (1080 ml) + Prosource (1 pkt QID) provides 2320 kcal (35 kcal/kg), 142 g pro (2.1 g/kg), 198 g CHO, 774 ml free water, and 0 g fiber daily.     Future Recommendations:  If loose stools persist, consider 1 pkt banatrol fiber/day.      EVALUATION OF THE PROGRESS TOWARD GOALS   Diet: NPO  Nutrition Support: Novasource Renal @ 45 ml/hr (1080 ml) + Prosource (2 pkts QID) to provide 2480 kcal, 186 g pro, 198 g CHO, 774 ml free water, and 0 g fiber daily. Micro/Nx: Folic acid 1 mg, thiamine 100 mg, certavite daily.     Intake: Met ~80% needs on avg.     NEW FINDINGS   CV: Decannulated (12/13).   Resp: Extubated this morning.   Renal: CRRT stopped with decannulation.   GI/Nutrition: Pt not alert enough to clear bedside swallow. NGT placed at bedside by dietitian. Plan to resume TF @ goal rate. Diarrhea (400 - 1100 ml/day), not on bowel regimen. New lowest weight of 67.7 kg. Pt has lost ~9.5 kg (12%) in the past week. Suspect weight loss r/t combination of inadequate nutrition intake and fluctuations in fluid status. Will updated nutrition needs below.     UPDATED ASSESSED NUTRITION NEEDS   Based on dosing weight of 67 kg (actual on 12/13):   Estimated Energy Needs: 2000 - 2350 kcal/day (30 - 35 kcal/kg/day)   Justification: Increased needs, repletion   Estimated Protein Needs: 100 - 135+g protein/day (1.5 - 2+ g/kg/day)   Justification: Hypercatabolism, repletion    MALNUTRITION  % Intake: Decreased intake does not meet criteria  % Weight Loss: 12% in 1 week (severe) - Cannot rule out weight loss 2/2 fluctuation in fluid status   Subcutaneous Fat Loss: Facial region: Mild   Muscle Loss:  Temporal: Mild, Scapular bone: Mild, Thoracic region (clavicle, acromium bone, deltoid, trapezius, pectoral):  Moderate, Upper arm (bicep, tricep):  Moderate, Lower arm  (forearm): Mild, Upper leg (quadricep, hamstring): Mild, Patellar region: Mild and Posterior calf: Mild  Fluid Accumulation/Edema: Does not meet criteria  Malnutrition Diagnosis: Severe malnutrition in the context of acute on chronic illness    Previous Goals   Initiate nutrition support within 2-3 days  Evaluation: Met    Previous Nutrition Diagnosis  Inadequate protein-energy intake   Evaluation: Improving    CURRENT NUTRITION DIAGNOSIS  Inadequate oral intake related to NPO (unsafe swallow) as evidenced by dependent on enteral nutrition support to meet 100% estimated needs.       INTERVENTIONS  See interventions at top of progress note.     Goals  Total avg nutritional intake to meet a minimum of 30 kcal/kg and 1.5 g PRO/kg daily (per dosing wt 67 kg).    Monitoring/Evaluation  Progress toward goals will be monitored and evaluated per protocol.    Dora High RD, LD  [id number]  Pgr: [id number]

## 2021-12-14 NOTE — PROCEDURES
Small Bowel Feeding Tube Placement Assessment  Reason for Feeding Tube Placement: Team request for small bore FT  Cortrak Start Time: 1130   Cortrak End Time: 1155  Medicine Delivered During Procedure: Lubricating jelly   Placement Successful: Presume FT tip within stomach, unable to advance to small bowel (AXR confirmation pending)     Procedure Complications: Pt agitated.   Final Placement Tien at exit of nare: 80 cm  Face to Face time with patient: 25 min       Bridle Placement:   Reason for bridle placement: Securement of FT    Medicine delivered during procedure: lubricating jelly    Procedure: Successful  Location of top of clip on FT: @ 81 cm marker   Condition of nose/skin at time of bridle placement: Unremarkable   Face to Face time with patient: 5 minutes.    Dora High RD, LD  [id number]  Pgr: [phone]

## 2021-12-14 NOTE — PROGRESS NOTES
Called by bedside nurse. Patient agitated trashing head about and she was concerned that patient may be at risk of losing his right internal jugular vein central line if he continued to be agitated.    History of severe AUD noted    Heart rate to 120's and /80 noted to be elevated. Patient not following commands. Pupils reactive and 4 mm. No tremors or clonus.    Patient empirically given 2 mg of versed with improvement in mental status and vitals. Chart reviewed and had previously received versed and unclear why he was switched to propofol. Given concern for ETOH withdrawal, propofol switched to versed for overnight. Will continue to monitor    James Granados MD  [phone]

## 2021-12-14 NOTE — PROGRESS NOTES
Nephrology Progress Note  12/14/2021         Bruce Blount is a 56 yom with hx of ETOH and tobacco use who presents to [facility] after out of hospital arrest on IABP and ECMO.  Had witnessed arrest at home (brother heard him fall and called 911), found in VF and coded by EMS.  UOP dwindling and has mild hyperkalemia after rewarming, nephrology consulted for ESTER and management of K.       Interval History :   Mr Blount had CRRT stopped yesterday, held on placing line and restarting post ECMO decannulation as he is making more UOP.  Cr essentially unchanged the past 24h.  No indication for RRT today, will see how chemistries trend.  Volume status is reasonable on exam, can use lasix if she trends towards being significantly net positive but net even so far today.       Assessment & Recommendations:   ESTER-Presentation Cr 1.0, normal imaging on CT with no hydro.  Cr up after arrest and K 5.4 in setting of ongoing rewarming from cooling protocol.  Still making some UOP but with need for IABP, ECMO and 2 pressors ESTER is unlikely to improve in the short term so started CRRT 12/8.  ECMO decannulation today, will see if he needs iHD or if he is recovering in the next 24h.                  -Stopped CRRT 12/13 with ECMO decannulation, continuing to monitor for need for RRT.                 -Cr essentially stable past 24h, will continue to follow but does not need RRT today.         Volume-Mild edema, net positive yesterday with CRRT stopped mid-morning for ECMO decannulation.  On exam has minimal edema, UOP is close to matching intake, can add lasix as needed to prevent volume accumulation.       Electrolytes/pH-K 3.6, bicarb 22.       BMD-Ca 8.2, Mg and Phos mildly up consistent with ESTER.       VF arrest-EF currently 5-10%.  Decannulating ECMO today.       Anemia-Hgb 7.8, down from admission, acute management per team.       Seen and discussed with Dr Flannery.      Recommendations were communicated to primary team via verbal  "communication.        Gideon Arevalo, APRN CNS  Clinical Nurse Specialist  [phone]    Review of Systems:   I reviewed the following systems:  ROS not done due to vent/sedation.     Physical Exam:   I/O last 3 completed shifts:  In: 3294.46 [I.V.:1994.46; NG/GT:580]  Out: 1795 [Urine:1195; Other:450; Stool:150]   /78   Pulse 83   Temp 100.4  F (38  C) (Bladder)   Resp 20   Ht 1.753 m (5' 9\")   Wt 68.1 kg (150 lb 2.1 oz)   SpO2 97%   BMI 22.17 kg/m       GENERAL APPEARANCE: Intubated and sedated, on IABP and ECMO.    EYES: No scleral icterus  NECK:  Difficult to assess JVD  Pulmonary: lungs clear to auscultation with equal breath sounds bilaterally, no clubbing or cyanosis  CV: Regular rhythm, normal rate, no rub   - Edema +1 generalized.   GI: soft, nontender, normal bowel sounds  MS: no evidence of inflammation in joints, no muscle tenderness  : + Gutiérrez  SKIN: no rash, warm, dry  NEURO: Intubated and sedated.     Labs:   All labs reviewed by me  Electrolytes/Renal - Recent Labs   Lab Test 12/14/21  1223 12/14/21  0929 12/14/21  0752 12/14/21  0425 12/14/21  0013 12/13/21  2246 12/13/21  2046 12/13/21  1554 12/13/21  1323 12/13/21  0355 12/13/21  0353   NA  --  143  --  141  --  140  --  139 138  138   < > 137   POTASSIUM  --  3.6  --  3.2*  --  3.6  --  3.8 3.6  3.6   < > 3.4   CHLORIDE  --  112*  --  109  --  109  --  107 106  106  --  104   CO2  --  22  --  21  --  22  --  23 23  24  --  22   BUN  --  70*  --  67*  --  62*  --  59* 56*  56*  --  52*   CR  --  1.77*  --  1.64*  --  1.78*  --  1.62* 1.51*  1.54*  --  1.37*   * 138*  134*   < > 194*   < > 176*  165*   < > 164*  154* 179*  179*   < > 183*  169*   BRIDGETTE  --  8.2*  --  8.2*  --  8.2*  --  8.3* 8.6  8.6  --  8.2*   MAG  --   --   --  2.9*  --   --   --  2.9* 2.9*  --  2.9*   PHOS  --   --   --  5.2*  --   --   --   --  6.2*  --  5.3*    < > = values in this interval not displayed.       CBC -   Recent Labs   Lab Test " 12/14/21  0929 12/14/21 0426 12/13/21  2246   WBC 17.0* 19.3* 21.3*   HGB 7.8* 7.7* 7.8*    152 153       LFTs -   Recent Labs   Lab Test 12/14/21  0929 12/14/21 0425 12/13/21  2246   ALKPHOS 90 85 81   BILITOTAL 0.2 0.3 0.3   ALT 43 43 44   AST 43 43 44   PROTTOTAL 6.6* 6.5* 6.6*   ALBUMIN 1.7* 1.6* 1.7*       Iron Panel - No lab results found.        Current Medications:    acetaminophen  650 mg Oral or Feeding Tube Q6H JOSE     aspirin  81 mg Per Feeding Tube Daily     atorvastatin  20 mg Oral or Feeding Tube QPM     cefTRIAXone  2 g Intravenous Q24H     chlorhexidine  15 mL Swish & Spit BID     folic acid  1 mg Oral or Feeding Tube Daily     heparin ANTICOAGULANT  5,000 Units Subcutaneous Q8H     insulin aspart  1-6 Units Subcutaneous Q4H     ipratropium - albuterol 0.5 mg/2.5 mg/3 mL  3 mL Nebulization 4x daily     lidocaine (viscous)  5 mL Topical Once     multivitamins w/minerals  15 mL Per Feeding Tube Daily     oxyCODONE  5-10 mg Oral or Feeding Tube Q4H     pantoprazole  40 mg Oral QAM AC     protein modular  1 packet Per Feeding Tube 4x Daily     thiamine  100 mg Oral or Feeding Tube Daily     ticagrelor  90 mg Oral or Feeding Tube BID       dexmedetomidine 0.7 mcg/kg/hr (12/14/21 1115)     dextrose       EPINEPHrine Stopped (12/14/21 0759)     norepinephrine 0.18 mcg/kg/min (12/14/21 1317)

## 2021-12-14 NOTE — PROGRESS NOTES
[facility], [place]   Palliative Care Daily Progress Note          Palliative Care was consulted for support on ECMO. At this time the patient does not have any needs that we are actively addressing, so we will sign off. Please feel free to reconsult us if we can be helpful in the future. Thank you for the opportunity to be involved in the care of this patient.    Jayde Quesada MD / Palliative Medicine / [facility] Inpatient Team Consult Pager [phone] (answered 8am-430pm M-F) - ok to text page via [employer] / After-Hours Answering Service [phone]

## 2021-12-14 NOTE — PROGRESS NOTES
[facility], Procedure Note          Extubation:       Bruce Blount  MRN# [id number]   December 14, 2021, 9:00 AM         Patient extubated at: December 14, 2021, 9:00 AM   Supplemental Oxygen: Via nasal cannula at 4 liters per minute   Cough: The cough is weak   Secretion Mode: PRN suction with assistance   Secretion Amount: Moderate amount, moderately thick and white / yellow in color   Respiratory Exam:: Breath sounds: good aeration     Location: bilaterally   Skin Exam:: Patient color: natural   Patient Status: Currently appears comfortable   Arterial Blood Gasses: pH Arterial (no units)   Date Value   12/14/2021 7.37     pO2 Arterial (mm Hg)   Date Value   12/14/2021 69 (L)     pCO2 Arterial (mm Hg)   Date Value   12/14/2021 40     Bicarbonate Arterial (mmol/L)   Date Value   12/14/2021 23            Recorded by Heidi Walker

## 2021-12-14 NOTE — PROGRESS NOTES
ECMO Attending Progress Note  12/13/2021    Bruce Blount is a 56 year old male who was cannulated for ECMO 12/6/21  Due to recurrent VF arrest in the setting of a circ STEMI    Cannulation Site:  17 Fr in the R femoral artery  25 Fr in the R femoral vein    Interval events: weaned sweep down overnight with stable gas this am lactic acid within normal limits waking up on fent and propofol with some pressor need related to his propofol     Physical Exam:  Temp:  [97.3  F (36.3  C)-99.7  F (37.6  C)] 99.7  F (37.6  C)  Pulse:  [] 79  Resp:  [18-21] 20  MAP:  [60 mmHg-88 mmHg] 64 mmHg  Arterial Line BP: ()/(29-58) 106/46  FiO2 (%):  [40 %-50 %] 50 %  SpO2:  [91 %-100 %] 95 %    Intake/Output Summary (Last 24 hours) at 12/13/2021 1853  Last data filed at 12/13/2021 1800  Gross per 24 hour   Intake 3618.13 ml   Output 3293 ml   Net 325.13 ml    Ventilation Mode: CMV/AC  (Continuous Mandatory Ventilation/ Assist Control)  FiO2 (%): 50 %  Rate Set (breaths/minute): 20 breaths/min  Tidal Volume Set (mL): 550 mL  PEEP (cm H2O): 8 cmH2O  Oxygen Concentration (%): 50 %  Resp: 20       Labs:  Recent Labs   Lab 12/13/21  1805 12/13/21  1554 12/13/21  1511 12/13/21  1322   PH 7.37 7.34* 7.31* 7.30*   PCO2 42 45 48* 49*   PO2 131* 112* 97 95   HCO3 24 24 24 24   O2PER 50 50  50 50 50      Recent Labs   Lab 12/13/21  1554 12/13/21  1323 12/13/21  1223 12/13/21  1124 12/13/21  1033 12/13/21  0353 12/12/21  2136   WBC 19.9* 19.2*  --   --   --  15.1* 13.2*   HGB 7.6* 7.7* 8.0* 7.8*   < > 8.0* 7.4*    < > = values in this interval not displayed.     Creatinine   Date Value Ref Range Status   12/13/2021 1.62 (H) 0.66 - 1.25 mg/dL Final   12/13/2021 1.51 (H) 0.66 - 1.25 mg/dL Final   12/13/2021 1.54 (H) 0.66 - 1.25 mg/dL Final   12/13/2021 1.37 (H) 0.66 - 1.25 mg/dL Final       Blood Flow (Circuit) LPM: 2.4 LPM  Gas Flow  LPM: 1 LPM  Gas FiO2   %: 60 %  ACT  (seconds): 173 seconds  Blood Temp  (degrees C): 36.9 C  Pulse  Oximetry  (SpO2%): 96 %  Arterial Pressure  mmH mmHg      ECMO Issues including assessments and plan on DOS 2021:  Neuro: Sedated for mechanical ventilation and ECMO.  No acute distress.  NIRS stable 60 b/l  RASS goal: -2-3  CV: Cardiogenic shock.  Hemodynamically stable on low dose ne  Pulm: Keep vent settings at rest settings as above.  FEN/Renal: on CRRT. Electrolytes stable w/ replacement protocols in place, Cr stable, has some UOP despite fluid removal  Heme: ACT goal:160-180, Hemoglobin 8.3 .  Minimal oozing around the ECMO cannulas.  ID: abx for klebsiella and s aureus pna  Cannulae: Position is acceptable on exam and the available imaging.  Distal perfusion cannulas is in place and patent.  Extremities are well-perfused.     I have personally reviewed the ECMO flows, oxygenation and CO2 clearance, anticoagulation, and cannula position.  I have also personally assessed the patient's systemic response with hemodynamics, oxygenation, ventilation, and bleeding.       The patient requires continued ECMO support and management in the ICU.      Malaika An MD  Cardiology critical care  Pager

## 2021-12-14 NOTE — PROGRESS NOTES
"  Cardiology ICU Progress Note    Brief HPI: Bruce Blount is a 56 year old male with PMHx current tobacco use and ETOH abuse who presents to [facility] after out of hospital cardiac arrest.      Per EMS and brother, patient was in his usual state of health prior to arrest. Patients brother heard a \"thump\" and found patient unresponsive and agonal breathing. 911 called and CPR initiated. EMS arrived within 4 minutes. Initial rhythm VF--> shocked x ~7 with ROSC upon arrival to ED. Total CPR team per EMS ~ 40 minutes. 3 mg Epinephrine, 300 mg Amio given via EMS. Patient EKG reveals Valentín inferior/posterior leads with reciprocal changes. Patient initially presented with an Igel and was intubated with ETT in the ED. He arrested and was again shocked in the ED with ROSC. Taken urgently to CCL where he underwent coronary angiogram and again arrested requiring manual CPR and was then cannulated on VA ECMO via right with 17 Vietnamese cannula RCFA, 25 Fr cannula RCFV. Coronary angiogram revealed  of RCA and acute culprit lesion of LCx/OM1, s/p PCI to pLCx, mid LCx, and OM1 with TERRY. IABP placed for decreased pulsatility. Thermogard cooling cath placed and patient was transferred to ICU for further management. Decannulated 12/13. IABP removed 12/13.     Subjective and Interval: Remains intubated and sedated, critically ill in the ICU. Yesterday, successful decannulation. IABP removed, stopped ECMO Heparin and started DVT prophy Heparin. Transitioned to Versed overnight. Tmax overnight. 101.1    Assessment and plan by system:   Today's changes:  Stop sedation  Start Seroquel, 50 mcg PO once now, PRN thereafter  EKG for Qtc tomorrow  Scheduled Tylenol given fevers  Start PO PRN oxy  Will need HD line if kidney function worsens, so far, UOP improving and lytes/labs stable.  PST, extubate  Reculture  Start statin, low dose  May not need CRRT, trial lasix if UOP sluggish  Add on lactic and procal  Swallow eval vs feeding tube placement " pending wakefulness     Neurology  # Concern for anoxic brain injury    # Hx of possible ETOH abuse  Intubated, sedated. S/p therapeutic hypothermia- Cooled to 33 degrees.Initial head CT without acute pathology. Able to follow commands intermittently  - Stop sedation, a RASS goal 0    Sedation/analgesia:  Transitioned to Versed overnight. Currently on 8 mg Versed, 75 mcg/hr Fentanyl.    - Start PRN Oxy and Seroquel  - Neuro-crit consulted- since signed off  - Palliative care consulted.   - Folate, Thiamine and multi vitamin for ETOH abuse. Discontinue CIWA given >1 week out     Cardiovascular  # Refractory VF cardiac arrest 2/2 secondary to STEMI  # Cardiogenic shock requiring VA ECMO  # Ischemic Cardiomyopathy (EF 10-15%)  # S/p PCI pLCx, mLCx, OM1  # Residual RCA   # HTN, HLD  # Concern for limb ischemia s/p distal reperfusion cannula left leg  S/p Peripheral V-A ECMO inserted via RCFA and RCFV 17/25 fr.  Decannulated 12/13. IABP removed 12/13.    Pressors/Inotropes/Antiarrythmics:  - Norepi 0.1 mcg/kg/mn, Norepi 0.1 mcg/kg/mn    - GDMT                 - Continue ASA 81mg and ticagrelor 90mg BID                  - Hold Lipitor for now given shock liver                 - Hold ACE/ARB for now given likely reduced renal fxn after arrest                 - Holding beta blocker given shock  -  of RCA not intervened upon     Pulmonary  # Acute hypoxemic respiratory failure requiring intubation  # Klebsiella pneumonia  # Rib Fractures  # Sternal Fracture  # Tobacco Abuse (2PPD)  Vent Settings: CMV 20/550/6/40%  CXR pending  - Wean vent as able; PST today as able  - Daily CXR  - Serial ABGs   - duoneb Q6 hours  - Consider Nicotine replacement     Gastrointestinal, Nutrition  # Shock liver 2/2 cardiac arrest, resolved  # Loose Stool  No known medical hx.   ALT 43 (54) AST 43 (64)  - Monitor LFTs   - TF at goal  - Bowel regimen: hold for now given loose stools  - GI Prophylaxis: Protonix 40 mg daily     Renal,  Electrolytes  # Acute Renal Injury requiring CRRT  # Lactic acidosis  # Hypoalbuminemia  # Hyperkalemia, resolved   # Hypocalcemia, resolved  # Hyperphosphatemia  Unclear creatinine baseline. UOP poor 12/8 with hyperkalemia. Started on CRRT.   Crt 1.64 (1.51) BUN 67   ml yesterday, 520 ml since midnight. Net + 1.3 L yesterday, net neg1.1L since admission.  - volume removal; goal net negative 500 ml today. May not need CRRT, trial lasix if UOP sluggish  - renal consult; appreciate recommendations  - lactic acid Q6 hours  - Monitor urine output     Infectious Disease  # Aspiration Pneumonia (klebsiella, staph aureus)  # Leukocytosis  # Fevers  # Lactic acidosis, resolved  WBC up to 19.3 (21). Febrile overnight with tmax 101.3.   - Monitor for signs of infection  - Pan culture today given temp and WBC  Cultures thus far:                 - sputum 12/6: staph aureus, staph dysgalactia, klebsiella                 - sputum 12/7: staph aureus                 - sputum 12/8: staph and klebsiella      - Sputum 12/9- staph aureus and klebsiella      - Sputum 12/10- staph aureus and klebsiella      - sputum 12/13- GPC  Antibiotics              - Vancomycin 12/6 - 12/7 (MRSA negative)              - Zosyn 12/6 - 12/9              - Rocephin 12/9 - present (plan for 7 with end date of 12/15)     Hematology  # Anemia of critical illness  # Concern for HIT initially- negative  # Thrombocytopenia  # Loss of bilateral DP pulses, resolved  Hgb stable. 7.7 (7.8) Plt stable 152 (87) No e/o bleeding.    - Transfuse for Hgb < 8  - DVT PPX: Heparin for DVT prophy     Endocrinology  # Hyperglycemia   No known medical history. Hemoglobin A1c 5.6%  - SSI     MSK/Skin  # Mottling LLE s/p LLE distal reperfusion cannula  # Dusky toes left foot  Pulses remain intact. IABP removed 12/13.   - continue to monitor     Pertinent Lines  RIJ 12/13  Rectal tube December 6, 2021  R radial arterial line December 6, 2021  ETT December 6, 2021  Gutiérrez  "catheter December 6, 2021  OG tube December 13, 2021     Patient seen and discussed with staff physician.    KATHARINA Peterson, CNP  [facility]  Interventional Cardiology-[facility] Service  Pager [phone]     Objective:  Most recent vital signs:  /78   Pulse 88   Temp 100.4  F (38  C)   Resp 20   Ht 1.753 m (5' 9\")   Wt 68.1 kg (150 lb 2.1 oz)   SpO2 99%   BMI 22.17 kg/m    Temp:  [97.3  F (36.3  C)-101.1  F (38.4  C)] 100.4  F (38  C)  Pulse:  [] 88  Resp:  [18-24] 20  MAP:  [60 mmHg-111 mmHg] 76 mmHg  Arterial Line BP: ()/(30-78) 128/53  FiO2 (%):  [40 %-50 %] 40 %  SpO2:  [92 %-100 %] 99 %  Wt Readings from Last 2 Encounters:   12/14/21 68.1 kg (150 lb 2.1 oz)   09/23/19 77.5 kg (170 lb 12.8 oz)       Intake/Output Summary (Last 24 hours) at 12/14/2021 0624  Last data filed at 12/14/2021 0600  Gross per 24 hour   Intake 3155.21 ml   Output 1795 ml   Net 1360.21 ml       Physical exam:  General: In bed, in NAD  HEENT: PERRL, no scleral icterus or injection  CARDIAC: RRR, no m/r/g appreciated. Peripheral pulses dopplered  RESP: Mechanical ventilation; CTAB, no wheezes, rhonchi or crackles appreciated.  GI: soft, BS hypoactive  : Gutiérrez  EXTREMITIES: NO LE edema, pulses dopplered. Femoral access site w/o bleeding, dressing c/d/i.    SKIN: No acute lesions appreciated  NEURO: Intubated and sedated    Labs (Past three days):  CBC  Recent Labs   Lab 12/14/21  0426 12/13/21  2246 12/13/21  1554 12/13/21  1323   WBC 19.3* 21.3* 19.9* 19.2*   RBC 2.47* 2.52* 2.42* 2.46*   HGB 7.7* 7.8* 7.6* 7.7*   HCT 23.3* 23.6* 23.0* 23.7*   MCV 94 94 95 96   MCH 31.2 31.0 31.4 31.3   MCHC 33.0 33.1 33.0 32.5   RDW 14.3 14.4 14.5 14.4    153 107* 88*     BMP  Recent Labs   Lab 12/14/21  0425 12/14/21  0422 12/14/21  0013 12/13/21  2246 12/13/21  2046 12/13/21  1554 12/13/21  1323 12/13/21  0355 12/13/21  0353 12/12/21  2358 12/12/21  2136     --   --  140  --  139 138 "  138   < > 137  --  138  138   POTASSIUM 3.2*  --   --  3.6  --  3.8 3.6  3.6   < > 3.4  --  3.5  3.5   CHLORIDE 109  --   --  109  --  107 106  106  --  104  --  105  105   CO2 21  --   --  22  --  23 23  24  --  22  --  25  25   ANIONGAP 11  --   --  9  --  9 9  8  --  11  --  8  8   * 188* 179* 176*  165*   < > 164*  154* 179*  179*   < > 183*  169*   < > 167*  167*  160*   BUN 67*  --   --  62*  --  59* 56*  56*  --  52*  --  50*  50*   CR 1.64*  --   --  1.78*  --  1.62* 1.51*  1.54*  --  1.37*  --  1.37*  1.37*   GFRESTIMATED 46*  --   --  42*  --  47* 51*  50*  --  57*  --  57*  57*   BRIDGETTE 8.2*  --   --  8.2*  --  8.3* 8.6  8.6  --  8.2*  --  8.4*  8.4*   MAG 2.9*  --   --   --   --  2.9* 2.9*  --  2.9*  --  3.1*   PHOS 5.2*  --   --   --   --   --  6.2*  --  5.3*  --  4.6*    < > = values in this interval not displayed.     Troponins:     INR  Recent Labs   Lab 12/14/21 0425 12/13/21  1554 12/13/21  1323 12/13/21  0353   INR 1.12 1.11 1.13 1.13     Liver panel  Recent Labs   Lab 12/14/21 0425 12/13/21  2246 12/13/21  1554 12/13/21  1323   PROTTOTAL 6.5* 6.6* 6.4* 6.5*   ALBUMIN 1.6* 1.7* 1.7* 1.7*  1.7*   BILITOTAL 0.3 0.3 0.3 0.3   ALKPHOS 85 81 77 78   AST 43 44 47* 52*   ALT 43 44 47 47       Imaging/procedure results:  XR Abdomen Port 1 View  Narrative: Exam: XR ABDOMEN PORT 1 VIEWS, 12/13/2021 4:59 PM    Indication: OG placement    Comparison: 12/6/2021    Findings:   Gastric tube tip and sidehole projected over the stomach. No distended  bowel loops in the visualized upper abdomen. Scattered mixed opacities  of the visualized lung bases. No acute osseous abnormalities.  Impression: Impression: Gastric tube tip and sidehole projects within the stomach.    I have personally reviewed the examination and initial interpretation  and I agree with the findings.    YELENA WAGONER MD         SYSTEM ID:  [id number]  XR Chest Port 1 View  Narrative: EXAM: XR Chest 1 view  12/13/2021 4:58 PM      HISTORY: new RIJ, s/p ecmo decann.    COMPARISON: Same day radiograph of the chest at 0129.     TECHNIQUE: Frontal view of the chest.    FINDINGS: ET tube is in the low thoracic trachea. Enteric tube is  subdiaphragmatic with tip collimated from the study. New right IJ CVC  with tip in the mid SVC. Interval removal of inferior approach ECMO  cannula.  Trachea is midline. Cardiomediastinal silhouette is within normal  limits. Heart size is within normal limits. Stable bilateral patchy  mixed interstitial and airspace pulmonary opacities. No pleural  effusions. No pneumothoraces. No acute osseous abnormalities.  Impression: IMPRESSION:   1. Stable bilateral patchy mixed airspace interstitial opacities.  2. Right IJ CVC with tip in mid SVC. Other support devices as  described above.    I have personally reviewed the examination and initial interpretation  and I agree with the findings.    YELENA WAGONER MD         SYSTEM ID:  [id number]  XR Chest Port 1 View  Narrative: EXAM: XR CHEST PORT 1 VIEW  12/13/2021 1:30 AM      HISTORY: Check endotracheal tube placement and ECLS cannula placement.    COMPARISON: Chest x-ray 12/12/2021, CT CAP 12/6/2021    FINDINGS: Portable supine AP radiograph of the chest. The enteric tube  courses inferior to the diaphragm and out of the field of view. The  tip of the endotracheal tube projects approximately 3 cm above the  jacques. The IABP marker projects over the descending thoracic aorta,  at the level of the jacques. Inferior approach ECMO cannula tip  projects over the low SVC. Additional inferior approach catheter tip  projects over the low SVC. Esophageal temperature probe tip projects  over the mid esophagus.    The trachea is midline. The cardiac silhouette is not enlarged. Stable  diffuse bilateral mixed interstitial and airspace opacities. No  pleural effusion or pneumothorax. The visualized upper abdomen is  unremarkable. Right clavicle  deformity.  Impression: IMPRESSION:   1. The tip of the endotracheal tube projects approximately 3 cm above  the jacques.  2. Inferior approach ECMO cannula tip projects over the low SVC.  3. Stable bilateral mixed interstitial and airspace opacities.         I have personally reviewed the examination and initial interpretation  and I agree with the findings.    HARESH ÁLVAREZ MD         SYSTEM ID:  [id number]

## 2021-12-15 ENCOUNTER — APPOINTMENT (OUTPATIENT)
Dept: GENERAL RADIOLOGY | Facility: CLINIC | Age: 56
End: 2021-12-15
Attending: INTERNAL MEDICINE
Payer: COMMERCIAL

## 2021-12-15 ENCOUNTER — APPOINTMENT (OUTPATIENT)
Dept: GENERAL RADIOLOGY | Facility: CLINIC | Age: 56
End: 2021-12-15
Attending: NURSE PRACTITIONER
Payer: COMMERCIAL

## 2021-12-15 LAB
ALBUMIN SERPL-MCNC: 1.4 G/DL (ref 3.4–5)
ALP SERPL-CCNC: 81 U/L (ref 40–150)
ALT SERPL W P-5'-P-CCNC: 37 U/L (ref 0–70)
ANION GAP SERPL CALCULATED.3IONS-SCNC: 5 MMOL/L (ref 3–14)
ANION GAP SERPL CALCULATED.3IONS-SCNC: 6 MMOL/L (ref 3–14)
APTT PPP: 37 SECONDS (ref 22–38)
AST SERPL W P-5'-P-CCNC: 44 U/L (ref 0–45)
BACTERIA SPT CULT: ABNORMAL
BACTERIA SPT CULT: ABNORMAL
BASE EXCESS BLDA CALC-SCNC: -3.5 MMOL/L (ref -9–1.8)
BASE EXCESS BLDA CALC-SCNC: -3.9 MMOL/L (ref -9–1.8)
BASE EXCESS BLDA CALC-SCNC: -4 MMOL/L (ref -9–1.8)
BASE EXCESS BLDA CALC-SCNC: -5.3 MMOL/L (ref -9–1.8)
BASE EXCESS BLDA CALC-SCNC: -8.6 MMOL/L (ref -9–1.8)
BILIRUB SERPL-MCNC: 0.5 MG/DL (ref 0.2–1.3)
BUN SERPL-MCNC: 64 MG/DL (ref 7–30)
BUN SERPL-MCNC: 66 MG/DL (ref 7–30)
CA-I BLD-MCNC: 4.8 MG/DL (ref 4.4–5.2)
CALCIUM SERPL-MCNC: 7.6 MG/DL (ref 8.5–10.1)
CALCIUM SERPL-MCNC: 8 MG/DL (ref 8.5–10.1)
CHLORIDE BLD-SCNC: 124 MMOL/L (ref 94–109)
CHLORIDE BLD-SCNC: 125 MMOL/L (ref 94–109)
CO2 SERPL-SCNC: 20 MMOL/L (ref 20–32)
CO2 SERPL-SCNC: 22 MMOL/L (ref 20–32)
CREAT SERPL-MCNC: 1.39 MG/DL (ref 0.66–1.25)
CREAT SERPL-MCNC: 1.41 MG/DL (ref 0.66–1.25)
CRP SERPL-MCNC: 200 MG/L (ref 0–8)
ERYTHROCYTE [DISTWIDTH] IN BLOOD BY AUTOMATED COUNT: 14 % (ref 10–15)
ERYTHROCYTE [DISTWIDTH] IN BLOOD BY AUTOMATED COUNT: 14.4 % (ref 10–15)
ERYTHROCYTE [SEDIMENTATION RATE] IN BLOOD BY WESTERGREN METHOD: 107 MM/HR (ref 0–20)
FIBRINOGEN PPP-MCNC: >1200 MG/DL (ref 170–490)
GFR SERPL CREATININE-BSD FRML MDRD: 55 ML/MIN/1.73M2
GFR SERPL CREATININE-BSD FRML MDRD: 56 ML/MIN/1.73M2
GLUCOSE BLD-MCNC: 114 MG/DL (ref 70–99)
GLUCOSE BLD-MCNC: 116 MG/DL (ref 70–99)
GLUCOSE BLD-MCNC: 158 MG/DL (ref 70–99)
GLUCOSE BLDC GLUCOMTR-MCNC: 107 MG/DL (ref 70–99)
GLUCOSE BLDC GLUCOMTR-MCNC: 113 MG/DL (ref 70–99)
GLUCOSE BLDC GLUCOMTR-MCNC: 122 MG/DL (ref 70–99)
GLUCOSE BLDC GLUCOMTR-MCNC: 139 MG/DL (ref 70–99)
GLUCOSE BLDC GLUCOMTR-MCNC: 141 MG/DL (ref 70–99)
GRAM STAIN RESULT: ABNORMAL
GRAM STAIN RESULT: ABNORMAL
HCO3 BLD-SCNC: 16 MMOL/L (ref 21–28)
HCO3 BLD-SCNC: 18 MMOL/L (ref 21–28)
HCO3 BLD-SCNC: 20 MMOL/L (ref 21–28)
HCO3 BLD-SCNC: 20 MMOL/L (ref 21–28)
HCO3 BLD-SCNC: 21 MMOL/L (ref 21–28)
HCT VFR BLD AUTO: 24 % (ref 40–53)
HCT VFR BLD AUTO: 24.2 % (ref 40–53)
HGB BLD-MCNC: 7.6 G/DL (ref 13.3–17.7)
HGB BLD-MCNC: 7.7 G/DL (ref 13.3–17.7)
HGB BLD-MCNC: 8 G/DL (ref 13.3–17.7)
HGB FREE PLAS-MCNC: <30 MG/DL
INR PPP: 1.16 (ref 0.85–1.15)
LACTATE SERPL-SCNC: 0.7 MMOL/L (ref 0.7–2)
LACTATE SERPL-SCNC: 0.7 MMOL/L (ref 0.7–2)
LDH SERPL L TO P-CCNC: 493 U/L (ref 85–227)
MAGNESIUM SERPL-MCNC: 2.7 MG/DL (ref 1.6–2.3)
MCH RBC QN AUTO: 30.9 PG (ref 26.5–33)
MCH RBC QN AUTO: 31.3 PG (ref 26.5–33)
MCHC RBC AUTO-ENTMCNC: 32.1 G/DL (ref 31.5–36.5)
MCHC RBC AUTO-ENTMCNC: 33.1 G/DL (ref 31.5–36.5)
MCV RBC AUTO: 95 FL (ref 78–100)
MCV RBC AUTO: 96 FL (ref 78–100)
O2/TOTAL GAS SETTING VFR VENT: 40 %
O2/TOTAL GAS SETTING VFR VENT: 45 %
O2/TOTAL GAS SETTING VFR VENT: 50 %
PCO2 BLD: 26 MM HG (ref 35–45)
PCO2 BLD: 28 MM HG (ref 35–45)
PCO2 BLD: 31 MM HG (ref 35–45)
PCO2 BLD: 33 MM HG (ref 35–45)
PCO2 BLD: 36 MM HG (ref 35–45)
PH BLD: 7.38 [PH] (ref 7.35–7.45)
PH BLD: 7.39 [PH] (ref 7.35–7.45)
PH BLD: 7.4 [PH] (ref 7.35–7.45)
PH BLD: 7.42 [PH] (ref 7.35–7.45)
PH BLD: 7.43 [PH] (ref 7.35–7.45)
PHOSPHATE SERPL-MCNC: 3.2 MG/DL (ref 2.5–4.5)
PLATELET # BLD AUTO: 201 10E3/UL (ref 150–450)
PLATELET # BLD AUTO: 263 10E3/UL (ref 150–450)
PO2 BLD: 112 MM HG (ref 80–105)
PO2 BLD: 70 MM HG (ref 80–105)
PO2 BLD: 75 MM HG (ref 80–105)
PO2 BLD: 88 MM HG (ref 80–105)
PO2 BLD: 96 MM HG (ref 80–105)
POTASSIUM BLD-SCNC: 3.8 MMOL/L (ref 3.4–5.3)
POTASSIUM BLD-SCNC: 3.9 MMOL/L (ref 3.4–5.3)
PROT SERPL-MCNC: 5.8 G/DL (ref 6.8–8.8)
RBC # BLD AUTO: 2.49 10E6/UL (ref 4.4–5.9)
RBC # BLD AUTO: 2.56 10E6/UL (ref 4.4–5.9)
SODIUM SERPL-SCNC: 150 MMOL/L (ref 133–144)
SODIUM SERPL-SCNC: 152 MMOL/L (ref 133–144)
TROPONIN I SERPL HS-MCNC: 1433 NG/L
WBC # BLD AUTO: 18.7 10E3/UL (ref 4–11)
WBC # BLD AUTO: 20.8 10E3/UL (ref 4–11)

## 2021-12-15 PROCEDURE — 94640 AIRWAY INHALATION TREATMENT: CPT | Mod: 76

## 2021-12-15 PROCEDURE — 999N000157 HC STATISTIC RCP TIME EA 10 MIN

## 2021-12-15 PROCEDURE — 250N000013 HC RX MED GY IP 250 OP 250 PS 637: Performed by: STUDENT IN AN ORGANIZED HEALTH CARE EDUCATION/TRAINING PROGRAM

## 2021-12-15 PROCEDURE — 85730 THROMBOPLASTIN TIME PARTIAL: CPT | Performed by: INTERNAL MEDICINE

## 2021-12-15 PROCEDURE — 83051 HEMOGLOBIN PLASMA: CPT | Performed by: NURSE PRACTITIONER

## 2021-12-15 PROCEDURE — 200N000002 HC R&B ICU UMMC

## 2021-12-15 PROCEDURE — 99291 CRITICAL CARE FIRST HOUR: CPT | Performed by: STUDENT IN AN ORGANIZED HEALTH CARE EDUCATION/TRAINING PROGRAM

## 2021-12-15 PROCEDURE — 80053 COMPREHEN METABOLIC PANEL: CPT | Performed by: INTERNAL MEDICINE

## 2021-12-15 PROCEDURE — 250N000009 HC RX 250: Performed by: PHYSICIAN ASSISTANT

## 2021-12-15 PROCEDURE — 250N000009 HC RX 250: Performed by: INTERNAL MEDICINE

## 2021-12-15 PROCEDURE — 82330 ASSAY OF CALCIUM: CPT | Performed by: INTERNAL MEDICINE

## 2021-12-15 PROCEDURE — 250N000013 HC RX MED GY IP 250 OP 250 PS 637: Performed by: PHYSICIAN ASSISTANT

## 2021-12-15 PROCEDURE — 86140 C-REACTIVE PROTEIN: CPT | Performed by: NURSE PRACTITIONER

## 2021-12-15 PROCEDURE — 85027 COMPLETE CBC AUTOMATED: CPT | Performed by: NURSE PRACTITIONER

## 2021-12-15 PROCEDURE — 85610 PROTHROMBIN TIME: CPT | Performed by: NURSE PRACTITIONER

## 2021-12-15 PROCEDURE — 250N000011 HC RX IP 250 OP 636: Performed by: INTERNAL MEDICINE

## 2021-12-15 PROCEDURE — 85027 COMPLETE CBC AUTOMATED: CPT | Performed by: INTERNAL MEDICINE

## 2021-12-15 PROCEDURE — 250N000011 HC RX IP 250 OP 636: Performed by: STUDENT IN AN ORGANIZED HEALTH CARE EDUCATION/TRAINING PROGRAM

## 2021-12-15 PROCEDURE — 94640 AIRWAY INHALATION TREATMENT: CPT

## 2021-12-15 PROCEDURE — 84100 ASSAY OF PHOSPHORUS: CPT | Performed by: NURSE PRACTITIONER

## 2021-12-15 PROCEDURE — 250N000009 HC RX 250: Performed by: STUDENT IN AN ORGANIZED HEALTH CARE EDUCATION/TRAINING PROGRAM

## 2021-12-15 PROCEDURE — 94660 CPAP INITIATION&MGMT: CPT

## 2021-12-15 PROCEDURE — 71045 X-RAY EXAM CHEST 1 VIEW: CPT | Mod: 26 | Performed by: RADIOLOGY

## 2021-12-15 PROCEDURE — G0463 HOSPITAL OUTPT CLINIC VISIT: HCPCS

## 2021-12-15 PROCEDURE — 93005 ELECTROCARDIOGRAM TRACING: CPT

## 2021-12-15 PROCEDURE — 82803 BLOOD GASES ANY COMBINATION: CPT | Performed by: STUDENT IN AN ORGANIZED HEALTH CARE EDUCATION/TRAINING PROGRAM

## 2021-12-15 PROCEDURE — 250N000013 HC RX MED GY IP 250 OP 250 PS 637: Performed by: NURSE PRACTITIONER

## 2021-12-15 PROCEDURE — 82947 ASSAY GLUCOSE BLOOD QUANT: CPT | Performed by: INTERNAL MEDICINE

## 2021-12-15 PROCEDURE — 71045 X-RAY EXAM CHEST 1 VIEW: CPT | Mod: 26 | Performed by: STUDENT IN AN ORGANIZED HEALTH CARE EDUCATION/TRAINING PROGRAM

## 2021-12-15 PROCEDURE — 83615 LACTATE (LD) (LDH) ENZYME: CPT | Performed by: NURSE PRACTITIONER

## 2021-12-15 PROCEDURE — 85652 RBC SED RATE AUTOMATED: CPT | Performed by: INTERNAL MEDICINE

## 2021-12-15 PROCEDURE — 83605 ASSAY OF LACTIC ACID: CPT | Performed by: NURSE PRACTITIONER

## 2021-12-15 PROCEDURE — 93010 ELECTROCARDIOGRAM REPORT: CPT | Performed by: INTERNAL MEDICINE

## 2021-12-15 PROCEDURE — 250N000013 HC RX MED GY IP 250 OP 250 PS 637: Performed by: INTERNAL MEDICINE

## 2021-12-15 PROCEDURE — 999N000015 HC STATISTIC ARTERIAL MONITORING DAILY

## 2021-12-15 PROCEDURE — 258N000003 HC RX IP 258 OP 636: Performed by: NURSE PRACTITIONER

## 2021-12-15 PROCEDURE — 71045 X-RAY EXAM CHEST 1 VIEW: CPT | Mod: 77

## 2021-12-15 PROCEDURE — 84484 ASSAY OF TROPONIN QUANT: CPT | Performed by: INTERNAL MEDICINE

## 2021-12-15 PROCEDURE — 71045 X-RAY EXAM CHEST 1 VIEW: CPT

## 2021-12-15 PROCEDURE — 83605 ASSAY OF LACTIC ACID: CPT | Performed by: INTERNAL MEDICINE

## 2021-12-15 PROCEDURE — 258N000003 HC RX IP 258 OP 636: Performed by: STUDENT IN AN ORGANIZED HEALTH CARE EDUCATION/TRAINING PROGRAM

## 2021-12-15 PROCEDURE — 85384 FIBRINOGEN ACTIVITY: CPT | Performed by: NURSE PRACTITIONER

## 2021-12-15 PROCEDURE — 250N000011 HC RX IP 250 OP 636: Performed by: NURSE PRACTITIONER

## 2021-12-15 PROCEDURE — 83735 ASSAY OF MAGNESIUM: CPT | Performed by: NURSE PRACTITIONER

## 2021-12-15 RX ORDER — DEXTROSE MONOHYDRATE 50 MG/ML
INJECTION, SOLUTION INTRAVENOUS CONTINUOUS
Status: DISCONTINUED | OUTPATIENT
Start: 2021-12-15 | End: 2021-12-20

## 2021-12-15 RX ORDER — METHOCARBAMOL 500 MG/1
1000 TABLET, FILM COATED ORAL 4 TIMES DAILY
Status: DISCONTINUED | OUTPATIENT
Start: 2021-12-16 | End: 2021-12-15

## 2021-12-15 RX ORDER — QUETIAPINE FUMARATE 50 MG/1
50 TABLET, FILM COATED ORAL 3 TIMES DAILY
Status: DISCONTINUED | OUTPATIENT
Start: 2021-12-15 | End: 2021-12-19

## 2021-12-15 RX ORDER — HALOPERIDOL 5 MG/ML
5 INJECTION INTRAMUSCULAR ONCE
Status: COMPLETED | OUTPATIENT
Start: 2021-12-15 | End: 2021-12-15

## 2021-12-15 RX ORDER — HYDROMORPHONE HCL IN WATER/PF 6 MG/30 ML
0.2 PATIENT CONTROLLED ANALGESIA SYRINGE INTRAVENOUS EVERY 4 HOURS PRN
Status: DISCONTINUED | OUTPATIENT
Start: 2021-12-15 | End: 2021-12-15

## 2021-12-15 RX ORDER — DEXMEDETOMIDINE HYDROCHLORIDE 4 UG/ML
.1-.7 INJECTION, SOLUTION INTRAVENOUS CONTINUOUS
Status: DISCONTINUED | OUTPATIENT
Start: 2021-12-16 | End: 2021-12-17

## 2021-12-15 RX ORDER — QUETIAPINE FUMARATE 50 MG/1
100 TABLET, FILM COATED ORAL
Status: DISCONTINUED | OUTPATIENT
Start: 2021-12-15 | End: 2021-12-19

## 2021-12-15 RX ORDER — POTASSIUM CHLORIDE 29.8 MG/ML
20 INJECTION INTRAVENOUS ONCE
Status: COMPLETED | OUTPATIENT
Start: 2021-12-15 | End: 2021-12-15

## 2021-12-15 RX ORDER — HALOPERIDOL 2 MG/ML
5 SOLUTION ORAL ONCE
Status: DISCONTINUED | OUTPATIENT
Start: 2021-12-15 | End: 2021-12-15

## 2021-12-15 RX ORDER — QUETIAPINE FUMARATE 50 MG/1
50 TABLET, FILM COATED ORAL
Status: COMPLETED | OUTPATIENT
Start: 2021-12-15 | End: 2021-12-15

## 2021-12-15 RX ORDER — SALIVA STIMULANT COMB. NO.3
2 SPRAY, NON-AEROSOL (ML) MUCOUS MEMBRANE 4 TIMES DAILY
Status: DISCONTINUED | OUTPATIENT
Start: 2021-12-15 | End: 2022-01-03

## 2021-12-15 RX ORDER — METHOCARBAMOL 500 MG/1
1000 TABLET, FILM COATED ORAL 4 TIMES DAILY
Status: DISCONTINUED | OUTPATIENT
Start: 2021-12-15 | End: 2021-12-16

## 2021-12-15 RX ORDER — HALOPERIDOL 5 MG/ML
2 INJECTION INTRAMUSCULAR ONCE
Status: DISCONTINUED | OUTPATIENT
Start: 2021-12-15 | End: 2021-12-15

## 2021-12-15 RX ORDER — HALOPERIDOL 5 MG/ML
10 INJECTION INTRAMUSCULAR EVERY 6 HOURS SCHEDULED
Status: DISCONTINUED | OUTPATIENT
Start: 2021-12-15 | End: 2021-12-15

## 2021-12-15 RX ORDER — MEROPENEM 1 G/1
1 INJECTION, POWDER, FOR SOLUTION INTRAVENOUS EVERY 12 HOURS
Status: DISCONTINUED | OUTPATIENT
Start: 2021-12-15 | End: 2021-12-15

## 2021-12-15 RX ORDER — QUETIAPINE FUMARATE 25 MG/1
25 TABLET, FILM COATED ORAL ONCE
Status: COMPLETED | OUTPATIENT
Start: 2021-12-15 | End: 2021-12-15

## 2021-12-15 RX ORDER — CEFTRIAXONE 2 G/1
2 INJECTION, POWDER, FOR SOLUTION INTRAMUSCULAR; INTRAVENOUS EVERY 24 HOURS
Status: DISCONTINUED | OUTPATIENT
Start: 2021-12-16 | End: 2021-12-16

## 2021-12-15 RX ORDER — POTASSIUM CHLORIDE 1.5 G/1.58G
20 POWDER, FOR SOLUTION ORAL ONCE
Status: COMPLETED | OUTPATIENT
Start: 2021-12-15 | End: 2021-12-15

## 2021-12-15 RX ADMIN — ACETAMINOPHEN 650 MG: 325 TABLET, FILM COATED ORAL at 23:51

## 2021-12-15 RX ADMIN — QUETIAPINE FUMARATE 50 MG: 50 TABLET ORAL at 01:49

## 2021-12-15 RX ADMIN — OXYCODONE HYDROCHLORIDE 10 MG: 5 TABLET ORAL at 16:21

## 2021-12-15 RX ADMIN — CHLORHEXIDINE GLUCONATE 0.12% ORAL RINSE 15 ML: 1.2 LIQUID ORAL at 08:01

## 2021-12-15 RX ADMIN — DEXTROSE MONOHYDRATE: 50 INJECTION, SOLUTION INTRAVENOUS at 23:27

## 2021-12-15 RX ADMIN — LIDOCAINE 1 PATCH: 560 PATCH PERCUTANEOUS; TOPICAL; TRANSDERMAL at 08:02

## 2021-12-15 RX ADMIN — ATORVASTATIN CALCIUM 20 MG: 20 TABLET, FILM COATED ORAL at 19:34

## 2021-12-15 RX ADMIN — DEXTROSE MONOHYDRATE: 50 INJECTION, SOLUTION INTRAVENOUS at 09:52

## 2021-12-15 RX ADMIN — POTASSIUM CHLORIDE 20 MEQ: 1.5 POWDER, FOR SOLUTION ORAL at 22:23

## 2021-12-15 RX ADMIN — OXYCODONE HYDROCHLORIDE 5 MG: 5 TABLET ORAL at 17:31

## 2021-12-15 RX ADMIN — ACETAMINOPHEN 650 MG: 325 TABLET, FILM COATED ORAL at 05:29

## 2021-12-15 RX ADMIN — POTASSIUM CHLORIDE 20 MEQ: 29.8 INJECTION, SOLUTION INTRAVENOUS at 00:49

## 2021-12-15 RX ADMIN — Medication 1 PACKET: at 08:01

## 2021-12-15 RX ADMIN — OXYCODONE HYDROCHLORIDE 10 MG: 5 TABLET ORAL at 01:32

## 2021-12-15 RX ADMIN — HALOPERIDOL LACTATE 5 MG: 5 INJECTION, SOLUTION INTRAMUSCULAR at 09:14

## 2021-12-15 RX ADMIN — HALOPERIDOL LACTATE 5 MG: 5 INJECTION, SOLUTION INTRAMUSCULAR at 05:29

## 2021-12-15 RX ADMIN — METHOCARBAMOL 1000 MG: 500 TABLET ORAL at 22:23

## 2021-12-15 RX ADMIN — HYDROMORPHONE HYDROCHLORIDE 0.2 MG: 0.2 INJECTION, SOLUTION INTRAMUSCULAR; INTRAVENOUS; SUBCUTANEOUS at 12:57

## 2021-12-15 RX ADMIN — HEPARIN SODIUM 5000 UNITS: 5000 INJECTION, SOLUTION INTRAVENOUS; SUBCUTANEOUS at 19:34

## 2021-12-15 RX ADMIN — HALOPERIDOL LACTATE 5 MG: 5 INJECTION, SOLUTION INTRAMUSCULAR at 09:08

## 2021-12-15 RX ADMIN — Medication 2 SPRAY: at 16:21

## 2021-12-15 RX ADMIN — MIDAZOLAM 2 MG: 1 INJECTION INTRAMUSCULAR; INTRAVENOUS at 04:21

## 2021-12-15 RX ADMIN — Medication 10 MG: at 21:21

## 2021-12-15 RX ADMIN — QUETIAPINE FUMARATE 25 MG: 25 TABLET ORAL at 10:30

## 2021-12-15 RX ADMIN — FOLIC ACID 1 MG: 5 INJECTION, SOLUTION INTRAMUSCULAR; INTRAVENOUS; SUBCUTANEOUS at 08:11

## 2021-12-15 RX ADMIN — IPRATROPIUM BROMIDE AND ALBUTEROL SULFATE 3 ML: 2.5; .5 SOLUTION RESPIRATORY (INHALATION) at 08:17

## 2021-12-15 RX ADMIN — QUETIAPINE FUMARATE 50 MG: 50 TABLET ORAL at 12:57

## 2021-12-15 RX ADMIN — QUETIAPINE FUMARATE 50 MG: 50 TABLET ORAL at 19:34

## 2021-12-15 RX ADMIN — DEXMEDETOMIDINE HYDROCHLORIDE 0.5 MCG/KG/HR: 4 INJECTION, SOLUTION INTRAVENOUS at 23:52

## 2021-12-15 RX ADMIN — THIAMINE HYDROCHLORIDE 200 MG: 100 INJECTION, SOLUTION INTRAMUSCULAR; INTRAVENOUS at 14:50

## 2021-12-15 RX ADMIN — TICAGRELOR 90 MG: 90 TABLET ORAL at 19:34

## 2021-12-15 RX ADMIN — Medication 1 PACKET: at 19:35

## 2021-12-15 RX ADMIN — THIAMINE HYDROCHLORIDE 200 MG: 100 INJECTION, SOLUTION INTRAMUSCULAR; INTRAVENOUS at 08:40

## 2021-12-15 RX ADMIN — IPRATROPIUM BROMIDE AND ALBUTEROL SULFATE 3 ML: 2.5; .5 SOLUTION RESPIRATORY (INHALATION) at 15:24

## 2021-12-15 RX ADMIN — ACETAMINOPHEN 650 MG: 325 TABLET, FILM COATED ORAL at 11:48

## 2021-12-15 RX ADMIN — QUETIAPINE FUMARATE 100 MG: 50 TABLET ORAL at 21:21

## 2021-12-15 RX ADMIN — TICAGRELOR 90 MG: 90 TABLET ORAL at 08:01

## 2021-12-15 RX ADMIN — IPRATROPIUM BROMIDE AND ALBUTEROL SULFATE 3 ML: 2.5; .5 SOLUTION RESPIRATORY (INHALATION) at 20:41

## 2021-12-15 RX ADMIN — Medication 2 SPRAY: at 11:48

## 2021-12-15 RX ADMIN — Medication 1 PACKET: at 11:48

## 2021-12-15 RX ADMIN — OXYCODONE HYDROCHLORIDE 10 MG: 5 TABLET ORAL at 20:17

## 2021-12-15 RX ADMIN — HEPARIN SODIUM 5000 UNITS: 5000 INJECTION, SOLUTION INTRAVENOUS; SUBCUTANEOUS at 03:57

## 2021-12-15 RX ADMIN — THIAMINE HYDROCHLORIDE 200 MG: 100 INJECTION, SOLUTION INTRAMUSCULAR; INTRAVENOUS at 21:20

## 2021-12-15 RX ADMIN — MIDAZOLAM 0.5 MG: 1 INJECTION INTRAMUSCULAR; INTRAVENOUS at 02:46

## 2021-12-15 RX ADMIN — OXYCODONE HYDROCHLORIDE 10 MG: 5 TABLET ORAL at 09:26

## 2021-12-15 RX ADMIN — OXYCODONE HYDROCHLORIDE 10 MG: 5 TABLET ORAL at 04:40

## 2021-12-15 RX ADMIN — Medication 2 SPRAY: at 19:35

## 2021-12-15 RX ADMIN — MULTIVIT AND MINERALS-FERROUS GLUCONATE 9 MG IRON/15 ML ORAL LIQUID 15 ML: at 08:00

## 2021-12-15 RX ADMIN — ACETAMINOPHEN 650 MG: 325 TABLET, FILM COATED ORAL at 17:31

## 2021-12-15 RX ADMIN — IPRATROPIUM BROMIDE AND ALBUTEROL SULFATE 3 ML: 2.5; .5 SOLUTION RESPIRATORY (INHALATION) at 11:56

## 2021-12-15 RX ADMIN — Medication 1 PACKET: at 16:21

## 2021-12-15 RX ADMIN — OXYCODONE HYDROCHLORIDE 10 MG: 5 TABLET ORAL at 23:51

## 2021-12-15 RX ADMIN — INSULIN ASPART 1 UNITS: 100 INJECTION, SOLUTION INTRAVENOUS; SUBCUTANEOUS at 12:12

## 2021-12-15 RX ADMIN — HEPARIN SODIUM 5000 UNITS: 5000 INJECTION, SOLUTION INTRAVENOUS; SUBCUTANEOUS at 11:48

## 2021-12-15 RX ADMIN — OXYCODONE HYDROCHLORIDE 10 MG: 5 TABLET ORAL at 12:58

## 2021-12-15 RX ADMIN — ASPIRIN 81 MG CHEWABLE TABLET 81 MG: 81 TABLET CHEWABLE at 08:00

## 2021-12-15 RX ADMIN — CEFTRIAXONE SODIUM 2 G: 2 INJECTION, POWDER, FOR SOLUTION INTRAMUSCULAR; INTRAVENOUS at 08:07

## 2021-12-15 ASSESSMENT — ACTIVITIES OF DAILY LIVING (ADL)
ADLS_ACUITY_SCORE: 13

## 2021-12-15 ASSESSMENT — MIFFLIN-ST. JEOR: SCORE: 1488.38

## 2021-12-15 NOTE — PROGRESS NOTES
"  Cardiology ICU Progress Note    Brief HPI: Bruce Blount is a 56 year old male with PMHx current tobacco use and ETOH abuse who presents to [facility] after out of hospital cardiac arrest.      Per EMS and brother, patient was in his usual state of health prior to arrest. Patients brother heard a \"thump\" and found patient unresponsive and agonal breathing. 911 called and CPR initiated. EMS arrived within 4 minutes. Initial rhythm VF--> shocked x ~7 with ROSC upon arrival to ED. Total CPR team per EMS ~ 40 minutes. 3 mg Epinephrine, 300 mg Amio given via EMS. Patient EKG reveals Valentín inferior/posterior leads with reciprocal changes. Patient initially presented with an Igel and was intubated with ETT in the ED. He arrested and was again shocked in the ED with ROSC. Taken urgently to CCL where he underwent coronary angiogram and again arrested requiring manual CPR and was then cannulated on VA ECMO via right with 17 Kyrgyz cannula RCFA, 25 Fr cannula RCFV. Coronary angiogram revealed  of RCA and acute culprit lesion of LCx/OM1, s/p PCI to pLCx, mid LCx, and OM1 with TERRY. IABP placed for decreased pulsatility. Thermogard cooling cath placed and patient was transferred to ICU for further management. Decannulated 12/13. IABP removed 12/13. Extubated 12/14.    Subjective and Interval: Remains delirious with complaints of pain. Overnight, started CIWA and given Gabapentin and Valium with resultant hypotension requiring pressors. Switched back to Haldol 5 mg q6h. Yesterday. Extubated to bipap, given Seroquel and Haldol. QTc 316, scheduled Tylenol, started PO PRN oxy, recultured, started low dose statin, feeding tube placed.     Assessment and plan by system:   Today's changes:  Delerium precautions  Start d5 @ 50 for hypernatremia  Trialed increased haldol 10 mg/day, switched to seroquel 50 mg TID plus 100 mg qhs  Discussed w micro- susceptibilities will be reported tomorrow  Remove washburn and rectal tube if able  Stop " Lidocaine patches  Extend Rocephin to 10 day course  EKG tomorrow for QTc eval     Neurology  # Concern for anoxic brain injury    # Hx of possible ETOH abuse per family  # ICU delerium  Extubated 12/14. S/p therapeutic hypothermia- Cooled to 33 degrees. Initial head CT without acute pathology. Able to follow commands intermittently. Moans out.    Sedation/analgesia:  - Haldol q6h trialed without success, transitioned to Seroquel 50 mg TID plus 100 mg at bedtime . QTc 316.   - Scheduled Oxy 5-10 mg q4h  - Neuro-crit consulted- since signed off  - Folate, Thiamine and multi vitamin for ETOH abuse. Discontinued CIWA given >1 week out     Cardiovascular  # Refractory VF cardiac arrest 2/2 secondary to STEMI  # Cardiogenic shock requiring VA ECMO  # Ischemic Cardiomyopathy (EF 10-15%)  # S/p PCI pLCx, mLCx, OM1  # Residual RCA   # HTN, HLD  # Concern for limb ischemia s/p distal reperfusion cannula left leg  S/p Peripheral V-A ECMO inserted via RCFA and RCFV 17/25 fr.  Decannulated 12/13. IABP removed 12/13. VBG yesterday 59. Off pressors.    Pressors/Inotropes/Antiarrythmics:  - None currently    - GDMT                 - Continue ASA 81mg and ticagrelor 90mg BID                  - Started Lipitor low dose                 - Hold ACE/ARB given hypotension                 - Holding beta blocker given shock/hypotension  -  of RCA not intervened upon     Pulmonary  # Acute hypoxemic respiratory failure requiring intubation  # Klebsiella pneumonia  # Rib Fractures  # Sternal Fracture  # Tobacco Abuse (2PPD)  # Tachypneic  Extubated 12/14 to Bipap. Currently on Bipap 50%, 10/5  CXR today with worsening bilateral opacities  - Serial ABGs- most recent 7.38/36/88/21  - duoneb Q6 hours  - Consider Nicotine replacement     Gastrointestinal, Nutrition  # Shock liver 2/2 cardiac arrest, resolved  # Loose Stool  No known medical hx.   ALT 43 (54) AST 43 (64)  - Monitor LFTs   - TF at goal  - Bowel regimen: hold for now given  loose stools  - GI Prophylaxis: Protonix 40 mg daily     Renal, Electrolytes  # Acute Renal Injury requiring CRRT  # Lactic acidosis  # Hypoalbuminemia  # Hyperkalemia, resolved   # Hypocalcemia, resolved  # Hyperphosphatemia  UOP poor 12/8 with hyperkalemia. Started on CRRT. Removed from CRRT since decannulation 12/13 and has not needed to be put back on. UOP improving and labs stable.      Crt continues to improve 1.39 (1.64) BUN 66 (67)  UOP 1.8 L yesterday, 650 ml since midnight. Net + 158 cc yesterday, net neg1.5L since admission.  - trial lasix if UOP sluggish  - renal consulted initially, have since signed off.  - lactic acid WNL  - Monitor urine output     Infectious Disease  # Aspiration Pneumonia (klebsiella, staph aureus)  # Leukocytosis  # Fevers  # Lactic acidosis, resolved  WBC down to 18.7 (19.3. Tmax 100.6 with scheduled tylenol  - Monitor for signs of infection  - Pan culture 12/14 given temp and WBC  Cultures thus far:                 - sputum 12/6: staph aureus, staph dysgalactia, klebsiella                 - sputum 12/7: staph aureus                 - sputum 12/8: staph and klebsiella      - Sputum 12/9- staph aureus and klebsiella      - Sputum 12/10- staph aureus and klebsiella      - sputum 12/13- staph aureus and klebsiella  Antibiotics              - Vancomycin 12/6 - 12/7 (MRSA negative)              - Zosyn 12/6 - 12/9              - Rocephin 12/9 - present (plan for 10 with end date of  12/19)     Hematology  # Anemia of critical illness  # Concern for HIT initially- negative  # Thrombocytopenia  # Loss of bilateral DP pulses, resolved  Hgb stable. 8 (7.6) Plt stable 201. No e/o bleeding.    - Transfuse for Hgb < 8  - DVT PPX: Heparin for DVT prophy     Endocrinology  # Hyperglycemia   No known medical history. Hemoglobin A1c 5.6%  - SSI     MSK/Skin  # Mottling LLE s/p LLE distal reperfusion cannula  # Dusky toes left foot  Pulses remain intact. IABP removed 12/13.   - continue to  "monitor     Pertinent Lines  RIJ 12/13  Rectal tube December 6, 2021  R radial arterial line December 6, 2021  Gutiérrez catheter December 6, 2021  OG tube December 14, 2021     Patient seen and discussed with staff physician.    KATHARINA Peterson, CNP  [facility]  Interventional Cardiology-I Service  Pager [phone]     Objective:  Most recent vital signs:  /78   Pulse 114   Temp 99.1  F (37.3  C) (Axillary)   Resp (!) 41   Ht 1.753 m (5' 9\")   Wt 68.1 kg (150 lb 2.1 oz)   SpO2 98%   BMI 22.17 kg/m    Temp:  [97.4  F (36.3  C)-100.6  F (38.1  C)] 99.1  F (37.3  C)  Pulse:  [] 114  Resp:  [17-54] 41  MAP:  [53 mmHg-95 mmHg] 80 mmHg  Arterial Line BP: ()/(6-68) 125/61  FiO2 (%):  [35 %-60 %] 50 %  SpO2:  [85 %-100 %] 98 %  Wt Readings from Last 2 Encounters:   12/14/21 68.1 kg (150 lb 2.1 oz)   09/23/19 77.5 kg (170 lb 12.8 oz)       Intake/Output Summary (Last 24 hours) at 12/15/2021 0752  Last data filed at 12/15/2021 0700  Gross per 24 hour   Intake 1786.35 ml   Output 2275 ml   Net -488.65 ml     Physical exam:  General: In bed, moaning  HEENT: PERRL, no scleral icterus or injection  CARDIAC: RRR, no m/r/g appreciated. Peripheral pulses dopplered  RESP: Bipap CTAB, no wheezes, rhonchi or crackles appreciated. Diminished bases  GI: soft, BS hypoactive  : Gutiérrez  EXTREMITIES: NO LE edema, pulses dopplered. Femoral access site w/o bleeding, dressing c/d/i.    SKIN: No acute lesions appreciated  NEURO: delerious, able to follow commands. Moans    Labs (Past three days):  CBC  Recent Labs   Lab 12/15/21  0339 12/14/21  2342 12/14/21 2012 12/14/21  1451 12/14/21  0929   WBC 18.7*  --  17.1* 15.8* 17.0*   RBC 2.56*  --  2.28* 2.34* 2.48*   HGB 8.0* 7.6* 7.0* 7.3* 7.8*   HCT 24.2*  --  21.6* 22.3* 23.7*   MCV 95  --  95 95 96   MCH 31.3  --  30.7 31.2 31.5   MCHC 33.1  --  32.4 32.7 32.9   RDW 14.0  --  14.0 14.4 14.4     --  178 172 163     BMP  Recent " Labs   Lab 12/15/21  0347 12/15/21  0340 12/15/21  0339 12/14/21  2348 12/14/21  1943 12/14/21  1939 12/14/21  1451 12/14/21  1223 12/14/21  0929 12/14/21  0752 12/14/21  0425 12/13/21  2046 12/13/21  1554 12/13/21  1323   NA  --   --  150*  --   --  148* 147*  --  143  --  141   < > 139 138  138   POTASSIUM  --   --  3.9  --   --  3.5 3.0*  --  3.6  --  3.2*   < > 3.8 3.6  3.6   CHLORIDE  --   --  124*  --   --  120* 116*  --  112*  --  109   < > 107 106  106   CO2  --   --  20  --   --  20 23  --  22  --  21   < > 23 23  24   ANIONGAP  --   --  6  --   --  8 8  --  9  --  11   < > 9 9  8   * 114* 116* 129*   < > 126* 178*  173*   < > 138*  134*   < > 194*   < > 164*  154* 179*  179*   BUN  --   --  66*  --   --  70* 74*  --  70*  --  67*   < > 59* 56*  56*   CR  --   --  1.39*  --   --  1.61* 1.63*  --  1.77*  --  1.64*   < > 1.62* 1.51*  1.54*   GFRESTIMATED  --   --  56*  --   --  47* 46*  --  42*  --  46*   < > 47* 51*  50*   BRIDGETTE  --   --  7.6*  --   --  8.1* 8.0*  --  8.2*  --  8.2*   < > 8.3* 8.6  8.6   MAG  --   --  2.7*  --   --  2.8*  --   --   --   --  2.9*  --  2.9* 2.9*   PHOS  --   --  3.2  --   --  2.8  --   --   --   --  5.2*  --   --  6.2*    < > = values in this interval not displayed.     Troponins:     INR  Recent Labs   Lab 12/15/21  0339 12/14/21  0425 12/13/21  1554 12/13/21  1323   INR 1.16* 1.12 1.11 1.13     Liver panel  Recent Labs   Lab 12/15/21  0339 12/14/21  1939 12/14/21  1451 12/14/21  0929   PROTTOTAL 5.8* 6.3* 6.4* 6.6*   ALBUMIN 1.4* 1.5* 1.6* 1.7*   BILITOTAL 0.5 0.4 0.2 0.2   ALKPHOS 81 84 94 90   AST 44 45 49* 43   ALT 37 41 43 43       Imaging/procedure results:  XR Chest Port 1 View  Narrative: EXAM: XR CHEST PORT 1 VIEW  12/15/2021 5:57 AM      HISTORY: ETT, Line placement, fevers    COMPARISON: Chest x-ray 12/14/2021    FINDINGS: Portable semiupright AP radiograph of the chest. The feeding  tube courses inferior to the diaphragm and out of the field of  view.  Right IJ central venous catheter tip projects over the low SVC. No  endotracheal tube is present. The trachea is midline. The cardiac  silhouette is not enlarged. No pleural effusion or pneumothorax.  Slightly increased diffuse bilateral mixed interstitial and airspace  opacities. The visualized upper abdomen is unremarkable.   Impression: IMPRESSION:   1. Slightly increased diffuse bilateral mixed interstitial and  airspace opacities.  2. Right IJ central venous catheter tip projects over the low SVC. No  endotracheal tube is present.    I have personally reviewed the examination and initial interpretation  and I agree with the findings.    HARESH ÁLVAREZ MD         SYSTEM ID:  [id number]

## 2021-12-15 NOTE — PROGRESS NOTES
Brief Nephrology Note        Mr Mine's Cr is down on its own the past 48h after decannulating ECMO.  Has associated hypernatremia suggesting a good portion of his UOP is free water which can be increased via GI or D5 IV.  Will sign off from daily visits but I am available for any renal questions at pager below.      KATHARINA Kemp CNS  Clinical Nurse Specialist  [phone]

## 2021-12-15 NOTE — PLAN OF CARE
Major Shift Events:  Pt agitated, restless, and anxious. Started PO serequel, helps some. Oriented x1.Sitter placed. Norepi restarted this afternoon to keep MAPs >65. Good  UO, two loose stools. Remains on BiPap, occasionaly tachypneic, ABGs MD angi aware. Unable to transition to Vapotherm due to tachypnea/anxiety/ work of breathing. Tmax 100.1. Brother Garfield at bedside  Plan: Continue forward with delirium precautions.  For vital signs and complete assessments, please see documentation flowsheets.

## 2021-12-15 NOTE — PLAN OF CARE
Major Shift Events: Pt extubated at 0900, intermittently follows commands. Appears very delirious, does not visually track. Increasing agitation throughout the day. Haldol scheduled, does not appear to decrease agitation. Dex gtt initiated. Pt continuing to thrash around in bed, CSI team notified and by patient's room multiple times to assess. Oxycodone scheduled. HR sinus tach, frequent ectopy. BP labile, levo gtt as high as 0.22 for MAPs in the 50s. Dex may be driving hypotension. ECHO done. Extubated to oxymask. Trialed Bipap and HFNC. Pt agitated with both. ABG checked with changes. Frequent, loose cough. TF stopped d/t bipap. Rectal tube in place, having loose stools. Adequate UOP. UA/peripheral cx/sputum cx done for increasing temp. Brother by to visit.     Plan: continue to support respiratory status with HFNC or Bipap. Delirium precautions.PT/OT.     For vital signs and complete assessments, please see documentation flowsheets.     Problem: Risk for Delirium  Goal: Optimal Coping  Outcome: Declining  Goal: Improved Behavioral Control  Outcome: Declining  Goal: Improved Attention and Thought Clarity  Outcome: Declining

## 2021-12-15 NOTE — PLAN OF CARE
Major Shift Events:  Oriented only to self, follows commands inconsistently. Does not try to pull at lines. Gave scheduled haldol and oxy along with PRN versed as ordered for increased agitation. Stable on BIPAP 10/5 all night. Received one unit of blood for low HGB. Weaned off precedex due to bradycardia and hypotension, lowest HR in the 20s and MDs aware. Once off precedex, able to wean off Levo as well.   Plan: Continue to support pt through confusion.   For vital signs and complete assessments, please see documentation flowsheets.

## 2021-12-15 NOTE — PROGRESS NOTES
Called by Bedside RN about Agitation. Initially started CIWA; Patient received 5 mg of valium and gabapentin 1200 mg per [place] protocol.    Patient had hypotension post valium with 80/50's and MAPS 55-60. Switched to haldol scheduled 5 mg IV q 6 and will avoid benzos. Norepinephrine restarted.    Ordered lactate, CBC, CMP, ABG stat.    James Granados MD  [phone]

## 2021-12-15 NOTE — PROGRESS NOTES
"[facility]  WO Nurse Inpatient Adult Pressure Injury Prevention Assessment: ECMO  Follow up    Positioning Tolerance: Good  Date of ECMO cannulation: 12/6  decannulation 12/13  Presence of Ischemia: Yes  Location of ischemia: bilateral cold dusky plantar toes. Ischemic left toes (see media for photo)    Pressure Injury Prevention Interventions In Place:  Optifoam Dressing under ECMO Cannula, Z flow Positioner under head, Pillows for repositioning, TAPs Wedge Positioners in use, Heel off-loading boots and Mepilex Sacral Dressing   Current support surface: Standard  Low air loss mattress with pulsation        Pressure Injury Prevention Interventions Added:  None        Plan of Care for Positioning and Pressure Injury Prevention  Reposition patient every 1-2 hours using TAP Wedges  Position head on Z flow positioner, mold indentation at areas of pressure points.  Pad ECMO IJ cannula with Optifoam (#[id number]) along face and scalp between skin and cannula and under [place] head wraps    Pad ECMO groin and chest cannula under rigid connectors with Optifoam or Soft cloth  Heel off-loading Boots at all times  Sacral Mepilex for Prevention, change every 5 days and prn  Low Air loss mattress    Patient History:   According to medical record: Bruce Blount is a 56 year old male with a pmhx sig for heavy etoh use and 2ppd for \"years\" otherwise does not follow with medical physicians thus has no known medical history who was at home here he lives with his brother, had no complaints until his brother heard a thud then found him down, began cpr until ems arrived 4min later where he was found to be in vfib--> shocked 7 times, with rosc on arrival to er where he was noted to have jensen the inferior and posterior leads with reciprocal changes --> cath lab, rearrested in er and cath lab with shocks and rosc but due to recurrent arrythmia was placed on va ecmo -->  rca, acute culprit cx " om1 with pci to prox and mid cx as well as om1. IABP placed due to loss of pulsatility --> iCTH with preserved grey white, hypertensive on admit to icu. Cooled to 33 (cold on arrival to icu).     Admission conditions: cardiac arrest, coronary artery disease, myocadial infarction, anoxic brain injury, Ihypoxic/hypercarbic respiratory failure, lactic acidosis, hypotension, cardiogenic shock, ventricular fibrillation, acute heart failure, transaminitis, shock liver, acute renal injury, hypokalemia, aspiration pneumonia, hyperglycemia, vasodilatory shock, etoh abuse, tobacco abuse    Current Diet / Nutrition:     Orders Placed This Encounter      NPO for Medical/Clinical Reasons Except for: No Exceptions      Output:    I/O last 3 completed shifts:  In: 1861.65 [I.V.:771.65; NG/GT:650]  Out: 2365 [Urine:1965; Stool:400]  Containment: of urine/stool: Incontinence Protocol and Urinary Catheter    Risk Assessment:   Sensory Perception: 3-->slightly limited  Moisture: 4-->rarely moist  Activity: 2-->chairfast  Mobility: 2-->very limited  Nutrition: 2-->probably inadequate  Friction and Shear: 1-->problem  Yamil Score: 14    Labs:    Recent Labs   Lab 12/15/21  0339 12/09/21  1541 12/09/21  1455   ALBUMIN 1.4*   < >  --    HGB 8.0*   < >  --    INR 1.16*   < >  --    WBC 18.7*   < >  --    A1C  --   --  5.6   .0*   < >  --     < > = values in this interval not displayed.       Focused Assessment: right Right groin ECMO cannula feet  Pressure Injury Present::No    Education provided to: nurse  Discussed importance of:repositioning etad, and noting the towel roll under the tube to offload pressure on upper lip   Discussed plan of care with Nurse  WOC Nurse follow-up plan:signing off    Rebecca Bernal RN CWOCN

## 2021-12-16 ENCOUNTER — APPOINTMENT (OUTPATIENT)
Dept: GENERAL RADIOLOGY | Facility: CLINIC | Age: 56
End: 2021-12-16
Attending: NURSE PRACTITIONER
Payer: COMMERCIAL

## 2021-12-16 LAB
ALBUMIN SERPL-MCNC: 1.5 G/DL (ref 3.4–5)
ALP SERPL-CCNC: 93 U/L (ref 40–150)
ALT SERPL W P-5'-P-CCNC: 35 U/L (ref 0–70)
ANION GAP SERPL CALCULATED.3IONS-SCNC: 6 MMOL/L (ref 3–14)
APTT PPP: 37 SECONDS (ref 22–38)
AST SERPL W P-5'-P-CCNC: 46 U/L (ref 0–45)
BACTERIA SPT CULT: ABNORMAL
BASE EXCESS BLDA CALC-SCNC: -1.4 MMOL/L (ref -9–1.8)
BASE EXCESS BLDA CALC-SCNC: -3.4 MMOL/L (ref -9–1.8)
BILIRUB SERPL-MCNC: 0.4 MG/DL (ref 0.2–1.3)
BUN SERPL-MCNC: 56 MG/DL (ref 7–30)
CA-I BLD-MCNC: 4.6 MG/DL (ref 4.4–5.2)
CALCIUM SERPL-MCNC: 8.2 MG/DL (ref 8.5–10.1)
CHLORIDE BLD-SCNC: 124 MMOL/L (ref 94–109)
CO2 SERPL-SCNC: 22 MMOL/L (ref 20–32)
CREAT SERPL-MCNC: 1.38 MG/DL (ref 0.66–1.25)
ERYTHROCYTE [DISTWIDTH] IN BLOOD BY AUTOMATED COUNT: 14.3 % (ref 10–15)
FLUAV AG SPEC QL IA: NEGATIVE
FLUBV AG SPEC QL IA: NEGATIVE
GFR SERPL CREATININE-BSD FRML MDRD: 57 ML/MIN/1.73M2
GLUCOSE BLD-MCNC: 173 MG/DL (ref 70–99)
GLUCOSE BLD-MCNC: 173 MG/DL (ref 70–99)
GLUCOSE BLDC GLUCOMTR-MCNC: 121 MG/DL (ref 70–99)
GLUCOSE BLDC GLUCOMTR-MCNC: 133 MG/DL (ref 70–99)
GLUCOSE BLDC GLUCOMTR-MCNC: 143 MG/DL (ref 70–99)
GLUCOSE BLDC GLUCOMTR-MCNC: 164 MG/DL (ref 70–99)
GLUCOSE BLDC GLUCOMTR-MCNC: 168 MG/DL (ref 70–99)
GLUCOSE BLDC GLUCOMTR-MCNC: 211 MG/DL (ref 70–99)
GRAM STAIN RESULT: ABNORMAL
GRAM STAIN RESULT: ABNORMAL
HCO3 BLD-SCNC: 20 MMOL/L (ref 21–28)
HCO3 BLD-SCNC: 23 MMOL/L (ref 21–28)
HCT VFR BLD AUTO: 24.3 % (ref 40–53)
HGB BLD-MCNC: 7.8 G/DL (ref 13.3–17.7)
HGB FREE PLAS-MCNC: <30 MG/DL
INR PPP: 1.23 (ref 0.85–1.15)
L PNEUMO1 AG UR QL IA: NEGATIVE
LACTATE SERPL-SCNC: 0.8 MMOL/L (ref 0.7–2)
LDH SERPL L TO P-CCNC: 625 U/L (ref 85–227)
MAGNESIUM SERPL-MCNC: 2.4 MG/DL (ref 1.6–2.3)
MCH RBC QN AUTO: 31.2 PG (ref 26.5–33)
MCHC RBC AUTO-ENTMCNC: 32.1 G/DL (ref 31.5–36.5)
MCV RBC AUTO: 97 FL (ref 78–100)
O2/TOTAL GAS SETTING VFR VENT: 45 %
O2/TOTAL GAS SETTING VFR VENT: 50 %
PCO2 BLD: 31 MM HG (ref 35–45)
PCO2 BLD: 37 MM HG (ref 35–45)
PH BLD: 7.41 [PH] (ref 7.35–7.45)
PH BLD: 7.43 [PH] (ref 7.35–7.45)
PHOSPHATE SERPL-MCNC: 3.4 MG/DL (ref 2.5–4.5)
PLATELET # BLD AUTO: 332 10E3/UL (ref 150–450)
PO2 BLD: 79 MM HG (ref 80–105)
PO2 BLD: 99 MM HG (ref 80–105)
POTASSIUM BLD-SCNC: 4 MMOL/L (ref 3.4–5.3)
POTASSIUM BLD-SCNC: 4.6 MMOL/L (ref 3.4–5.3)
PROT SERPL-MCNC: 6.7 G/DL (ref 6.8–8.8)
RBC # BLD AUTO: 2.5 10E6/UL (ref 4.4–5.9)
S PNEUM AG SPEC QL: NEGATIVE
SODIUM SERPL-SCNC: 152 MMOL/L (ref 133–144)
WBC # BLD AUTO: 21.8 10E3/UL (ref 4–11)

## 2021-12-16 PROCEDURE — 250N000013 HC RX MED GY IP 250 OP 250 PS 637: Performed by: NURSE PRACTITIONER

## 2021-12-16 PROCEDURE — 250N000009 HC RX 250: Performed by: NURSE PRACTITIONER

## 2021-12-16 PROCEDURE — 999N000157 HC STATISTIC RCP TIME EA 10 MIN

## 2021-12-16 PROCEDURE — 85027 COMPLETE CBC AUTOMATED: CPT | Performed by: INTERNAL MEDICINE

## 2021-12-16 PROCEDURE — 94660 CPAP INITIATION&MGMT: CPT

## 2021-12-16 PROCEDURE — 84132 ASSAY OF SERUM POTASSIUM: CPT | Performed by: INTERNAL MEDICINE

## 2021-12-16 PROCEDURE — 80053 COMPREHEN METABOLIC PANEL: CPT | Performed by: INTERNAL MEDICINE

## 2021-12-16 PROCEDURE — 250N000013 HC RX MED GY IP 250 OP 250 PS 637: Performed by: INTERNAL MEDICINE

## 2021-12-16 PROCEDURE — 87899 AGENT NOS ASSAY W/OPTIC: CPT | Performed by: NURSE PRACTITIONER

## 2021-12-16 PROCEDURE — 999N000015 HC STATISTIC ARTERIAL MONITORING DAILY

## 2021-12-16 PROCEDURE — 250N000009 HC RX 250: Performed by: STUDENT IN AN ORGANIZED HEALTH CARE EDUCATION/TRAINING PROGRAM

## 2021-12-16 PROCEDURE — 83605 ASSAY OF LACTIC ACID: CPT | Performed by: INTERNAL MEDICINE

## 2021-12-16 PROCEDURE — 71045 X-RAY EXAM CHEST 1 VIEW: CPT | Mod: 26 | Performed by: RADIOLOGY

## 2021-12-16 PROCEDURE — 85730 THROMBOPLASTIN TIME PARTIAL: CPT | Performed by: INTERNAL MEDICINE

## 2021-12-16 PROCEDURE — 99292 CRITICAL CARE ADDL 30 MIN: CPT | Performed by: STUDENT IN AN ORGANIZED HEALTH CARE EDUCATION/TRAINING PROGRAM

## 2021-12-16 PROCEDURE — 87804 INFLUENZA ASSAY W/OPTIC: CPT | Performed by: NURSE PRACTITIONER

## 2021-12-16 PROCEDURE — 82330 ASSAY OF CALCIUM: CPT | Performed by: INTERNAL MEDICINE

## 2021-12-16 PROCEDURE — 83615 LACTATE (LD) (LDH) ENZYME: CPT | Performed by: NURSE PRACTITIONER

## 2021-12-16 PROCEDURE — 250N000011 HC RX IP 250 OP 636: Performed by: INTERNAL MEDICINE

## 2021-12-16 PROCEDURE — 250N000013 HC RX MED GY IP 250 OP 250 PS 637: Performed by: PHYSICIAN ASSISTANT

## 2021-12-16 PROCEDURE — 93010 ELECTROCARDIOGRAM REPORT: CPT | Performed by: INTERNAL MEDICINE

## 2021-12-16 PROCEDURE — 94640 AIRWAY INHALATION TREATMENT: CPT

## 2021-12-16 PROCEDURE — 94640 AIRWAY INHALATION TREATMENT: CPT | Mod: 76

## 2021-12-16 PROCEDURE — 82803 BLOOD GASES ANY COMBINATION: CPT | Performed by: NURSE PRACTITIONER

## 2021-12-16 PROCEDURE — 258N000003 HC RX IP 258 OP 636: Performed by: NURSE PRACTITIONER

## 2021-12-16 PROCEDURE — 82947 ASSAY GLUCOSE BLOOD QUANT: CPT | Performed by: INTERNAL MEDICINE

## 2021-12-16 PROCEDURE — 83735 ASSAY OF MAGNESIUM: CPT | Performed by: NURSE PRACTITIONER

## 2021-12-16 PROCEDURE — 250N000009 HC RX 250: Performed by: PHYSICIAN ASSISTANT

## 2021-12-16 PROCEDURE — 200N000002 HC R&B ICU UMMC

## 2021-12-16 PROCEDURE — 84100 ASSAY OF PHOSPHORUS: CPT | Performed by: NURSE PRACTITIONER

## 2021-12-16 PROCEDURE — 250N000011 HC RX IP 250 OP 636: Performed by: NURSE PRACTITIONER

## 2021-12-16 PROCEDURE — 93005 ELECTROCARDIOGRAM TRACING: CPT

## 2021-12-16 PROCEDURE — 83051 HEMOGLOBIN PLASMA: CPT | Performed by: NURSE PRACTITIONER

## 2021-12-16 PROCEDURE — 250N000011 HC RX IP 250 OP 636: Performed by: STUDENT IN AN ORGANIZED HEALTH CARE EDUCATION/TRAINING PROGRAM

## 2021-12-16 PROCEDURE — 71045 X-RAY EXAM CHEST 1 VIEW: CPT

## 2021-12-16 PROCEDURE — 258N000003 HC RX IP 258 OP 636: Performed by: STUDENT IN AN ORGANIZED HEALTH CARE EDUCATION/TRAINING PROGRAM

## 2021-12-16 PROCEDURE — 99291 CRITICAL CARE FIRST HOUR: CPT | Performed by: STUDENT IN AN ORGANIZED HEALTH CARE EDUCATION/TRAINING PROGRAM

## 2021-12-16 PROCEDURE — 250N000013 HC RX MED GY IP 250 OP 250 PS 637: Performed by: STUDENT IN AN ORGANIZED HEALTH CARE EDUCATION/TRAINING PROGRAM

## 2021-12-16 PROCEDURE — 85610 PROTHROMBIN TIME: CPT | Performed by: NURSE PRACTITIONER

## 2021-12-16 RX ORDER — LEVALBUTEROL INHALATION SOLUTION 0.63 MG/3ML
0.63 SOLUTION RESPIRATORY (INHALATION) EVERY 4 HOURS PRN
Status: DISCONTINUED | OUTPATIENT
Start: 2021-12-16 | End: 2022-01-03 | Stop reason: HOSPADM

## 2021-12-16 RX ORDER — METHYLPREDNISOLONE SODIUM SUCCINATE 125 MG/2ML
62.5 INJECTION, POWDER, LYOPHILIZED, FOR SOLUTION INTRAMUSCULAR; INTRAVENOUS EVERY 12 HOURS
Status: DISCONTINUED | OUTPATIENT
Start: 2021-12-16 | End: 2021-12-17

## 2021-12-16 RX ORDER — METHYLPREDNISOLONE SODIUM SUCCINATE 125 MG/2ML
62.5 INJECTION, POWDER, LYOPHILIZED, FOR SOLUTION INTRAMUSCULAR; INTRAVENOUS ONCE
Status: COMPLETED | OUTPATIENT
Start: 2021-12-16 | End: 2021-12-16

## 2021-12-16 RX ORDER — CHLOROTHIAZIDE SODIUM 500 MG/1
500 INJECTION INTRAVENOUS ONCE
Status: COMPLETED | OUTPATIENT
Start: 2021-12-16 | End: 2021-12-16

## 2021-12-16 RX ORDER — OXYCODONE HYDROCHLORIDE 5 MG/1
5 TABLET ORAL EVERY 4 HOURS
Status: DISCONTINUED | OUTPATIENT
Start: 2021-12-16 | End: 2021-12-17

## 2021-12-16 RX ORDER — PREDNISONE 20 MG/1
60 TABLET ORAL 2 TIMES DAILY WITH MEALS
Status: DISCONTINUED | OUTPATIENT
Start: 2021-12-16 | End: 2021-12-16

## 2021-12-16 RX ORDER — NICOTINE 21 MG/24HR
1 PATCH, TRANSDERMAL 24 HOURS TRANSDERMAL DAILY
Status: DISCONTINUED | OUTPATIENT
Start: 2021-12-16 | End: 2022-01-03 | Stop reason: HOSPADM

## 2021-12-16 RX ORDER — POTASSIUM CHLORIDE 1.5 G/1.58G
20 POWDER, FOR SOLUTION ORAL ONCE
Status: COMPLETED | OUTPATIENT
Start: 2021-12-16 | End: 2021-12-16

## 2021-12-16 RX ORDER — FUROSEMIDE 10 MG/ML
60 INJECTION INTRAMUSCULAR; INTRAVENOUS ONCE
Status: COMPLETED | OUTPATIENT
Start: 2021-12-16 | End: 2021-12-16

## 2021-12-16 RX ORDER — IPRATROPIUM BROMIDE AND ALBUTEROL SULFATE 2.5; .5 MG/3ML; MG/3ML
3 SOLUTION RESPIRATORY (INHALATION)
Status: DISCONTINUED | OUTPATIENT
Start: 2021-12-16 | End: 2021-12-16

## 2021-12-16 RX ADMIN — HEPARIN SODIUM 5000 UNITS: 5000 INJECTION, SOLUTION INTRAVENOUS; SUBCUTANEOUS at 04:53

## 2021-12-16 RX ADMIN — QUETIAPINE FUMARATE 50 MG: 50 TABLET ORAL at 20:50

## 2021-12-16 RX ADMIN — INSULIN ASPART 1 UNITS: 100 INJECTION, SOLUTION INTRAVENOUS; SUBCUTANEOUS at 20:53

## 2021-12-16 RX ADMIN — OXYCODONE HYDROCHLORIDE 5 MG: 5 TABLET ORAL at 10:13

## 2021-12-16 RX ADMIN — QUETIAPINE FUMARATE 50 MG: 50 TABLET ORAL at 14:53

## 2021-12-16 RX ADMIN — METHYLPREDNISOLONE SODIUM SUCCINATE 62.5 MG: 125 INJECTION, POWDER, FOR SOLUTION INTRAMUSCULAR; INTRAVENOUS at 17:37

## 2021-12-16 RX ADMIN — Medication 1 PACKET: at 17:34

## 2021-12-16 RX ADMIN — METRONIDAZOLE 500 MG: 500 INJECTION, SOLUTION INTRAVENOUS at 07:59

## 2021-12-16 RX ADMIN — Medication 0.07 MCG/KG/MIN: at 18:14

## 2021-12-16 RX ADMIN — FUROSEMIDE 60 MG: 10 INJECTION, SOLUTION INTRAMUSCULAR; INTRAVENOUS at 08:08

## 2021-12-16 RX ADMIN — QUETIAPINE FUMARATE 50 MG: 50 TABLET ORAL at 07:52

## 2021-12-16 RX ADMIN — IPRATROPIUM BROMIDE AND ALBUTEROL SULFATE 3 ML: 2.5; .5 SOLUTION RESPIRATORY (INHALATION) at 15:15

## 2021-12-16 RX ADMIN — QUETIAPINE FUMARATE 100 MG: 50 TABLET ORAL at 22:47

## 2021-12-16 RX ADMIN — FOLIC ACID 1 MG: 5 INJECTION, SOLUTION INTRAMUSCULAR; INTRAVENOUS; SUBCUTANEOUS at 10:48

## 2021-12-16 RX ADMIN — NICOTINE 1 PATCH: 14 PATCH, EXTENDED RELEASE TRANSDERMAL at 10:48

## 2021-12-16 RX ADMIN — CEFEPIME HYDROCHLORIDE 2 G: 2 INJECTION, POWDER, FOR SOLUTION INTRAVENOUS at 00:14

## 2021-12-16 RX ADMIN — Medication 2 SPRAY: at 12:48

## 2021-12-16 RX ADMIN — OXYCODONE HYDROCHLORIDE 5 MG: 5 TABLET ORAL at 17:37

## 2021-12-16 RX ADMIN — IPRATROPIUM BROMIDE AND ALBUTEROL SULFATE 3 ML: 2.5; .5 SOLUTION RESPIRATORY (INHALATION) at 08:12

## 2021-12-16 RX ADMIN — CHLOROTHIAZIDE SODIUM 500 MG: 500 INJECTION, POWDER, LYOPHILIZED, FOR SOLUTION INTRAVENOUS at 10:13

## 2021-12-16 RX ADMIN — DEXMEDETOMIDINE HYDROCHLORIDE 0.5 MCG/KG/HR: 4 INJECTION, SOLUTION INTRAVENOUS at 11:05

## 2021-12-16 RX ADMIN — ACETAMINOPHEN 650 MG: 325 TABLET, FILM COATED ORAL at 17:37

## 2021-12-16 RX ADMIN — POTASSIUM CHLORIDE 20 MEQ: 1.5 POWDER, FOR SOLUTION ORAL at 00:14

## 2021-12-16 RX ADMIN — IPRATROPIUM BROMIDE AND ALBUTEROL SULFATE 3 ML: 2.5; .5 SOLUTION RESPIRATORY (INHALATION) at 13:02

## 2021-12-16 RX ADMIN — Medication 2 SPRAY: at 07:55

## 2021-12-16 RX ADMIN — INSULIN ASPART 1 UNITS: 100 INJECTION, SOLUTION INTRAVENOUS; SUBCUTANEOUS at 12:49

## 2021-12-16 RX ADMIN — Medication 1 PACKET: at 20:50

## 2021-12-16 RX ADMIN — CEFEPIME HYDROCHLORIDE 2 G: 2 INJECTION, POWDER, FOR SOLUTION INTRAVENOUS at 12:45

## 2021-12-16 RX ADMIN — THIAMINE HCL TAB 100 MG 100 MG: 100 TAB at 08:08

## 2021-12-16 RX ADMIN — MULTIVIT AND MINERALS-FERROUS GLUCONATE 9 MG IRON/15 ML ORAL LIQUID 15 ML: at 07:50

## 2021-12-16 RX ADMIN — HEPARIN SODIUM 5000 UNITS: 5000 INJECTION, SOLUTION INTRAVENOUS; SUBCUTANEOUS at 20:50

## 2021-12-16 RX ADMIN — ATORVASTATIN CALCIUM 20 MG: 20 TABLET, FILM COATED ORAL at 20:50

## 2021-12-16 RX ADMIN — FUROSEMIDE 60 MG: 10 INJECTION, SOLUTION INTRAMUSCULAR; INTRAVENOUS at 00:14

## 2021-12-16 RX ADMIN — METHYLPREDNISOLONE SODIUM SUCCINATE 62.5 MG: 125 INJECTION, POWDER, FOR SOLUTION INTRAMUSCULAR; INTRAVENOUS at 08:25

## 2021-12-16 RX ADMIN — IPRATROPIUM BROMIDE AND ALBUTEROL SULFATE 3 ML: 2.5; .5 SOLUTION RESPIRATORY (INHALATION) at 21:00

## 2021-12-16 RX ADMIN — ASPIRIN 81 MG CHEWABLE TABLET 81 MG: 81 TABLET CHEWABLE at 07:50

## 2021-12-16 RX ADMIN — AZITHROMYCIN MONOHYDRATE 500 MG: 500 INJECTION, POWDER, LYOPHILIZED, FOR SOLUTION INTRAVENOUS at 10:49

## 2021-12-16 RX ADMIN — OXYCODONE HYDROCHLORIDE 5 MG: 5 TABLET ORAL at 07:51

## 2021-12-16 RX ADMIN — OXYCODONE HYDROCHLORIDE 5 MG: 5 TABLET ORAL at 12:44

## 2021-12-16 RX ADMIN — Medication 10 MG: at 22:47

## 2021-12-16 RX ADMIN — OXYCODONE HYDROCHLORIDE 10 MG: 5 TABLET ORAL at 04:53

## 2021-12-16 RX ADMIN — Medication 1 PACKET: at 07:50

## 2021-12-16 RX ADMIN — Medication 2 SPRAY: at 17:33

## 2021-12-16 RX ADMIN — METRONIDAZOLE 500 MG: 500 INJECTION, SOLUTION INTRAVENOUS at 01:16

## 2021-12-16 RX ADMIN — Medication 1 PACKET: at 12:48

## 2021-12-16 RX ADMIN — ACETAMINOPHEN 650 MG: 325 TABLET, FILM COATED ORAL at 12:44

## 2021-12-16 RX ADMIN — DEXTROSE MONOHYDRATE: 50 INJECTION, SOLUTION INTRAVENOUS at 08:08

## 2021-12-16 RX ADMIN — OXYCODONE HYDROCHLORIDE 5 MG: 5 TABLET ORAL at 20:50

## 2021-12-16 RX ADMIN — Medication 2 SPRAY: at 20:50

## 2021-12-16 RX ADMIN — INSULIN ASPART 1 UNITS: 100 INJECTION, SOLUTION INTRAVENOUS; SUBCUTANEOUS at 04:54

## 2021-12-16 RX ADMIN — INSULIN ASPART 2 UNITS: 100 INJECTION, SOLUTION INTRAVENOUS; SUBCUTANEOUS at 17:31

## 2021-12-16 RX ADMIN — TICAGRELOR 90 MG: 90 TABLET ORAL at 20:50

## 2021-12-16 RX ADMIN — HEPARIN SODIUM 5000 UNITS: 5000 INJECTION, SOLUTION INTRAVENOUS; SUBCUTANEOUS at 12:48

## 2021-12-16 RX ADMIN — ACETAMINOPHEN 650 MG: 325 TABLET, FILM COATED ORAL at 06:44

## 2021-12-16 RX ADMIN — TICAGRELOR 90 MG: 90 TABLET ORAL at 07:51

## 2021-12-16 ASSESSMENT — ACTIVITIES OF DAILY LIVING (ADL)
ADLS_ACUITY_SCORE: 13

## 2021-12-16 NOTE — PROGRESS NOTES
"    [facility]   Critical Care Cardiology - Progress Note    Bruce Blount MRN: [id number]  Age: 56 year old, : 1965  Date: 2021    Assessment and Plan:  Bruce Blount is a 56 year old male with PMHx current tobacco use and ETOH abuse who presented to [facility]  after out of hospital cardiac arrest.      Per EMS and brother, patient was in his usual state of health prior to arrest. Patients brother heard a \"thump\" and found patient unresponsive and agonal breathing. 911 called and CPR initiated. EMS arrived within 4 minutes. Initial rhythm VF--> shocked x ~7 with ROSC upon arrival to ED. Total CPR team per EMS ~ 40 minutes. 3 mg Epinephrine, 300 mg Amio given via EMS. Patient EKG reveals Valentín inferior/posterior leads with reciprocal changes. Patient initially presented with an Igel and was intubated with ETT in the ED. He arrested and was again shocked in the ED with ROSC.      Taken urgently to CCL where he underwent coronary angiogram and again arrested requiring manual CPR and was then cannulated on VA ECMO via right with 17 [place] cannula RCFA, 25 Fr cannula RCFV. Coronary angiogram revealed  of RCA and acute culprit lesion of LCx/OM1, s/p PCI to pLCx, mid LCx, and OM1 with TERRY. IABP placed for decreased pulsatility. Thermogard cooling cath placed and patient was transferred to ICU for further management. Decannulated . IABP removed . Extubated .     Today's Plan:  - encourage wakefulness during day  - EKG this AM  - reduce oxycodone to 2.5mg Q4 hours  - repeat ABG now  - transition methylprednisolone 62.5mg (equivalent to 78.1mg prednisone) to 40mg prednisone daily  - trial off BiPAP  - TF on hold for now given ongoing BiPAP. Consider resuming today pending success of weaning BiPAP  - diuresis: 80mg IV lasix this AM, 5mg metolazone  - monitor sodium Q6 hours  - transition azithromycin from IV to PO    Neurology  # Anoxic brain injury  # ICU " delirium  # Chest wall pain    # Hx of possible ETOH abuse per family  S/p therapeutic hypothermia. Extubated 12/14. Initial head CT without acute pathology. Able to follow commands intermittently. Have trialed Haldol without success, transitioned to Seroquel 50 mg TID plus 100 mg at bedtime with mild improvement. Added Precedex gtt and sitter overnight and clearer today. Neuro-crit consulted- since signed off  - EKG this AM  - Oxycodone 5mg Q4; reduce to 2.5mg Q4  - 10mg melatonin at bedtime  - 50mg Seroquel TID  - 100mg Seroquel PRN at bedtime  - Tylenol and lidocaine patches for additional pain control as needed  - Folate, Thiamine and multi vitamin for ETOH abuse.     Cardiovascular  # Ischemic Cardiomyopathy (EF 40-45%)  # S/p PCI pLCx, mLCx, OM1  # Residual RCA   # HTN, HLD  # Refractory VF cardiac arrest 2/2 secondary to STEMI, resolved  # Cardiogenic shock requiring VA ECMO, resolved  # Concern for limb ischemia s/p distal reperfusion cannula left leg  S/p Peripheral V-A ECMO inserted via RCFA and RCFV 17/25 fr.  Decannulated 12/13. IABP removed 12/13.  Intermittently requiring pressors, usually due to sedation/meds  - GDMT   - DAPT: Continue ASA 81mg and ticagrelor 90mg BID    - Statin: 20mg Lipitor daily   - Hold ACE/ARB given hypotension   - Holding beta blocker given shock/hypotension  -  of RCA not intervened upon  - wound vac off 12/20     Pulmonary  # Acute hypoxemic respiratory failure   # Klebsiella pneumonia  # Rib Fractures  # Sternal Fracture  # Tobacco Abuse (2PPD)  # Probable COPD exacerbation  Extubated 12/14 to BiPAP. Currently on BiPAP 45%, 10/5. CXR unchanged. ABG overnight- unclear accuracy.  - Serial ABGs; repeat now  - Duoneb Q4 hours while awake  - transition methylprednisolone 62.5mg Q12 (equivalent to 156mg prednisone) to 40mg prednisone daily tomorrow  - trial off BiPAP  - Diurese per below     Gastrointestinal, Nutrition  # Loose Stool  # Shock liver 2/2 cardiac arrest,  resolved  No known medical hx.   - Monitor LFTs   - TF on hold for now given ongoing BiPAP. Consider resuming today pending success of weaning BiPAP     Renal, Electrolytes  # Hypernatremia  # Hypoalbuminemia  # Acute Renal Injury, resolved  # Lactic acidosis, resolved  # Hyperkalemia, resolved   # Hypocalcemia, resolved  # Hyperphosphatemia, resolved  No CRRT since decannulation 12/13. Creatinine 1.25. UOP 4.7L. Net -770cc yesterday. Sodium elevated 12/14, started on D5W infusion  - continue 125cc/hr D5W  - 80mg IV lasix this AM  - 5mg metolazone this AM  - Monitor urine output     Infectious Disease  # Aspiration Pneumonia (klebsiella, staph aureus)  # Leukocytosis  # Fever  # Lactic acidosis, resolved  WBC 17.1. Tmax 100.0. Pan culture 12/14 given temp and WBC. Susceptibilities resulted the same as previous  - scheduled acetaminophen 650mg Q6 hours  Cultures thus far:   - sputum 12/6: staph aureus, staph dysgalactia, klebsiella   - sputum 12/7: staph aureus   - sputum 12/8: staph and klebsiella   - Sputum 12/9: staph aureus and klebsiella   - Sputum 12/10: staph aureus and klebsiella   - sputum 12/13: staph aureus and klebsiella   - sputum 12/14: staph aureus  Antibiotics              - Vancomycin 12/6 - 12/7 (MRSA negative)              - Zosyn 12/6 - 12/9              - Rocephin 12/9 - 12/16   - Cefepime 12/16 - present   - Flagyl 12/16 - 12/16   - Azithro 12/16 - present. Transition to oral today     Hematology  # Anemia of critical illness  # Concern for HIT - screen negative  # Thrombocytopenia, resolved  # Loss of bilateral DP pulses, resolved  Hgb stable 7.3. Plt stable 416. No e/o bleeding.    - monitor hemoglobin     Endocrinology  # Hyperglycemia   No known medical history. Hemoglobin A1c 5.6%  - SSI     MSK/Skin  # Mottling LLE s/p LLE distal reperfusion cannula  # Dusky toes left foot  Pulses remain intact. IABP removed 12/13.   - continue to monitor    Pertinent Lines  Gutiérrez  Wound vac  Arterial line  - possibly remove later today  RIJ CVC  NG    ICU Cares:  Daily CXR: unchanged opacities  Fluids/Feeds: TF on hold given BiPAP.  Fluids per hypernatremia  DVT Prophylaxis: subcutaneous heparin Q8 hours  GI Prophylaxis: N/A  Bowel Regimen: N/A - loose stools  Anticipated Floor Transfer: N/A    Family Update: brother, updated by me    Patient seen and discussed with Dr. Malaika Roman.  Assessment and plan as above.    Mary Leger PA-C  Critical Care Cardiology  Pager: [phone]      Interval History:  No acute events overnight.  Seems to be more conversant.  Remains confused - alert and oriented to self only.  Believes the year to be 2009, president [place], in [place].  Has no awareness of recent MI/cardiac arrest.      Objective Findings:  Temp:  [98.4  F (36.9  C)-100.8  F (38.2  C)] 99.2  F (37.3  C)  Pulse:  [] 87  Resp:  [23-44] 25  BP: (90)/(54) 90/54  MAP:  [45 mmHg-125 mmHg] 62 mmHg  Arterial Line BP: ()/(32-95) 96/43  FiO2 (%):  [40 %-50 %] 50 %  SpO2:  [88 %-100 %] 99 %    I/O:  Intake/Output Summary (Last 24 hours) at 12/17/2021 0840  Last data filed at 12/17/2021 0700  Gross per 24 hour   Intake 3675.48 ml   Output 3825 ml   Net -149.52 ml       Physical Exam:  GEN: sitting in bed.  No acute distress  HEENT: pupils equal.  No appreciable cranial trauma  Pulm: scattered rhonchi.  Intermittent expiratory wheeze  Cardiac: regular rate/rhythm. No murmur, rub, gallop  GI: soft, non distended  Neuro: alert to self only.  Moves all extremities symmetrically  Integument: no appreciable skin breakdown  Vascular: LE warm, well perfused      Medications:    acetaminophen  650 mg Oral or Feeding Tube Q6H JOSE     artificial saliva  2 spray Swish & Spit 4x Daily     aspirin  81 mg Per Feeding Tube Daily     atorvastatin  20 mg Oral or Feeding Tube QPM     azithromycin  500 mg Intravenous Q24H     ceFEPIme (MAXIPIME) IV  2 g Intravenous Q12H     [START ON 12/17/2021] folic acid  1 mg Oral Daily      heparin ANTICOAGULANT  5,000 Units Subcutaneous Q8H     insulin aspart  1-6 Units Subcutaneous Q4H     ipratropium - albuterol 0.5 mg/2.5 mg/3 mL  3 mL Nebulization 4x daily     melatonin  10 mg Oral At Bedtime     methylPREDNISolone  62.5 mg Intravenous Q12H     multivitamins w/minerals  15 mL Per Feeding Tube Daily     nicotine  1 patch Transdermal Daily     nicotine   Transdermal Q8H     oxyCODONE  5 mg Oral or Feeding Tube Q4H     protein modular  1 packet Per Feeding Tube 4x Daily     QUEtiapine  50 mg Oral or Feeding Tube TID     thiamine  100 mg Oral or Feeding Tube Daily     ticagrelor  90 mg Oral or Feeding Tube BID       dexmedetomidine 0.3 mcg/kg/hr (12/16/21 1241)     dextrose       D5W 125 mL/hr at 12/16/21 1508     norepinephrine 0.07 mcg/kg/min (12/16/21 1507)         Labs:   [facility]  Recent Labs   Lab 12/16/21  1138 12/16/21  0748 12/16/21  0450 12/16/21  0446 12/15/21  1626 12/15/21  1334 12/15/21  0340 12/15/21  0339 12/14/21  1943 12/14/21  1939 12/14/21  1451 12/14/21  0752 12/14/21  0425   NA  --   --   --  152*  --  152*  --  150*  --  148* 147*   < > 141   POTASSIUM  --   --   --  4.0  --  3.8  --  3.9  --  3.5 3.0*   < > 3.2*   CHLORIDE  --   --   --  124*  --  125*  --  124*  --  120* 116*   < > 109   CO2  --   --   --  22  --  22  --  20  --  20 23   < > 21   ANIONGAP  --   --   --  6  --  5  --  6  --  8 8   < > 11   * 133* 143* 173*  173*   < > 158*   < > 116*   < > 126* 178*  173*   < > 194*   BUN  --   --   --  56*  --  64*  --  66*  --  70* 74*   < > 67*   CR  --   --   --  1.38*  --  1.41*  --  1.39*  --  1.61* 1.63*   < > 1.64*   GFRESTIMATED  --   --   --  57*  --  55*  --  56*  --  47* 46*   < > 46*   BRIDGETTE  --   --   --  8.2*  --  8.0*  --  7.6*  --  8.1* 8.0*   < > 8.2*   MAG  --   --   --  2.4*  --   --   --  2.7*  --  2.8*  --   --  2.9*   PHOS  --   --   --  3.4  --   --   --  3.2  --  2.8  --   --  5.2*   PROTTOTAL  --   --   --  6.7*  --   --   --  5.8*  --  6.3* 6.4*    < > 6.5*   ALBUMIN  --   --   --  1.5*  --   --   --  1.4*  --  1.5* 1.6*   < > 1.6*   BILITOTAL  --   --   --  0.4  --   --   --  0.5  --  0.4 0.2   < > 0.3   ALKPHOS  --   --   --  93  --   --   --  81  --  84 94   < > 85   AST  --   --   --  46*  --   --   --  44  --  45 49*   < > 43   ALT  --   --   --  35  --   --   --  37  --  41 43   < > 43    < > = values in this interval not displayed.     CBC  Recent Labs   Lab 12/16/21  0446 12/15/21  1335 12/15/21  0339 12/14/21  2342 12/14/21 2012   WBC 21.8* 20.8* 18.7*  --  17.1*   RBC 2.50* 2.49* 2.56*  --  2.28*   HGB 7.8* 7.7* 8.0* 7.6* 7.0*   HCT 24.3* 24.0* 24.2*  --  21.6*   MCV 97 96 95  --  95   MCH 31.2 30.9 31.3  --  30.7   MCHC 32.1 32.1 33.1  --  32.4   RDW 14.3 14.4 14.0  --  14.0    263 201  --  178     Arterial Blood Gas  Recent Labs   Lab 12/16/21  0742 12/15/21  2359 12/15/21  1950 12/15/21  1245   PH 7.41 7.43 7.42 7.43   PCO2 37 31* 31* 28*   PO2 99 79* 75* 112*   HCO3 23 20* 20* 18*   O2PER 50 45 40 40         Pertinent Imaging Studies:  Coronary angiogram:   Refractory VT/VF cardiac arrest.  Cardiogenic shock.  STEMI involving the OM1 as culprit.  Three vessel severe CAD involving the  of the RCA, mid LCx and OM1 severe disease with occlusion of OM1 as culprit in STEMI, and moderate proximal LAD lesions likely hemodynamically significant.  CPR  VA ECMO placement.  IABP placement.  Thermogard placement with initiation of hypothermia protocol.  Right radial arterial line placement.  PCI with three drug eluting stents to the proximal, mid LCx and OM1.    Echo:  Left ventricular function is decreased. The ejection fraction is 40-45% (mildly reduced). Mild diffuse hypokinesis is present.  Global right ventricular function is normal. The right ventricle is normal size.  This study was compared with the study from 12/12/2021. There has been an  improvement in the biventricular function since the patient has been decannulated.       Mary  GIULIANA Leger  Critical Care Cardiology  Pager: [phone]

## 2021-12-16 NOTE — PLAN OF CARE
Major Shift Events:  More alert tonight, now oriented to self and place. Around 0000 pt seemed more tachypneic and air hungry, called MD to bedside and he ordered CXR, lasix, and precedex. Since giving lasix, pt has been able to diurese well and seems less tachypneic with precedex. Needed intermittent levo with precedex when pt was actually able to get some rest. Slightly febrile at start of shift, MD aware and abx changed accordingly.   Plan: Continue to assess orientation and resp status.  For vital signs and complete assessments, please see documentation flowsheets.

## 2021-12-16 NOTE — PROGRESS NOTES
"  Cardiology ICU Progress Note    Brief HPI: Bruce Blount is a 56 year old male with PMHx current tobacco use and ETOH abuse who presented to [facility] 12/6 after out of hospital cardiac arrest.      Per EMS and brother, patient was in his usual state of health prior to arrest. Patients brother heard a \"thump\" and found patient unresponsive and agonal breathing. 911 called and CPR initiated. EMS arrived within 4 minutes. Initial rhythm VF--> shocked x ~7 with ROSC upon arrival to ED. Total CPR team per EMS ~ 40 minutes. 3 mg Epinephrine, 300 mg Amio given via EMS. Patient EKG reveals Valentín inferior/posterior leads with reciprocal changes. Patient initially presented with an Igel and was intubated with ETT in the ED. He arrested and was again shocked in the ED with ROSC.     Taken urgently to CCL where he underwent coronary angiogram and again arrested requiring manual CPR and was then cannulated on VA ECMO via right with 17 Canadian cannula RCFA, 25 Fr cannula RCFV. Coronary angiogram revealed  of RCA and acute culprit lesion of LCx/OM1, s/p PCI to pLCx, mid LCx, and OM1 with TERRY. IABP placed for decreased pulsatility. Thermogard cooling cath placed and patient was transferred to ICU for further management. Decannulated 12/13. IABP removed 12/13. Extubated 12/14.    Subjective and Interval: Remains delirious in the ICU but much improved compared to yesterday. Now able to verbalize where pain is and tells me he is feeling much better. He is quite wheezy this morning and endorses SOB. He tells me he has shania,e pain at his chest, which may be due to compressions. Overnight, started on Precedex and Robaxin, diuresed with 60 IV lasix resulting in net negative 800 sliding scale so far today, net neg 300 ml yesterday, D5W increased to 100 ml/hour, abx broadended to Cefepime and Flagyl. A bedside attendant was ordered. He remained on Bipap all night. Yesterday, we started D5W Start d5 for hypernatremia, trialed increased haldol " 10 mg/day, ended up switching to seroquel 50 mg TID plus 100 mg at bedtime, discussed w micro regarding susceptibilities, removed rectal tube, stopped Lidocaine patches, ordered daily EKGs for QTc monitoring; EKG this am 408.    Assessment and plan by system:   Today's changes:  Start treatment for COPD exacerbation; Azithro, Methylpred  Increase FWF; increase D5W to 125 ml/hour, will start FWF this afternoon via OG  Diurese with thiazide + Loop; 500 mg Diuril + 60 mg Lasix x 1, goal net negative 2 L today  discontinue duplicate multi-vitamin and B12  Decrease Oxycodone to 5 mg daily  Discontinue Robaxin  Nicotine replacement  Stop Flagyl, start Azithr0  Levalbuterol PRN (tachy)  TF on hold for now given ongoing bipap. Consider resuming tomorrow    Increase support of bipap 12/5     Neurology  # Concern for anoxic brain injury    # Hx of possible ETOH abuse per family  # ICU delirium  # Acute pain  Extubated 12/14. S/p therapeutic hypothermia- Cooled to 33 degrees. Initial head CT without acute pathology. Able to follow commands intermittently. Clearer today, following commands, answering questions. Does have pain at chest wall.    Sedation/analgesia:  Currently on Precedex @ 0.3 mck/kg/hr    - Have trialed Haldol 10 mg q6h trialed without success, transitioned to Seroquel 50 mg TID plus 100 mg at bedtime with mild improvement. Added Precedex gtt and sitter overnight and clearer today . QTc 408.   - Decrease scheduled Oxy to 5 mg q4h  - Tylenol and lidocaine patches for additional pain control as needed  - Neuro-crit consulted- since signed off  - Folate, Thiamine and multi vitamin for ETOH abuse. Discontinued CIWA given >1 week out     Cardiovascular  # Refractory VF cardiac arrest 2/2 secondary to STEMI  # Cardiogenic shock requiring VA ECMO  # Ischemic Cardiomyopathy (EF 10-15%)  # S/p PCI pLCx, mLCx, OM1  # Residual RCA   # HTN, HLD  # Concern for limb ischemia s/p distal reperfusion cannula left leg  S/p  Peripheral V-A ECMO inserted via RCFA and RCFV 17/25 fr.  Decannulated 12/13. IABP removed 12/13. VBG 12/14 w svo2 59. Intermittently requiring pressors, usually due to sedation/meds    Pressors/Inotropes/Antiarrythmics:  - None currently    - GDMT                 - Continue ASA 81mg and ticagrelor 90mg BID                  - Started Lipitor low dose                 - Hold ACE/ARB given hypotension                 - Holding beta blocker given shock/hypotension  -  of RCA not intervened upon     Pulmonary  # Acute hypoxemic respiratory failure requiring intubation  # Klebsiella pneumonia  # Rib Fractures  # Sternal Fracture  # Tobacco Abuse (2PPD)  # Tachypneic  # Probable COPD exacerbatin  Extubated 12/14 to Bipap. Currently on Bipap 50%, 10/5--> increase to 12/5  CXR today with worsening bilateral opacities  - Serial ABGs- most recent 0742 this morning; 7.41/37/99/23  - duoneb scheduled  - Add Methylpred 60 mg q4h for COPD exacerbation  - Diurese per below  - Consider Nicotine replacement     Gastrointestinal, Nutrition  # Shock liver 2/2 cardiac arrest, resolved  # Loose Stool  No known medical hx.   ALT WNL 35  AST slight bump 46 (44)  - Monitor LFTs   - TF on hold for now given ongoing bipap. Consider resuming today.   - Bowel regimen: hold for now given loose stools  - GI Prophylaxis: no longer indicated     Renal, Electrolytes  # Acute Renal Injury requiring CRRT (off CRRT since 12/13)  # Lactic acidosis  # Hypoalbuminemia  # Hyperkalemia, resolved   # Hypocalcemia, resolved  # Hyperphosphatemia  UOP poor 12/8 with hyperkalemia. Started on CRRT. Removed from CRRT since decannulation 12/13 and has not needed to be put back on. UOP improving and labs stable.      Crt stable 1.38 (1.39) BUN 56 (64)  UOP 2 L yesterday, 2L since midnight. Net -237 cc yesterday, net neg 700 ml since midnight. Net neg 2 L since admission.  - Thiazide + Loop; Diuril 500 mg IV + 60 mg IV lasix x 1 now, oal net neg 2 L today.   -  "renal consulted initially, have since signed off.  - lactic acid WNL 0.8  - Monitor urine output     Infectious Disease  # Aspiration Pneumonia (klebsiella, staph aureus)  # Leukocytosis  # Fevers  # Lactic acidosis, resolved  WBC up to 21.8 (18.7) Tmax 100.8 with scheduled tylenol  - Monitor for signs of infection  - Pan culture 12/14 given temp and WBC. Susceptibilities resulted the same as previous    Cultures thus far:                 - sputum 12/6: staph aureus, staph dysgalactia, klebsiella                 - sputum 12/7: staph aureus                 - sputum 12/8: staph and klebsiella      - Sputum 12/9- staph aureus and klebsiella      - Sputum 12/10- staph aureus and klebsiella      - sputum 12/13- staph aureus and klebsiella  Antibiotics              - Vancomycin 12/6 - 12/7 (MRSA negative)              - Zosyn 12/6 - 12/9              - Rocephin 12/9 - 12/16   - Cefepime 12/16- present   - Flagyl 12/16- 12/16   - Azithro 12/16-present     Hematology  # Anemia of critical illness  # Concern for HIT initially- negative  # Thrombocytopenia  # Loss of bilateral DP pulses, resolved  Hgb stable 7.8 (7.7) Plt stable 332. No e/o bleeding.    - DVT PPX: Heparin for DVT prophy     Endocrinology  # Hyperglycemia   No known medical history. Hemoglobin A1c 5.6%  - SSI     MSK/Skin  # Mottling LLE s/p LLE distal reperfusion cannula  # Dusky toes left foot  Pulses remain intact. IABP removed 12/13.   - continue to monitor     Pertinent Lines  [facility] 12/13  R radial arterial line December 6, 2021  Gutiérrez catheter December 6, 2021  NG tube December 14, 2021     Patient seen and discussed with staff physician.    KATHARINA Peterson, CNP  [facility]  Interventional Cardiology-CSI Service  Pager [phone]     Objective:  Most recent vital signs:  /78   Pulse 99   Temp 98.4  F (36.9  C) (Axillary)   Resp 27   Ht 1.753 m (5' 9\")   Wt 66.8 kg (147 lb 4.3 oz)   SpO2 97%   BMI 21.75 " kg/m    Temp:  [98.4  F (36.9  C)-100.8  F (38.2  C)] 98.4  F (36.9  C)  Pulse:  [] 99  Resp:  [21-48] 27  MAP:  [25 mmHg-108 mmHg] 61 mmHg  Arterial Line BP: ()/(7-74) 101/43  FiO2 (%):  [40 %-50 %] 50 %  SpO2:  [93 %-100 %] 97 %  Wt Readings from Last 2 Encounters:   12/15/21 66.8 kg (147 lb 4.3 oz)   09/23/19 77.5 kg (170 lb 12.8 oz)       Intake/Output Summary (Last 24 hours) at 12/16/2021 0810  Last data filed at 12/16/2021 0755  Gross per 24 hour   Intake 2799.79 ml   Output 3390 ml   Net -590.21 ml     Physical exam:  General: In bed, bipap on, NAD  HEENT: PERRL, no scleral icterus or injection  CARDIAC: RRR, no m/r/g appreciated. Peripheral pulses dopplered  RESP: Bipap , significant wheezing right side  GI: soft, BS hypoactive  : Gutiérrez  EXTREMITIES: NO LE edema, pulses dopplered. Femoral access site w/o bleeding, dressing c/d/i.    SKIN: No acute lesions appreciated  NEURO: delerious, able to follow commands. Communicates    Labs (Past three days):  CBC  Recent Labs   Lab 12/16/21  0446 12/15/21  1335 12/15/21  0339 12/14/21  2342 12/14/21 2012   WBC 21.8* 20.8* 18.7*  --  17.1*   RBC 2.50* 2.49* 2.56*  --  2.28*   HGB 7.8* 7.7* 8.0* 7.6* 7.0*   HCT 24.3* 24.0* 24.2*  --  21.6*   MCV 97 96 95  --  95   MCH 31.2 30.9 31.3  --  30.7   MCHC 32.1 32.1 33.1  --  32.4   RDW 14.3 14.4 14.0  --  14.0    263 201  --  178     BMP  Recent Labs   Lab 12/16/21  0450 12/16/21  0446 12/16/21  0002 12/15/21  1626 12/15/21  1334 12/15/21  0340 12/15/21  0339 12/14/21  1943 12/14/21  1939 12/14/21  0752 12/14/21  0425   NA  --  152*  --   --  152*  --  150*  --  148*   < > 141   POTASSIUM  --  4.0  --   --  3.8  --  3.9  --  3.5   < > 3.2*   CHLORIDE  --  124*  --   --  125*  --  124*  --  120*   < > 109   CO2  --  22  --   --  22  --  20  --  20   < > 21   ANIONGAP  --  6  --   --  5  --  6  --  8   < > 11   * 173*  173* 121*   < > 158*   < > 116*   < > 126*   < > 194*   BUN  --  56*  --   --   64*  --  66*  --  70*   < > 67*   CR  --  1.38*  --   --  1.41*  --  1.39*  --  1.61*   < > 1.64*   GFRESTIMATED  --  57*  --   --  55*  --  56*  --  47*   < > 46*   BRIDGETTE  --  8.2*  --   --  8.0*  --  7.6*  --  8.1*   < > 8.2*   MAG  --  2.4*  --   --   --   --  2.7*  --  2.8*  --  2.9*   PHOS  --  3.4  --   --   --   --  3.2  --  2.8  --  5.2*    < > = values in this interval not displayed.     Troponins:     INR  Recent Labs   Lab 12/16/21  0446 12/15/21  0339 12/14/21  0425 12/13/21  1554   INR 1.23* 1.16* 1.12 1.11     Liver panel  Recent Labs   Lab 12/16/21  0446 12/15/21  0339 12/14/21  1939 12/14/21  1451   PROTTOTAL 6.7* 5.8* 6.3* 6.4*   ALBUMIN 1.5* 1.4* 1.5* 1.6*   BILITOTAL 0.4 0.5 0.4 0.2   ALKPHOS 93 81 84 94   AST 46* 44 45 49*   ALT 35 37 41 43       Imaging/procedure results:  XR Chest Port 1 View  Narrative: EXAM: XR CHEST PORT 1 VIEW  12/16/2021 12:00 AM      HISTORY: assess airspace    COMPARISON: Chest x-ray 12/15/2021 (5:57 AM)    FINDINGS: Portable semiupright AP radiograph of the chest. The feeding  tube courses inferior to the diaphragm and out of the field of view.  Right IJ central venous catheter tip projects over the mid SVC. The  trachea is midline. The cardiac silhouette is not enlarged. No pleural  effusion or pneumothorax. Bilateral mixed interstitial and airspace  opacities are slightly increased on the right. The visualized upper  abdomen is unremarkable.   Impression: IMPRESSION: Bilateral mixed interstitial and airspace opacities are  slightly increased on the right, likely infection.    I have personally reviewed the examination and initial interpretation  and I agree with the findings.    RAFAELA PANDA MD         SYSTEM ID:  [id number]

## 2021-12-17 ENCOUNTER — APPOINTMENT (OUTPATIENT)
Dept: GENERAL RADIOLOGY | Facility: CLINIC | Age: 56
End: 2021-12-17
Attending: NURSE PRACTITIONER
Payer: COMMERCIAL

## 2021-12-17 LAB
ALBUMIN SERPL-MCNC: 1.5 G/DL (ref 3.4–5)
ALP SERPL-CCNC: 89 U/L (ref 40–150)
ALT SERPL W P-5'-P-CCNC: 31 U/L (ref 0–70)
ANION GAP SERPL CALCULATED.3IONS-SCNC: 10 MMOL/L (ref 3–14)
ANION GAP SERPL CALCULATED.3IONS-SCNC: 9 MMOL/L (ref 3–14)
APTT PPP: 34 SECONDS (ref 22–38)
AST SERPL W P-5'-P-CCNC: 31 U/L (ref 0–45)
BASE EXCESS BLDA CALC-SCNC: -10.1 MMOL/L (ref -9–1.8)
BASE EXCESS BLDV CALC-SCNC: 1.6 MMOL/L (ref -7.7–1.9)
BILIRUB SERPL-MCNC: 0.5 MG/DL (ref 0.2–1.3)
BUN SERPL-MCNC: 65 MG/DL (ref 7–30)
BUN SERPL-MCNC: 66 MG/DL (ref 7–30)
CA-I BLD-MCNC: 3.2 MG/DL (ref 4.4–5.2)
CALCIUM SERPL-MCNC: 8.1 MG/DL (ref 8.5–10.1)
CALCIUM SERPL-MCNC: 8.2 MG/DL (ref 8.5–10.1)
CHLORIDE BLD-SCNC: 112 MMOL/L (ref 94–109)
CHLORIDE BLD-SCNC: 117 MMOL/L (ref 94–109)
CO2 SERPL-SCNC: 23 MMOL/L (ref 20–32)
CO2 SERPL-SCNC: 25 MMOL/L (ref 20–32)
CREAT SERPL-MCNC: 1.22 MG/DL (ref 0.66–1.25)
CREAT SERPL-MCNC: 1.25 MG/DL (ref 0.66–1.25)
ERYTHROCYTE [DISTWIDTH] IN BLOOD BY AUTOMATED COUNT: 14 % (ref 10–15)
GFR SERPL CREATININE-BSD FRML MDRD: 64 ML/MIN/1.73M2
GFR SERPL CREATININE-BSD FRML MDRD: 66 ML/MIN/1.73M2
GLUCOSE BLD-MCNC: 133 MG/DL (ref 70–99)
GLUCOSE BLD-MCNC: 225 MG/DL (ref 70–99)
GLUCOSE BLD-MCNC: 229 MG/DL (ref 70–99)
GLUCOSE BLDC GLUCOMTR-MCNC: 121 MG/DL (ref 70–99)
GLUCOSE BLDC GLUCOMTR-MCNC: 140 MG/DL (ref 70–99)
GLUCOSE BLDC GLUCOMTR-MCNC: 183 MG/DL (ref 70–99)
GLUCOSE BLDC GLUCOMTR-MCNC: 188 MG/DL (ref 70–99)
GLUCOSE BLDC GLUCOMTR-MCNC: 193 MG/DL (ref 70–99)
GLUCOSE BLDC GLUCOMTR-MCNC: 202 MG/DL (ref 70–99)
GLUCOSE BLDC GLUCOMTR-MCNC: 225 MG/DL (ref 70–99)
HCO3 BLD-SCNC: 14 MMOL/L (ref 21–28)
HCO3 BLDV-SCNC: 27 MMOL/L (ref 21–28)
HCT VFR BLD AUTO: 22.9 % (ref 40–53)
HGB BLD-MCNC: 7.3 G/DL (ref 13.3–17.7)
INR PPP: 1.28 (ref 0.85–1.15)
LACTATE SERPL-SCNC: 0.6 MMOL/L (ref 0.7–2)
LDH SERPL L TO P-CCNC: 551 U/L (ref 85–227)
MAGNESIUM SERPL-MCNC: 2.5 MG/DL (ref 1.6–2.3)
MCH RBC QN AUTO: 31.2 PG (ref 26.5–33)
MCHC RBC AUTO-ENTMCNC: 31.9 G/DL (ref 31.5–36.5)
MCV RBC AUTO: 98 FL (ref 78–100)
O2/TOTAL GAS SETTING VFR VENT: 45 %
O2/TOTAL GAS SETTING VFR VENT: 45 %
OXYHGB MFR BLDV: 60 % (ref 70–75)
PCO2 BLD: 22 MM HG (ref 35–45)
PCO2 BLDV: 43 MM HG (ref 40–50)
PH BLD: 7.41 [PH] (ref 7.35–7.45)
PH BLDV: 7.4 [PH] (ref 7.32–7.43)
PHOSPHATE SERPL-MCNC: 4.4 MG/DL (ref 2.5–4.5)
PLATELET # BLD AUTO: 416 10E3/UL (ref 150–450)
PO2 BLD: 143 MM HG (ref 80–105)
PO2 BLDV: 33 MM HG (ref 25–47)
POTASSIUM BLD-SCNC: 2.2 MMOL/L (ref 3.4–5.3)
POTASSIUM BLD-SCNC: 2.2 MMOL/L (ref 3.4–5.3)
POTASSIUM BLD-SCNC: 3.3 MMOL/L (ref 3.4–5.3)
POTASSIUM BLD-SCNC: 3.8 MMOL/L (ref 3.4–5.3)
POTASSIUM BLD-SCNC: 4.1 MMOL/L (ref 3.4–5.3)
PROT SERPL-MCNC: 6.8 G/DL (ref 6.8–8.8)
RBC # BLD AUTO: 2.34 10E6/UL (ref 4.4–5.9)
SODIUM SERPL-SCNC: 146 MMOL/L (ref 133–144)
SODIUM SERPL-SCNC: 147 MMOL/L (ref 133–144)
SODIUM SERPL-SCNC: 147 MMOL/L (ref 133–144)
SODIUM SERPL-SCNC: 148 MMOL/L (ref 133–144)
SODIUM SERPL-SCNC: 149 MMOL/L (ref 133–144)
WBC # BLD AUTO: 17.1 10E3/UL (ref 4–11)

## 2021-12-17 PROCEDURE — 250N000011 HC RX IP 250 OP 636: Performed by: INTERNAL MEDICINE

## 2021-12-17 PROCEDURE — 250N000011 HC RX IP 250 OP 636: Performed by: STUDENT IN AN ORGANIZED HEALTH CARE EDUCATION/TRAINING PROGRAM

## 2021-12-17 PROCEDURE — 82803 BLOOD GASES ANY COMBINATION: CPT | Performed by: STUDENT IN AN ORGANIZED HEALTH CARE EDUCATION/TRAINING PROGRAM

## 2021-12-17 PROCEDURE — 83051 HEMOGLOBIN PLASMA: CPT | Performed by: NURSE PRACTITIONER

## 2021-12-17 PROCEDURE — 71045 X-RAY EXAM CHEST 1 VIEW: CPT | Mod: 26 | Performed by: RADIOLOGY

## 2021-12-17 PROCEDURE — 250N000013 HC RX MED GY IP 250 OP 250 PS 637: Performed by: STUDENT IN AN ORGANIZED HEALTH CARE EDUCATION/TRAINING PROGRAM

## 2021-12-17 PROCEDURE — 250N000013 HC RX MED GY IP 250 OP 250 PS 637: Performed by: PHYSICIAN ASSISTANT

## 2021-12-17 PROCEDURE — 93005 ELECTROCARDIOGRAM TRACING: CPT

## 2021-12-17 PROCEDURE — 250N000013 HC RX MED GY IP 250 OP 250 PS 637: Performed by: NURSE PRACTITIONER

## 2021-12-17 PROCEDURE — 82330 ASSAY OF CALCIUM: CPT | Performed by: INTERNAL MEDICINE

## 2021-12-17 PROCEDURE — 258N000003 HC RX IP 258 OP 636: Performed by: NURSE PRACTITIONER

## 2021-12-17 PROCEDURE — 85730 THROMBOPLASTIN TIME PARTIAL: CPT | Performed by: INTERNAL MEDICINE

## 2021-12-17 PROCEDURE — 250N000011 HC RX IP 250 OP 636: Performed by: PHYSICIAN ASSISTANT

## 2021-12-17 PROCEDURE — 94640 AIRWAY INHALATION TREATMENT: CPT

## 2021-12-17 PROCEDURE — 85027 COMPLETE CBC AUTOMATED: CPT | Performed by: INTERNAL MEDICINE

## 2021-12-17 PROCEDURE — 200N000002 HC R&B ICU UMMC

## 2021-12-17 PROCEDURE — 250N000009 HC RX 250: Performed by: PHYSICIAN ASSISTANT

## 2021-12-17 PROCEDURE — 82805 BLOOD GASES W/O2 SATURATION: CPT | Performed by: PHYSICIAN ASSISTANT

## 2021-12-17 PROCEDURE — 82947 ASSAY GLUCOSE BLOOD QUANT: CPT | Performed by: INTERNAL MEDICINE

## 2021-12-17 PROCEDURE — 84132 ASSAY OF SERUM POTASSIUM: CPT | Performed by: INTERNAL MEDICINE

## 2021-12-17 PROCEDURE — 94640 AIRWAY INHALATION TREATMENT: CPT | Mod: 76

## 2021-12-17 PROCEDURE — 83735 ASSAY OF MAGNESIUM: CPT | Performed by: NURSE PRACTITIONER

## 2021-12-17 PROCEDURE — 250N000011 HC RX IP 250 OP 636: Performed by: NURSE PRACTITIONER

## 2021-12-17 PROCEDURE — 999N000015 HC STATISTIC ARTERIAL MONITORING DAILY

## 2021-12-17 PROCEDURE — 94660 CPAP INITIATION&MGMT: CPT

## 2021-12-17 PROCEDURE — 83615 LACTATE (LD) (LDH) ENZYME: CPT | Performed by: NURSE PRACTITIONER

## 2021-12-17 PROCEDURE — 250N000013 HC RX MED GY IP 250 OP 250 PS 637: Performed by: INTERNAL MEDICINE

## 2021-12-17 PROCEDURE — 71045 X-RAY EXAM CHEST 1 VIEW: CPT

## 2021-12-17 PROCEDURE — 84132 ASSAY OF SERUM POTASSIUM: CPT | Performed by: STUDENT IN AN ORGANIZED HEALTH CARE EDUCATION/TRAINING PROGRAM

## 2021-12-17 PROCEDURE — 93010 ELECTROCARDIOGRAM REPORT: CPT | Mod: 59 | Performed by: INTERNAL MEDICINE

## 2021-12-17 PROCEDURE — 84132 ASSAY OF SERUM POTASSIUM: CPT | Performed by: PHYSICIAN ASSISTANT

## 2021-12-17 PROCEDURE — 999N000157 HC STATISTIC RCP TIME EA 10 MIN

## 2021-12-17 PROCEDURE — 84295 ASSAY OF SERUM SODIUM: CPT | Performed by: PHYSICIAN ASSISTANT

## 2021-12-17 PROCEDURE — 84100 ASSAY OF PHOSPHORUS: CPT | Performed by: NURSE PRACTITIONER

## 2021-12-17 PROCEDURE — 99291 CRITICAL CARE FIRST HOUR: CPT | Performed by: STUDENT IN AN ORGANIZED HEALTH CARE EDUCATION/TRAINING PROGRAM

## 2021-12-17 PROCEDURE — 83605 ASSAY OF LACTIC ACID: CPT | Performed by: INTERNAL MEDICINE

## 2021-12-17 PROCEDURE — 85610 PROTHROMBIN TIME: CPT | Performed by: NURSE PRACTITIONER

## 2021-12-17 RX ORDER — POTASSIUM CHLORIDE 7.45 MG/ML
10 INJECTION INTRAVENOUS
Status: DISCONTINUED | OUTPATIENT
Start: 2021-12-17 | End: 2021-12-17

## 2021-12-17 RX ORDER — POTASSIUM CHLORIDE 29.8 MG/ML
20 INJECTION INTRAVENOUS
Status: COMPLETED | OUTPATIENT
Start: 2021-12-17 | End: 2021-12-17

## 2021-12-17 RX ORDER — METOLAZONE 5 MG/1
5 TABLET ORAL ONCE
Status: DISCONTINUED | OUTPATIENT
Start: 2021-12-17 | End: 2021-12-17

## 2021-12-17 RX ORDER — FUROSEMIDE 10 MG/ML
80 INJECTION INTRAMUSCULAR; INTRAVENOUS ONCE
Status: COMPLETED | OUTPATIENT
Start: 2021-12-17 | End: 2021-12-17

## 2021-12-17 RX ORDER — IPRATROPIUM BROMIDE AND ALBUTEROL SULFATE 2.5; .5 MG/3ML; MG/3ML
3 SOLUTION RESPIRATORY (INHALATION)
Status: DISCONTINUED | OUTPATIENT
Start: 2021-12-17 | End: 2021-12-19

## 2021-12-17 RX ORDER — POTASSIUM CHLORIDE 29.8 MG/ML
20 INJECTION INTRAVENOUS ONCE
Status: COMPLETED | OUTPATIENT
Start: 2021-12-17 | End: 2021-12-17

## 2021-12-17 RX ORDER — PREDNISONE 20 MG/1
40 TABLET ORAL DAILY
Status: COMPLETED | OUTPATIENT
Start: 2021-12-18 | End: 2021-12-20

## 2021-12-17 RX ORDER — AZITHROMYCIN 250 MG/1
250 TABLET, FILM COATED ORAL DAILY
Status: COMPLETED | OUTPATIENT
Start: 2021-12-17 | End: 2021-12-20

## 2021-12-17 RX ADMIN — CEFEPIME HYDROCHLORIDE 2 G: 2 INJECTION, POWDER, FOR SOLUTION INTRAVENOUS at 23:46

## 2021-12-17 RX ADMIN — ACETAMINOPHEN 650 MG: 325 TABLET, FILM COATED ORAL at 11:54

## 2021-12-17 RX ADMIN — INSULIN ASPART 1 UNITS: 100 INJECTION, SOLUTION INTRAVENOUS; SUBCUTANEOUS at 00:13

## 2021-12-17 RX ADMIN — Medication 10 MG: at 21:11

## 2021-12-17 RX ADMIN — AZITHROMYCIN DIHYDRATE 250 MG: 250 TABLET, FILM COATED ORAL at 11:04

## 2021-12-17 RX ADMIN — OXYCODONE HYDROCHLORIDE 5 MG: 5 TABLET ORAL at 00:13

## 2021-12-17 RX ADMIN — FUROSEMIDE 80 MG: 10 INJECTION, SOLUTION INTRAVENOUS at 09:09

## 2021-12-17 RX ADMIN — CEFEPIME HYDROCHLORIDE 2 G: 2 INJECTION, POWDER, FOR SOLUTION INTRAVENOUS at 11:54

## 2021-12-17 RX ADMIN — IPRATROPIUM BROMIDE AND ALBUTEROL SULFATE 3 ML: 2.5; .5 SOLUTION RESPIRATORY (INHALATION) at 11:16

## 2021-12-17 RX ADMIN — IPRATROPIUM BROMIDE AND ALBUTEROL SULFATE 3 ML: 2.5; .5 SOLUTION RESPIRATORY (INHALATION) at 08:16

## 2021-12-17 RX ADMIN — ACETAMINOPHEN 650 MG: 325 TABLET, FILM COATED ORAL at 06:37

## 2021-12-17 RX ADMIN — Medication 2.5 MG: at 15:37

## 2021-12-17 RX ADMIN — FOLIC ACID 1 MG: 1 TABLET ORAL at 09:08

## 2021-12-17 RX ADMIN — Medication 2 SPRAY: at 09:10

## 2021-12-17 RX ADMIN — Medication 1 PACKET: at 15:38

## 2021-12-17 RX ADMIN — ACETAMINOPHEN 650 MG: 325 TABLET, FILM COATED ORAL at 18:28

## 2021-12-17 RX ADMIN — IPRATROPIUM BROMIDE AND ALBUTEROL SULFATE 3 ML: 2.5; .5 SOLUTION RESPIRATORY (INHALATION) at 16:01

## 2021-12-17 RX ADMIN — INSULIN ASPART 2 UNITS: 100 INJECTION, SOLUTION INTRAVENOUS; SUBCUTANEOUS at 20:02

## 2021-12-17 RX ADMIN — INSULIN ASPART 2 UNITS: 100 INJECTION, SOLUTION INTRAVENOUS; SUBCUTANEOUS at 12:03

## 2021-12-17 RX ADMIN — POTASSIUM CHLORIDE 20 MEQ: 29.8 INJECTION INTRAVENOUS at 19:09

## 2021-12-17 RX ADMIN — DEXTROSE MONOHYDRATE: 50 INJECTION, SOLUTION INTRAVENOUS at 09:16

## 2021-12-17 RX ADMIN — ASPIRIN 81 MG CHEWABLE TABLET 81 MG: 81 TABLET CHEWABLE at 09:06

## 2021-12-17 RX ADMIN — TICAGRELOR 90 MG: 90 TABLET ORAL at 19:53

## 2021-12-17 RX ADMIN — Medication 1 PACKET: at 19:54

## 2021-12-17 RX ADMIN — ACETAMINOPHEN 650 MG: 325 TABLET, FILM COATED ORAL at 00:13

## 2021-12-17 RX ADMIN — Medication 2.5 MG: at 23:46

## 2021-12-17 RX ADMIN — ACETAMINOPHEN 650 MG: 325 TABLET, FILM COATED ORAL at 23:46

## 2021-12-17 RX ADMIN — ATORVASTATIN CALCIUM 20 MG: 20 TABLET, FILM COATED ORAL at 19:53

## 2021-12-17 RX ADMIN — POTASSIUM CHLORIDE 20 MEQ: 29.8 INJECTION INTRAVENOUS at 20:17

## 2021-12-17 RX ADMIN — QUETIAPINE FUMARATE 50 MG: 50 TABLET ORAL at 13:31

## 2021-12-17 RX ADMIN — NICOTINE 1 PATCH: 14 PATCH, EXTENDED RELEASE TRANSDERMAL at 09:24

## 2021-12-17 RX ADMIN — Medication 2 SPRAY: at 15:38

## 2021-12-17 RX ADMIN — Medication 2 SPRAY: at 19:53

## 2021-12-17 RX ADMIN — Medication 2.5 MG: at 09:06

## 2021-12-17 RX ADMIN — Medication 2 SPRAY: at 11:50

## 2021-12-17 RX ADMIN — TICAGRELOR 90 MG: 90 TABLET ORAL at 09:08

## 2021-12-17 RX ADMIN — CEFEPIME HYDROCHLORIDE 2 G: 2 INJECTION, POWDER, FOR SOLUTION INTRAVENOUS at 00:14

## 2021-12-17 RX ADMIN — THIAMINE HCL TAB 100 MG 100 MG: 100 TAB at 09:08

## 2021-12-17 RX ADMIN — Medication 2.5 MG: at 11:49

## 2021-12-17 RX ADMIN — DEXTROSE MONOHYDRATE: 50 INJECTION, SOLUTION INTRAVENOUS at 17:51

## 2021-12-17 RX ADMIN — HEPARIN SODIUM 5000 UNITS: 5000 INJECTION, SOLUTION INTRAVENOUS; SUBCUTANEOUS at 11:55

## 2021-12-17 RX ADMIN — OXYCODONE HYDROCHLORIDE 5 MG: 5 TABLET ORAL at 04:55

## 2021-12-17 RX ADMIN — HEPARIN SODIUM 5000 UNITS: 5000 INJECTION, SOLUTION INTRAVENOUS; SUBCUTANEOUS at 19:54

## 2021-12-17 RX ADMIN — INSULIN ASPART 2 UNITS: 100 INJECTION, SOLUTION INTRAVENOUS; SUBCUTANEOUS at 15:55

## 2021-12-17 RX ADMIN — DEXTROSE MONOHYDRATE: 50 INJECTION, SOLUTION INTRAVENOUS at 00:58

## 2021-12-17 RX ADMIN — QUETIAPINE FUMARATE 50 MG: 50 TABLET ORAL at 09:08

## 2021-12-17 RX ADMIN — MULTIVIT AND MINERALS-FERROUS GLUCONATE 9 MG IRON/15 ML ORAL LIQUID 15 ML: at 09:08

## 2021-12-17 RX ADMIN — METHYLPREDNISOLONE SODIUM SUCCINATE 62.5 MG: 125 INJECTION, POWDER, FOR SOLUTION INTRAMUSCULAR; INTRAVENOUS at 06:37

## 2021-12-17 RX ADMIN — Medication 1 PACKET: at 12:04

## 2021-12-17 RX ADMIN — Medication 1 PACKET: at 09:11

## 2021-12-17 RX ADMIN — QUETIAPINE FUMARATE 50 MG: 50 TABLET ORAL at 19:53

## 2021-12-17 RX ADMIN — INSULIN ASPART 1 UNITS: 100 INJECTION, SOLUTION INTRAVENOUS; SUBCUTANEOUS at 09:34

## 2021-12-17 RX ADMIN — Medication 2.5 MG: at 19:53

## 2021-12-17 RX ADMIN — QUETIAPINE FUMARATE 100 MG: 50 TABLET ORAL at 21:11

## 2021-12-17 RX ADMIN — POTASSIUM CHLORIDE 20 MEQ: 29.8 INJECTION INTRAVENOUS at 09:04

## 2021-12-17 ASSESSMENT — ACTIVITIES OF DAILY LIVING (ADL)
ADLS_ACUITY_SCORE: 16
ADLS_ACUITY_SCORE: 12
ADLS_ACUITY_SCORE: 14
ADLS_ACUITY_SCORE: 12
ADLS_ACUITY_SCORE: 12
ADLS_ACUITY_SCORE: 14
ADLS_ACUITY_SCORE: 16
ADLS_ACUITY_SCORE: 14
ADLS_ACUITY_SCORE: 14
ADLS_ACUITY_SCORE: 16
ADLS_ACUITY_SCORE: 12
ADLS_ACUITY_SCORE: 14
ADLS_ACUITY_SCORE: 12
ADLS_ACUITY_SCORE: 12
ADLS_ACUITY_SCORE: 14
ADLS_ACUITY_SCORE: 12
ADLS_ACUITY_SCORE: 14

## 2021-12-17 NOTE — PLAN OF CARE
Major Shift Events:    Wore Bipap most of the shift, but was able to be off for about 1 hour with oximask at 10 liters.  Placed back on bipap secondary to increased work of breathing, anxiety and O2 saturations decreasing to around 89%.  Up in the chair twice today.  Treated with Oxycodone for pain.  Hard to get a full neurological assessment secondary to patient being on the Bipap continuously, but patient is able to follow commands and knows that he is in the hospital.   Plan: Monitor respiratory status, encourage deep breathing/coughing and activity.    For vital signs and complete assessments, please see documentation flowsheets.

## 2021-12-17 NOTE — PROGRESS NOTES
Care Management Follow Up    Length of Stay (days): 11    Expected Discharge Date:  TBD         Patient plan of care discussed at interdisciplinary rounds: Yes    Anticipated Discharge Disposition:  TBD     Anticipated Discharge Services:  TBD  Anticipated Discharge DME: TBD     Additional Information:   Pt is with PMHx current tobacco use and ETOH abuse who presented to [facility] 12/6 after out of hospital cardiac arrest. Pt ECMO decannulated on 12/12, IABP removed, 12/13 and extubated on 12/14.  Attempt to visit pt and family for support, pt is confused and no family was in the room at time of my visit.  RNCC will cont to follow plan of care.      Morales Eduardo RN, PHN, BSN  4A and 4E/ ICU  Care Coordinator  Phone: [phone]  Pager: [phone]    To get in touch with weekend & Holiday on call RN Care Coordinator  Page [phone] or Care Coordinator Job code/pager- [phone]

## 2021-12-17 NOTE — PLAN OF CARE
Major Shift Events: Becoming more alert and oriented, and answers questions faster than previous days. Tolerated BiPAP on and off through the night, kept trying to take it off. Able to wean off of Levo at start of shift and has been able to keep it off. Around 2330, he had a ciro episode but pulses and perfusion remained the same. MD notified. Precedex turned off after event. Has not gotten much sleep overnight, sitter remains in place.   Plan: Continue to support through delirium, and watch respiratory status.     For vital signs and complete assessments, please see documentation flowsheets.

## 2021-12-18 ENCOUNTER — APPOINTMENT (OUTPATIENT)
Dept: GENERAL RADIOLOGY | Facility: CLINIC | Age: 56
End: 2021-12-18
Attending: NURSE PRACTITIONER
Payer: COMMERCIAL

## 2021-12-18 ENCOUNTER — APPOINTMENT (OUTPATIENT)
Dept: GENERAL RADIOLOGY | Facility: CLINIC | Age: 56
End: 2021-12-18
Attending: PHYSICIAN ASSISTANT
Payer: COMMERCIAL

## 2021-12-18 LAB
ALBUMIN SERPL-MCNC: 1.6 G/DL (ref 3.4–5)
ALP SERPL-CCNC: 104 U/L (ref 40–150)
ALT SERPL W P-5'-P-CCNC: 44 U/L (ref 0–70)
ANION GAP SERPL CALCULATED.3IONS-SCNC: 7 MMOL/L (ref 3–14)
ANION GAP SERPL CALCULATED.3IONS-SCNC: 9 MMOL/L (ref 3–14)
APTT PPP: 31 SECONDS (ref 22–38)
AST SERPL W P-5'-P-CCNC: 42 U/L (ref 0–45)
BASE EXCESS BLDV CALC-SCNC: 1.9 MMOL/L (ref -7.7–1.9)
BILIRUB SERPL-MCNC: 0.3 MG/DL (ref 0.2–1.3)
BUN SERPL-MCNC: 50 MG/DL (ref 7–30)
BUN SERPL-MCNC: 57 MG/DL (ref 7–30)
C DIFF TOX B STL QL: NEGATIVE
CA-I BLD-MCNC: 4.6 MG/DL (ref 4.4–5.2)
CALCIUM SERPL-MCNC: 8.4 MG/DL (ref 8.5–10.1)
CALCIUM SERPL-MCNC: 8.4 MG/DL (ref 8.5–10.1)
CHLORIDE BLD-SCNC: 112 MMOL/L (ref 94–109)
CHLORIDE BLD-SCNC: 116 MMOL/L (ref 94–109)
CO2 SERPL-SCNC: 23 MMOL/L (ref 20–32)
CO2 SERPL-SCNC: 26 MMOL/L (ref 20–32)
CREAT SERPL-MCNC: 0.96 MG/DL (ref 0.66–1.25)
CREAT SERPL-MCNC: 0.96 MG/DL (ref 0.66–1.25)
ERYTHROCYTE [DISTWIDTH] IN BLOOD BY AUTOMATED COUNT: 13.5 % (ref 10–15)
GFR SERPL CREATININE-BSD FRML MDRD: 88 ML/MIN/1.73M2
GFR SERPL CREATININE-BSD FRML MDRD: 88 ML/MIN/1.73M2
GLUCOSE BLD-MCNC: 159 MG/DL (ref 70–99)
GLUCOSE BLD-MCNC: 168 MG/DL (ref 70–99)
GLUCOSE BLD-MCNC: 232 MG/DL (ref 70–99)
GLUCOSE BLDC GLUCOMTR-MCNC: 149 MG/DL (ref 70–99)
GLUCOSE BLDC GLUCOMTR-MCNC: 167 MG/DL (ref 70–99)
GLUCOSE BLDC GLUCOMTR-MCNC: 204 MG/DL (ref 70–99)
GLUCOSE BLDC GLUCOMTR-MCNC: 207 MG/DL (ref 70–99)
HCO3 BLDV-SCNC: 28 MMOL/L (ref 21–28)
HCT VFR BLD AUTO: 25.5 % (ref 40–53)
HGB BLD-MCNC: 8.2 G/DL (ref 13.3–17.7)
HGB FREE PLAS-MCNC: <30 MG/DL
INR PPP: 1.3 (ref 0.85–1.15)
LACTATE SERPL-SCNC: 1.2 MMOL/L (ref 0.7–2)
LDH SERPL L TO P-CCNC: 520 U/L (ref 85–227)
MAGNESIUM SERPL-MCNC: 2.3 MG/DL (ref 1.6–2.3)
MCH RBC QN AUTO: 30.7 PG (ref 26.5–33)
MCHC RBC AUTO-ENTMCNC: 32.2 G/DL (ref 31.5–36.5)
MCV RBC AUTO: 96 FL (ref 78–100)
O2/TOTAL GAS SETTING VFR VENT: 45 %
OXYHGB MFR BLDV: 62 % (ref 70–75)
PCO2 BLDV: 46 MM HG (ref 40–50)
PH BLDV: 7.39 [PH] (ref 7.32–7.43)
PHOSPHATE SERPL-MCNC: 3 MG/DL (ref 2.5–4.5)
PLATELET # BLD AUTO: 571 10E3/UL (ref 150–450)
PO2 BLDV: 36 MM HG (ref 25–47)
POTASSIUM BLD-SCNC: 3.4 MMOL/L (ref 3.4–5.3)
POTASSIUM BLD-SCNC: 3.5 MMOL/L (ref 3.4–5.3)
POTASSIUM BLD-SCNC: 4.1 MMOL/L (ref 3.4–5.3)
PROT SERPL-MCNC: 7 G/DL (ref 6.8–8.8)
RBC # BLD AUTO: 2.67 10E6/UL (ref 4.4–5.9)
SODIUM SERPL-SCNC: 145 MMOL/L (ref 133–144)
SODIUM SERPL-SCNC: 145 MMOL/L (ref 133–144)
SODIUM SERPL-SCNC: 147 MMOL/L (ref 133–144)
SODIUM SERPL-SCNC: 147 MMOL/L (ref 133–144)
SODIUM SERPL-SCNC: 148 MMOL/L (ref 133–144)
WBC # BLD AUTO: 20.5 10E3/UL (ref 4–11)

## 2021-12-18 PROCEDURE — 250N000013 HC RX MED GY IP 250 OP 250 PS 637: Performed by: NURSE PRACTITIONER

## 2021-12-18 PROCEDURE — 94640 AIRWAY INHALATION TREATMENT: CPT

## 2021-12-18 PROCEDURE — 84100 ASSAY OF PHOSPHORUS: CPT | Performed by: NURSE PRACTITIONER

## 2021-12-18 PROCEDURE — 250N000011 HC RX IP 250 OP 636: Performed by: PHYSICIAN ASSISTANT

## 2021-12-18 PROCEDURE — 250N000013 HC RX MED GY IP 250 OP 250 PS 637: Performed by: STUDENT IN AN ORGANIZED HEALTH CARE EDUCATION/TRAINING PROGRAM

## 2021-12-18 PROCEDURE — 84132 ASSAY OF SERUM POTASSIUM: CPT | Performed by: INTERNAL MEDICINE

## 2021-12-18 PROCEDURE — 250N000011 HC RX IP 250 OP 636

## 2021-12-18 PROCEDURE — 99291 CRITICAL CARE FIRST HOUR: CPT | Performed by: STUDENT IN AN ORGANIZED HEALTH CARE EDUCATION/TRAINING PROGRAM

## 2021-12-18 PROCEDURE — 94640 AIRWAY INHALATION TREATMENT: CPT | Mod: 76

## 2021-12-18 PROCEDURE — 74018 RADEX ABDOMEN 1 VIEW: CPT

## 2021-12-18 PROCEDURE — 71045 X-RAY EXAM CHEST 1 VIEW: CPT | Mod: 26 | Performed by: RADIOLOGY

## 2021-12-18 PROCEDURE — 258N000003 HC RX IP 258 OP 636: Performed by: NURSE PRACTITIONER

## 2021-12-18 PROCEDURE — 94660 CPAP INITIATION&MGMT: CPT

## 2021-12-18 PROCEDURE — 250N000009 HC RX 250: Performed by: NURSE PRACTITIONER

## 2021-12-18 PROCEDURE — 82947 ASSAY GLUCOSE BLOOD QUANT: CPT | Performed by: INTERNAL MEDICINE

## 2021-12-18 PROCEDURE — 83605 ASSAY OF LACTIC ACID: CPT | Performed by: INTERNAL MEDICINE

## 2021-12-18 PROCEDURE — 80053 COMPREHEN METABOLIC PANEL: CPT | Performed by: INTERNAL MEDICINE

## 2021-12-18 PROCEDURE — 250N000012 HC RX MED GY IP 250 OP 636 PS 637: Performed by: PHYSICIAN ASSISTANT

## 2021-12-18 PROCEDURE — 83615 LACTATE (LD) (LDH) ENZYME: CPT | Performed by: NURSE PRACTITIONER

## 2021-12-18 PROCEDURE — 71045 X-RAY EXAM CHEST 1 VIEW: CPT

## 2021-12-18 PROCEDURE — 250N000009 HC RX 250: Performed by: PHYSICIAN ASSISTANT

## 2021-12-18 PROCEDURE — 84295 ASSAY OF SERUM SODIUM: CPT | Performed by: PHYSICIAN ASSISTANT

## 2021-12-18 PROCEDURE — 85027 COMPLETE CBC AUTOMATED: CPT | Performed by: INTERNAL MEDICINE

## 2021-12-18 PROCEDURE — 87493 C DIFF AMPLIFIED PROBE: CPT | Performed by: PHYSICIAN ASSISTANT

## 2021-12-18 PROCEDURE — 999N000065 XR ABDOMEN PORT 1 VIEWS

## 2021-12-18 PROCEDURE — 74018 RADEX ABDOMEN 1 VIEW: CPT | Mod: 26 | Performed by: RADIOLOGY

## 2021-12-18 PROCEDURE — 85730 THROMBOPLASTIN TIME PARTIAL: CPT | Performed by: INTERNAL MEDICINE

## 2021-12-18 PROCEDURE — 82805 BLOOD GASES W/O2 SATURATION: CPT | Performed by: PHYSICIAN ASSISTANT

## 2021-12-18 PROCEDURE — 250N000011 HC RX IP 250 OP 636: Performed by: INTERNAL MEDICINE

## 2021-12-18 PROCEDURE — 85610 PROTHROMBIN TIME: CPT | Performed by: NURSE PRACTITIONER

## 2021-12-18 PROCEDURE — 250N000013 HC RX MED GY IP 250 OP 250 PS 637: Performed by: PHYSICIAN ASSISTANT

## 2021-12-18 PROCEDURE — 83735 ASSAY OF MAGNESIUM: CPT | Performed by: NURSE PRACTITIONER

## 2021-12-18 PROCEDURE — 84132 ASSAY OF SERUM POTASSIUM: CPT | Performed by: PHYSICIAN ASSISTANT

## 2021-12-18 PROCEDURE — 999N000157 HC STATISTIC RCP TIME EA 10 MIN

## 2021-12-18 PROCEDURE — 82330 ASSAY OF CALCIUM: CPT | Performed by: INTERNAL MEDICINE

## 2021-12-18 PROCEDURE — 250N000011 HC RX IP 250 OP 636: Performed by: NURSE PRACTITIONER

## 2021-12-18 PROCEDURE — 250N000013 HC RX MED GY IP 250 OP 250 PS 637: Performed by: INTERNAL MEDICINE

## 2021-12-18 PROCEDURE — 200N000002 HC R&B ICU UMMC

## 2021-12-18 RX ORDER — ATORVASTATIN CALCIUM 40 MG/1
40 TABLET, FILM COATED ORAL EVERY EVENING
Status: DISCONTINUED | OUTPATIENT
Start: 2021-12-18 | End: 2022-01-03 | Stop reason: HOSPADM

## 2021-12-18 RX ORDER — HALOPERIDOL 5 MG/ML
2 INJECTION INTRAMUSCULAR ONCE
Status: COMPLETED | OUTPATIENT
Start: 2021-12-18 | End: 2021-12-18

## 2021-12-18 RX ORDER — ACETAMINOPHEN 325 MG/1
650 TABLET ORAL EVERY 4 HOURS PRN
Status: DISCONTINUED | OUTPATIENT
Start: 2021-12-18 | End: 2022-01-03 | Stop reason: HOSPADM

## 2021-12-18 RX ORDER — POTASSIUM CHLORIDE 29.8 MG/ML
20 INJECTION INTRAVENOUS
Status: COMPLETED | OUTPATIENT
Start: 2021-12-18 | End: 2021-12-18

## 2021-12-18 RX ORDER — LOPERAMIDE HCL 2 MG
2 CAPSULE ORAL 4 TIMES DAILY PRN
Status: DISCONTINUED | OUTPATIENT
Start: 2021-12-18 | End: 2022-01-03 | Stop reason: HOSPADM

## 2021-12-18 RX ORDER — FUROSEMIDE 10 MG/ML
80 INJECTION INTRAMUSCULAR; INTRAVENOUS ONCE
Status: COMPLETED | OUTPATIENT
Start: 2021-12-18 | End: 2021-12-18

## 2021-12-18 RX ORDER — POTASSIUM CHLORIDE 20MEQ/15ML
20 LIQUID (ML) ORAL ONCE
Status: COMPLETED | OUTPATIENT
Start: 2021-12-18 | End: 2021-12-18

## 2021-12-18 RX ORDER — FUROSEMIDE 10 MG/ML
60 INJECTION INTRAMUSCULAR; INTRAVENOUS ONCE
Status: COMPLETED | OUTPATIENT
Start: 2021-12-18 | End: 2021-12-18

## 2021-12-18 RX ORDER — HALOPERIDOL 5 MG/ML
INJECTION INTRAMUSCULAR
Status: COMPLETED
Start: 2021-12-18 | End: 2021-12-18

## 2021-12-18 RX ADMIN — QUETIAPINE FUMARATE 100 MG: 50 TABLET ORAL at 20:37

## 2021-12-18 RX ADMIN — POTASSIUM CHLORIDE 20 MEQ: 29.8 INJECTION INTRAVENOUS at 06:03

## 2021-12-18 RX ADMIN — Medication 1 PACKET: at 10:14

## 2021-12-18 RX ADMIN — AZITHROMYCIN DIHYDRATE 250 MG: 250 TABLET, FILM COATED ORAL at 10:13

## 2021-12-18 RX ADMIN — POTASSIUM CHLORIDE 20 MEQ: 29.8 INJECTION INTRAVENOUS at 07:03

## 2021-12-18 RX ADMIN — NICOTINE 1 PATCH: 14 PATCH, EXTENDED RELEASE TRANSDERMAL at 10:16

## 2021-12-18 RX ADMIN — Medication 1 PACKET: at 16:05

## 2021-12-18 RX ADMIN — QUETIAPINE FUMARATE 50 MG: 50 TABLET ORAL at 10:13

## 2021-12-18 RX ADMIN — Medication 1 PACKET: at 20:39

## 2021-12-18 RX ADMIN — Medication 2 SPRAY: at 11:51

## 2021-12-18 RX ADMIN — FUROSEMIDE 80 MG: 10 INJECTION, SOLUTION INTRAVENOUS at 07:55

## 2021-12-18 RX ADMIN — Medication 2 SPRAY: at 20:39

## 2021-12-18 RX ADMIN — INSULIN ASPART 1 UNITS: 100 INJECTION, SOLUTION INTRAVENOUS; SUBCUTANEOUS at 00:04

## 2021-12-18 RX ADMIN — HEPARIN SODIUM 5000 UNITS: 5000 INJECTION, SOLUTION INTRAVENOUS; SUBCUTANEOUS at 20:37

## 2021-12-18 RX ADMIN — HEPARIN SODIUM 5000 UNITS: 5000 INJECTION, SOLUTION INTRAVENOUS; SUBCUTANEOUS at 11:51

## 2021-12-18 RX ADMIN — POTASSIUM CHLORIDE 20 MEQ: 20 SOLUTION ORAL at 14:48

## 2021-12-18 RX ADMIN — Medication 2 SPRAY: at 16:05

## 2021-12-18 RX ADMIN — DEXTROSE MONOHYDRATE: 50 INJECTION, SOLUTION INTRAVENOUS at 03:35

## 2021-12-18 RX ADMIN — TICAGRELOR 90 MG: 90 TABLET ORAL at 10:13

## 2021-12-18 RX ADMIN — ASPIRIN 81 MG CHEWABLE TABLET 81 MG: 81 TABLET CHEWABLE at 10:13

## 2021-12-18 RX ADMIN — LEVALBUTEROL HYDROCHLORIDE 0.63 MG: 0.63 SOLUTION RESPIRATORY (INHALATION) at 16:37

## 2021-12-18 RX ADMIN — QUETIAPINE FUMARATE 50 MG: 50 TABLET ORAL at 14:48

## 2021-12-18 RX ADMIN — CEFEPIME HYDROCHLORIDE 2 G: 2 INJECTION, POWDER, FOR SOLUTION INTRAVENOUS at 11:54

## 2021-12-18 RX ADMIN — THIAMINE HCL TAB 100 MG 100 MG: 100 TAB at 10:13

## 2021-12-18 RX ADMIN — MULTIVIT AND MINERALS-FERROUS GLUCONATE 9 MG IRON/15 ML ORAL LIQUID 15 ML: at 10:15

## 2021-12-18 RX ADMIN — HALOPERIDOL 2 MG: 5 INJECTION INTRAMUSCULAR at 03:40

## 2021-12-18 RX ADMIN — FUROSEMIDE 60 MG: 10 INJECTION, SOLUTION INTRAMUSCULAR; INTRAVENOUS at 16:04

## 2021-12-18 RX ADMIN — IPRATROPIUM BROMIDE AND ALBUTEROL SULFATE 3 ML: 2.5; .5 SOLUTION RESPIRATORY (INHALATION) at 12:37

## 2021-12-18 RX ADMIN — IPRATROPIUM BROMIDE AND ALBUTEROL SULFATE 3 ML: 2.5; .5 SOLUTION RESPIRATORY (INHALATION) at 20:49

## 2021-12-18 RX ADMIN — QUETIAPINE FUMARATE 50 MG: 50 TABLET ORAL at 20:38

## 2021-12-18 RX ADMIN — DEXTROSE MONOHYDRATE: 50 INJECTION, SOLUTION INTRAVENOUS at 11:45

## 2021-12-18 RX ADMIN — HALOPERIDOL LACTATE 2 MG: 5 INJECTION, SOLUTION INTRAMUSCULAR at 03:40

## 2021-12-18 RX ADMIN — INSULIN ASPART 1 UNITS: 100 INJECTION, SOLUTION INTRAVENOUS; SUBCUTANEOUS at 04:02

## 2021-12-18 RX ADMIN — INSULIN ASPART 1 UNITS: 100 INJECTION, SOLUTION INTRAVENOUS; SUBCUTANEOUS at 20:55

## 2021-12-18 RX ADMIN — ACETAMINOPHEN 650 MG: 325 TABLET, FILM COATED ORAL at 21:28

## 2021-12-18 RX ADMIN — Medication 10 MG: at 21:28

## 2021-12-18 RX ADMIN — ATORVASTATIN CALCIUM 40 MG: 40 TABLET, FILM COATED ORAL at 20:37

## 2021-12-18 RX ADMIN — IPRATROPIUM BROMIDE AND ALBUTEROL SULFATE 3 ML: 2.5; .5 SOLUTION RESPIRATORY (INHALATION) at 08:20

## 2021-12-18 RX ADMIN — DEXTROSE MONOHYDRATE: 50 INJECTION, SOLUTION INTRAVENOUS at 20:46

## 2021-12-18 RX ADMIN — FOLIC ACID 1 MG: 1 TABLET ORAL at 10:13

## 2021-12-18 RX ADMIN — ACETAMINOPHEN 650 MG: 325 TABLET, FILM COATED ORAL at 17:23

## 2021-12-18 RX ADMIN — HEPARIN SODIUM 5000 UNITS: 5000 INJECTION, SOLUTION INTRAVENOUS; SUBCUTANEOUS at 03:37

## 2021-12-18 RX ADMIN — TICAGRELOR 90 MG: 90 TABLET ORAL at 20:38

## 2021-12-18 RX ADMIN — Medication 1 PACKET: at 11:51

## 2021-12-18 RX ADMIN — INSULIN ASPART 2 UNITS: 100 INJECTION, SOLUTION INTRAVENOUS; SUBCUTANEOUS at 13:16

## 2021-12-18 RX ADMIN — Medication 2 SPRAY: at 10:33

## 2021-12-18 RX ADMIN — PREDNISONE 40 MG: 20 TABLET ORAL at 10:13

## 2021-12-18 RX ADMIN — INSULIN ASPART 2 UNITS: 100 INJECTION, SOLUTION INTRAVENOUS; SUBCUTANEOUS at 16:36

## 2021-12-18 ASSESSMENT — ACTIVITIES OF DAILY LIVING (ADL)
ADLS_ACUITY_SCORE: 16
ADLS_ACUITY_SCORE: 14
ADLS_ACUITY_SCORE: 16
ADLS_ACUITY_SCORE: 13
ADLS_ACUITY_SCORE: 13
ADLS_ACUITY_SCORE: 16
ADLS_ACUITY_SCORE: 14
ADLS_ACUITY_SCORE: 13
ADLS_ACUITY_SCORE: 13
ADLS_ACUITY_SCORE: 14
ADLS_ACUITY_SCORE: 14
ADLS_ACUITY_SCORE: 13
ADLS_ACUITY_SCORE: 16
ADLS_ACUITY_SCORE: 14
ADLS_ACUITY_SCORE: 16
ADLS_ACUITY_SCORE: 14
ADLS_ACUITY_SCORE: 13
ADLS_ACUITY_SCORE: 14
ADLS_ACUITY_SCORE: 16
ADLS_ACUITY_SCORE: 13
ADLS_ACUITY_SCORE: 13
ADLS_ACUITY_SCORE: 16
ADLS_ACUITY_SCORE: 14
ADLS_ACUITY_SCORE: 14

## 2021-12-18 ASSESSMENT — MIFFLIN-ST. JEOR: SCORE: 1477.38

## 2021-12-18 NOTE — PLAN OF CARE
Major Shift Events:  Transitioned to oxymask from Bipap.  Tolerated well most of day.  Did require brief stint on BiPAP d/t increased work of breathing, though that was likely anxiety/agitation related.  Periods of profound agitation.  Usually resolve with decrease in stimulation.  Repeatedly attempted to remove oxygen mask/Bipap mask.  Eventually left them alone at the end of shift with mitts removed.  Sitter at BS.    Plan: resist the use of benzo's and taper down opiates in hopes of resolving delirium    Problem: Adult Inpatient Plan of Care  Goal: Plan of Care Review  Outcome: No Change  Goal: Absence of Hospital-Acquired Illness or Injury  Outcome: No Change     Problem: Risk for Delirium  Goal: Optimal Coping  Outcome: No Change

## 2021-12-18 NOTE — PROGRESS NOTES
Major Shift Events: Sinus tach. Oxymask 6L for majority of shift then switched over to BiPAP 45%. Tachypneic in the 30s. MAP >65 without pressors. Complaints of 8/10 chest pain at 2230. Provider notified and EKG done. Oriented to person only. Very agitated overnight. Pulled out NG tube. Threatening staff. Requiring constant reminders to stay in bed and not pull on tubes/lines. One time dose of Haldol 2mg given. No sleep overnight. 8 incont loose stools overnight. Good urine output.  Plan: Wean O2 as able.   For vital signs and complete assessments, please see documentation flowsheets.

## 2021-12-18 NOTE — PROGRESS NOTES
"    [facility]   Critical Care Cardiology - Progress Note    Bruce Blount MRN: [id number]  Age: 56 year old, : 1965  Date: 2021    Assessment and Plan:  Bruce Blount is a 56 year old male with PMHx current tobacco use and ETOH abuse who presented to [facility]  after out of hospital cardiac arrest.      Per EMS and brother, patient was in his usual state of health prior to arrest. Patients brother heard a \"thump\" and found patient unresponsive and agonal breathing. 911 called and CPR initiated. EMS arrived within 4 minutes. Initial rhythm VF--> shocked x ~7 with ROSC upon arrival to ED. Total CPR team per EMS ~ 40 minutes. 3 mg Epinephrine, 300 mg Amio given via EMS. Patient EKG reveals Valentín inferior/posterior leads with reciprocal changes. Patient initially presented with an Igel and was intubated with ETT in the ED. He arrested and was again shocked in the ED with ROSC.      Taken urgently to CCL where he underwent coronary angiogram and again arrested requiring manual CPR and was then cannulated on VA ECMO via right with 17 Moldovan cannula RCFA, 25 Fr cannula RCFV. Coronary angiogram revealed  of RCA and acute culprit lesion of LCx/OM1, s/p PCI to pLCx, mid LCx, and OM1 with TERRY. IABP placed for decreased pulsatility. Thermogard cooling cath placed and patient was transferred to ICU for further management. Decannulated . IABP removed . Extubated .     Today's Plan:  - transition oxycodone to PRN  - increase Lipitor to 40mg daily  - again trial off BiPAP  - resume TF  - check c. Diff today  - diuresis: 80mg IV lasix this AM  - stop scheduled acetaminophen    Neurology  # Anoxic brain injury  # ICU delirium  # Chest wall pain    # Hx of possible ETOH abuse per family  S/p therapeutic hypothermia. Extubated . Initial head CT without acute pathology. Able to follow commands intermittently. Have trialed Haldol without success, transitioned to Seroquel " 50 mg TID plus 100 mg at bedtime with mild improvement. Added Precedex gtt and sitter overnight and clearer today. Neuro-crit consulted- since signed off  - transition oxycodone to PRN  - 10mg melatonin at bedtime  - 50mg Seroquel TID  - 100mg Seroquel PRN at bedtime  - Tylenol and lidocaine patches for additional pain control as needed  - Folate, Thiamine and multi vitamin for ETOH abuse.     Cardiovascular  # Ischemic Cardiomyopathy (EF 40-45%)  # S/p PCI pLCx, mLCx, OM1  # Residual RCA   # HTN, HLD  # Refractory VF cardiac arrest 2/2 secondary to STEMI, resolved  # Cardiogenic shock requiring VA ECMO, resolved  # Concern for limb ischemia s/p distal reperfusion cannula left leg  S/p Peripheral V-A ECMO inserted via RCFA and RCFV 17/25 fr.  Decannulated 12/13. IABP removed 12/13.  Intermittently requiring pressors, usually due to sedation/meds  - GDMT   - DAPT: Continue ASA 81mg and ticagrelor 90mg BID    - Statin: 40mg Lipitor daily   - Hold ACE/ARB given hypotension   - Holding beta blocker given shock/hypotension  -  of RCA not intervened upon  - wound vac off 12/20     Pulmonary  # Acute hypoxemic respiratory failure   # Klebsiella pneumonia  # Rib Fractures  # Sternal Fracture  # Tobacco Abuse (2PPD)  # Probable COPD exacerbation  Extubated 12/14 to BiPAP. Currently on BiPAP 45%, 10/5. CXR unchanged. ABG overnight- unclear accuracy.  - RT pulmonary hygiene  - Duoneb Q4 hours while awake  - 40mg prednisone daily through 12/20  - trial off BiPAP  - Diurese per below     Gastrointestinal, Nutrition  # Loose Stool  # Shock liver 2/2 cardiac arrest, resolved  No known medical hx.   - check c. Diff today  - Monitor LFTs   - TF on hold for now given ongoing BiPAP. Consider resuming today pending success of weaning BiPAP     Renal, Electrolytes  # Hypernatremia  # Hypoalbuminemia  # Acute Renal Injury, resolved  # Lactic acidosis, resolved  # Hyperkalemia, resolved   # Hypocalcemia, resolved  #  Hyperphosphatemia, resolved  No CRRT since decannulation 12/13. Creatinine 1.25. UOP 4.7L. Net -770cc yesterday. Sodium elevated 12/14, started on D5W infusion  - continue 125cc/hr D5W  - follow sodium Q6 hours  - 80mg IV lasix this AM  - Monitor urine output     Infectious Disease  # Aspiration Pneumonia (klebsiella, staph aureus)  # Leukocytosis  # Fever  # Lactic acidosis, resolved  WBC 20.5. Tmax 98.4. Pan culture 12/14 given temp and WBC. Susceptibilities resulted the same as previous  - stop scheduled acetaminophen 650mg, transition to PRN  Cultures thus far:   - sputum 12/6: staph aureus, staph dysgalactia, klebsiella   - sputum 12/7: staph aureus   - sputum 12/8: staph and klebsiella   - Sputum 12/9: staph aureus and klebsiella   - Sputum 12/10: staph aureus and klebsiella   - sputum 12/13: staph aureus and klebsiella   - sputum 12/14: staph aureus  Antibiotics              - Vancomycin 12/6 - 12/7 (MRSA negative)              - Zosyn 12/6 - 12/9              - Rocephin 12/9 - 12/16   - Cefepime 12/16 - present   - Flagyl 12/16 - 12/16   - Azithro 12/16 - present. Transition to oral today     Hematology  # Anemia of critical illness  # Concern for HIT - screen negative  # Thrombocytopenia, resolved  # Loss of bilateral DP pulses, resolved  Hgb stable 7.3. Plt stable 416. No e/o bleeding.    - monitor hemoglobin     Endocrinology  # Hyperglycemia   No known medical history. Hemoglobin A1c 5.6%  - SSI     MSK/Skin  # Mottling LLE s/p LLE distal reperfusion cannula  # Dusky toes left foot  Pulses remain intact. IABP removed 12/13.   - continue to monitor    Pertinent Lines  Gutiérrez  Wound vac  RIJ CVC  NG    ICU Cares:  Daily CXR: unchanged opacities  Fluids/Feeds: TF on hold given BiPAP.  Fluids per hypernatremia  DVT Prophylaxis: subcutaneous heparin Q8 hours  GI Prophylaxis: N/A  Bowel Regimen: N/A - loose stools  Anticipated Floor Transfer: N/A    Family Update: brother, updated by me    Patient seen and  discussed with Dr. Malaika Roman.  Assessment and plan as above.    Mary Leger PA-C  Critical Care Cardiology  Pager: [phone]      Interval History: No acute events overnight.  Did not sleep. Denies pain.  Otherwise, refusing to answer questions today.    Objective Findings:  Temp:  [96.8  F (36  C)-98.9  F (37.2  C)] 98.2  F (36.8  C)  Pulse:  [] 97  Resp:  [25-42] 35  BP: (100-131)/(55-77) 102/57  MAP:  [79 mmHg-98 mmHg] 87 mmHg  Arterial Line BP: ()/(68-85) 119/72  FiO2 (%):  [45 %] 45 %  SpO2:  [79 %-100 %] 97 %    I/O:  Intake/Output Summary (Last 24 hours) at 12/18/2021 0840  Last data filed at 12/18/2021 0700  Gross per 24 hour   Intake 3240 ml   Output 3930 ml   Net -690 ml       Physical Exam:  GEN: sitting in bed.  No acute distress  HEENT: pupils equal.  No appreciable cranial trauma  Pulm: scattered rhonchi.  Intermittent expiratory wheeze  Cardiac: regular rhythm. Tachycardia. No murmur, rub, gallop  GI: soft, non distended  Neuro: alert to self only.  Moves all extremities symmetrically  Integument: no appreciable skin breakdown  Vascular: LE warm, well perfused      Medications:    acetaminophen  650 mg Oral or Feeding Tube Q6H JOSE     artificial saliva  2 spray Swish & Spit 4x Daily     aspirin  81 mg Per Feeding Tube Daily     atorvastatin  20 mg Oral or Feeding Tube QPM     azithromycin  250 mg Oral Daily     ceFEPIme (MAXIPIME) IV  2 g Intravenous Q12H     folic acid  1 mg Oral Daily     furosemide  80 mg Intravenous Once     heparin ANTICOAGULANT  5,000 Units Subcutaneous Q8H     insulin aspart  1-6 Units Subcutaneous Q4H     ipratropium - albuterol 0.5 mg/2.5 mg/3 mL  3 mL Nebulization Q4H While awake     melatonin  10 mg Oral At Bedtime     multivitamins w/minerals  15 mL Per Feeding Tube Daily     nicotine  1 patch Transdermal Daily     nicotine   Transdermal Q8H     potassium chloride  20 mEq Intravenous Q1H     predniSONE  40 mg Oral Daily     protein modular  1  packet Per Feeding Tube 4x Daily     QUEtiapine  50 mg Oral or Feeding Tube TID     thiamine  100 mg Oral or Feeding Tube Daily     ticagrelor  90 mg Oral or Feeding Tube BID       dextrose       D5W 125 mL/hr at 12/18/21 0600         Labs:   [facility]  Recent Labs   Lab 12/18/21  0401 12/17/21  2350 12/17/21  2151 12/17/21  1554 12/17/21  1550 12/17/21  0933 12/17/21  0932 12/17/21  0500 12/17/21  0455 12/17/21  0454 12/16/21  0450 12/16/21  0446 12/15/21  0340 12/15/21  0339   *  --  147*  --  147*  --  146*  --  149*  --    < > 152*   < > 150*   POTASSIUM 3.4  --   --   --  3.3*  --   --   --  3.8 2.2*  2.2*   < > 4.0   < > 3.9   CHLORIDE 116*  --   --   --  112*  --   --   --  117*  --   --  124*   < > 124*   CO2 23  --   --   --  25  --   --   --  23  --   --  22   < > 20   ANIONGAP 9  --   --   --  10  --   --   --  9  --   --  6   < > 6   *  159*  149* 140*  --    < > 225*   < >  --    < > 229* 133*   < > 173*  173*   < > 116*   BUN 57*  --   --   --  66*  --   --   --  65*  --   --  56*   < > 66*   CR 0.96  --   --   --  1.22  --   --   --  1.25  --   --  1.38*   < > 1.39*   GFRESTIMATED 88  --   --   --  66  --   --   --  64  --   --  57*   < > 56*   BRIDGETTE 8.4*  --   --   --  8.2*  --   --   --  8.1*  --   --  8.2*   < > 7.6*   MAG 2.3  --   --   --   --   --   --   --  2.5*  --   --  2.4*  --  2.7*   PHOS 3.0  --   --   --   --   --   --   --  4.4  --   --  3.4  --  3.2   PROTTOTAL 7.0  --   --   --   --   --   --   --  6.8  --   --  6.7*  --  5.8*   ALBUMIN 1.6*  --   --   --   --   --   --   --  1.5*  --   --  1.5*  --  1.4*   BILITOTAL 0.3  --   --   --   --   --   --   --  0.5  --   --  0.4  --  0.5   ALKPHOS 104  --   --   --   --   --   --   --  89  --   --  93  --  81   AST 42  --   --   --   --   --   --   --  31  --   --  46*  --  44   ALT 44  --   --   --   --   --   --   --  31  --   --  35  --  37    < > = values in this interval not displayed.     CBC  Recent Labs   Lab 12/18/21  8675  12/17/21  0455 12/16/21  0446 12/15/21  1335   WBC 20.5* 17.1* 21.8* 20.8*   RBC 2.67* 2.34* 2.50* 2.49*   HGB 8.2* 7.3* 7.8* 7.7*   HCT 25.5* 22.9* 24.3* 24.0*   MCV 96 98 97 96   MCH 30.7 31.2 31.2 30.9   MCHC 32.2 31.9 32.1 32.1   RDW 13.5 14.0 14.3 14.4   * 416 332 263     Arterial Blood Gas  Recent Labs   Lab 12/17/21  1028 12/17/21  0454 12/16/21  0742 12/15/21  2359 12/15/21  1950   PH  --  7.41 7.41 7.43 7.42   PCO2  --  22* 37 31* 31*   PO2  --  143* 99 79* 75*   HCO3  --  14* 23 20* 20*   O2PER 45 45 50 45 40         Pertinent Imaging Studies:  Coronary angiogram:   Refractory VT/VF cardiac arrest.  Cardiogenic shock.  STEMI involving the OM1 as culprit.  Three vessel severe CAD involving the  of the RCA, mid LCx and OM1 severe disease with occlusion of OM1 as culprit in STEMI, and moderate proximal LAD lesions likely hemodynamically significant.  CPR  VA ECMO placement.  IABP placement.  Thermogard placement with initiation of hypothermia protocol.  Right radial arterial line placement.  PCI with three drug eluting stents to the proximal, mid LCx and OM1.    Echo:  Left ventricular function is decreased. The ejection fraction is 40-45% (mildly reduced). Mild diffuse hypokinesis is present.  Global right ventricular function is normal. The right ventricle is normal size.  This study was compared with the study from 12/12/2021. There has been an  improvement in the biventricular function since the patient has been decannulated.       Mary Leger PA-C  Critical Care Cardiology  Pager: [phone]

## 2021-12-18 NOTE — PROGRESS NOTES
Per order to assess patient for aggressive pulmonary hygiene, writer has determined that no further intervention is indicated at this time. Lung sounds are clear with the exception of SAMREEN inspiratory wheeze for which patient has Q4h bronchodilator ordered.     RT will continue to follow,  Geronimo Capellan, RT

## 2021-12-19 ENCOUNTER — APPOINTMENT (OUTPATIENT)
Dept: PHYSICAL THERAPY | Facility: CLINIC | Age: 56
End: 2021-12-19
Attending: PHYSICIAN ASSISTANT
Payer: COMMERCIAL

## 2021-12-19 ENCOUNTER — APPOINTMENT (OUTPATIENT)
Dept: OCCUPATIONAL THERAPY | Facility: CLINIC | Age: 56
End: 2021-12-19
Attending: PHYSICIAN ASSISTANT
Payer: COMMERCIAL

## 2021-12-19 ENCOUNTER — APPOINTMENT (OUTPATIENT)
Dept: GENERAL RADIOLOGY | Facility: CLINIC | Age: 56
End: 2021-12-19
Attending: NURSE PRACTITIONER
Payer: COMMERCIAL

## 2021-12-19 LAB
ABO/RH(D): NORMAL
ALBUMIN SERPL-MCNC: 1.8 G/DL (ref 3.4–5)
ALP SERPL-CCNC: 98 U/L (ref 40–150)
ALT SERPL W P-5'-P-CCNC: 58 U/L (ref 0–70)
ANION GAP SERPL CALCULATED.3IONS-SCNC: 7 MMOL/L (ref 3–14)
ANION GAP SERPL CALCULATED.3IONS-SCNC: 7 MMOL/L (ref 3–14)
ANTIBODY SCREEN: NEGATIVE
APTT PPP: 30 SECONDS (ref 22–38)
AST SERPL W P-5'-P-CCNC: 40 U/L (ref 0–45)
BACTERIA BLD CULT: NO GROWTH
BACTERIA BLD CULT: NO GROWTH
BILIRUB SERPL-MCNC: 0.8 MG/DL (ref 0.2–1.3)
BUN SERPL-MCNC: 47 MG/DL (ref 7–30)
BUN SERPL-MCNC: 52 MG/DL (ref 7–30)
CA-I BLD-MCNC: 4.6 MG/DL (ref 4.4–5.2)
CALCIUM SERPL-MCNC: 8.2 MG/DL (ref 8.5–10.1)
CALCIUM SERPL-MCNC: 8.4 MG/DL (ref 8.5–10.1)
CHLORIDE BLD-SCNC: 109 MMOL/L (ref 94–109)
CHLORIDE BLD-SCNC: 112 MMOL/L (ref 94–109)
CO2 SERPL-SCNC: 25 MMOL/L (ref 20–32)
CO2 SERPL-SCNC: 26 MMOL/L (ref 20–32)
CREAT SERPL-MCNC: 0.96 MG/DL (ref 0.66–1.25)
CREAT SERPL-MCNC: 1.06 MG/DL (ref 0.66–1.25)
ERYTHROCYTE [DISTWIDTH] IN BLOOD BY AUTOMATED COUNT: 13.1 % (ref 10–15)
GFR SERPL CREATININE-BSD FRML MDRD: 78 ML/MIN/1.73M2
GFR SERPL CREATININE-BSD FRML MDRD: 88 ML/MIN/1.73M2
GLUCOSE BLD-MCNC: 167 MG/DL (ref 70–99)
GLUCOSE BLD-MCNC: 173 MG/DL (ref 70–99)
GLUCOSE BLD-MCNC: 236 MG/DL (ref 70–99)
GLUCOSE BLDC GLUCOMTR-MCNC: 133 MG/DL (ref 70–99)
GLUCOSE BLDC GLUCOMTR-MCNC: 151 MG/DL (ref 70–99)
GLUCOSE BLDC GLUCOMTR-MCNC: 157 MG/DL (ref 70–99)
GLUCOSE BLDC GLUCOMTR-MCNC: 163 MG/DL (ref 70–99)
GLUCOSE BLDC GLUCOMTR-MCNC: 201 MG/DL (ref 70–99)
GLUCOSE BLDC GLUCOMTR-MCNC: 213 MG/DL (ref 70–99)
HCT VFR BLD AUTO: 26.9 % (ref 40–53)
HGB BLD-MCNC: 8.5 G/DL (ref 13.3–17.7)
INR PPP: 1.29 (ref 0.85–1.15)
LACTATE SERPL-SCNC: 1.4 MMOL/L (ref 0.7–2)
LDH SERPL L TO P-CCNC: 454 U/L (ref 85–227)
MAGNESIUM SERPL-MCNC: 2.1 MG/DL (ref 1.6–2.3)
MCH RBC QN AUTO: 30.1 PG (ref 26.5–33)
MCHC RBC AUTO-ENTMCNC: 31.6 G/DL (ref 31.5–36.5)
MCV RBC AUTO: 95 FL (ref 78–100)
PHOSPHATE SERPL-MCNC: 3 MG/DL (ref 2.5–4.5)
PLATELET # BLD AUTO: 619 10E3/UL (ref 150–450)
POTASSIUM BLD-SCNC: 3.5 MMOL/L (ref 3.4–5.3)
POTASSIUM BLD-SCNC: 4.1 MMOL/L (ref 3.4–5.3)
PROT SERPL-MCNC: 7.2 G/DL (ref 6.8–8.8)
RBC # BLD AUTO: 2.82 10E6/UL (ref 4.4–5.9)
SODIUM SERPL-SCNC: 142 MMOL/L (ref 133–144)
SODIUM SERPL-SCNC: 142 MMOL/L (ref 133–144)
SODIUM SERPL-SCNC: 143 MMOL/L (ref 133–144)
SODIUM SERPL-SCNC: 144 MMOL/L (ref 133–144)
SODIUM SERPL-SCNC: 145 MMOL/L (ref 133–144)
SPECIMEN EXPIRATION DATE: NORMAL
TROPONIN I SERPL HS-MCNC: 104 NG/L
WBC # BLD AUTO: 15.7 10E3/UL (ref 4–11)

## 2021-12-19 PROCEDURE — 250N000013 HC RX MED GY IP 250 OP 250 PS 637: Performed by: STUDENT IN AN ORGANIZED HEALTH CARE EDUCATION/TRAINING PROGRAM

## 2021-12-19 PROCEDURE — 250N000013 HC RX MED GY IP 250 OP 250 PS 637: Performed by: PHYSICIAN ASSISTANT

## 2021-12-19 PROCEDURE — 85610 PROTHROMBIN TIME: CPT | Performed by: NURSE PRACTITIONER

## 2021-12-19 PROCEDURE — 93010 ELECTROCARDIOGRAM REPORT: CPT | Performed by: INTERNAL MEDICINE

## 2021-12-19 PROCEDURE — 250N000011 HC RX IP 250 OP 636

## 2021-12-19 PROCEDURE — 250N000011 HC RX IP 250 OP 636: Performed by: INTERNAL MEDICINE

## 2021-12-19 PROCEDURE — 82330 ASSAY OF CALCIUM: CPT | Performed by: INTERNAL MEDICINE

## 2021-12-19 PROCEDURE — 97530 THERAPEUTIC ACTIVITIES: CPT | Mod: GO

## 2021-12-19 PROCEDURE — 999N000157 HC STATISTIC RCP TIME EA 10 MIN

## 2021-12-19 PROCEDURE — 97166 OT EVAL MOD COMPLEX 45 MIN: CPT | Mod: GO

## 2021-12-19 PROCEDURE — 83615 LACTATE (LD) (LDH) ENZYME: CPT | Performed by: NURSE PRACTITIONER

## 2021-12-19 PROCEDURE — 250N000013 HC RX MED GY IP 250 OP 250 PS 637: Performed by: NURSE PRACTITIONER

## 2021-12-19 PROCEDURE — 93005 ELECTROCARDIOGRAM TRACING: CPT

## 2021-12-19 PROCEDURE — 84295 ASSAY OF SERUM SODIUM: CPT | Performed by: PHYSICIAN ASSISTANT

## 2021-12-19 PROCEDURE — 94640 AIRWAY INHALATION TREATMENT: CPT

## 2021-12-19 PROCEDURE — 85730 THROMBOPLASTIN TIME PARTIAL: CPT | Performed by: INTERNAL MEDICINE

## 2021-12-19 PROCEDURE — 250N000012 HC RX MED GY IP 250 OP 636 PS 637: Performed by: PHYSICIAN ASSISTANT

## 2021-12-19 PROCEDURE — 97162 PT EVAL MOD COMPLEX 30 MIN: CPT | Mod: GP

## 2021-12-19 PROCEDURE — 83605 ASSAY OF LACTIC ACID: CPT | Performed by: INTERNAL MEDICINE

## 2021-12-19 PROCEDURE — 86901 BLOOD TYPING SEROLOGIC RH(D): CPT | Performed by: INTERNAL MEDICINE

## 2021-12-19 PROCEDURE — 84484 ASSAY OF TROPONIN QUANT: CPT | Performed by: STUDENT IN AN ORGANIZED HEALTH CARE EDUCATION/TRAINING PROGRAM

## 2021-12-19 PROCEDURE — 258N000003 HC RX IP 258 OP 636: Performed by: NURSE PRACTITIONER

## 2021-12-19 PROCEDURE — 250N000011 HC RX IP 250 OP 636: Performed by: PHYSICIAN ASSISTANT

## 2021-12-19 PROCEDURE — 94640 AIRWAY INHALATION TREATMENT: CPT | Mod: 76

## 2021-12-19 PROCEDURE — 85027 COMPLETE CBC AUTOMATED: CPT | Performed by: INTERNAL MEDICINE

## 2021-12-19 PROCEDURE — 80053 COMPREHEN METABOLIC PANEL: CPT | Performed by: INTERNAL MEDICINE

## 2021-12-19 PROCEDURE — 97530 THERAPEUTIC ACTIVITIES: CPT | Mod: GP

## 2021-12-19 PROCEDURE — 99291 CRITICAL CARE FIRST HOUR: CPT | Performed by: STUDENT IN AN ORGANIZED HEALTH CARE EDUCATION/TRAINING PROGRAM

## 2021-12-19 PROCEDURE — 250N000009 HC RX 250: Performed by: PHYSICIAN ASSISTANT

## 2021-12-19 PROCEDURE — 999N000065 XR CHEST PORT 1 VIEW

## 2021-12-19 PROCEDURE — 94660 CPAP INITIATION&MGMT: CPT

## 2021-12-19 PROCEDURE — 84100 ASSAY OF PHOSPHORUS: CPT | Performed by: NURSE PRACTITIONER

## 2021-12-19 PROCEDURE — 250N000011 HC RX IP 250 OP 636: Performed by: NURSE PRACTITIONER

## 2021-12-19 PROCEDURE — 82947 ASSAY GLUCOSE BLOOD QUANT: CPT | Performed by: INTERNAL MEDICINE

## 2021-12-19 PROCEDURE — 83735 ASSAY OF MAGNESIUM: CPT | Performed by: NURSE PRACTITIONER

## 2021-12-19 PROCEDURE — 71045 X-RAY EXAM CHEST 1 VIEW: CPT | Mod: 26 | Performed by: RADIOLOGY

## 2021-12-19 PROCEDURE — 86850 RBC ANTIBODY SCREEN: CPT | Performed by: INTERNAL MEDICINE

## 2021-12-19 PROCEDURE — 258N000003 HC RX IP 258 OP 636: Performed by: PHYSICIAN ASSISTANT

## 2021-12-19 PROCEDURE — 250N000013 HC RX MED GY IP 250 OP 250 PS 637: Performed by: INTERNAL MEDICINE

## 2021-12-19 PROCEDURE — 200N000002 HC R&B ICU UMMC

## 2021-12-19 PROCEDURE — 250N000009 HC RX 250: Performed by: INTERNAL MEDICINE

## 2021-12-19 RX ORDER — HALOPERIDOL 5 MG/ML
5 INJECTION INTRAMUSCULAR EVERY 4 HOURS PRN
Status: DISCONTINUED | OUTPATIENT
Start: 2021-12-19 | End: 2021-12-30

## 2021-12-19 RX ORDER — METOLAZONE 5 MG/1
5 TABLET ORAL ONCE
Status: COMPLETED | OUTPATIENT
Start: 2021-12-19 | End: 2021-12-19

## 2021-12-19 RX ORDER — FUROSEMIDE 10 MG/ML
80 INJECTION INTRAMUSCULAR; INTRAVENOUS ONCE
Status: COMPLETED | OUTPATIENT
Start: 2021-12-19 | End: 2021-12-19

## 2021-12-19 RX ORDER — QUETIAPINE FUMARATE 50 MG/1
100 TABLET, FILM COATED ORAL 2 TIMES DAILY
Status: DISCONTINUED | OUTPATIENT
Start: 2021-12-19 | End: 2021-12-20

## 2021-12-19 RX ORDER — POTASSIUM CHLORIDE 29.8 MG/ML
20 INJECTION INTRAVENOUS ONCE
Status: COMPLETED | OUTPATIENT
Start: 2021-12-19 | End: 2021-12-19

## 2021-12-19 RX ORDER — IPRATROPIUM BROMIDE AND ALBUTEROL SULFATE 2.5; .5 MG/3ML; MG/3ML
3 SOLUTION RESPIRATORY (INHALATION) EVERY 4 HOURS
Status: DISCONTINUED | OUTPATIENT
Start: 2021-12-19 | End: 2021-12-22

## 2021-12-19 RX ORDER — OLANZAPINE 10 MG/2ML
10 INJECTION, POWDER, FOR SOLUTION INTRAMUSCULAR ONCE
Status: COMPLETED | OUTPATIENT
Start: 2021-12-19 | End: 2021-12-19

## 2021-12-19 RX ORDER — HALOPERIDOL 5 MG/ML
INJECTION INTRAMUSCULAR
Status: COMPLETED
Start: 2021-12-19 | End: 2021-12-19

## 2021-12-19 RX ORDER — HALOPERIDOL 5 MG/ML
2 INJECTION INTRAMUSCULAR ONCE
Status: COMPLETED | OUTPATIENT
Start: 2021-12-19 | End: 2021-12-19

## 2021-12-19 RX ORDER — HALOPERIDOL 5 MG/ML
3 INJECTION INTRAMUSCULAR ONCE
Status: COMPLETED | OUTPATIENT
Start: 2021-12-19 | End: 2021-12-19

## 2021-12-19 RX ORDER — HALOPERIDOL 5 MG/ML
5 INJECTION INTRAMUSCULAR ONCE
Status: COMPLETED | OUTPATIENT
Start: 2021-12-19 | End: 2021-12-19

## 2021-12-19 RX ORDER — QUETIAPINE FUMARATE 50 MG/1
200 TABLET, FILM COATED ORAL EVERY EVENING
Status: DISCONTINUED | OUTPATIENT
Start: 2021-12-19 | End: 2021-12-23

## 2021-12-19 RX ORDER — DEXMEDETOMIDINE HYDROCHLORIDE 4 UG/ML
.1-1.2 INJECTION, SOLUTION INTRAVENOUS CONTINUOUS
Status: DISPENSED | OUTPATIENT
Start: 2021-12-19 | End: 2021-12-21

## 2021-12-19 RX ADMIN — METOLAZONE 5 MG: 5 TABLET ORAL at 20:29

## 2021-12-19 RX ADMIN — INSULIN ASPART 2 UNITS: 100 INJECTION, SOLUTION INTRAVENOUS; SUBCUTANEOUS at 12:25

## 2021-12-19 RX ADMIN — INSULIN ASPART 1 UNITS: 100 INJECTION, SOLUTION INTRAVENOUS; SUBCUTANEOUS at 09:18

## 2021-12-19 RX ADMIN — TICAGRELOR 90 MG: 90 TABLET ORAL at 09:04

## 2021-12-19 RX ADMIN — INSULIN ASPART 2 UNITS: 100 INJECTION, SOLUTION INTRAVENOUS; SUBCUTANEOUS at 16:13

## 2021-12-19 RX ADMIN — ATORVASTATIN CALCIUM 40 MG: 40 TABLET, FILM COATED ORAL at 20:20

## 2021-12-19 RX ADMIN — AZITHROMYCIN DIHYDRATE 250 MG: 250 TABLET, FILM COATED ORAL at 09:06

## 2021-12-19 RX ADMIN — IPRATROPIUM BROMIDE AND ALBUTEROL SULFATE 3 ML: 2.5; .5 SOLUTION RESPIRATORY (INHALATION) at 21:01

## 2021-12-19 RX ADMIN — INSULIN ASPART 1 UNITS: 100 INJECTION, SOLUTION INTRAVENOUS; SUBCUTANEOUS at 03:22

## 2021-12-19 RX ADMIN — PREDNISONE 40 MG: 20 TABLET ORAL at 09:03

## 2021-12-19 RX ADMIN — Medication 1 PACKET: at 09:08

## 2021-12-19 RX ADMIN — IPRATROPIUM BROMIDE AND ALBUTEROL SULFATE 3 ML: 2.5; .5 SOLUTION RESPIRATORY (INHALATION) at 07:35

## 2021-12-19 RX ADMIN — Medication 2 SPRAY: at 20:31

## 2021-12-19 RX ADMIN — Medication 2 SPRAY: at 16:12

## 2021-12-19 RX ADMIN — NICOTINE 1 PATCH: 14 PATCH, EXTENDED RELEASE TRANSDERMAL at 09:07

## 2021-12-19 RX ADMIN — FOLIC ACID 1 MG: 1 TABLET ORAL at 09:06

## 2021-12-19 RX ADMIN — IPRATROPIUM BROMIDE AND ALBUTEROL SULFATE 3 ML: 2.5; .5 SOLUTION RESPIRATORY (INHALATION) at 00:39

## 2021-12-19 RX ADMIN — TICAGRELOR 90 MG: 90 TABLET ORAL at 20:20

## 2021-12-19 RX ADMIN — HALOPERIDOL 2 MG: 5 INJECTION INTRAMUSCULAR at 02:24

## 2021-12-19 RX ADMIN — Medication 1 PACKET: at 12:19

## 2021-12-19 RX ADMIN — HEPARIN SODIUM 5000 UNITS: 5000 INJECTION, SOLUTION INTRAVENOUS; SUBCUTANEOUS at 20:21

## 2021-12-19 RX ADMIN — Medication 2 SPRAY: at 12:18

## 2021-12-19 RX ADMIN — CEFEPIME HYDROCHLORIDE 2 G: 2 INJECTION, POWDER, FOR SOLUTION INTRAVENOUS at 01:07

## 2021-12-19 RX ADMIN — HALOPERIDOL LACTATE 5 MG: 5 INJECTION, SOLUTION INTRAMUSCULAR at 11:34

## 2021-12-19 RX ADMIN — HEPARIN SODIUM 5000 UNITS: 5000 INJECTION, SOLUTION INTRAVENOUS; SUBCUTANEOUS at 04:11

## 2021-12-19 RX ADMIN — OLANZAPINE 10 MG: 10 INJECTION, POWDER, LYOPHILIZED, FOR SOLUTION INTRAMUSCULAR at 05:58

## 2021-12-19 RX ADMIN — HALOPERIDOL LACTATE 3 MG: 5 INJECTION, SOLUTION INTRAMUSCULAR at 03:30

## 2021-12-19 RX ADMIN — FUROSEMIDE 160 MG: 10 INJECTION, SOLUTION INTRAVENOUS at 21:31

## 2021-12-19 RX ADMIN — DEXTROSE MONOHYDRATE: 50 INJECTION, SOLUTION INTRAVENOUS at 16:51

## 2021-12-19 RX ADMIN — HALOPERIDOL LACTATE 2 MG: 5 INJECTION, SOLUTION INTRAMUSCULAR at 02:24

## 2021-12-19 RX ADMIN — HALOPERIDOL LACTATE 5 MG: 5 INJECTION, SOLUTION INTRAMUSCULAR at 06:34

## 2021-12-19 RX ADMIN — HALOPERIDOL 5 MG: 5 INJECTION INTRAMUSCULAR at 06:34

## 2021-12-19 RX ADMIN — Medication 1 PACKET: at 20:30

## 2021-12-19 RX ADMIN — QUETIAPINE FUMARATE 50 MG: 50 TABLET ORAL at 09:04

## 2021-12-19 RX ADMIN — INSULIN ASPART 1 UNITS: 100 INJECTION, SOLUTION INTRAVENOUS; SUBCUTANEOUS at 00:03

## 2021-12-19 RX ADMIN — HEPARIN SODIUM 5000 UNITS: 5000 INJECTION, SOLUTION INTRAVENOUS; SUBCUTANEOUS at 12:29

## 2021-12-19 RX ADMIN — QUETIAPINE FUMARATE 100 MG: 50 TABLET ORAL at 14:05

## 2021-12-19 RX ADMIN — QUETIAPINE FUMARATE 200 MG: 50 TABLET ORAL at 20:20

## 2021-12-19 RX ADMIN — THIAMINE HCL TAB 100 MG 100 MG: 100 TAB at 09:03

## 2021-12-19 RX ADMIN — FUROSEMIDE 80 MG: 10 INJECTION, SOLUTION INTRAVENOUS at 09:06

## 2021-12-19 RX ADMIN — Medication 10 MG: at 21:31

## 2021-12-19 RX ADMIN — ASPIRIN 81 MG CHEWABLE TABLET 81 MG: 81 TABLET CHEWABLE at 09:08

## 2021-12-19 RX ADMIN — Medication 1 PACKET: at 16:13

## 2021-12-19 RX ADMIN — DEXTROSE MONOHYDRATE: 50 INJECTION, SOLUTION INTRAVENOUS at 06:38

## 2021-12-19 RX ADMIN — MULTIVIT AND MINERALS-FERROUS GLUCONATE 9 MG IRON/15 ML ORAL LIQUID 15 ML: at 09:06

## 2021-12-19 RX ADMIN — DEXMEDETOMIDINE HYDROCHLORIDE 0.2 MCG/KG/HR: 4 INJECTION, SOLUTION INTRAVENOUS at 06:42

## 2021-12-19 RX ADMIN — HALOPERIDOL 3 MG: 5 INJECTION INTRAMUSCULAR at 03:30

## 2021-12-19 RX ADMIN — IPRATROPIUM BROMIDE AND ALBUTEROL SULFATE 3 ML: 2.5; .5 SOLUTION RESPIRATORY (INHALATION) at 16:17

## 2021-12-19 RX ADMIN — Medication 2 SPRAY: at 08:41

## 2021-12-19 RX ADMIN — FUROSEMIDE 80 MG: 10 INJECTION, SOLUTION INTRAVENOUS at 14:04

## 2021-12-19 RX ADMIN — CEFEPIME HYDROCHLORIDE 2 G: 2 INJECTION, POWDER, FOR SOLUTION INTRAVENOUS at 12:18

## 2021-12-19 RX ADMIN — LOPERAMIDE HYDROCHLORIDE 2 MG: 2 CAPSULE ORAL at 00:00

## 2021-12-19 RX ADMIN — POTASSIUM CHLORIDE 20 MEQ: 29.8 INJECTION, SOLUTION INTRAVENOUS at 04:44

## 2021-12-19 ASSESSMENT — ACTIVITIES OF DAILY LIVING (ADL)
ADLS_ACUITY_SCORE: 16
ADLS_ACUITY_SCORE: 15
ADLS_ACUITY_SCORE: 16
ADLS_ACUITY_SCORE: 15
ADLS_ACUITY_SCORE: 15
ADLS_ACUITY_SCORE: 16
IADL_COMMENTS: IND
ADLS_ACUITY_SCORE: 16

## 2021-12-19 NOTE — PLAN OF CARE
Patient has been very restless, agitated and combative as times.  Still required sitter 1:1.  MAP stable, tachycardic HR 120s.  Currently on BiPAP due to tachypnec and tachycardia.  IVF D5 in progress,                                     Urine output good after IV lasix x2 doses.  NG inserted this morning, on tube feeding for several hours till 1730 since on BiPAP.  Diarrhea, LILIAM x4, ruled out C-diff.

## 2021-12-19 NOTE — PROGRESS NOTES
"    [facility]   Critical Care Cardiology - Progress Note    Bruce Blount MRN: [id number]  Age: 56 year old, : 1965  Date: 2021    Assessment and Plan:  Bruce Blount is a 56 year old male with PMHx current tobacco use and ETOH abuse who presented to [facility]  after out of hospital cardiac arrest.      Per EMS and brother, patient was in his usual state of health prior to arrest. Patients brother heard a \"thump\" and found patient unresponsive and agonal breathing. 911 called and CPR initiated. EMS arrived within 4 minutes. Initial rhythm VF--> shocked x ~7 with ROSC upon arrival to ED. Total CPR team per EMS ~ 40 minutes. 3 mg Epinephrine, 300 mg Amio given via EMS. Patient EKG reveals Valentín inferior/posterior leads with reciprocal changes. Patient initially presented with an Igel and was intubated with ETT in the ED. He arrested and was again shocked in the ED with ROSC.      Taken urgently to CCL where he underwent coronary angiogram and again arrested requiring manual CPR and was then cannulated on VA ECMO via right with 17 Kosovan cannula RCFA, 25 Fr cannula RCFV. Coronary angiogram revealed  of RCA and acute culprit lesion of LCx/OM1, s/p PCI to pLCx, mid LCx, and OM1 with TERRY. IABP placed for decreased pulsatility. Thermogard cooling cath placed and patient was transferred to ICU for further management. Decannulated . IABP removed . Extubated .     Today's Plan:  - stop oxycodone  - increase Seroquel 100mg, 100mg, 200mg  - EKG in AM  - Q4 haldol PRN  - transition duoneb to Q4 hours  - PT and OT consults  - reduce D5W to 100    Neurology  # Anoxic brain injury  # ICU delirium  # Chest wall pain    # Hx of possible ETOH abuse per family  S/p therapeutic hypothermia. Extubated . Initial head CT without acute pathology. Able to follow commands intermittently. Have trialed Haldol without success, transitioned to Seroquel 50 mg TID plus 100 mg at " bedtime with mild improvement. Added Precedex gtt and sitter overnight and clearer today. Neuro-crit consulted- since signed off  - precedex ggt  - stop oxycodone  - 10mg melatonin at bedtime  - 100mg Seroquel at 08:00, 14:00  - schedule 200mg Seroquel at night  - PRN haldol  - Tylenol and lidocaine patches for additional pain control as needed  - Folate, Thiamine and multi vitamin for ETOH abuse.     Cardiovascular  # Ischemic Cardiomyopathy (EF 40-45%)  # S/p PCI pLCx, mLCx, OM1  # Residual RCA   # HTN, HLD  # Refractory VF cardiac arrest 2/2 secondary to STEMI, resolved  # Cardiogenic shock requiring VA ECMO, resolved  # Concern for limb ischemia s/p distal reperfusion cannula left leg  S/p Peripheral V-A ECMO inserted via RCFA and RCFV 17/25 fr.  Decannulated 12/13. IABP removed 12/13.  Intermittently requiring pressors, usually due to sedation/meds  - GDMT   - DAPT: Continue ASA 81mg and ticagrelor 90mg BID    - Statin: 40mg Lipitor daily   - Hold ACE/ARB given hypotension   - Holding beta blocker given shock/hypotension  -  of RCA not intervened upon  - wound vac off 12/20     Pulmonary  # Acute hypoxemic respiratory failure   # Klebsiella pneumonia  # Rib Fractures  # Sternal Fracture  # Tobacco Abuse (2PPD)  # Probable COPD exacerbation  Extubated 12/14 to BiPAP. Currently on BiPAP 45%, 10/5. CXR unchanged. ABG overnight- unclear accuracy.  - RT pulmonary hygiene  - Duoneb Q4 hours   - 40mg prednisone daily through 12/20  - trial off BiPAP  - Diurese per below     Gastrointestinal, Nutrition  # Loose Stool  # Shock liver 2/2 cardiac arrest, resolved  No known medical hx. C. Diff negative  - Monitor LFTs   - TF on hold for now given ongoing BiPAP. Consider resuming today pending success of weaning BiPAP     Renal, Electrolytes  # Hypernatremia  # Hypoalbuminemia  # Acute Renal Injury, resolved  # Lactic acidosis, resolved  # Hyperkalemia, resolved   # Hypocalcemia, resolved  # Hyperphosphatemia,  resolved  No CRRT since decannulation 12/13. Creatinine 1.25. UOP 4.7L. Net -770cc yesterday. Sodium elevated 12/14, started on D5W infusion  - continue 100cc/hr D5W  - follow sodium Q6 hours  - aggressive diuresis; net negative 1-2L  - Monitor urine output     Infectious Disease  # Aspiration Pneumonia (klebsiella, staph aureus)  # Leukocytosis  # Fever  # Lactic acidosis, resolved  WBC 20.5. Tmax 98.4. Pan culture 12/14 given temp and WBC. Susceptibilities resulted the same as previous  - stop scheduled acetaminophen 650mg, transition to PRN  Cultures thus far:   - sputum 12/6: staph aureus, staph dysgalactia, klebsiella   - sputum 12/7: staph aureus   - sputum 12/8: staph and klebsiella   - Sputum 12/9: staph aureus and klebsiella   - Sputum 12/10: staph aureus and klebsiella   - sputum 12/13: staph aureus and klebsiella   - sputum 12/14: staph aureus  Antibiotics              - Vancomycin 12/6 - 12/7 (MRSA negative)              - Zosyn 12/6 - 12/9              - Rocephin 12/9 - 12/16   - Cefepime 12/16 - present   - Flagyl 12/16 - 12/16   - Azithro 12/16 - present. Transition to oral today     Hematology  # Anemia of critical illness  # Concern for HIT - screen negative  # Thrombocytopenia, resolved  # Loss of bilateral DP pulses, resolved  Hgb stable 8.2. Plt stable 619. No e/o bleeding.    - monitor hemoglobin     Endocrinology  # Hyperglycemia   No known medical history. Hemoglobin A1c 5.6%  - SSI     MSK/Skin  # Mottling LLE s/p LLE distal reperfusion cannula  # Dusky toes left foot  Pulses remain intact. IABP removed 12/13.   - continue to monitor    Pertinent Lines  Gutiérrez  Wound vac  RIJ CVC  NG    ICU Cares:  Daily CXR: unchanged opacities  Fluids/Feeds: TF, work to goal. Fluids per hypernatremia  DVT Prophylaxis: subcutaneous heparin Q8 hours  GI Prophylaxis: N/A  Bowel Regimen: N/A - loose stools  Anticipated Floor Transfer: N/A    Family Update: brother, updated by me    Patient seen and discussed  with Dr. Malaika Roman.  Assessment and plan as above.    Mary Leger PA-C  Critical Care Cardiology  Pager: [phone]      Interval History: Ongoing agitation. Refusing to answer questions this AM.    Objective Findings:  Temp:  [97.8  F (36.6  C)-99.9  F (37.7  C)] 98.4  F (36.9  C)  Pulse:  [] 102  Resp:  [30-46] 37  BP: ()/(61-75) 112/61  FiO2 (%):  [40 %-45 %] 40 %  SpO2:  [92 %-100 %] 95 %    I/O:  Intake/Output Summary (Last 24 hours) at 12/19/2021 0925  Last data filed at 12/19/2021 0700  Gross per 24 hour   Intake 3702 ml   Output 2760 ml   Net 942 ml     Physical Exam:  GEN: sitting in bed.  No acute distress  HEENT: pupils equal.  No appreciable cranial trauma  Pulm: scattered rhonchi.  Intermittent expiratory wheeze  Cardiac: regular rhythm. Tachycardia. No murmur, rub, gallop  GI: soft, non distended  Neuro: alert to self only.  Moves all extremities symmetrically  Integument: no appreciable skin breakdown  Vascular: LE warm, well perfused      Medications:    artificial saliva  2 spray Swish & Spit 4x Daily     aspirin  81 mg Per Feeding Tube Daily     atorvastatin  40 mg Oral or Feeding Tube QPM     azithromycin  250 mg Oral Daily     ceFEPIme (MAXIPIME) IV  2 g Intravenous Q12H     folic acid  1 mg Oral Daily     heparin ANTICOAGULANT  5,000 Units Subcutaneous Q8H     insulin aspart  1-6 Units Subcutaneous Q4H     ipratropium - albuterol 0.5 mg/2.5 mg/3 mL  3 mL Nebulization Q4H While awake     melatonin  10 mg Oral At Bedtime     multivitamins w/minerals  15 mL Per Feeding Tube Daily     nicotine  1 patch Transdermal Daily     nicotine   Transdermal Q8H     predniSONE  40 mg Oral Daily     protein modular  1 packet Per Feeding Tube 4x Daily     QUEtiapine  50 mg Oral or Feeding Tube TID     thiamine  100 mg Oral or Feeding Tube Daily     ticagrelor  90 mg Oral or Feeding Tube BID       dexmedetomidine 0.2 mcg/kg/hr (12/19/21 0642)     dextrose       D5W 125 mL/hr at 12/19/21  0638         Labs:   [facility]  Recent Labs   Lab 12/19/21  0317 12/19/21  0316 12/19/21  0003 12/18/21  2159 12/18/21  1611 12/18/21  1606 12/18/21  1315 12/18/21  1035 12/18/21  0401 12/17/21  1554 12/17/21  1550 12/17/21  0500 12/17/21  0455 12/16/21  0450 12/16/21  0446     --   --  147*  --  145*  145*  --  147* 148*   < > 147*   < > 149*   < > 152*   POTASSIUM 3.5  --   --   --   --  4.1  --  3.5 3.4  --  3.3*  --  3.8   < > 4.0   CHLORIDE 112*  --   --   --   --  112*  --   --  116*  --  112*  --  117*  --  124*   CO2 25  --   --   --   --  26  --   --  23  --  25  --  23  --  22   ANIONGAP 7  --   --   --   --  7  --   --  9  --  10  --  9  --  6   *  167* 151* 157*  --    < > 232*   < >  --  168*  159*  149*   < > 225*   < > 229*   < > 173*  173*   BUN 47*  --   --   --   --  50*  --   --  57*  --  66*  --  65*  --  56*   CR 0.96  --   --   --   --  0.96  --   --  0.96  --  1.22  --  1.25  --  1.38*   GFRESTIMATED 88  --   --   --   --  88  --   --  88  --  66  --  64  --  57*   BRIDGETTE 8.2*  --   --   --   --  8.4*  --   --  8.4*  --  8.2*  --  8.1*  --  8.2*   MAG 2.1  --   --   --   --   --   --   --  2.3  --   --   --  2.5*  --  2.4*   PHOS 3.0  --   --   --   --   --   --   --  3.0  --   --   --  4.4  --  3.4   PROTTOTAL 7.2  --   --   --   --   --   --   --  7.0  --   --   --  6.8  --  6.7*   ALBUMIN 1.8*  --   --   --   --   --   --   --  1.6*  --   --   --  1.5*  --  1.5*   BILITOTAL 0.8  --   --   --   --   --   --   --  0.3  --   --   --  0.5  --  0.4   ALKPHOS 98  --   --   --   --   --   --   --  104  --   --   --  89  --  93   AST 40  --   --   --   --   --   --   --  42  --   --   --  31  --  46*   ALT 58  --   --   --   --   --   --   --  44  --   --   --  31  --  35    < > = values in this interval not displayed.     CBC  Recent Labs   Lab 12/19/21  0317 12/18/21  0401 12/17/21  0455 12/16/21  0446   WBC 15.7* 20.5* 17.1* 21.8*   RBC 2.82* 2.67* 2.34* 2.50*   HGB 8.5* 8.2* 7.3* 7.8*    HCT 26.9* 25.5* 22.9* 24.3*   MCV 95 96 98 97   MCH 30.1 30.7 31.2 31.2   MCHC 31.6 32.2 31.9 32.1   RDW 13.1 13.5 14.0 14.3   * 571* 416 332     Arterial Blood Gas  Recent Labs   Lab 12/18/21  0658 12/17/21  1028 12/17/21  0454 12/16/21  0742 12/15/21  2359 12/15/21  1950   PH  --   --  7.41 7.41 7.43 7.42   PCO2  --   --  22* 37 31* 31*   PO2  --   --  143* 99 79* 75*   HCO3  --   --  14* 23 20* 20*   O2PER 45 45 45 50 45 40         Pertinent Imaging Studies:  Coronary angiogram:   Refractory VT/VF cardiac arrest.  Cardiogenic shock.  STEMI involving the OM1 as culprit.  Three vessel severe CAD involving the  of the RCA, mid LCx and OM1 severe disease with occlusion of OM1 as culprit in STEMI, and moderate proximal LAD lesions likely hemodynamically significant.  CPR  VA ECMO placement.  IABP placement.  Thermogard placement with initiation of hypothermia protocol.  Right radial arterial line placement.  PCI with three drug eluting stents to the proximal, mid LCx and OM1.    Echo:  Left ventricular function is decreased. The ejection fraction is 40-45% (mildly reduced). Mild diffuse hypokinesis is present.  Global right ventricular function is normal. The right ventricle is normal size.  This study was compared with the study from 12/12/2021. There has been an  improvement in the biventricular function since the patient has been decannulated.       Mary Leger PA-C  Critical Care Cardiology  Pager: [phone]

## 2021-12-19 NOTE — PROGRESS NOTES
21 1600   Quick Adds   Type of Visit Initial Occupational Therapy Evaluation   Living Environment   People in home sibling(s)  (brother )   Current Living Arrangements house   Living Environment Comments Pt unable to provide PLOF info due to decreased cognition. Per  note, lives in house with brother. 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 Was IND with I/ADLs PTA.    Self-Care   Usual Activity Tolerance good   Current Activity Tolerance fair   Equipment Currently Used at Home none   Instrumental Activities of Daily Living (IADL)   IADL Comments IND   Disability/Function   Hearing Difficulty or Deaf no   Wear Glasses or Blind no   Concentrating, Remembering or Making Decisions Difficulty no   Difficulty Communicating no   Difficulty Eating/Swallowing no   Walking or Climbing Stairs Difficulty no   Dressing/Bathing Difficulty no   Toileting issues no   Doing Errands Independently Difficulty (such as shopping) no   Fall history within last six months yes   Number of times patient has fallen within last six months 1   Change in Functional Status Since Onset of Current Illness/Injury yes   General Information   Onset of Illness/Injury or Date of Surgery 21   Referring Physician Brayan Thompson MD   Existing Precautions/Restrictions cardiac;fall   Left Upper Extremity (Weight-bearing Status) full weight-bearing (FWB)   Right Upper Extremity (Weight-bearing Status) full weight-bearing (FWB)   Left Lower Extremity (Weight-bearing Status) full weight-bearing (FWB)   Right Lower Extremity (Weight-bearing Status) full weight-bearing (FWB)   Heart Disease Risk Factors Medical history   Cognitive Status Examination   Orientation Status not oriented to person, place or time  (head nods to  )   Affect/Mental Status (Cognitive) agitated  (but able to redirect )   Follows Commands delayed response/completion;increased processing time needed    Safety Deficit severe deficit;impulsivity;insight into deficits/self-awareness   Attention Deficit distractible in noisy environment   Integumentary/Edema   Integumentary/Edema no deficits were identifed   Posture   Posture forward head position;protracted shoulders   Range of Motion Comprehensive   Comment, General Range of Motion asuncion UE grossly WNL   Strength Comprehensive (MMT)   Comment, General Manual Muscle Testing (MMT) Assessment overall generalized weakness    Bed Mobility   Comment (Bed Mobility) supine>sit modA   Transfers   Transfer Comments SPT: modAx2    Activities of Daily Living   BADL Assessment   (washed hands with washcloth: totalA)   Clinical Impression   Criteria for Skilled Therapeutic Interventions Met (OT) yes;skilled treatment is necessary   OT Diagnosis Per H&P:  56 year old male w/ h/o alcohol abuse, tobacco use who was admitted 12/6/2021 s/p refractory out-of-hospital VF cardiac arrest in the setting of inferoposterior STEMI s/p VA ECMO and LCx and OM1 PCI with residual pLAD disease and RCA  who was successfully decannulated and IABP removed on 12/13/21. Extubated 12/14    OT Problem List-Impairments impacting ADL problems related to;activity tolerance impaired;inability to direct their own care;strength   Assessment of Occupational Performance 5 or more Performance Deficits   Identified Performance Deficits dressing, g/h, asuncion UE strength, cognition, toileting   Planned Therapy Interventions (OT) ADL retraining;IADL retraining;bed mobility training;neuromuscular re-education;strengthening;home program guidelines   Clinical Decision Making Complexity (OT) moderate complexity   Therapy Frequency (OT) 5x/week   Predicted Duration of Therapy 2 weeks    Anticipated Equipment Needs Upon Discharge (OT)   (TBD )   Risk & Benefits of therapy have been explained evaluation/treatment results reviewed   Comment-Clinical Impression Pt is well below baseline in the areas of cognition, ADLs,  mobility & strength. Appropriate for skilled OT services to address these deficits.    OT Discharge Planning    OT Discharge Recommendation (DC Rec) Transitional Care Facility   OT Rationale for DC Rec Pt is below baseline with functional tasks, mobility & cognition. Recommend TCU stay to adress these deficits. If pt were to d/c to home, would require Ax2 for transfers, 24/7 assist, w/c, hospital bed.    OT Brief overview of current status  Ax2 with transfers, impulsive    Total Evaluation Time (Minutes)   Total Evaluation Time (Minutes) 10

## 2021-12-19 NOTE — PROGRESS NOTES
12/19/21 1000   Quick Adds   Type of Visit Initial PT Evaluation   Living Environment   People in home sibling(s)  (Brother)   Living Environment Comments Pt confused, unable to clarify with patient, no family present at this time. 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 When he is at home, Wolf always has some project in the yard or with his car. Previously was IND with mobility and ADLs.   Self-Care   Usual Activity Tolerance good   Current Activity Tolerance fair   Regular Exercise No   Equipment Currently Used at Home none   Disability/Function   Hearing Difficulty or Deaf no   Wear Glasses or Blind no   Concentrating, Remembering or Making Decisions Difficulty no   Difficulty Communicating no   Difficulty Eating/Swallowing no   Walking or Climbing Stairs Difficulty no   Dressing/Bathing Difficulty no   Toileting issues no   Doing Errands Independently Difficulty (such as shopping) no   Fall history within last six months yes   Number of times patient has fallen within last six months 1   Change in Functional Status Since Onset of Current Illness/Injury yes   General Information   Onset of Illness/Injury or Date of Surgery 12/13/21   Referring Physician Mary Leger PA-C   Patient/Family Therapy Goals Statement (PT) Pt unable to state at this time, no family/friends present   Pertinent History of Current Problem (include personal factors and/or comorbidities that impact the POC) 56 year old male w/ h/o alcohol abuse, tobacco use who was admitted 12/6/2021 s/p refractory out-of-hospital VF cardiac arrest in the setting of inferoposterior STEMI s/p VA ECMO and LCx and OM1 PCI who was successfully decannulated and IABP removed on 12/13/21. Extubated 12/14   Existing Precautions/Restrictions fall;other (see comments)  (Pt with rib fractures  and sternal fracture)   Heart Disease Risk Factors Smoking;Gender;Age   Cognition   Orientation Status (Cognition) oriented to;person   Affect/Mental Status (Cognition) anxious;confused   Follows Commands (Cognition) increased processing time needed;physical/tactile prompts required;50-74% accuracy   Behavioral Issues overwhelmed easily   Safety Deficit (Cognition) at risk behavior observed;awareness of need for assistance;impulsivity;insight into deficits/self-awareness   Cognitive Status Comments Very easily distracted despite decreasing additional stimulation(turn off TV ect)   Pain Assessment   Patient Currently in Pain Yes, see Vital Sign flowsheet  (unable to specifically verbalize)   Posture    Posture Forward head position;Protracted shoulders   Range of Motion (ROM)   ROM Quick Adds ROM WFL   ROM Comment Grossly appear WFL in BLE and BUE   Strength   Manual Muscle Testing Quick Adds Deficits observed during functional mobility   Bed Mobility   Comment (Bed Mobility) Supine > sit with mod A   Transfers   Transfer Safety Comments Sit<> stand with min A x2   Gait/Stairs (Locomotion)   Comment (Gait/Stairs) Bed > chair with min-mod A x2, impaired stepping, weight shifting and balance   Balance   Balance Comments Impaired standing balance, Sitting balance with CGA-mod A   Sensory Examination   Sensory Perception other (describe)   Sensory Perception Comments Unable to fully assess due to agitation/command following   Coordination   Coordination Comments Impaired due to disctraction/short attention span, poor command following   Clinical Impression   Criteria for Skilled Therapeutic Intervention yes, treatment indicated   PT Diagnosis (PT) impaired functional mobility   Influenced by the following impairments strength, activity tolerance, balance, cognition/attention span, medical co-morbidities, precautions, fractures, pain   Functional limitations due to impairments gait, stairs, transfers, bed mobility,  functional endurance   Clinical Presentation Evolving/Changing   Clinical Presentation Rationale clinical reasoning, multiple co-morbidities, new fractures, cognition   Clinical Decision Making (Complexity) moderate complexity   Therapy Frequency (PT) 5x/week   Predicted Duration of Therapy Intervention (days/wks) 3 weeks   Planned Therapy Interventions (PT) balance training;bed mobility training;gait training;home exercise program;motor coordination training;neuromuscular re-education;patient/family education;ROM (range of motion);stair training;strengthening;stretching;transfer training;progressive activity/exercise;risk factor education;home program guidelines   Risk & Benefits of therapy have been explained evaluation/treatment results reviewed;patient   PT Discharge Planning    PT Discharge Recommendation (DC Rec) Transitional Care Facility;home with home care physical therapy   PT Rationale for DC Rec Currently recommend TCU due to below baseline with physical and cognition, pt is not safe to discharge home, impulsive with decreased safety awareness, if discharing home would require Ax2, w/c, walker, increased home therapies, 24/7 assist, hospital bed, ect   PT Brief overview of current status  Ax2 with gait belt for transfers   Total Evaluation Time   Total Evaluation Time (Minutes) 8

## 2021-12-19 NOTE — PROGRESS NOTES
Major Shift Events:  Patient only oriented to self. Throughout the night patient was very agitated and attempting to pull at lines and get out of bed. Haldol given twice with no successful. No sleep overnight. Multiple loose BMs. BiPAP 10/5 40% then oxymask 7L.   At 0600 patient became combative and attempted to hit nursing staff. Haldol 5mg given, zyprexa 10mg given, precedex drip started and wrist restraints applied.  Plan: Wean O2  For vital signs and complete assessments, please see documentation flowsheets.

## 2021-12-20 ENCOUNTER — APPOINTMENT (OUTPATIENT)
Dept: GENERAL RADIOLOGY | Facility: CLINIC | Age: 56
End: 2021-12-20
Attending: NURSE PRACTITIONER
Payer: COMMERCIAL

## 2021-12-20 ENCOUNTER — APPOINTMENT (OUTPATIENT)
Dept: OCCUPATIONAL THERAPY | Facility: CLINIC | Age: 56
End: 2021-12-20
Payer: COMMERCIAL

## 2021-12-20 ENCOUNTER — APPOINTMENT (OUTPATIENT)
Dept: SPEECH THERAPY | Facility: CLINIC | Age: 56
End: 2021-12-20
Attending: PHYSICIAN ASSISTANT
Payer: COMMERCIAL

## 2021-12-20 LAB
ALBUMIN SERPL-MCNC: 2.1 G/DL (ref 3.4–5)
ALP SERPL-CCNC: 98 U/L (ref 40–150)
ALT SERPL W P-5'-P-CCNC: 55 U/L (ref 0–70)
ANION GAP SERPL CALCULATED.3IONS-SCNC: 7 MMOL/L (ref 3–14)
ANION GAP SERPL CALCULATED.3IONS-SCNC: 9 MMOL/L (ref 3–14)
APTT PPP: 26 SECONDS (ref 22–38)
AST SERPL W P-5'-P-CCNC: 29 U/L (ref 0–45)
ATRIAL RATE - MUSE: 104 BPM
ATRIAL RATE - MUSE: 111 BPM
ATRIAL RATE - MUSE: 128 BPM
ATRIAL RATE - MUSE: 86 BPM
ATRIAL RATE - MUSE: 93 BPM
ATRIAL RATE - MUSE: 98 BPM
ATRIAL RATE - MUSE: 99 BPM
BASE EXCESS BLDV CALC-SCNC: 4.9 MMOL/L (ref -7.7–1.9)
BILIRUB SERPL-MCNC: 0.5 MG/DL (ref 0.2–1.3)
BUN SERPL-MCNC: 61 MG/DL (ref 7–30)
BUN SERPL-MCNC: 82 MG/DL (ref 7–30)
CA-I BLD-MCNC: 4.5 MG/DL (ref 4.4–5.2)
CALCIUM SERPL-MCNC: 8.1 MG/DL (ref 8.5–10.1)
CALCIUM SERPL-MCNC: 8.5 MG/DL (ref 8.5–10.1)
CHLORIDE BLD-SCNC: 107 MMOL/L (ref 94–109)
CHLORIDE BLD-SCNC: 109 MMOL/L (ref 94–109)
CO2 SERPL-SCNC: 26 MMOL/L (ref 20–32)
CO2 SERPL-SCNC: 30 MMOL/L (ref 20–32)
CREAT SERPL-MCNC: 1.16 MG/DL (ref 0.66–1.25)
CREAT SERPL-MCNC: 1.19 MG/DL (ref 0.66–1.25)
DIASTOLIC BLOOD PRESSURE - MUSE: NORMAL MMHG
ERYTHROCYTE [DISTWIDTH] IN BLOOD BY AUTOMATED COUNT: 13 % (ref 10–15)
GFR SERPL CREATININE-BSD FRML MDRD: 68 ML/MIN/1.73M2
GFR SERPL CREATININE-BSD FRML MDRD: 70 ML/MIN/1.73M2
GLUCOSE BLD-MCNC: 173 MG/DL (ref 70–99)
GLUCOSE BLD-MCNC: 177 MG/DL (ref 70–99)
GLUCOSE BLD-MCNC: 188 MG/DL (ref 70–99)
GLUCOSE BLDC GLUCOMTR-MCNC: 134 MG/DL (ref 70–99)
GLUCOSE BLDC GLUCOMTR-MCNC: 141 MG/DL (ref 70–99)
GLUCOSE BLDC GLUCOMTR-MCNC: 151 MG/DL (ref 70–99)
GLUCOSE BLDC GLUCOMTR-MCNC: 154 MG/DL (ref 70–99)
GLUCOSE BLDC GLUCOMTR-MCNC: 177 MG/DL (ref 70–99)
GLUCOSE BLDC GLUCOMTR-MCNC: 188 MG/DL (ref 70–99)
HCO3 BLDV-SCNC: 30 MMOL/L (ref 21–28)
HCT VFR BLD AUTO: 28.2 % (ref 40–53)
HGB BLD-MCNC: 9.1 G/DL (ref 13.3–17.7)
INR PPP: 1.23 (ref 0.85–1.15)
INTERPRETATION ECG - MUSE: NORMAL
LACTATE SERPL-SCNC: 1 MMOL/L (ref 0.7–2)
LACTATE SERPL-SCNC: 1.3 MMOL/L (ref 0.7–2)
LDH SERPL L TO P-CCNC: 402 U/L (ref 85–227)
MAGNESIUM SERPL-MCNC: 2.2 MG/DL (ref 1.6–2.3)
MCH RBC QN AUTO: 31.1 PG (ref 26.5–33)
MCHC RBC AUTO-ENTMCNC: 32.3 G/DL (ref 31.5–36.5)
MCV RBC AUTO: 96 FL (ref 78–100)
O2/TOTAL GAS SETTING VFR VENT: 35 %
OXYHGB MFR BLDV: 46 % (ref 70–75)
P AXIS - MUSE: 46 DEGREES
P AXIS - MUSE: 53 DEGREES
P AXIS - MUSE: 54 DEGREES
P AXIS - MUSE: 56 DEGREES
P AXIS - MUSE: 64 DEGREES
P AXIS - MUSE: 67 DEGREES
P AXIS - MUSE: 67 DEGREES
PCO2 BLDV: 49 MM HG (ref 40–50)
PH BLDV: 7.4 [PH] (ref 7.32–7.43)
PHOSPHATE SERPL-MCNC: 4.8 MG/DL (ref 2.5–4.5)
PLATELET # BLD AUTO: 677 10E3/UL (ref 150–450)
PO2 BLDV: 28 MM HG (ref 25–47)
POTASSIUM BLD-SCNC: 3.5 MMOL/L (ref 3.4–5.3)
POTASSIUM BLD-SCNC: 3.9 MMOL/L (ref 3.4–5.3)
PR INTERVAL - MUSE: 110 MS
PR INTERVAL - MUSE: 110 MS
PR INTERVAL - MUSE: 112 MS
PR INTERVAL - MUSE: 118 MS
PR INTERVAL - MUSE: 118 MS
PR INTERVAL - MUSE: 124 MS
PR INTERVAL - MUSE: 138 MS
PROT SERPL-MCNC: 7.9 G/DL (ref 6.8–8.8)
QRS DURATION - MUSE: 100 MS
QRS DURATION - MUSE: 104 MS
QRS DURATION - MUSE: 106 MS
QRS DURATION - MUSE: 94 MS
QRS DURATION - MUSE: 96 MS
QT - MUSE: 316 MS
QT - MUSE: 318 MS
QT - MUSE: 332 MS
QT - MUSE: 336 MS
QT - MUSE: 340 MS
QT - MUSE: 352 MS
QT - MUSE: 368 MS
QTC - MUSE: 408 MS
QTC - MUSE: 423 MS
QTC - MUSE: 437 MS
QTC - MUSE: 440 MS
QTC - MUSE: 446 MS
QTC - MUSE: 447 MS
QTC - MUSE: 461 MS
R AXIS - MUSE: 39 DEGREES
R AXIS - MUSE: 47 DEGREES
R AXIS - MUSE: 50 DEGREES
R AXIS - MUSE: 54 DEGREES
R AXIS - MUSE: 55 DEGREES
R AXIS - MUSE: 56 DEGREES
R AXIS - MUSE: 60 DEGREES
RBC # BLD AUTO: 2.93 10E6/UL (ref 4.4–5.9)
SODIUM SERPL-SCNC: 143 MMOL/L (ref 133–144)
SODIUM SERPL-SCNC: 144 MMOL/L (ref 133–144)
SODIUM SERPL-SCNC: 146 MMOL/L (ref 133–144)
SYSTOLIC BLOOD PRESSURE - MUSE: NORMAL MMHG
T AXIS - MUSE: 124 DEGREES
T AXIS - MUSE: 200 DEGREES
T AXIS - MUSE: 228 DEGREES
T AXIS - MUSE: 235 DEGREES
T AXIS - MUSE: 257 DEGREES
T AXIS - MUSE: 258 DEGREES
T AXIS - MUSE: 73 DEGREES
VENTRICULAR RATE- MUSE: 104 BPM
VENTRICULAR RATE- MUSE: 106 BPM
VENTRICULAR RATE- MUSE: 128 BPM
VENTRICULAR RATE- MUSE: 86 BPM
VENTRICULAR RATE- MUSE: 93 BPM
VENTRICULAR RATE- MUSE: 98 BPM
VENTRICULAR RATE- MUSE: 99 BPM
WBC # BLD AUTO: 19.1 10E3/UL (ref 4–11)

## 2021-12-20 PROCEDURE — 84295 ASSAY OF SERUM SODIUM: CPT | Performed by: PHYSICIAN ASSISTANT

## 2021-12-20 PROCEDURE — 250N000011 HC RX IP 250 OP 636: Performed by: INTERNAL MEDICINE

## 2021-12-20 PROCEDURE — 200N000002 HC R&B ICU UMMC

## 2021-12-20 PROCEDURE — 250N000013 HC RX MED GY IP 250 OP 250 PS 637: Performed by: PHYSICIAN ASSISTANT

## 2021-12-20 PROCEDURE — 71045 X-RAY EXAM CHEST 1 VIEW: CPT

## 2021-12-20 PROCEDURE — 999N000157 HC STATISTIC RCP TIME EA 10 MIN

## 2021-12-20 PROCEDURE — 93005 ELECTROCARDIOGRAM TRACING: CPT

## 2021-12-20 PROCEDURE — 83605 ASSAY OF LACTIC ACID: CPT | Performed by: INTERNAL MEDICINE

## 2021-12-20 PROCEDURE — 83615 LACTATE (LD) (LDH) ENZYME: CPT | Performed by: NURSE PRACTITIONER

## 2021-12-20 PROCEDURE — 250N000011 HC RX IP 250 OP 636: Performed by: NURSE PRACTITIONER

## 2021-12-20 PROCEDURE — 85027 COMPLETE CBC AUTOMATED: CPT | Performed by: INTERNAL MEDICINE

## 2021-12-20 PROCEDURE — 250N000013 HC RX MED GY IP 250 OP 250 PS 637: Performed by: STUDENT IN AN ORGANIZED HEALTH CARE EDUCATION/TRAINING PROGRAM

## 2021-12-20 PROCEDURE — 84100 ASSAY OF PHOSPHORUS: CPT | Performed by: NURSE PRACTITIONER

## 2021-12-20 PROCEDURE — 80053 COMPREHEN METABOLIC PANEL: CPT | Performed by: PHYSICIAN ASSISTANT

## 2021-12-20 PROCEDURE — 250N000009 HC RX 250

## 2021-12-20 PROCEDURE — 250N000012 HC RX MED GY IP 250 OP 636 PS 637: Performed by: PHYSICIAN ASSISTANT

## 2021-12-20 PROCEDURE — 82805 BLOOD GASES W/O2 SATURATION: CPT | Performed by: INTERNAL MEDICINE

## 2021-12-20 PROCEDURE — 92610 EVALUATE SWALLOWING FUNCTION: CPT | Mod: GN

## 2021-12-20 PROCEDURE — 92526 ORAL FUNCTION THERAPY: CPT | Mod: GN

## 2021-12-20 PROCEDURE — 71045 X-RAY EXAM CHEST 1 VIEW: CPT | Mod: 26 | Performed by: RADIOLOGY

## 2021-12-20 PROCEDURE — 82330 ASSAY OF CALCIUM: CPT | Performed by: INTERNAL MEDICINE

## 2021-12-20 PROCEDURE — 82947 ASSAY GLUCOSE BLOOD QUANT: CPT | Performed by: INTERNAL MEDICINE

## 2021-12-20 PROCEDURE — 85610 PROTHROMBIN TIME: CPT | Performed by: NURSE PRACTITIONER

## 2021-12-20 PROCEDURE — 250N000009 HC RX 250: Performed by: PHYSICIAN ASSISTANT

## 2021-12-20 PROCEDURE — 84520 ASSAY OF UREA NITROGEN: CPT | Performed by: INTERNAL MEDICINE

## 2021-12-20 PROCEDURE — 97110 THERAPEUTIC EXERCISES: CPT | Mod: GO

## 2021-12-20 PROCEDURE — 94660 CPAP INITIATION&MGMT: CPT

## 2021-12-20 PROCEDURE — 93010 ELECTROCARDIOGRAM REPORT: CPT | Performed by: INTERNAL MEDICINE

## 2021-12-20 PROCEDURE — 250N000013 HC RX MED GY IP 250 OP 250 PS 637: Performed by: INTERNAL MEDICINE

## 2021-12-20 PROCEDURE — 94640 AIRWAY INHALATION TREATMENT: CPT

## 2021-12-20 PROCEDURE — 85730 THROMBOPLASTIN TIME PARTIAL: CPT | Performed by: INTERNAL MEDICINE

## 2021-12-20 PROCEDURE — 83735 ASSAY OF MAGNESIUM: CPT | Performed by: NURSE PRACTITIONER

## 2021-12-20 PROCEDURE — 94640 AIRWAY INHALATION TREATMENT: CPT | Mod: 76

## 2021-12-20 PROCEDURE — 99233 SBSQ HOSP IP/OBS HIGH 50: CPT | Performed by: INTERNAL MEDICINE

## 2021-12-20 PROCEDURE — 250N000009 HC RX 250: Performed by: INTERNAL MEDICINE

## 2021-12-20 PROCEDURE — 250N000011 HC RX IP 250 OP 636: Performed by: PHYSICIAN ASSISTANT

## 2021-12-20 PROCEDURE — 97530 THERAPEUTIC ACTIVITIES: CPT | Mod: GO

## 2021-12-20 PROCEDURE — 250N000013 HC RX MED GY IP 250 OP 250 PS 637: Performed by: NURSE PRACTITIONER

## 2021-12-20 RX ORDER — HALOPERIDOL 5 MG/ML
10 INJECTION INTRAMUSCULAR 2 TIMES DAILY
Status: DISCONTINUED | OUTPATIENT
Start: 2021-12-20 | End: 2021-12-23

## 2021-12-20 RX ORDER — POTASSIUM CHLORIDE 29.8 MG/ML
20 INJECTION INTRAVENOUS ONCE
Status: COMPLETED | OUTPATIENT
Start: 2021-12-20 | End: 2021-12-20

## 2021-12-20 RX ORDER — NOREPINEPHRINE BITARTRATE 0.06 MG/ML
INJECTION, SOLUTION INTRAVENOUS
Status: COMPLETED
Start: 2021-12-20 | End: 2021-12-20

## 2021-12-20 RX ORDER — OLANZAPINE 10 MG/2ML
10 INJECTION, POWDER, FOR SOLUTION INTRAMUSCULAR ONCE
Status: COMPLETED | OUTPATIENT
Start: 2021-12-20 | End: 2021-12-20

## 2021-12-20 RX ORDER — IBUPROFEN 200 MG
200 TABLET ORAL EVERY 4 HOURS PRN
Status: ON HOLD | COMMUNITY
End: 2022-01-03

## 2021-12-20 RX ORDER — NOREPINEPHRINE BITARTRATE 0.06 MG/ML
.01-.6 INJECTION, SOLUTION INTRAVENOUS CONTINUOUS
Status: DISCONTINUED | OUTPATIENT
Start: 2021-12-20 | End: 2021-12-22

## 2021-12-20 RX ADMIN — DEXMEDETOMIDINE HYDROCHLORIDE 1.2 MCG/KG/HR: 4 INJECTION, SOLUTION INTRAVENOUS at 20:14

## 2021-12-20 RX ADMIN — ATORVASTATIN CALCIUM 40 MG: 40 TABLET, FILM COATED ORAL at 20:16

## 2021-12-20 RX ADMIN — Medication 0.03 MCG/KG/MIN: at 02:25

## 2021-12-20 RX ADMIN — TICAGRELOR 90 MG: 90 TABLET ORAL at 09:09

## 2021-12-20 RX ADMIN — Medication 2 SPRAY: at 16:55

## 2021-12-20 RX ADMIN — CEFEPIME HYDROCHLORIDE 2 G: 2 INJECTION, POWDER, FOR SOLUTION INTRAVENOUS at 12:58

## 2021-12-20 RX ADMIN — HALOPERIDOL LACTATE 5 MG: 5 INJECTION, SOLUTION INTRAMUSCULAR at 00:29

## 2021-12-20 RX ADMIN — Medication 1 PACKET: at 09:09

## 2021-12-20 RX ADMIN — TICAGRELOR 90 MG: 90 TABLET ORAL at 20:16

## 2021-12-20 RX ADMIN — Medication 1 PACKET: at 16:57

## 2021-12-20 RX ADMIN — IPRATROPIUM BROMIDE AND ALBUTEROL SULFATE 3 ML: 2.5; .5 SOLUTION RESPIRATORY (INHALATION) at 01:14

## 2021-12-20 RX ADMIN — INSULIN ASPART 1 UNITS: 100 INJECTION, SOLUTION INTRAVENOUS; SUBCUTANEOUS at 00:16

## 2021-12-20 RX ADMIN — Medication 2 SPRAY: at 09:09

## 2021-12-20 RX ADMIN — Medication 2 SPRAY: at 20:16

## 2021-12-20 RX ADMIN — NOREPINEPHRINE BITARTRATE 0.03 MCG/KG/MIN: 0.06 INJECTION, SOLUTION INTRAVENOUS at 02:25

## 2021-12-20 RX ADMIN — Medication 1 PACKET: at 12:59

## 2021-12-20 RX ADMIN — ASPIRIN 81 MG CHEWABLE TABLET 81 MG: 81 TABLET CHEWABLE at 09:09

## 2021-12-20 RX ADMIN — QUETIAPINE FUMARATE 100 MG: 50 TABLET ORAL at 09:09

## 2021-12-20 RX ADMIN — DEXMEDETOMIDINE HYDROCHLORIDE 1 MCG/KG/HR: 4 INJECTION, SOLUTION INTRAVENOUS at 11:22

## 2021-12-20 RX ADMIN — Medication 1 PACKET: at 20:17

## 2021-12-20 RX ADMIN — IPRATROPIUM BROMIDE AND ALBUTEROL SULFATE 3 ML: 2.5; .5 SOLUTION RESPIRATORY (INHALATION) at 08:27

## 2021-12-20 RX ADMIN — Medication 10 MG: at 21:48

## 2021-12-20 RX ADMIN — Medication 2 SPRAY: at 12:54

## 2021-12-20 RX ADMIN — HEPARIN SODIUM 5000 UNITS: 5000 INJECTION, SOLUTION INTRAVENOUS; SUBCUTANEOUS at 03:48

## 2021-12-20 RX ADMIN — INSULIN ASPART 1 UNITS: 100 INJECTION, SOLUTION INTRAVENOUS; SUBCUTANEOUS at 12:59

## 2021-12-20 RX ADMIN — HEPARIN SODIUM 5000 UNITS: 5000 INJECTION, SOLUTION INTRAVENOUS; SUBCUTANEOUS at 12:58

## 2021-12-20 RX ADMIN — QUETIAPINE FUMARATE 200 MG: 50 TABLET ORAL at 20:15

## 2021-12-20 RX ADMIN — MULTIVIT AND MINERALS-FERROUS GLUCONATE 9 MG IRON/15 ML ORAL LIQUID 15 ML: at 09:09

## 2021-12-20 RX ADMIN — INSULIN ASPART 1 UNITS: 100 INJECTION, SOLUTION INTRAVENOUS; SUBCUTANEOUS at 03:55

## 2021-12-20 RX ADMIN — INSULIN ASPART 1 UNITS: 100 INJECTION, SOLUTION INTRAVENOUS; SUBCUTANEOUS at 20:22

## 2021-12-20 RX ADMIN — INSULIN ASPART 1 UNITS: 100 INJECTION, SOLUTION INTRAVENOUS; SUBCUTANEOUS at 17:02

## 2021-12-20 RX ADMIN — THIAMINE HCL TAB 100 MG 100 MG: 100 TAB at 09:09

## 2021-12-20 RX ADMIN — DEXMEDETOMIDINE HYDROCHLORIDE 1 MCG/KG/HR: 4 INJECTION, SOLUTION INTRAVENOUS at 06:15

## 2021-12-20 RX ADMIN — DEXMEDETOMIDINE HYDROCHLORIDE 1.2 MCG/KG/HR: 4 INJECTION, SOLUTION INTRAVENOUS at 16:51

## 2021-12-20 RX ADMIN — PREDNISONE 40 MG: 20 TABLET ORAL at 09:09

## 2021-12-20 RX ADMIN — ACETAMINOPHEN 650 MG: 325 TABLET, FILM COATED ORAL at 12:59

## 2021-12-20 RX ADMIN — NICOTINE 1 PATCH: 14 PATCH, EXTENDED RELEASE TRANSDERMAL at 09:13

## 2021-12-20 RX ADMIN — ACETAMINOPHEN 650 MG: 325 TABLET, FILM COATED ORAL at 03:48

## 2021-12-20 RX ADMIN — DEXMEDETOMIDINE HYDROCHLORIDE 1.2 MCG/KG/HR: 4 INJECTION, SOLUTION INTRAVENOUS at 00:44

## 2021-12-20 RX ADMIN — CEFEPIME HYDROCHLORIDE 2 G: 2 INJECTION, POWDER, FOR SOLUTION INTRAVENOUS at 01:10

## 2021-12-20 RX ADMIN — OLANZAPINE 10 MG: 10 INJECTION, POWDER, LYOPHILIZED, FOR SOLUTION INTRAMUSCULAR at 04:28

## 2021-12-20 RX ADMIN — AZITHROMYCIN DIHYDRATE 250 MG: 250 TABLET, FILM COATED ORAL at 09:09

## 2021-12-20 RX ADMIN — FOLIC ACID 1 MG: 1 TABLET ORAL at 09:09

## 2021-12-20 RX ADMIN — HEPARIN SODIUM 5000 UNITS: 5000 INJECTION, SOLUTION INTRAVENOUS; SUBCUTANEOUS at 20:15

## 2021-12-20 RX ADMIN — HALOPERIDOL LACTATE 10 MG: 5 INJECTION, SOLUTION INTRAMUSCULAR at 14:02

## 2021-12-20 RX ADMIN — LOPERAMIDE HYDROCHLORIDE 2 MG: 2 CAPSULE ORAL at 16:57

## 2021-12-20 RX ADMIN — POTASSIUM CHLORIDE 20 MEQ: 29.8 INJECTION, SOLUTION INTRAVENOUS at 04:57

## 2021-12-20 ASSESSMENT — ACTIVITIES OF DAILY LIVING (ADL)
ADLS_ACUITY_SCORE: 14
ADLS_ACUITY_SCORE: 14
ADLS_ACUITY_SCORE: 13
ADLS_ACUITY_SCORE: 14
ADLS_ACUITY_SCORE: 13
ADLS_ACUITY_SCORE: 14
ADLS_ACUITY_SCORE: 13
ADLS_ACUITY_SCORE: 16
ADLS_ACUITY_SCORE: 13
ADLS_ACUITY_SCORE: 14
ADLS_ACUITY_SCORE: 14
ADLS_ACUITY_SCORE: 13
ADLS_ACUITY_SCORE: 14
ADLS_ACUITY_SCORE: 14
ADLS_ACUITY_SCORE: 16
ADLS_ACUITY_SCORE: 13
ADLS_ACUITY_SCORE: 13
ADLS_ACUITY_SCORE: 14
ADLS_ACUITY_SCORE: 13
ADLS_ACUITY_SCORE: 13

## 2021-12-20 ASSESSMENT — MIFFLIN-ST. JEOR: SCORE: 1411.38

## 2021-12-20 NOTE — PROGRESS NOTES
12/20/21 1440   General Information   Onset of Illness/Injury or Date of Surgery 12/06/21   Referring Physician Mary Leger PA-C   Patient/Family Therapy Goal Statement (SLP) Pt would like food/drink.   Pertinent History of Current Problem The pt is a 56 year old male w/ h/o alcohol abuse, tobacco use who was admitted 12/6/2021 s/p refractory out-of-hospital VF cardiac arrest in the setting of inferoposterior STEMI s/p VA ECMO and LCx and OM1 PCI with residual pLAD disease and RCA  who was successfully decannulated and IABP removed on 12/13/21. Extubated 12/14. Swallow evaluation ordered per MD as pt no longer requires BiPAP.   General Observations  The pt is pleasant. Sitter present d/t agitation and AMS.   Past History of Dysphagia The pt's brother denies hx of dysphagia and reports he consumes a regular diet at baseline. He reports that his voice is significantly hoarse as compared to baseline; however it was better on 12/15 and 12/16 as compared to today.   Pain Assessment   Patient Currently in Pain No   Type of Evaluation   Type of Evaluation Swallow Evaluation   Oral Motor   Oral Musculature generally intact   Structural Abnormalities none present   Mucosal Quality dry   Dentition (Oral Motor)   Dentition (Oral Motor) significant number of missing teeth   Comment, Dentition (Oral Motor) Present lower dentition, absent upper dentition   Facial Symmetry (Oral Motor)   Facial Symmetry (Oral Motor) WNL   Lip Function (Oral Motor)   Lip Range of Motion (Oral Motor) WNL   Tongue Function (Oral Motor)   Tongue ROM (Oral Motor) protrusion is impaired;lateralization is impaired   Protrusion, Tongue ROM Impairment (Oral Motor) bilateral;minimal impairment   Lateralization, Tongue ROM Impairment (Oral Motor) bilateral;minimal impairment   Tongue Strength (Oral Motor) bilateral;minimal impairment   Jaw Function (Oral Motor)   Jaw Function (Oral Motor) WNL   Cough/Swallow/Gag Reflex (Oral Motor)    Volitional Throat Clear/Cough (Oral Motor) impaired;reduced strength   Volitional Swallow (Oral Motor) mildly delayed   Vocal Quality/Secretion Management (Oral Motor)   Vocal Quality (Oral Motor) aphonia;breathy   Secretion Management (Oral Motor) WNL   Comment, Vocal Quality/Secretion Management (Oral Motor) Voicing noted with max cues and cued cough; question if aphonia r/t AMS   General Swallowing Observations   Current Diet/Method of Nutritional Intake (General Swallowing Observations, NIS) NPO;nasogastric tube (NG)  (large bore NG)   Respiratory Support (General Swallowing Observations) nasal cannula  (2 lpm)   Swallowing Evaluation Clinical swallow evaluation   Clinical Swallow Evaluation   Feeding Assistance frequent cues/help required   Clinical Swallow Evaluation Textures Trialed thin liquids;mildly thick liquids;pureed   Clinical Swallow Eval: Thin Liquid Texture Trial   Mode of Presentation, Thin Liquids spoon;fed by clinician   Volume of Liquid or Food Presented tsp x2   Oral Phase of Swallow delayed AP movement;premature pharyngeal entry   Pharyngeal Phase of Swallow impaired;coughing/choking;reduction in laryngeal movement   Diagnostic Statement Immediate coughing noted in 2/2 trials with increased RR and drop in O2 sats.   Clinical Swallow Eval: Mildly Thick Liquids   Mode of Presentation cup;spoon;self-fed;fed by clinician   Volume Presented 1 oz   Oral Phase delayed AP movement   Pharyngeal Phase feeling of something stuck in throat;reduction in laryngeal movement;repeated swallows   Diagnostic Statement No overt s/s of aspiration, however pt endorsed sensation of liquids sticking in throat and took 3-4 swallows per small bolus to clear.   Clinical Swallow Evaluation: Puree Solid Texture Trial   Mode of Presentation, Puree spoon;fed by clinician   Volume of Puree Presented tsp x1   Oral Phase, Puree delayed AP movement   Pharyngeal Phase, Puree feeling of something stuck in throat;reduction in  laryngeal movement;repeated swallows   Diagnostic Statement Pt endorsed sensation of puree in pharyngeal cavity and required a liquid wash to clear.   Esophageal Phase of Swallow   Patient reports or presents with symptoms of esophageal dysphagia No   Swallowing Recommendations   Diet Consistency Recommendations NPO;ice chips only   Supervision Level for Intake 1:1 supervision needed   Medication Administration Recommendations, Swallowing (SLP) Via NG tube   Instrumental Assessment Recommendations FEES (fiberoptic endoscopic evaluation of swallowing)  (pending progress)   General Therapy Interventions   Planned Therapy Interventions Dysphagia Treatment   Dysphagia treatment Oropharyngeal exercise training;Modified diet education;Instruction of safe swallow strategies;Compensatory strategies for swallowing   SLP Therapy Assessment/Plan   Criteria for Skilled Therapeutic Interventions Met (SLP Eval) yes;treatment indicated   SLP Diagnosis Severe oropharyngeal dysphagia   Rehab Potential (SLP Eval) good, to achieve stated therapy goals   Therapy Frequency (SLP Eval) daily   Predicted Duration of Therapy Intervention (SLP Eval) 2 weeks   Comment, Therapy Assessment/Plan (SLP) Clinical swallow evaluation ordered per MD. Pt presents with severe oropharyngeal dysphagia s/p prolonged intubation and anoxic brain injury. Oral mech significant for breathy and mostly aphonic voicing, question if this is somewhat behavioral in nature as he is able to produce voicing with cued cough and yawn. Generalized lingual and facial weakness also noted bilaterally. Pt dependent with feeding d/t AMS and presence of large bore NG tube. With thin liquids via spoon, suspect premature spillage to pharynx which resulted in immediate coughing. Reduced laryngeal elevation noted per palpation. With mildly thick liquids and puree via spoon, pt tolerated without coughing, however multiple swallows per bolus noted (3-4). Pt endorsed sensation of  "pharyngeal residuals and \"food sticking\". O2 sats did fluctuate between 85 and 96 t/o eval. Recommend NPO status with alternative means of nutrition/hydration/medication. Ice chips ok for comfort with 1:1 assist if pt is stable on oxygen mask. Recommend initiate SLP services for PO readiness. Given pt's inconsistent voicing, may benefit from ENT consult and/or FEES, however will continue to assess. Pt able to produce \"adequate\" voice on 12/15-16 per pt's brother.   Therapy Plan Review/Discharge Plan (SLP)   Therapy Plan Review (SLP) evaluation/treatment results reviewed;care plan/treatment goals reviewed;risks/benefits reviewed;current/potential barriers reviewed;participants voiced agreement with care plan;participants included;patient;sibling   SLP Discharge Planning    SLP Discharge Recommendation (DC Rec) home with assist;home with home care speech therapy;[facility] patient will benefit from intensive, interdisciplinary therapy.  Anticipate will be able to tolerate 3 hours of therapy per day   SLP Rationale for DC Rec Swallow function is significantly below baseline; Would need alternative means of nutrition/hydration/medication and 24/7 assist if he were to D/C home.   SLP Brief overview of current status  Recommend NPO status with alternative means of nutrition/hydration/medication. Ice chips ok for comfort with 1:1 assist if pt is stable on oxygen mask. Recommend initiate SLP services for PO readiness. Given pt's inconsistent voicing, may benefit from ENT consult and/or FEES, however will continue to assess.    Total Evaluation Time   Total Evaluation Time (Minutes) 18     "

## 2021-12-20 NOTE — PROGRESS NOTES
"    [facility]   Critical Care Cardiology - Progress Note    Bruce Blount MRN: [id number]  Age: 56 year old, : 1965  Date: 2021    Assessment and Plan:  Bruce Blount is a 56 year old male with PMHx current tobacco use and ETOH abuse who presented to [facility]  after out of hospital cardiac arrest.      Per EMS and brother, patient was in his usual state of health prior to arrest. Patients brother heard a \"thump\" and found patient unresponsive and agonal breathing. 911 called and CPR initiated. EMS arrived within 4 minutes. Initial rhythm VF--> shocked x ~7 with ROSC upon arrival to ED. Total CPR team per EMS ~ 40 minutes. 3 mg Epinephrine, 300 mg Amio given via EMS. Patient EKG reveals Valentín inferior/posterior leads with reciprocal changes. Patient initially presented with an Igel and was intubated with ETT in the ED. He arrested and was again shocked in the ED with ROSC.      Taken urgently to CCL where he underwent coronary angiogram and again arrested requiring manual CPR and was then cannulated on VA ECMO via right with 17 Ecuadorean cannula RCFA, 25 Fr cannula RCFV. Coronary angiogram revealed  of RCA and acute culprit lesion of LCx/OM1, s/p PCI to pLCx, mid LCx, and OM1 with TERRY. IABP placed for decreased pulsatility. Thermogard cooling cath placed and patient was transferred to ICU for further management. Decannulated . IABP removed . Extubated .     Today's Plan:  - plan to taper Seroquel. No dose this afternoon. 200mg this evening. Will plan to reduce evening dose tomorrow  - start haldol 10mg BID (08:00, 14:00)  - diuretic holiday  - last day steroids, azithromycin, cefepime    Neurology  # Anoxic brain injury  # ICU delirium  # Chest wall pain    # Hx of possible ETOH abuse per family  S/p therapeutic hypothermia. Extubated . Initial head CT without acute pathology. Able to follow commands intermittently. Have trialed Haldol without success, " transitioned to Seroquel 50 mg TID plus 100 mg at bedtime with mild improvement. Added Precedex gtt and sitter overnight and clearer today. Neuro-crit consulted- since signed off  - precedex ggt  - 10mg melatonin at bedtime  - 200mg Seroquel at night  - start 10mg haldol BID (08:00 and 14:00)  - continue PRN haldol  - Tylenol and lidocaine patches for additional pain control as needed  - Folate, Thiamine and multi vitamin for ETOH abuse.     Cardiovascular  # Ischemic Cardiomyopathy (EF 40-45%)  # S/p PCI pLCx, mLCx, OM1  # Residual RCA   # HTN, HLD  # Refractory VF cardiac arrest 2/2 secondary to STEMI, resolved  # Cardiogenic shock requiring VA ECMO, resolved  # Concern for limb ischemia s/p distal reperfusion cannula left leg  S/p Peripheral V-A ECMO inserted via RCFA and RCFV 17/25 fr.  Decannulated 12/13. IABP removed 12/13.  Intermittently requiring pressors, usually due to sedation/meds  - GDMT   - DAPT: Continue ASA 81mg and ticagrelor 90mg BID    - Statin: 40mg Lipitor daily   - Hold ACE/ARB given hypotension   - Holding beta blocker given shock/hypotension  -  of RCA not intervened upon  - wound vac off 12/20     Pulmonary  # Acute hypoxemic respiratory failure   # Klebsiella pneumonia  # Rib Fractures  # Sternal Fracture  # Tobacco Abuse (2PPD)  # Probable COPD exacerbation  Extubated 12/14 to BiPAP. Currently on BiPAP 45%, 10/5. CXR unchanged. ABG overnight- unclear accuracy.  - RT pulmonary hygiene  - Duoneb Q4 hours   - 40mg prednisone daily through 12/20  - trial off BiPAP  - Diurese per below     Gastrointestinal, Nutrition  # Loose Stool  # Shock liver 2/2 cardiac arrest, resolved  No known medical hx. C. Diff negative  - Monitor LFTs   - TF on hold for now given ongoing BiPAP. Consider resuming today pending success of weaning BiPAP     Renal, Electrolytes  # Hypernatremia  # Hypoalbuminemia  # Acute Renal Injury, resolved  # Lactic acidosis, resolved  # Hyperkalemia, resolved   #  Hypocalcemia, resolved  # Hyperphosphatemia, resolved  No CRRT since decannulation 12/13. Creatinine 1.19, HCO3 30. Sodium elevated 12/14, started on D5W infusion  - appears contracted, diuretic holiday today  - continue 25cc/hr D5W  - follow sodium Q6 hours  - Monitor urine output     Infectious Disease  # Aspiration Pneumonia (klebsiella, staph aureus)  # Leukocytosis  # Fever  # Lactic acidosis, resolved  WBC 19.1. Tmax 98.4. Pan culture 12/14 given temp and WBC. Susceptibilities resulted the same as previous  - PRN acetaminophen  Cultures thus far:   - sputum 12/6: staph aureus, staph dysgalactia, klebsiella   - sputum 12/7: staph aureus   - sputum 12/8: staph and klebsiella   - Sputum 12/9: staph aureus and klebsiella   - Sputum 12/10: staph aureus and klebsiella   - sputum 12/13: staph aureus and klebsiella   - sputum 12/14: staph aureus  Antibiotics              - Vancomycin 12/6 - 12/7 (MRSA negative)              - Zosyn 12/6 - 12/9              - Rocephin 12/9 - 12/16   - Cefepime 12/16 - present   - Flagyl 12/16 - 12/16   - Azithro 12/16 - 12/20 (last dose today)     Hematology  # Anemia of critical illness  # Concern for HIT - screen negative  # Thrombocytopenia, resolved  # Loss of bilateral DP pulses, resolved  Hgb stable 9.1. Plt stable 672. No e/o bleeding.    - monitor hemoglobin     Endocrinology  # Hyperglycemia   No known medical history. Hemoglobin A1c 5.6%  - SSI     MSK/Skin  # Mottling LLE s/p LLE distal reperfusion cannula  # Dusky toes left foot  Pulses remain intact. IABP removed 12/13.   - continue to monitor    Pertinent Lines  Gutiérrez  Wound vac  RIJ CVC  NG    ICU Cares:  Daily CXR: unchanged opacities  Fluids/Feeds: TF, work to goal. Fluids per hypernatremia  DVT Prophylaxis: subcutaneous heparin Q8 hours  GI Prophylaxis: N/A  Bowel Regimen: N/A - loose stools  Anticipated Floor Transfer: N/A    Family Update: brother, updated by me    Patient seen and discussed with Dr. Lorne Stewart.   Assessment and plan as above.    Mary Leger PA-C  Critical Care Cardiology  Pager: [phone]      Interval History: Ongoing agitation. Refusing to answer questions this AM.    Objective Findings:  Temp:  [97.3  F (36.3  C)-98.9  F (37.2  C)] 97.3  F (36.3  C)  Pulse:  [] 85  Resp:  [22-46] 28  BP: ()/(50-69) 89/57  FiO2 (%):  [35 %-40 %] 35 %  SpO2:  [90 %-100 %] 99 %    I/O:  Intake/Output Summary (Last 24 hours) at 12/20/2021 1017  Last data filed at 12/20/2021 1000  Gross per 24 hour   Intake 2258.87 ml   Output 3830 ml   Net -1571.13 ml       Physical Exam:  GEN: sitting in bed.  No acute distress  HEENT: pupils equal.  No appreciable cranial trauma  Pulm: seemingly CTAB.  Scattered rale  Cardiac: regular rhythm/rate. No murmur, rub, gallop  GI: soft, non distended  Neuro: alert to self only.  Moves all extremities symmetrically  Integument: no appreciable skin breakdown  Vascular: LE warm, well perfused      Medications:    artificial saliva  2 spray Swish & Spit 4x Daily     aspirin  81 mg Per Feeding Tube Daily     atorvastatin  40 mg Oral or Feeding Tube QPM     azithromycin  250 mg Oral Daily     ceFEPIme (MAXIPIME) IV  2 g Intravenous Q12H     folic acid  1 mg Oral Daily     heparin ANTICOAGULANT  5,000 Units Subcutaneous Q8H     insulin aspart  1-6 Units Subcutaneous Q4H     ipratropium - albuterol 0.5 mg/2.5 mg/3 mL  3 mL Nebulization Q4H     melatonin  10 mg Oral At Bedtime     multivitamins w/minerals  15 mL Per Feeding Tube Daily     nicotine  1 patch Transdermal Daily     nicotine   Transdermal Q8H     predniSONE  40 mg Oral Daily     protein modular  1 packet Per Feeding Tube 4x Daily     QUEtiapine  100 mg Oral or Feeding Tube BID     QUEtiapine  200 mg Oral or Feeding Tube QPM     thiamine  100 mg Oral or Feeding Tube Daily     ticagrelor  90 mg Oral or Feeding Tube BID       dexmedetomidine 1 mcg/kg/hr (12/20/21 0700)     dextrose       D5W 50 mL/hr at 12/20/21 0700      norepinephrine 0.02 mcg/kg/min (12/20/21 0700)         Labs:   CMP  Recent Labs   Lab 12/20/21  0354 12/20/21  0349 12/19/21  2357 12/19/21  2241 12/19/21  2036 12/19/21  1612 12/19/21  1610 12/19/21  1403 12/19/21  0917 12/19/21  0317 12/18/21  1611 12/18/21  1606 12/18/21  1035 12/18/21  0401 12/17/21  0500 12/17/21  0455   NA  --  144  144  --  145*  --  142  142  --  143  --  144   < > 145*  145*   < > 148*   < > 149*   POTASSIUM  --  3.5  --   --   --  4.1  --   --   --  3.5  --  4.1   < > 3.4   < > 3.8   CHLORIDE  --  107  --   --   --  109  --   --   --  112*  --  112*  --  116*   < > 117*   CO2  --  30  --   --   --  26  --   --   --  25  --  26  --  23   < > 23   ANIONGAP  --  7  --   --   --  7  --   --   --  7  --  7  --  9   < > 9   * 177*  173* 141*  --    < > 236*   < >  --    < > 173*  167*   < > 232*   < > 168*  159*  149*   < > 229*   BUN  --  61*  --   --   --  52*  --   --   --  47*  --  50*  --  57*   < > 65*   CR  --  1.19  --   --   --  1.06  --   --   --  0.96  --  0.96  --  0.96   < > 1.25   GFRESTIMATED  --  68  --   --   --  78  --   --   --  88  --  88  --  88   < > 64   BRIDGETTE  --  8.5  --   --   --  8.4*  --   --   --  8.2*  --  8.4*  --  8.4*   < > 8.1*   MAG  --  2.2  --   --   --   --   --   --   --  2.1  --   --   --  2.3  --  2.5*   PHOS  --  4.8*  --   --   --   --   --   --   --  3.0  --   --   --  3.0  --  4.4   PROTTOTAL  --  7.9  --   --   --   --   --   --   --  7.2  --   --   --  7.0  --  6.8   ALBUMIN  --  2.1*  --   --   --   --   --   --   --  1.8*  --   --   --  1.6*  --  1.5*   BILITOTAL  --  0.5  --   --   --   --   --   --   --  0.8  --   --   --  0.3  --  0.5   ALKPHOS  --  98  --   --   --   --   --   --   --  98  --   --   --  104  --  89   AST  --  29  --   --   --   --   --   --   --  40  --   --   --  42  --  31   ALT  --  55  --   --   --   --   --   --   --  58  --   --   --  44  --  31    < > = values in this interval not displayed.     CBC  Recent  Labs   Lab 12/20/21  0349 12/19/21  0317 12/18/21  0401 12/17/21  0455   WBC 19.1* 15.7* 20.5* 17.1*   RBC 2.93* 2.82* 2.67* 2.34*   HGB 9.1* 8.5* 8.2* 7.3*   HCT 28.2* 26.9* 25.5* 22.9*   MCV 96 95 96 98   MCH 31.1 30.1 30.7 31.2   MCHC 32.3 31.6 32.2 31.9   RDW 13.0 13.1 13.5 14.0   * 619* 571* 416     Arterial Blood Gas  Recent Labs   Lab 12/20/21  0225 12/18/21  0658 12/17/21  1028 12/17/21  0454 12/16/21  0742 12/15/21  2359 12/15/21  1950   PH  --   --   --  7.41 7.41 7.43 7.42   PCO2  --   --   --  22* 37 31* 31*   PO2  --   --   --  143* 99 79* 75*   HCO3  --   --   --  14* 23 20* 20*   O2PER 35 45 45 45 50 45 40         Pertinent Imaging Studies:  Coronary angiogram:   Refractory VT/VF cardiac arrest.  Cardiogenic shock.  STEMI involving the OM1 as culprit.  Three vessel severe CAD involving the  of the RCA, mid LCx and OM1 severe disease with occlusion of OM1 as culprit in STEMI, and moderate proximal LAD lesions likely hemodynamically significant.  CPR  VA ECMO placement.  IABP placement.  Thermogard placement with initiation of hypothermia protocol.  Right radial arterial line placement.  PCI with three drug eluting stents to the proximal, mid LCx and OM1.    Echo:  Left ventricular function is decreased. The ejection fraction is 40-45% (mildly reduced). Mild diffuse hypokinesis is present.  Global right ventricular function is normal. The right ventricle is normal size.  This study was compared with the study from 12/12/2021. There has been an  improvement in the biventricular function since the patient has been decannulated.       Mary Leger PA-C  Critical Care Cardiology  Pager: [phone]

## 2021-12-20 NOTE — PLAN OF CARE
Pt working with PT/OT this shift and pt in OT reclining chair.  Pt tolerated sitting in chair shifting weight q 1-2 hrs. Pt not yelling or screaming this shift and no profanity.  Updated brother via telephone.  For complete vital signs and assessments, please see documentation flow sheet.  Will continue to monitor.

## 2021-12-20 NOTE — PROGRESS NOTES
Major Shift Events:  Sinus tach. BiPAP 10/5 35% for majority of night. Switched to Oxymask 7L at 0445. Patient continues to be confused and only oriented to person. At 0030 patient became very agitated. Nursing staff tried to reorient patient and patient became combative and was attempting to kick and hit staff (this writer was kicked). Wrist and ankle restraints were applied. PRN Haldol given. Precedex increased. At 0200 patient's BP decreased and norepi was started. At 0400 patient became combative again and one time dose of zyprexa was given. Patient did not sleep at all overnight. Ankle restraints have now been removed and wrist restraints remain in place. Norepi is at 0.02 and precedex is at 1.0.  Plan: Wean O2 as able. Promote sleep.   For vital signs and complete assessments, please see documentation flowsheets.

## 2021-12-20 NOTE — PHARMACY-ADMISSION MEDICATION HISTORY
Admission Medication History Completed by Pharmacy    See [facility] Admission Navigator for allergy information, preferred outpatient pharmacy, prior to admission medications and immunization status.     Medication History Sources:     Surescript fill history    Phone call to Amor arnold    Changes made to PTA medication list (reason):    Added: PRN ibuprofen    Deleted: None    Changed: None    Additional Information:    None    Prior to Admission medications    Medication Sig Last Dose Taking? Auth Provider   ibuprofen (ADVIL/MOTRIN) 200 MG tablet Take 200 mg by mouth every 4 hours as needed for mild pain   Unknown, Entered By History       Date completed: 12/20/21    Medication history completed by: MICHEL PARIS [facility]

## 2021-12-20 NOTE — PROGRESS NOTES
CLINICAL NUTRITION SERVICES - BRIEF NOTE   (see RD note on 12/14 for full assessment)    TF held while pt requiring BiPAP (x5 days). Now tolerating NC.   Per discussion with team, will re-advance feeds toward goal.       Nutrition changes today:  Adv by 10 ml q8h to eventual goal rate @ 45 ml/hr, pending electrolytes    Dora High RD, LD  [id number]  Pgr: [phone]

## 2021-12-21 ENCOUNTER — APPOINTMENT (OUTPATIENT)
Dept: GENERAL RADIOLOGY | Facility: CLINIC | Age: 56
End: 2021-12-21
Attending: NURSE PRACTITIONER
Payer: COMMERCIAL

## 2021-12-21 ENCOUNTER — APPOINTMENT (OUTPATIENT)
Dept: PHYSICAL THERAPY | Facility: CLINIC | Age: 56
End: 2021-12-21
Attending: PHYSICIAN ASSISTANT
Payer: COMMERCIAL

## 2021-12-21 ENCOUNTER — APPOINTMENT (OUTPATIENT)
Dept: SPEECH THERAPY | Facility: CLINIC | Age: 56
End: 2021-12-21
Payer: COMMERCIAL

## 2021-12-21 LAB
7AMINOCLONAZEPAM SERPL-MCNC: NEGATIVE NG/ML
ALBUMIN SERPL-MCNC: 2.2 G/DL (ref 3.4–5)
ALP SERPL-CCNC: 98 U/L (ref 40–150)
ALPRAZ SERPL-MCNC: NEGATIVE NG/ML
ALT SERPL W P-5'-P-CCNC: 59 U/L (ref 0–70)
AMPHET BLD CFM-MCNC: NEGATIVE NG/ML
ANION GAP SERPL CALCULATED.3IONS-SCNC: 6 MMOL/L (ref 3–14)
ANION GAP SERPL CALCULATED.3IONS-SCNC: 7 MMOL/L (ref 3–14)
APAP BLD-MCNC: NEGATIVE UG/ML
APTT PPP: 25 SECONDS (ref 22–38)
AST SERPL W P-5'-P-CCNC: 26 U/L (ref 0–45)
ATRIAL RATE - MUSE: 123 BPM
ATRIAL RATE - MUSE: 82 BPM
BARBITURATES SPEC-MCNC: NEGATIVE UG/ML
BENZODIAZ SPEC QL: POSITIVE
BENZODIAZ SPEC-MCNC: ABNORMAL NG/ML
BILIRUB SERPL-MCNC: 0.3 MG/DL (ref 0.2–1.3)
BUN SERPL-MCNC: 88 MG/DL (ref 7–30)
BUN SERPL-MCNC: 89 MG/DL (ref 7–30)
BUPRENORPHINE SERPL-MCNC: NEGATIVE NG/ML
BZE BLD CFM-MCNC: NEGATIVE NG/ML
CA-I BLD-MCNC: 4.8 MG/DL (ref 4.4–5.2)
CALCIUM SERPL-MCNC: 8.6 MG/DL (ref 8.5–10.1)
CALCIUM SERPL-MCNC: 8.6 MG/DL (ref 8.5–10.1)
CARBOXYTHC BLD-MCNC: NEGATIVE NG/ML
CARISOPRODOL IA: NEGATIVE UG/ML
CHLORDIAZEP SERPL-MCNC: NEGATIVE NG/ML
CHLORIDE BLD-SCNC: 114 MMOL/L (ref 94–109)
CHLORIDE BLD-SCNC: 118 MMOL/L (ref 94–109)
CLONAZEPAM SERPL-MCNC: NEGATIVE NG/ML
CO2 SERPL-SCNC: 26 MMOL/L (ref 20–32)
CO2 SERPL-SCNC: 26 MMOL/L (ref 20–32)
CREAT SERPL-MCNC: 1.1 MG/DL (ref 0.66–1.25)
CREAT SERPL-MCNC: 1.13 MG/DL (ref 0.66–1.25)
DECLARED MEDICATIONS: ABNORMAL
DESALKYLFLURAZ SERPL CFM-MCNC: NEGATIVE NG/ML
DIASTOLIC BLOOD PRESSURE - MUSE: NORMAL MMHG
DIASTOLIC BLOOD PRESSURE - MUSE: NORMAL MMHG
DIAZEPAM SERPL-MCNC: NEGATIVE NG/ML
DRUGS FLD: ABNORMAL
ERYTHROCYTE [DISTWIDTH] IN BLOOD BY AUTOMATED COUNT: 13.2 % (ref 10–15)
ETHANOL BLD-MCNC: NEGATIVE GM/DL
FENTANYL BLD CFM-MCNC: 3.8 NG/ML
FENTANYL IA: ABNORMAL NG/ML
FENTANYL SPEC QL: POSITIVE
FLURAZEPAM SPEC-MCNC: NEGATIVE NG/ML
GABAPENTIN IA: NEGATIVE UG/ML
GFR SERPL CREATININE-BSD FRML MDRD: 75 ML/MIN/1.73M2
GFR SERPL CREATININE-BSD FRML MDRD: 76 ML/MIN/1.73M2
GLUCOSE BLD-MCNC: 182 MG/DL (ref 70–99)
GLUCOSE BLD-MCNC: 196 MG/DL (ref 70–99)
GLUCOSE BLD-MCNC: 205 MG/DL (ref 70–99)
GLUCOSE BLDC GLUCOMTR-MCNC: 145 MG/DL (ref 70–99)
GLUCOSE BLDC GLUCOMTR-MCNC: 157 MG/DL (ref 70–99)
GLUCOSE BLDC GLUCOMTR-MCNC: 158 MG/DL (ref 70–99)
GLUCOSE BLDC GLUCOMTR-MCNC: 158 MG/DL (ref 70–99)
GLUCOSE BLDC GLUCOMTR-MCNC: 170 MG/DL (ref 70–99)
GLUCOSE BLDC GLUCOMTR-MCNC: 180 MG/DL (ref 70–99)
GLUCOSE BLDC GLUCOMTR-MCNC: 182 MG/DL (ref 70–99)
HCT VFR BLD AUTO: 28.9 % (ref 40–53)
HGB BLD-MCNC: 9.3 G/DL (ref 13.3–17.7)
INR PPP: 1.24 (ref 0.85–1.15)
INTERPRETATION ECG - MUSE: NORMAL
INTERPRETATION ECG - MUSE: NORMAL
LACTATE SERPL-SCNC: 1.5 MMOL/L (ref 0.7–2)
LDH SERPL L TO P-CCNC: 326 U/L (ref 85–227)
LORAZEPAM SERPL-MCNC: NEGATIVE NG/ML
MAGNESIUM SERPL-MCNC: 2.5 MG/DL (ref 1.6–2.3)
MCH RBC QN AUTO: 30.8 PG (ref 26.5–33)
MCHC RBC AUTO-ENTMCNC: 32.2 G/DL (ref 31.5–36.5)
MCV RBC AUTO: 96 FL (ref 78–100)
MEPERIDINE SERPLBLD-MCNC: NEGATIVE NG/ML
METHADONE SAL CFM-MCNC: NEGATIVE NG/ML
MIDAZOLAM SERPL-MCNC: 166.1 NG/ML
NORCHLORDIAZEP SERPL-MCNC: NEGATIVE NG/ML
NORDIAZEPAM SPEC-MCNC: NEGATIVE NG/ML
NORFENTANYL BLD CFM-MCNC: 1.2 NG/ML
OPIATES SPEC-MCNC: NEGATIVE NG/ML
OXAZEPAM SERPL CFM-MCNC: NEGATIVE NG/ML
OXYCODONE SERPLBLD SCN-MCNC: NEGATIVE NG/ML
P AXIS - MUSE: 54 DEGREES
P AXIS - MUSE: 63 DEGREES
PCP SPEC-MCNC: NEGATIVE NG/ML
PHOSPHATE SERPL-MCNC: 4.2 MG/DL (ref 2.5–4.5)
PLATELET # BLD AUTO: 621 10E3/UL (ref 150–450)
POTASSIUM BLD-SCNC: 3.4 MMOL/L (ref 3.4–5.3)
POTASSIUM BLD-SCNC: 3.6 MMOL/L (ref 3.4–5.3)
POTASSIUM BLD-SCNC: 3.7 MMOL/L (ref 3.4–5.3)
PR INTERVAL - MUSE: 108 MS
PR INTERVAL - MUSE: 124 MS
PROPOXYPH SPEC-MCNC: NEGATIVE NG/ML
PROT SERPL-MCNC: 7.8 G/DL (ref 6.8–8.8)
QRS DURATION - MUSE: 94 MS
QRS DURATION - MUSE: 98 MS
QT - MUSE: 310 MS
QT - MUSE: 368 MS
QTC - MUSE: 429 MS
QTC - MUSE: 443 MS
R AXIS - MUSE: 37 DEGREES
R AXIS - MUSE: 52 DEGREES
RBC # BLD AUTO: 3.02 10E6/UL (ref 4.4–5.9)
SARS-COV-2 RNA RESP QL NAA+PROBE: NEGATIVE
SODIUM SERPL-SCNC: 147 MMOL/L (ref 133–144)
SODIUM SERPL-SCNC: 147 MMOL/L (ref 133–144)
SODIUM SERPL-SCNC: 150 MMOL/L (ref 133–144)
SYSTOLIC BLOOD PRESSURE - MUSE: NORMAL MMHG
SYSTOLIC BLOOD PRESSURE - MUSE: NORMAL MMHG
T AXIS - MUSE: 250 DEGREES
T AXIS - MUSE: 252 DEGREES
TEMAZEPAM SERPL-MCNC: NEGATIVE NG/ML
TRAMADOL BLD-MCNC: NEGATIVE NG/ML
TRIAZOLAM SPEC-MCNC: NEGATIVE NG/ML
VENTRICULAR RATE- MUSE: 123 BPM
VENTRICULAR RATE- MUSE: 82 BPM
WBC # BLD AUTO: 17.1 10E3/UL (ref 4–11)

## 2021-12-21 PROCEDURE — 250N000013 HC RX MED GY IP 250 OP 250 PS 637: Performed by: STUDENT IN AN ORGANIZED HEALTH CARE EDUCATION/TRAINING PROGRAM

## 2021-12-21 PROCEDURE — 250N000011 HC RX IP 250 OP 636: Performed by: NURSE PRACTITIONER

## 2021-12-21 PROCEDURE — 250N000011 HC RX IP 250 OP 636: Performed by: INTERNAL MEDICINE

## 2021-12-21 PROCEDURE — 250N000011 HC RX IP 250 OP 636: Performed by: PHYSICIAN ASSISTANT

## 2021-12-21 PROCEDURE — 83605 ASSAY OF LACTIC ACID: CPT | Performed by: INTERNAL MEDICINE

## 2021-12-21 PROCEDURE — 83615 LACTATE (LD) (LDH) ENZYME: CPT | Performed by: NURSE PRACTITIONER

## 2021-12-21 PROCEDURE — 85610 PROTHROMBIN TIME: CPT | Performed by: NURSE PRACTITIONER

## 2021-12-21 PROCEDURE — 250N000009 HC RX 250: Performed by: INTERNAL MEDICINE

## 2021-12-21 PROCEDURE — 92526 ORAL FUNCTION THERAPY: CPT | Mod: GN

## 2021-12-21 PROCEDURE — 85027 COMPLETE CBC AUTOMATED: CPT | Performed by: INTERNAL MEDICINE

## 2021-12-21 PROCEDURE — 82330 ASSAY OF CALCIUM: CPT | Performed by: INTERNAL MEDICINE

## 2021-12-21 PROCEDURE — 258N000003 HC RX IP 258 OP 636: Performed by: PHYSICIAN ASSISTANT

## 2021-12-21 PROCEDURE — 82947 ASSAY GLUCOSE BLOOD QUANT: CPT | Performed by: INTERNAL MEDICINE

## 2021-12-21 PROCEDURE — 250N000013 HC RX MED GY IP 250 OP 250 PS 637: Performed by: NURSE PRACTITIONER

## 2021-12-21 PROCEDURE — 71045 X-RAY EXAM CHEST 1 VIEW: CPT

## 2021-12-21 PROCEDURE — 99233 SBSQ HOSP IP/OBS HIGH 50: CPT | Performed by: INTERNAL MEDICINE

## 2021-12-21 PROCEDURE — 93005 ELECTROCARDIOGRAM TRACING: CPT

## 2021-12-21 PROCEDURE — 250N000013 HC RX MED GY IP 250 OP 250 PS 637: Performed by: INTERNAL MEDICINE

## 2021-12-21 PROCEDURE — 250N000013 HC RX MED GY IP 250 OP 250 PS 637: Performed by: PHYSICIAN ASSISTANT

## 2021-12-21 PROCEDURE — 71045 X-RAY EXAM CHEST 1 VIEW: CPT | Mod: 26 | Performed by: RADIOLOGY

## 2021-12-21 PROCEDURE — 999N000157 HC STATISTIC RCP TIME EA 10 MIN

## 2021-12-21 PROCEDURE — 94640 AIRWAY INHALATION TREATMENT: CPT

## 2021-12-21 PROCEDURE — 200N000002 HC R&B ICU UMMC

## 2021-12-21 PROCEDURE — U0003 INFECTIOUS AGENT DETECTION BY NUCLEIC ACID (DNA OR RNA); SEVERE ACUTE RESPIRATORY SYNDROME CORONAVIRUS 2 (SARS-COV-2) (CORONAVIRUS DISEASE [COVID-19]), AMPLIFIED PROBE TECHNIQUE, MAKING USE OF HIGH THROUGHPUT TECHNOLOGIES AS DESCRIBED BY CMS-2020-01-R: HCPCS | Performed by: PHYSICIAN ASSISTANT

## 2021-12-21 PROCEDURE — 84100 ASSAY OF PHOSPHORUS: CPT | Performed by: NURSE PRACTITIONER

## 2021-12-21 PROCEDURE — 93010 ELECTROCARDIOGRAM REPORT: CPT | Mod: 59 | Performed by: INTERNAL MEDICINE

## 2021-12-21 PROCEDURE — 84132 ASSAY OF SERUM POTASSIUM: CPT | Performed by: INTERNAL MEDICINE

## 2021-12-21 PROCEDURE — 97530 THERAPEUTIC ACTIVITIES: CPT | Mod: GP

## 2021-12-21 PROCEDURE — 250N000009 HC RX 250: Performed by: PHYSICIAN ASSISTANT

## 2021-12-21 PROCEDURE — 94640 AIRWAY INHALATION TREATMENT: CPT | Mod: 76

## 2021-12-21 PROCEDURE — 84295 ASSAY OF SERUM SODIUM: CPT | Performed by: INTERNAL MEDICINE

## 2021-12-21 PROCEDURE — 84132 ASSAY OF SERUM POTASSIUM: CPT | Performed by: PHYSICIAN ASSISTANT

## 2021-12-21 PROCEDURE — 84295 ASSAY OF SERUM SODIUM: CPT | Performed by: PHYSICIAN ASSISTANT

## 2021-12-21 PROCEDURE — 83735 ASSAY OF MAGNESIUM: CPT | Performed by: NURSE PRACTITIONER

## 2021-12-21 PROCEDURE — 97116 GAIT TRAINING THERAPY: CPT | Mod: GP

## 2021-12-21 PROCEDURE — 80053 COMPREHEN METABOLIC PANEL: CPT | Performed by: PHYSICIAN ASSISTANT

## 2021-12-21 PROCEDURE — 85730 THROMBOPLASTIN TIME PARTIAL: CPT | Performed by: INTERNAL MEDICINE

## 2021-12-21 RX ORDER — POTASSIUM CHLORIDE 7.45 MG/ML
10 INJECTION INTRAVENOUS ONCE
Status: COMPLETED | OUTPATIENT
Start: 2021-12-21 | End: 2021-12-21

## 2021-12-21 RX ORDER — DEXMEDETOMIDINE HYDROCHLORIDE 4 UG/ML
.1-1.2 INJECTION, SOLUTION INTRAVENOUS CONTINUOUS
Status: DISCONTINUED | OUTPATIENT
Start: 2021-12-21 | End: 2021-12-22

## 2021-12-21 RX ORDER — POTASSIUM CHLORIDE 29.8 MG/ML
20 INJECTION INTRAVENOUS ONCE
Status: COMPLETED | OUTPATIENT
Start: 2021-12-21 | End: 2021-12-21

## 2021-12-21 RX ADMIN — THIAMINE HCL TAB 100 MG 100 MG: 100 TAB at 09:12

## 2021-12-21 RX ADMIN — Medication 1 PACKET: at 19:56

## 2021-12-21 RX ADMIN — IPRATROPIUM BROMIDE AND ALBUTEROL SULFATE 3 ML: 2.5; .5 SOLUTION RESPIRATORY (INHALATION) at 19:47

## 2021-12-21 RX ADMIN — DEXMEDETOMIDINE HYDROCHLORIDE 1 MCG/KG/HR: 4 INJECTION, SOLUTION INTRAVENOUS at 10:11

## 2021-12-21 RX ADMIN — SODIUM CHLORIDE, POTASSIUM CHLORIDE, SODIUM LACTATE AND CALCIUM CHLORIDE 500 ML: 600; 310; 30; 20 INJECTION, SOLUTION INTRAVENOUS at 11:29

## 2021-12-21 RX ADMIN — HEPARIN SODIUM 5000 UNITS: 5000 INJECTION, SOLUTION INTRAVENOUS; SUBCUTANEOUS at 03:57

## 2021-12-21 RX ADMIN — HALOPERIDOL LACTATE 10 MG: 5 INJECTION, SOLUTION INTRAMUSCULAR at 09:12

## 2021-12-21 RX ADMIN — LOPERAMIDE HYDROCHLORIDE 2 MG: 2 CAPSULE ORAL at 04:17

## 2021-12-21 RX ADMIN — Medication 10 MG: at 20:59

## 2021-12-21 RX ADMIN — DEXMEDETOMIDINE HYDROCHLORIDE 1.2 MCG/KG/HR: 4 INJECTION, SOLUTION INTRAVENOUS at 00:18

## 2021-12-21 RX ADMIN — INSULIN ASPART 1 UNITS: 100 INJECTION, SOLUTION INTRAVENOUS; SUBCUTANEOUS at 03:57

## 2021-12-21 RX ADMIN — INSULIN ASPART 1 UNITS: 100 INJECTION, SOLUTION INTRAVENOUS; SUBCUTANEOUS at 00:32

## 2021-12-21 RX ADMIN — ATORVASTATIN CALCIUM 40 MG: 40 TABLET, FILM COATED ORAL at 19:55

## 2021-12-21 RX ADMIN — POTASSIUM CHLORIDE 10 MEQ: 7.46 INJECTION, SOLUTION INTRAVENOUS at 13:31

## 2021-12-21 RX ADMIN — HEPARIN SODIUM 5000 UNITS: 5000 INJECTION, SOLUTION INTRAVENOUS; SUBCUTANEOUS at 19:56

## 2021-12-21 RX ADMIN — TICAGRELOR 90 MG: 90 TABLET ORAL at 19:55

## 2021-12-21 RX ADMIN — Medication 1 PACKET: at 16:35

## 2021-12-21 RX ADMIN — TICAGRELOR 90 MG: 90 TABLET ORAL at 09:13

## 2021-12-21 RX ADMIN — DEXMEDETOMIDINE HYDROCHLORIDE 1 MCG/KG/HR: 4 INJECTION, SOLUTION INTRAVENOUS at 14:27

## 2021-12-21 RX ADMIN — HEPARIN SODIUM 5000 UNITS: 5000 INJECTION, SOLUTION INTRAVENOUS; SUBCUTANEOUS at 12:48

## 2021-12-21 RX ADMIN — INSULIN ASPART 1 UNITS: 100 INJECTION, SOLUTION INTRAVENOUS; SUBCUTANEOUS at 23:54

## 2021-12-21 RX ADMIN — HALOPERIDOL LACTATE 10 MG: 5 INJECTION, SOLUTION INTRAMUSCULAR at 13:28

## 2021-12-21 RX ADMIN — INSULIN ASPART 1 UNITS: 100 INJECTION, SOLUTION INTRAVENOUS; SUBCUTANEOUS at 09:22

## 2021-12-21 RX ADMIN — Medication 2 SPRAY: at 19:55

## 2021-12-21 RX ADMIN — DEXMEDETOMIDINE HYDROCHLORIDE 1 MCG/KG/HR: 4 INJECTION, SOLUTION INTRAVENOUS at 05:14

## 2021-12-21 RX ADMIN — IPRATROPIUM BROMIDE AND ALBUTEROL SULFATE 3 ML: 2.5; .5 SOLUTION RESPIRATORY (INHALATION) at 16:43

## 2021-12-21 RX ADMIN — QUETIAPINE FUMARATE 200 MG: 50 TABLET ORAL at 19:55

## 2021-12-21 RX ADMIN — Medication 1 PACKET: at 09:14

## 2021-12-21 RX ADMIN — IPRATROPIUM BROMIDE AND ALBUTEROL SULFATE 3 ML: 2.5; .5 SOLUTION RESPIRATORY (INHALATION) at 03:34

## 2021-12-21 RX ADMIN — INSULIN ASPART 1 UNITS: 100 INJECTION, SOLUTION INTRAVENOUS; SUBCUTANEOUS at 19:56

## 2021-12-21 RX ADMIN — FOLIC ACID 1 MG: 1 TABLET ORAL at 09:12

## 2021-12-21 RX ADMIN — CEFEPIME HYDROCHLORIDE 2 G: 2 INJECTION, POWDER, FOR SOLUTION INTRAVENOUS at 00:15

## 2021-12-21 RX ADMIN — Medication 1 PACKET: at 12:48

## 2021-12-21 RX ADMIN — INSULIN ASPART 1 UNITS: 100 INJECTION, SOLUTION INTRAVENOUS; SUBCUTANEOUS at 16:35

## 2021-12-21 RX ADMIN — ACETAMINOPHEN 650 MG: 325 TABLET, FILM COATED ORAL at 04:17

## 2021-12-21 RX ADMIN — Medication 2 SPRAY: at 12:48

## 2021-12-21 RX ADMIN — NICOTINE 1 PATCH: 14 PATCH, EXTENDED RELEASE TRANSDERMAL at 09:13

## 2021-12-21 RX ADMIN — Medication 1 PACKET: at 16:36

## 2021-12-21 RX ADMIN — ASPIRIN 81 MG CHEWABLE TABLET 81 MG: 81 TABLET CHEWABLE at 09:12

## 2021-12-21 RX ADMIN — DEXMEDETOMIDINE HYDROCHLORIDE 1 MCG/KG/HR: 4 INJECTION, SOLUTION INTRAVENOUS at 14:26

## 2021-12-21 RX ADMIN — INSULIN ASPART 1 UNITS: 100 INJECTION, SOLUTION INTRAVENOUS; SUBCUTANEOUS at 11:34

## 2021-12-21 RX ADMIN — MULTIVIT AND MINERALS-FERROUS GLUCONATE 9 MG IRON/15 ML ORAL LIQUID 15 ML: at 09:12

## 2021-12-21 RX ADMIN — IPRATROPIUM BROMIDE AND ALBUTEROL SULFATE 3 ML: 2.5; .5 SOLUTION RESPIRATORY (INHALATION) at 12:04

## 2021-12-21 RX ADMIN — Medication 2 SPRAY: at 16:36

## 2021-12-21 RX ADMIN — Medication 2 SPRAY: at 09:12

## 2021-12-21 RX ADMIN — POTASSIUM CHLORIDE 20 MEQ: 29.8 INJECTION, SOLUTION INTRAVENOUS at 05:20

## 2021-12-21 ASSESSMENT — ACTIVITIES OF DAILY LIVING (ADL)
ADLS_ACUITY_SCORE: 14
ADLS_ACUITY_SCORE: 14
ADLS_ACUITY_SCORE: 13
ADLS_ACUITY_SCORE: 13
ADLS_ACUITY_SCORE: 14
ADLS_ACUITY_SCORE: 14
ADLS_ACUITY_SCORE: 13
ADLS_ACUITY_SCORE: 14
ADLS_ACUITY_SCORE: 13
ADLS_ACUITY_SCORE: 14
ADLS_ACUITY_SCORE: 13
ADLS_ACUITY_SCORE: 14
ADLS_ACUITY_SCORE: 13
ADLS_ACUITY_SCORE: 14
ADLS_ACUITY_SCORE: 13
ADLS_ACUITY_SCORE: 14
ADLS_ACUITY_SCORE: 13
ADLS_ACUITY_SCORE: 14

## 2021-12-21 ASSESSMENT — MIFFLIN-ST. JEOR: SCORE: 1400.38

## 2021-12-21 NOTE — PROGRESS NOTES
Major Shift Events:  Sinus rhythm. 2L NC. Oriented to person and sometimes time. Patient slept for majority of shift. Precedex was able to be turned down to 1.0. Rass -1 to 0 throughout shift. Maps >65 without the use of norepi. Good urine output.  Plan: Enhance sleep. Delirium precautions. Wean O2 as able. Decrease precedex as able.  For vital signs and complete assessments, please see documentation flowsheets.

## 2021-12-21 NOTE — PLAN OF CARE
Hospital day#15:  Major Shift Events:    1) disorientation continues.  However, less agitated throughout the day.  Precedex titrated.  Scheduled haldol continues.  No PRN haldol administered.  Delirium prevention measures continue.  1:1 sitter continues  2) Up in chair x4 hours.  Ambulated with 2+ assist in room late this afternoon.  Tachycardic into the 130s and brief drop in 02 sats to 88% with activity.  Both improved once back in bed and at rest.  3) Gutiérrez cath dc'd this afternoon.  Due to void.    4) Loose incont stool x1 today.  5) routine covid swab neg    Plan: continue to wean precedex.  This is the only factor that causes him to remain in the ICU at this time.    For vital signs and complete assessments, please see documentation flowsheets.

## 2021-12-21 NOTE — PROGRESS NOTES
"Patient's ex-wife requested to speak with the patient on his room phone.  Attempted to explain to her that the patient is too confused and disoriented to carry on a conversation at this time.      The patient's ex-wife requested an update on the patient.  She indicated on facebook she saw he had a heart attack and has been in a coma for 9 days.      Writer indicated that updates will need to be ok'd by the patient's brothers first.  The ex-wife stated, \"just transfer me to his phone bitch\".    Patient's brother indicated that the ex-wife shouldn't be given updates and shouldn't be transferred to the patient's phone.      "

## 2021-12-21 NOTE — PLAN OF CARE
Major Shift Events:  Pt continues to be delirious, intermittently combative, swearing, trying to get out of bed. Haldol started, dex @ 1.2. Levo currently off, highest was 0.03. Gutiérrez with good UO.   Plan: Continue to monitor hemodynamics, delirium precautions.     For vital signs and complete assessments, please see documentation flowsheets.

## 2021-12-21 NOTE — PROGRESS NOTES
"    [facility]   Critical Care Cardiology - Progress Note    Bruce Blount MRN: [id number]  Age: 56 year old, : 1965  Date: 2021    Assessment and Plan:  Bruce Blount is a 56 year old male with PMHx current tobacco use and ETOH abuse who presented to [facility]  after out of hospital cardiac arrest.      Per EMS and brother, patient was in his usual state of health prior to arrest. Patients brother heard a \"thump\" and found patient unresponsive and agonal breathing. 911 called and CPR initiated. EMS arrived within 4 minutes. Initial rhythm VF--> shocked x ~7 with ROSC upon arrival to ED. Total CPR team per EMS ~ 40 minutes. 3 mg Epinephrine, 300 mg Amio given via EMS. Patient EKG reveals Valentín inferior/posterior leads with reciprocal changes. Patient initially presented with an Igel and was intubated with ETT in the ED. He arrested and was again shocked in the ED with ROSC.      Taken urgently to CCL where he underwent coronary angiogram and again arrested requiring manual CPR and was then cannulated on VA ECMO via right with 17 Slovak cannula RCFA, 25 Fr cannula RCFV. Coronary angiogram revealed  of RCA and acute culprit lesion of LCx/OM1, s/p PCI to pLCx, mid LCx, and OM1 with TERRY. IABP placed for decreased pulsatility. Thermogard cooling cath placed and patient was transferred to ICU for further management. Decannulated . IABP removed . Extubated .     Today's Plan:  - no change to delirium management today  - plan to wean precedex tomorrow    Neurology  # Anoxic brain injury  # ICU delirium  # Chest wall pain    # Hx of possible ETOH abuse per family  S/p therapeutic hypothermia. Extubated . Initial head CT without acute pathology. Able to follow commands intermittently. Have trialed Haldol without success, transitioned to Seroquel 50 mg TID plus 100 mg at bedtime with mild improvement. Added Precedex gtt and sitter overnight and clearer today. " Neuro-crit consulted- since signed off  - precedex ggt  - 10mg melatonin at bedtime  - 200mg Seroquel at night  - 10mg haldol BID (08:00 and 14:00)  - continue PRN haldol  - Tylenol and lidocaine patches for additional pain control as needed  - Folate, Thiamine and multi vitamin for ETOH abuse.     Cardiovascular  # Ischemic Cardiomyopathy (EF 40-45%)  # S/p PCI pLCx, mLCx, OM1  # Residual RCA   # HTN, HLD  # Refractory VF cardiac arrest 2/2 secondary to STEMI, resolved  # Cardiogenic shock requiring VA ECMO, resolved  # Concern for limb ischemia s/p distal reperfusion cannula left leg  S/p Peripheral V-A ECMO inserted via RCFA and RCFV 17/25 fr.  Decannulated 12/13. IABP removed 12/13.  Intermittently requiring pressors, usually due to sedation/meds  - GDMT   - DAPT: Continue ASA 81mg and ticagrelor 90mg BID    - Statin: 40mg Lipitor daily   - Hold ACE/ARB given hypotension   - Holding beta blocker given shock/hypotension  -  of RCA not intervened upon  - wound vac off 12/20     Pulmonary  # Acute hypoxemic respiratory failure   # Klebsiella pneumonia  # Rib Fractures  # Sternal Fracture  # Tobacco Abuse (2PPD)  # Probable COPD exacerbation  Extubated 12/14 to BiPAP. Now sating well on 2L NC.  Completed antibiotic/steroid course for COPD exacerbation 12/20  - RT pulmonary hygiene  - Duoneb Q4 hours; consider de-escalation tomorrow       Gastrointestinal, Nutrition  # Loose Stool  # Shock liver 2/2 cardiac arrest, resolved  No known medical hx. C. Diff negative  - Monitor LFTs      Renal, Electrolytes  # Hypernatremia  # Hypoalbuminemia  # Acute Renal Injury, resolved  # Lactic acidosis, resolved  # Hyperkalemia, resolved   # Hypocalcemia, resolved  # Hyperphosphatemia, resolved  No CRRT since decannulation 12/13. Creatinine 1.1, HCO3 26 today. BUN 89. Sodium elevated 12/14, started on D5W infusion. Sodium now 147  - give small LR fluid bolus  - Monitor urine output     Infectious Disease  # Aspiration  Pneumonia (klebsiella, staph aureus)  # Leukocytosis  # Fever  # Lactic acidosis, resolved  WBC 19.1. Tmax 98.4. Pan culture 12/14 given temp and WBC. Susceptibilities resulted the same as previous  - PRN acetaminophen  Cultures thus far:   - sputum 12/6: staph aureus, staph dysgalactia, klebsiella   - sputum 12/7: staph aureus   - sputum 12/8: staph and klebsiella   - Sputum 12/9: staph aureus and klebsiella   - Sputum 12/10: staph aureus and klebsiella   - sputum 12/13: staph aureus and klebsiella   - sputum 12/14: staph aureus  Antibiotics              - Vancomycin 12/6 - 12/7 (MRSA negative)              - Zosyn 12/6 - 12/9              - Rocephin 12/9 - 12/16   - Cefepime 12/16 - 12/21   - Flagyl 12/16 - 12/16   - Azithro 12/16 - 12/20      Hematology  # Anemia of critical illness  # Concern for HIT - screen negative  # Thrombocytopenia, resolved  # Loss of bilateral DP pulses, resolved  Hgb stable 9.3. Plt stable 621. No e/o bleeding.    - monitor hemoglobin     Endocrinology  # Hyperglycemia   No known medical history. Hemoglobin A1c 5.6%  - SSI     MSK/Skin  # Mottling LLE s/p LLE distal reperfusion cannula  # Dusky toes left foot  Pulses remain intact. IABP removed 12/13.   - continue to monitor    Pertinent Lines  Gutiérrez  RIJ CVC  NG    ICU Cares:  Daily CXR: improved parenchyma  Fluids/Feeds: TF at goal. Small fluid bolus today  DVT Prophylaxis: subcutaneous heparin Q8 hours  GI Prophylaxis: N/A  Bowel Regimen: N/A - loose stools  Anticipated Floor Transfer: N/A    Family Update: brother, updated by me    Patient seen and discussed with Dr. Lorne Stewart.  Assessment and plan as above.    Mary Leger PA-C  Critical Care Cardiology  Pager: [phone]      Interval History: Slept overnight.  Denies pain. Voice horse.    Objective Findings:  Temp:  [97.1  F (36.2  C)-98.6  F (37  C)] 97.1  F (36.2  C)  Pulse:  [] 102  Resp:  [24-44] 29  BP: ()/(47-74) 88/62  SpO2:  [87 %-100 %] 96  %    I/O:  Intake/Output Summary (Last 24 hours) at 12/21/2021 1134  Last data filed at 12/21/2021 1000  Gross per 24 hour   Intake 2021.66 ml   Output 1790 ml   Net 231.66 ml       Physical Exam:  GEN: sitting in bed.  No acute distress   HEENT: pupils equal.  No appreciable cranial trauma  Pulm: seemingly CTAB.  Scattered rale  Cardiac: regular rhythm. tachycardia. No murmur, rub, gallop  GI: soft, non distended  Neuro: alert to self only.  Moves all extremities symmetrically  Integument: no appreciable skin breakdown  Vascular: LE warm, well perfused      Medications:    artificial saliva  2 spray Swish & Spit 4x Daily     aspirin  81 mg Per Feeding Tube Daily     atorvastatin  40 mg Oral or Feeding Tube QPM     folic acid  1 mg Oral Daily     haloperidol lactate  10 mg Intravenous BID     heparin ANTICOAGULANT  5,000 Units Subcutaneous Q8H     insulin aspart  1-6 Units Subcutaneous Q4H     ipratropium - albuterol 0.5 mg/2.5 mg/3 mL  3 mL Nebulization Q4H     melatonin  10 mg Oral At Bedtime     multivitamins w/minerals  15 mL Per Feeding Tube Daily     nicotine  1 patch Transdermal Daily     nicotine   Transdermal Q8H     protein modular  1 packet Per Feeding Tube 4x Daily     QUEtiapine  200 mg Oral or Feeding Tube QPM     thiamine  100 mg Oral or Feeding Tube Daily     ticagrelor  90 mg Oral or Feeding Tube BID       dexmedetomidine 1 mcg/kg/hr (12/21/21 0700)     dextrose       norepinephrine Stopped (12/20/21 1745)         Labs:   CMP  Recent Labs   Lab 12/21/21  0357 12/21/21  0348 12/21/21  0032 12/20/21  2152 12/20/21  2019 12/20/21  1820 12/20/21  1701 12/20/21  1249 12/20/21  0354 12/20/21  0349 12/19/21  2036 12/19/21  1612 12/19/21  0917 12/19/21  0317 12/18/21  1035 12/18/21  0401   NA  --  147*  147*  --  146*  --  144  144  --  143  --  144  144   < > 142  142   < > 144   < > 148*   POTASSIUM  --  3.4  --   --   --  3.9  --   --   --  3.5  --  4.1  --  3.5   < > 3.4   CHLORIDE  --  114*  --    --   --  109  --   --   --  107  --  109  --  112*   < > 116*   CO2  --  26  --   --   --  26  --   --   --  30  --  26  --  25   < > 23   ANIONGAP  --  7  --   --   --  9  --   --   --  7  --  7  --  7   < > 9   * 196*  182* 180*  --    < > 188*   < >  --    < > 177*  173*   < > 236*   < > 173*  167*   < > 168*  159*  149*   BUN  --  89*  --   --   --  82*  --   --   --  61*  --  52*  --  47*   < > 57*   CR  --  1.10  --   --   --  1.16  --   --   --  1.19  --  1.06  --  0.96   < > 0.96   GFRESTIMATED  --  75  --   --   --  70  --   --   --  68  --  78  --  88   < > 88   BRIDGETTE  --  8.6  --   --   --  8.1*  --   --   --  8.5  --  8.4*  --  8.2*   < > 8.4*   MAG  --  2.5*  --   --   --   --   --   --   --  2.2  --   --   --  2.1  --  2.3   PHOS  --  4.2  --   --   --   --   --   --   --  4.8*  --   --   --  3.0  --  3.0   PROTTOTAL  --  7.8  --   --   --   --   --   --   --  7.9  --   --   --  7.2  --  7.0   ALBUMIN  --  2.2*  --   --   --   --   --   --   --  2.1*  --   --   --  1.8*  --  1.6*   BILITOTAL  --  0.3  --   --   --   --   --   --   --  0.5  --   --   --  0.8  --  0.3   ALKPHOS  --  98  --   --   --   --   --   --   --  98  --   --   --  98  --  104   AST  --  26  --   --   --   --   --   --   --  29  --   --   --  40  --  42   ALT  --  59  --   --   --   --   --   --   --  55  --   --   --  58  --  44    < > = values in this interval not displayed.     CBC  Recent Labs   Lab 12/21/21  0348 12/20/21  0349 12/19/21  0317 12/18/21  0401   WBC 17.1* 19.1* 15.7* 20.5*   RBC 3.02* 2.93* 2.82* 2.67*   HGB 9.3* 9.1* 8.5* 8.2*   HCT 28.9* 28.2* 26.9* 25.5*   MCV 96 96 95 96   MCH 30.8 31.1 30.1 30.7   MCHC 32.2 32.3 31.6 32.2   RDW 13.2 13.0 13.1 13.5   * 677* 619* 571*     Arterial Blood Gas  Recent Labs   Lab 12/20/21  0225 12/18/21  0658 12/17/21  1028 12/17/21  0454 12/16/21  0742 12/15/21  2359 12/15/21  1950   PH  --   --   --  7.41 7.41 7.43 7.42   PCO2  --   --   --  22* 37 31* 31*   PO2   --   --   --  143* 99 79* 75*   HCO3  --   --   --  14* 23 20* 20*   O2PER 35 45 45 45 50 45 40         Pertinent Imaging Studies:  Coronary angiogram:   Refractory VT/VF cardiac arrest.  Cardiogenic shock.  STEMI involving the OM1 as culprit.  Three vessel severe CAD involving the  of the RCA, mid LCx and OM1 severe disease with occlusion of OM1 as culprit in STEMI, and moderate proximal LAD lesions likely hemodynamically significant.  CPR  VA ECMO placement.  IABP placement.  Thermogard placement with initiation of hypothermia protocol.  Right radial arterial line placement.  PCI with three drug eluting stents to the proximal, mid LCx and OM1.    Echo:  Left ventricular function is decreased. The ejection fraction is 40-45% (mildly reduced). Mild diffuse hypokinesis is present.  Global right ventricular function is normal. The right ventricle is normal size.  This study was compared with the study from 12/12/2021. There has been an  improvement in the biventricular function since the patient has been decannulated.       Mary Leger PA-C  Critical Care Cardiology  Pager: [phone]

## 2021-12-21 NOTE — PROGRESS NOTES
CLINICAL NUTRITION SERVICES - REASSESSMENT NOTE     Nutrition Prescription    Recommendations:  MD to adjust FWF as needed.    Malnutrition Status:    Severe malnutrition in the context of acute on chronic illness    Interventions by Registered Dietitian (RD):  +1 pkt banatrol fiber/day in setting of loose stools.     Future Recommendations:  - Adjust protein provisions pending renal status.   - If non-renal formula indicated, in the future, recommend: Osmolite 1.5 Jose @ goal of 65ml/hr  (1560ml/day)  will provide: 2340 kcals (40 kcal/kg), 97 g protein (1.7 g/kg), 1188 ml free H20, 317 g CHO, and 0 g fiber daily.       EVALUATION OF THE PROGRESS TOWARD GOALS   Diet: NPO (unsafe swallow)   Nutrition Support: Novasource Renal @ 45 ml/hr (1080 ml) + Prosource (1 pkt QID) via NGT to provide 2320 kcal, 142 g pro, 198 g CHO, 774 ml free water, and 0 g fiber daily. Micro/Nx: Folic acid (1 mg), thiamine (100 mg), certavite.     Intake: Met <50% needs on avg.     NEW FINDINGS   GI/Nutrition: Poor nutrition intake over the past week as feed held while pt requiring BiPAP (x4 days). Now, receiving feeds at goal rate. Ongoing loose stools, found C diff negative on 12/18. SLP following, continue to recommend NPO in setting of unsafe swallow. Pt has lost 25 kg (30%) in the past 2 weeks. Suspect weight loss r/t combination of inadequate nutrition intake and fluctuations in fluid status. Updated dosing weight and nutrition needs below.     UPDATED ASSESSED NUTRITION NEEDS   Based on dosing weight of 58 kg (actual on 12/21):   Estimated Energy Needs: 1750 - 2050+ kcal/day (30 - 35+ kcal/kg/day)   Justification: Increased needs, borderline underweight   Estimated Protein Needs: 85 - 115+g protein/day (1.5 - 2+ g/kg/day)   Justification: Increased needs, repletion    MALNUTRITION  % Intake: </= 50% for >/= 5 days (severe)   % Weight Loss: 30% in 2 weeks (severe) -- cannot rule out wt loss 2/2 fluctuation in fluid status   Subcutaneous  Fat Loss: Facial region: Moderat and Upper arm: moderate  Muscle Loss: Temporal: Mild, Scapular bone: Moderate, Thoracic region (clavicle, acromium bone, deltoid, trapezius, pectoral): Moderate, Upper arm (bicep, tricep): Moderate, Lower arm  (forearm): Moderate, Dorsal hand: Moderate, Upper leg (quadricep, hamstring): moderate, Patellar region: Moderate and Posterior calf: Moderate  Fluid Accumulation/Edema: None noted  Malnutrition Diagnosis: Severe malnutrition in the context of acute on chronic illness    Previous Goals   Total avg nutritional intake to meet a minimum of 30 kcal/kg and 1.5 g PRO/kg daily (per dosing wt 67 kg).  Evaluation: Not met    Previous Nutrition Diagnosis  Inadequate oral intake  Evaluation: No change    CURRENT NUTRITION DIAGNOSIS  Inadequate oral intake related to NPO (unsafe swallow) as evidenced by dependent on enteral nutrition support to meet 100% estimated needs.       INTERVENTIONS  See interventions at top of progress note    Goals  Total avg nutritional intake to meet a minimum of 30 kcal/kg and 1.5 g PRO/kg daily (per dosing wt 58 kg).    Monitoring/Evaluation  Progress toward goals will be monitored and evaluated per protocol.    Dora High, NORBERT, LD  [id number]  Pgr: [phone]

## 2021-12-22 ENCOUNTER — APPOINTMENT (OUTPATIENT)
Dept: SPEECH THERAPY | Facility: CLINIC | Age: 56
End: 2021-12-22
Payer: COMMERCIAL

## 2021-12-22 ENCOUNTER — APPOINTMENT (OUTPATIENT)
Dept: OCCUPATIONAL THERAPY | Facility: CLINIC | Age: 56
End: 2021-12-22
Payer: COMMERCIAL

## 2021-12-22 ENCOUNTER — APPOINTMENT (OUTPATIENT)
Dept: GENERAL RADIOLOGY | Facility: CLINIC | Age: 56
End: 2021-12-22
Payer: COMMERCIAL

## 2021-12-22 ENCOUNTER — APPOINTMENT (OUTPATIENT)
Dept: GENERAL RADIOLOGY | Facility: CLINIC | Age: 56
End: 2021-12-22
Attending: NURSE PRACTITIONER
Payer: COMMERCIAL

## 2021-12-22 LAB
ALBUMIN SERPL-MCNC: 2.3 G/DL (ref 3.4–5)
ALP SERPL-CCNC: 101 U/L (ref 40–150)
ALT SERPL W P-5'-P-CCNC: 74 U/L (ref 0–70)
ANION GAP SERPL CALCULATED.3IONS-SCNC: 6 MMOL/L (ref 3–14)
ANION GAP SERPL CALCULATED.3IONS-SCNC: 8 MMOL/L (ref 3–14)
APTT PPP: 26 SECONDS (ref 22–38)
AST SERPL W P-5'-P-CCNC: 31 U/L (ref 0–45)
BILIRUB SERPL-MCNC: 0.3 MG/DL (ref 0.2–1.3)
BUN SERPL-MCNC: 74 MG/DL (ref 7–30)
BUN SERPL-MCNC: 80 MG/DL (ref 7–30)
CA-I BLD-MCNC: 4.8 MG/DL (ref 4.4–5.2)
CALCIUM SERPL-MCNC: 8.9 MG/DL (ref 8.5–10.1)
CALCIUM SERPL-MCNC: 8.9 MG/DL (ref 8.5–10.1)
CHLORIDE BLD-SCNC: 117 MMOL/L (ref 94–109)
CHLORIDE BLD-SCNC: 118 MMOL/L (ref 94–109)
CO2 SERPL-SCNC: 26 MMOL/L (ref 20–32)
CO2 SERPL-SCNC: 27 MMOL/L (ref 20–32)
CREAT SERPL-MCNC: 0.94 MG/DL (ref 0.66–1.25)
CREAT SERPL-MCNC: 1.07 MG/DL (ref 0.66–1.25)
ERYTHROCYTE [DISTWIDTH] IN BLOOD BY AUTOMATED COUNT: 13.8 % (ref 10–15)
GFR SERPL CREATININE-BSD FRML MDRD: 81 ML/MIN/1.73M2
GFR SERPL CREATININE-BSD FRML MDRD: >90 ML/MIN/1.73M2
GLUCOSE BLD-MCNC: 170 MG/DL (ref 70–99)
GLUCOSE BLD-MCNC: 191 MG/DL (ref 70–99)
GLUCOSE BLD-MCNC: 208 MG/DL (ref 70–99)
GLUCOSE BLDC GLUCOMTR-MCNC: 139 MG/DL (ref 70–99)
GLUCOSE BLDC GLUCOMTR-MCNC: 176 MG/DL (ref 70–99)
GLUCOSE BLDC GLUCOMTR-MCNC: 180 MG/DL (ref 70–99)
GLUCOSE BLDC GLUCOMTR-MCNC: 197 MG/DL (ref 70–99)
GLUCOSE BLDC GLUCOMTR-MCNC: 198 MG/DL (ref 70–99)
HCT VFR BLD AUTO: 31.7 % (ref 40–53)
HGB BLD-MCNC: 10.1 G/DL (ref 13.3–17.7)
INR PPP: 1.16 (ref 0.85–1.15)
LACTATE SERPL-SCNC: 1.5 MMOL/L (ref 0.7–2)
LDH SERPL L TO P-CCNC: 328 U/L (ref 85–227)
MAGNESIUM SERPL-MCNC: 2.7 MG/DL (ref 1.6–2.3)
MCH RBC QN AUTO: 31.1 PG (ref 26.5–33)
MCHC RBC AUTO-ENTMCNC: 31.9 G/DL (ref 31.5–36.5)
MCV RBC AUTO: 98 FL (ref 78–100)
PHOSPHATE SERPL-MCNC: 3.6 MG/DL (ref 2.5–4.5)
PLATELET # BLD AUTO: 718 10E3/UL (ref 150–450)
POTASSIUM BLD-SCNC: 3.4 MMOL/L (ref 3.4–5.3)
POTASSIUM BLD-SCNC: 3.7 MMOL/L (ref 3.4–5.3)
POTASSIUM BLD-SCNC: 4.1 MMOL/L (ref 3.4–5.3)
PROT SERPL-MCNC: 7.9 G/DL (ref 6.8–8.8)
RBC # BLD AUTO: 3.25 10E6/UL (ref 4.4–5.9)
SODIUM SERPL-SCNC: 149 MMOL/L (ref 133–144)
SODIUM SERPL-SCNC: 150 MMOL/L (ref 133–144)
SODIUM SERPL-SCNC: 150 MMOL/L (ref 133–144)
SODIUM SERPL-SCNC: 152 MMOL/L (ref 133–144)
SODIUM SERPL-SCNC: 152 MMOL/L (ref 133–144)
WBC # BLD AUTO: 22.3 10E3/UL (ref 4–11)

## 2021-12-22 PROCEDURE — 999N000065 XR ABDOMEN PORT 1 VIEWS

## 2021-12-22 PROCEDURE — 36592 COLLECT BLOOD FROM PICC: CPT | Performed by: PHYSICIAN ASSISTANT

## 2021-12-22 PROCEDURE — 250N000013 HC RX MED GY IP 250 OP 250 PS 637: Performed by: STUDENT IN AN ORGANIZED HEALTH CARE EDUCATION/TRAINING PROGRAM

## 2021-12-22 PROCEDURE — 83605 ASSAY OF LACTIC ACID: CPT | Performed by: INTERNAL MEDICINE

## 2021-12-22 PROCEDURE — 85014 HEMATOCRIT: CPT | Performed by: INTERNAL MEDICINE

## 2021-12-22 PROCEDURE — 80053 COMPREHEN METABOLIC PANEL: CPT | Performed by: INTERNAL MEDICINE

## 2021-12-22 PROCEDURE — 71045 X-RAY EXAM CHEST 1 VIEW: CPT

## 2021-12-22 PROCEDURE — 83735 ASSAY OF MAGNESIUM: CPT | Performed by: NURSE PRACTITIONER

## 2021-12-22 PROCEDURE — 85610 PROTHROMBIN TIME: CPT | Performed by: NURSE PRACTITIONER

## 2021-12-22 PROCEDURE — 71045 X-RAY EXAM CHEST 1 VIEW: CPT | Mod: 26 | Performed by: RADIOLOGY

## 2021-12-22 PROCEDURE — 250N000011 HC RX IP 250 OP 636: Performed by: PHYSICIAN ASSISTANT

## 2021-12-22 PROCEDURE — 250N000011 HC RX IP 250 OP 636: Performed by: NURSE PRACTITIONER

## 2021-12-22 PROCEDURE — 250N000013 HC RX MED GY IP 250 OP 250 PS 637: Performed by: INTERNAL MEDICINE

## 2021-12-22 PROCEDURE — 84132 ASSAY OF SERUM POTASSIUM: CPT | Performed by: INTERNAL MEDICINE

## 2021-12-22 PROCEDURE — 92612 ENDOSCOPY SWALLOW (FEES) VID: CPT | Mod: GN

## 2021-12-22 PROCEDURE — 74018 RADEX ABDOMEN 1 VIEW: CPT | Mod: 26 | Performed by: RADIOLOGY

## 2021-12-22 PROCEDURE — 36415 COLL VENOUS BLD VENIPUNCTURE: CPT | Performed by: PHYSICIAN ASSISTANT

## 2021-12-22 PROCEDURE — 94640 AIRWAY INHALATION TREATMENT: CPT

## 2021-12-22 PROCEDURE — 82040 ASSAY OF SERUM ALBUMIN: CPT | Performed by: INTERNAL MEDICINE

## 2021-12-22 PROCEDURE — 93010 ELECTROCARDIOGRAM REPORT: CPT | Performed by: INTERNAL MEDICINE

## 2021-12-22 PROCEDURE — 94640 AIRWAY INHALATION TREATMENT: CPT | Mod: 76

## 2021-12-22 PROCEDURE — 84132 ASSAY OF SERUM POTASSIUM: CPT | Performed by: PHYSICIAN ASSISTANT

## 2021-12-22 PROCEDURE — 250N000013 HC RX MED GY IP 250 OP 250 PS 637: Performed by: PHYSICIAN ASSISTANT

## 2021-12-22 PROCEDURE — 92526 ORAL FUNCTION THERAPY: CPT | Mod: GN

## 2021-12-22 PROCEDURE — 258N000003 HC RX IP 258 OP 636: Performed by: PHYSICIAN ASSISTANT

## 2021-12-22 PROCEDURE — 84100 ASSAY OF PHOSPHORUS: CPT | Performed by: NURSE PRACTITIONER

## 2021-12-22 PROCEDURE — 99233 SBSQ HOSP IP/OBS HIGH 50: CPT | Performed by: INTERNAL MEDICINE

## 2021-12-22 PROCEDURE — 93005 ELECTROCARDIOGRAM TRACING: CPT

## 2021-12-22 PROCEDURE — 97110 THERAPEUTIC EXERCISES: CPT | Mod: GO | Performed by: OCCUPATIONAL THERAPIST

## 2021-12-22 PROCEDURE — 84295 ASSAY OF SERUM SODIUM: CPT | Performed by: PHYSICIAN ASSISTANT

## 2021-12-22 PROCEDURE — 250N000013 HC RX MED GY IP 250 OP 250 PS 637: Performed by: NURSE PRACTITIONER

## 2021-12-22 PROCEDURE — 85730 THROMBOPLASTIN TIME PARTIAL: CPT | Performed by: INTERNAL MEDICINE

## 2021-12-22 PROCEDURE — 82947 ASSAY GLUCOSE BLOOD QUANT: CPT | Performed by: INTERNAL MEDICINE

## 2021-12-22 PROCEDURE — 250N000009 HC RX 250: Performed by: PHYSICIAN ASSISTANT

## 2021-12-22 PROCEDURE — 83615 LACTATE (LD) (LDH) ENZYME: CPT | Performed by: NURSE PRACTITIONER

## 2021-12-22 PROCEDURE — 200N000002 HC R&B ICU UMMC

## 2021-12-22 PROCEDURE — 82330 ASSAY OF CALCIUM: CPT | Performed by: INTERNAL MEDICINE

## 2021-12-22 PROCEDURE — 999N000157 HC STATISTIC RCP TIME EA 10 MIN

## 2021-12-22 RX ORDER — IPRATROPIUM BROMIDE AND ALBUTEROL SULFATE 2.5; .5 MG/3ML; MG/3ML
3 SOLUTION RESPIRATORY (INHALATION)
Status: DISCONTINUED | OUTPATIENT
Start: 2021-12-22 | End: 2021-12-23

## 2021-12-22 RX ORDER — POTASSIUM CHLORIDE 1.5 G/1.58G
20 POWDER, FOR SOLUTION ORAL ONCE
Status: COMPLETED | OUTPATIENT
Start: 2021-12-22 | End: 2021-12-22

## 2021-12-22 RX ORDER — MULTIPLE VITAMINS W/ MINERALS TAB 9MG-400MCG
1 TAB ORAL DAILY
Status: DISCONTINUED | OUTPATIENT
Start: 2021-12-23 | End: 2022-01-03 | Stop reason: HOSPADM

## 2021-12-22 RX ORDER — POTASSIUM CHLORIDE 20MEQ/15ML
10 LIQUID (ML) ORAL ONCE
Status: COMPLETED | OUTPATIENT
Start: 2021-12-22 | End: 2021-12-22

## 2021-12-22 RX ADMIN — Medication 2 SPRAY: at 12:07

## 2021-12-22 RX ADMIN — THIAMINE HCL TAB 100 MG 100 MG: 100 TAB at 07:38

## 2021-12-22 RX ADMIN — MULTIVIT AND MINERALS-FERROUS GLUCONATE 9 MG IRON/15 ML ORAL LIQUID 15 ML: at 07:38

## 2021-12-22 RX ADMIN — Medication 2 SPRAY: at 15:41

## 2021-12-22 RX ADMIN — HEPARIN SODIUM 5000 UNITS: 5000 INJECTION, SOLUTION INTRAVENOUS; SUBCUTANEOUS at 21:05

## 2021-12-22 RX ADMIN — INSULIN ASPART 2 UNITS: 100 INJECTION, SOLUTION INTRAVENOUS; SUBCUTANEOUS at 07:40

## 2021-12-22 RX ADMIN — ATORVASTATIN CALCIUM 40 MG: 40 TABLET, FILM COATED ORAL at 21:08

## 2021-12-22 RX ADMIN — Medication 1 PACKET: at 21:05

## 2021-12-22 RX ADMIN — ACETAMINOPHEN 650 MG: 325 TABLET, FILM COATED ORAL at 21:05

## 2021-12-22 RX ADMIN — NICOTINE 1 PATCH: 14 PATCH, EXTENDED RELEASE TRANSDERMAL at 07:40

## 2021-12-22 RX ADMIN — HALOPERIDOL LACTATE 10 MG: 5 INJECTION, SOLUTION INTRAMUSCULAR at 14:06

## 2021-12-22 RX ADMIN — Medication 10 MG: at 21:08

## 2021-12-22 RX ADMIN — IPRATROPIUM BROMIDE AND ALBUTEROL SULFATE 3 ML: 2.5; .5 SOLUTION RESPIRATORY (INHALATION) at 11:39

## 2021-12-22 RX ADMIN — Medication 1 PACKET: at 07:40

## 2021-12-22 RX ADMIN — HEPARIN SODIUM 5000 UNITS: 5000 INJECTION, SOLUTION INTRAVENOUS; SUBCUTANEOUS at 04:53

## 2021-12-22 RX ADMIN — INSULIN ASPART 2 UNITS: 100 INJECTION, SOLUTION INTRAVENOUS; SUBCUTANEOUS at 12:07

## 2021-12-22 RX ADMIN — Medication 1 PACKET: at 12:07

## 2021-12-22 RX ADMIN — FOLIC ACID 1 MG: 1 TABLET ORAL at 07:38

## 2021-12-22 RX ADMIN — ASPIRIN 81 MG CHEWABLE TABLET 81 MG: 81 TABLET CHEWABLE at 07:38

## 2021-12-22 RX ADMIN — TICAGRELOR 90 MG: 90 TABLET ORAL at 21:05

## 2021-12-22 RX ADMIN — TICAGRELOR 90 MG: 90 TABLET ORAL at 07:39

## 2021-12-22 RX ADMIN — HALOPERIDOL LACTATE 10 MG: 5 INJECTION, SOLUTION INTRAMUSCULAR at 07:39

## 2021-12-22 RX ADMIN — POTASSIUM CHLORIDE 10 MEQ: 20 SOLUTION ORAL at 14:06

## 2021-12-22 RX ADMIN — Medication 2 SPRAY: at 07:40

## 2021-12-22 RX ADMIN — QUETIAPINE FUMARATE 200 MG: 50 TABLET ORAL at 21:05

## 2021-12-22 RX ADMIN — HEPARIN SODIUM 5000 UNITS: 5000 INJECTION, SOLUTION INTRAVENOUS; SUBCUTANEOUS at 12:07

## 2021-12-22 RX ADMIN — Medication 1 PACKET: at 15:41

## 2021-12-22 RX ADMIN — INSULIN ASPART 1 UNITS: 100 INJECTION, SOLUTION INTRAVENOUS; SUBCUTANEOUS at 15:41

## 2021-12-22 RX ADMIN — IPRATROPIUM BROMIDE AND ALBUTEROL SULFATE 3 ML: 2.5; .5 SOLUTION RESPIRATORY (INHALATION) at 16:03

## 2021-12-22 RX ADMIN — POTASSIUM CHLORIDE 20 MEQ: 1.5 POWDER, FOR SOLUTION ORAL at 06:31

## 2021-12-22 RX ADMIN — INSULIN ASPART 1 UNITS: 100 INJECTION, SOLUTION INTRAVENOUS; SUBCUTANEOUS at 04:53

## 2021-12-22 RX ADMIN — SODIUM CHLORIDE, POTASSIUM CHLORIDE, SODIUM LACTATE AND CALCIUM CHLORIDE 500 ML: 600; 310; 30; 20 INJECTION, SOLUTION INTRAVENOUS at 09:52

## 2021-12-22 ASSESSMENT — ACTIVITIES OF DAILY LIVING (ADL)
ADLS_ACUITY_SCORE: 17
ADLS_ACUITY_SCORE: 15
ADLS_ACUITY_SCORE: 17
ADLS_ACUITY_SCORE: 17
ADLS_ACUITY_SCORE: 15
ADLS_ACUITY_SCORE: 17
ADLS_ACUITY_SCORE: 15
ADLS_ACUITY_SCORE: 17
ADLS_ACUITY_SCORE: 15
ADLS_ACUITY_SCORE: 17
ADLS_ACUITY_SCORE: 15
ADLS_ACUITY_SCORE: 17

## 2021-12-22 NOTE — PROGRESS NOTES
Major Shift Events:    Pt oriented to self mainly. Pt is redirectable and less aggressive. 1:1 over night. Off precedex. Sinus tach with map >65. NGT with TF at goal 45 ml/hr. Standard FWF. Pt able to let you know when he needs commode.     Plan:  Swallow study.  discontinue RIJ?  discontinue NGT?  Transfer?      For vital signs and complete assessments, please see documentation flowsheets.

## 2021-12-22 NOTE — PROGRESS NOTES
12/22/21 0845   General Information   Onset of Illness/Injury or Date of Surgery 12/06/21   Referring Physician Brayan Thompson MD   Patient/Family Therapy Goal Statement (SLP) None stated   Pertinent History of Current Problem Pt is a 56 year old male with PMHx current tobacco use and ETOH abuse who presented to [facility] 12/6 after out of hospital cardiac arrest. Pt was decannulated from ECMO on 12/13. IABP removed 12/13. Extubated 12/14. SLP completed bedside swallow eval on 12/20, FEES completed today to objectively assess swallow function given s/sx of aspiration and hoarse vocal quality.    General Observations Alert and eager   Past History of Dysphagia None   Type of Evaluation   Type of Evaluation Swallow Evaluation   General Swallowing Observations   Current Diet/Method of Nutritional Intake (General Swallowing Observations, NIS) NPO;nasogastric tube (NG)  (large bore)   Respiratory Support (General Swallowing Observations) none  (NC removed )   Comment, Secretions/Suctioning Pt with productive cough prior to FEES   Swallowing Evaluation Fiberoptic Endoscopic Evaluation of Swallowing (FEES)   FEES Eval   Type of Scope Adult   Scope Serial Number [id number]   Nares Entry nostril entered using no topical anesthetic   Nares Passage Scope passed through left nares   NG Tube/Nasal O2 cannula NG tube present   Symmetry of Anatomy upon entry;upon command   Location of Secretions Mod-max diffuse thick yellow secretions, pt able to clear with cues and oral suction with Yankauer   FEES Textures Trialed thin liquids;mildly thick liquids;puree textures;solid foods   FEES Eval: Thin Liquid Texture Trial   Mode of Presentation, Thin Liquid straw;fed by clinician   Pre-swallow secretions noted Valleculae;Pyriforms;Posterior pharyngeal wall   Pre-spillage No   Penetration? Yes   Aspiration? No   Epiglottic inversion Incomplete   Post swallow residuals Valleculae;Pyriforms;Posterior pharygeal   Residuals in  laryngeal vestibule Yes   Penetration Occurence During swallow   Patient Response to Penetration Unable to clear with cueing   Residuals Remain after patient cued to clear   FEES Eval: Mildly Thick Liquids    Mode of presentation straw;fed by clinician   Pre-spillage No   Penetration? No   Aspiration? No   Epiglottic inversion Incomplete   Post swallow residuals Pyriforms   FEES Eval: Puree Solid Texture Trial   Mode of Presentation, Puree spoon;fed by clinician   Penetration? No   Aspiration? No   Epiglottic inversion Incomplete   FEES Eval: Solid Food Texture Trial   Mode of Presentation fed by clinician   Penetration? Yes   Aspiration? No   Epiglottic inversion Incomplete   Post swallow residuals Valleculae   Penetration Occurrence During swallow;After swallow   Location of Penetration   (epiglottic spillover)   Patient Response to Penetration Clears with cueing   Residuals All residuals cleared with cueing   Esophageal Phase of Swallow   Patient reports or presents with symptoms of esophageal dysphagia No   Swallowing Recommendations   Diet Consistency Recommendations mildly thick liquids (level 2);full liquid diet   Supervision Level for Intake 1:1 supervision needed   Mode of Delivery Recommendations bolus size, small;food moistened;slow rate of intake   Monitoring/Assistance Required (Eating/Swallowing) stop eating activities when fatigue is present;monitor for cough or change in vocal quality with intake   Recommended Feeding/Eating Techniques (Swallow Eval) maintain upright sitting position for eating   Medication Administration Recommendations, Swallowing (SLP) via NG   Instrumental Assessment Recommendations reassess via non-instrumental clinical swallow evaluation   General Therapy Interventions   Planned Therapy Interventions Dysphagia Treatment   Dysphagia treatment Oropharyngeal exercise training;Modified diet education;Instruction of safe swallow strategies;Compensatory strategies for swallowing    Intervention Comments Pt may be able to advance diet at bedside   SLP Therapy Assessment/Plan   Criteria for Skilled Therapeutic Interventions Met (SLP Eval) yes;treatment indicated   SLP Diagnosis Moderate pharyngeal dysphagia   Rehab Potential (SLP Eval) good, to achieve stated therapy goals   Therapy Frequency (SLP Eval) 5 times/wk   Predicted Duration of Therapy Intervention (SLP Eval) 2 weeks   Comment, Therapy Assessment/Plan (SLP)   FEES completed per MD order. Pt presents with moderate pharyngeal dysphagia in setting of generalized weakness d/t prolonged acute illness. Risks and benefits of procedure discussed with pt, who provided verbal consent. Flexible adult endoscope passed through left nares; of note, NG tube visible through what appeared to be a perforation in the septum. Pharyngeal/laryngeal anatomy grossly symmetrical upon command and at rest; erythema of the R TVF appreciated. Small glottic gap on phonation. Assessed pt with ice, thin liquids, mildly-thick liquids, and puree. Oral phase functional. Pharyngeal phase characterized by timely swallow trigger, reduced BOT retraction and incomplete epiglottic inversion. Pharyngeal constriction reduced. After the swallow, mild amounts of diffuse pharyngeal residue observed with all liquids, which cleared with cues for multiple swallows. Pt had increased difficulty clearing solid residue, which remained in vallecula despite repeat swallows and liquid rinse. Consistent penetration with thin liquids during the swallow, with thin liquid residue remaining in the laryngeal vestibule after the swallow. Residue eventually cleared with cues to cough/throat clear.  No aspiration with any consistency trialed. Pt has a productive volitional cough and was able to follow instruction for a dry swallow. Recommend pt advance to mildly-thick (IDDSI 2) full liquid diet with 1:1 assistance. Ensure the pt is upright and alert for all PO. Straws OK. SLP will follow for  exercises and diet advancement (anticipate will be able to advance diet at bedside).      Therapy Plan Review/Discharge Plan (SLP)   Therapy Plan Review (SLP) evaluation/treatment results reviewed;care plan/treatment goals reviewed;risks/benefits reviewed;current/potential barriers reviewed;participants voiced agreement with care plan;participants included;patient   Demonstrates Need for Referral to Another Service (SLP) clinical nutrition services/dietitian   SLP Discharge Planning    SLP Discharge Recommendation (DC Rec) Transitional Care Facility;home with home care speech therapy   SLP Rationale for DC Rec Dysphagia; should pt d/c today, pt will require a modified diet and intensive SLP services   SLP Brief overview of current status  Recommend pt advance to mildly-thick (IDDSI 2) full liquid diet with 1:1 assistance. Ensure the pt is upright and alert for all PO. Straws OK    Total Evaluation Time   Total Evaluation Time (Minutes) 18

## 2021-12-22 NOTE — PROGRESS NOTES
CLINICAL NUTRITION SERVICES - BRIEF NOTE   (see RD note on 12/21 for full assessment)    Advanced to full liquid diet with nectar-thickened liquids. Pt feeling hungry and taking good po thus far.   Plan to hold continuous TF and transition to nocturnal cycled feeds to promote po.      Nutrition changes today:  - Calorie counts (12/23 - 12/25)   - Ordered magic cup with meals   - Run Novasource Renal @ 45 ml/hr (540 ml) + Prosource (1 pkt QID) + Banatrol (1 pkt daily) provides 1240 kcal (21 kcal/kg), 93 g pro (1.6 g/kg), 99 g CHO, 387 ml free water, and 2 g fiber daily. This meets 70% energy needs and 100% protein needs.     Dora High, RD, LD  [id number]  Pgr: [phone]

## 2021-12-22 NOTE — PROGRESS NOTES
"    [facility]   Critical Care Cardiology - Progress Note    Bruce Blount MRN: [id number]  Age: 56 year old, : 1965  Date: 2021    Assessment and Plan:  Bruce Blount is a 56 year old male with PMHx current tobacco use and ETOH abuse who presented to [facility]  after out of hospital cardiac arrest.      Per EMS and brother, patient was in his usual state of health prior to arrest. Patients brother heard a \"thump\" and found patient unresponsive and agonal breathing. 911 called and CPR initiated. EMS arrived within 4 minutes. Initial rhythm VF--> shocked x ~7 with ROSC upon arrival to ED. Total CPR team per EMS ~ 40 minutes. 3 mg Epinephrine, 300 mg Amio given via EMS. Patient EKG reveals Valentín inferior/posterior leads with reciprocal changes. Patient initially presented with an Igel and was intubated with ETT in the ED. He arrested and was again shocked in the ED with ROSC.      Taken urgently to CCL where he underwent coronary angiogram and again arrested requiring manual CPR and was then cannulated on VA ECMO via right with 17 Croatian cannula RCFA, 25 Fr cannula RCFV. Coronary angiogram revealed  of RCA and acute culprit lesion of LCx/OM1, s/p PCI to pLCx, mid LCx, and OM1 with TERRY. IABP placed for decreased pulsatility. Thermogard cooling cath placed and patient was transferred to ICU for further management. Decannulated . IABP removed . Extubated .     Today's Plan:  - discontinue precedex  - transition Duoneb to Q4 hours while awake  - 500cc LR bolus this morning  - increase FWF, 100cc Q4 hours  - sodium Q6 hours  - remove [facility] CVC    Neurology  # Anoxic brain injury  # ICU delirium  # Chest wall pain    # Hx of possible ETOH abuse per family  S/p therapeutic hypothermia. Extubated . Initial head CT without acute pathology. Able to follow commands intermittently. Have trialed Haldol without success, transitioned to Seroquel 50 mg TID plus 100 mg " at bedtime with mild improvement. Added Precedex gtt and sitter overnight and clearer today. Neuro-crit consulted- since signed off  - precedex ggt  - 10mg melatonin at bedtime  - 200mg Seroquel at night   - 10mg haldol BID (08:00 and 14:00)  - continue PRN haldol  - Tylenol and lidocaine patches for additional pain control as needed  - Folate, Thiamine and multi vitamin for ETOH abuse.     Cardiovascular  # Ischemic Cardiomyopathy (EF 40-45%)  # S/p PCI pLCx, mLCx, OM1  # Residual RCA   # HTN, HLD  # Refractory VF cardiac arrest 2/2 secondary to STEMI, resolved  # Cardiogenic shock requiring VA ECMO, resolved  # Concern for limb ischemia s/p distal reperfusion cannula left leg  S/p Peripheral V-A ECMO inserted via RCFA and RCFV 17/25 fr.  Decannulated 12/13. IABP removed 12/13.   - GDMT   - DAPT: Continue ASA 81mg and ticagrelor 90mg BID    - Statin: 40mg Lipitor daily   - Hold ACE/ARB given hypotension   - Holding beta blocker given shock/hypotension  -  of RCA not intervened upon  - suture removal 12/28     Pulmonary  # Acute hypoxemic respiratory failure   # Klebsiella pneumonia  # Rib Fractures  # Sternal Fracture  # Tobacco Abuse (2PPD)  # Probable COPD exacerbation  Extubated 12/14 to BiPAP. Now sating well on 2L NC.  Completed antibiotic/steroid course for COPD exacerbation 12/20  - RT pulmonary hygiene  - transition to Duoneb Q4 hours while awake     Gastrointestinal, Nutrition  # Loose Stool  # Shock liver 2/2 cardiac arrest, resolved  No known medical hx. C. Diff negative  - Monitor LFTs      Renal, Electrolytes  # Hypernatremia  # Hypoalbuminemia  # Acute Renal Injury, resolved  # Lactic acidosis, resolved  # Hyperkalemia, resolved   # Hypocalcemia, resolved  # Hyperphosphatemia, resolved   No CRRT since decannulation 12/13. Creatinine 1.07, HCO3 26 today. BUN 80. Sodium elevated 12/14, started on D5W infusion. Sodium now again 152  - 500cc LR this AM  - increase FWF, 100cc Q4 hours  - sodium Q6  hours  - Monitor urine output     Infectious Disease  # Aspiration Pneumonia (klebsiella, staph aureus)  # Leukocytosis  # Fever  # Lactic acidosis, resolved  WBC 22.3. Tmax 98.8. Pan culture 12/14 given temp and WBC. Susceptibilities resulted the same as previous  - continue to monitor off antibiotics  - PRN acetaminophen  Cultures thus far:   - sputum 12/6: staph aureus, staph dysgalactia, klebsiella   - sputum 12/7: staph aureus   - sputum 12/8: staph and klebsiella   - Sputum 12/9: staph aureus and klebsiella   - Sputum 12/10: staph aureus and klebsiella   - sputum 12/13: staph aureus and klebsiella   - sputum 12/14: staph aureus  Antibiotics              - Vancomycin 12/6 - 12/7 (MRSA negative)              - Zosyn 12/6 - 12/9              - Rocephin 12/9 - 12/16   - Cefepime 12/16 - 12/21   - Flagyl 12/16 - 12/16   - Azithro 12/16 - 12/20      Hematology  # Anemia of critical illness  # Concern for HIT - screen negative  # Thrombocytopenia, resolved  # Loss of bilateral DP pulses, resolved  Hgb stable 10.1. Plt 718. No e/o bleeding.    - monitor hemoglobin     Endocrinology  # Hyperglycemia   No known medical history. Hemoglobin A1c 5.6%  - SSI     MSK/Skin  # Mottling LLE s/p LLE distal reperfusion cannula  # Dusky toes left foot  Pulses remain intact. IABP removed 12/13.   - continue to monitor    Pertinent Lines  RIJ CVC - remove  NG    ICU Cares:  Daily CXR: improved parenchyma  Fluids/Feeds: TF at goal. Small fluid bolus today  DVT Prophylaxis: subcutaneous heparin Q8 hours  GI Prophylaxis: N/A  Bowel Regimen: N/A - loose stools  Anticipated Floor Transfer: N/A    Family Update: brother, updated by me    Patient seen and discussed with Dr. Lorne Stewart.  Assessment and plan as above.    Mary Leger PA-C  Critical Care Cardiology  Pager: [phone]      Interval History: Slept overnight.  Denies pain. Voice horse. Remains somewhat delirious, however much more calm.    Objective Findings:  Temp:  [97.6   F (36.4  C)-98.6  F (37  C)] 97.9  F (36.6  C)  Pulse:  [] 124  Resp:  [28-39] 30  BP: ()/(51-78) 118/78  SpO2:  [91 %-98 %] 93 %    I/O:  Intake/Output Summary (Last 24 hours) at 12/22/2021 0819  Last data filed at 12/22/2021 0700  Gross per 24 hour   Intake 1584.83 ml   Output 950 ml   Net 634.83 ml       Physical Exam:  GEN: sitting in bed.  No acute distress   HEENT: pupils equal.  No appreciable cranial trauma  Pulm: seemingly CTAB.  Scattered rale  Cardiac: regular rhythm. tachycardia. No murmur, rub, gallop  GI: soft, non distended  Neuro: alert to self only.  Moves all extremities symmetrically  Integument: no appreciable skin breakdown  Vascular: LE warm, well perfused      Medications:    artificial saliva  2 spray Swish & Spit 4x Daily     aspirin  81 mg Per Feeding Tube Daily     atorvastatin  40 mg Oral or Feeding Tube QPM     banatrol plus  1 packet Per Feeding Tube Daily     folic acid  1 mg Oral Daily     haloperidol lactate  10 mg Intravenous BID     heparin ANTICOAGULANT  5,000 Units Subcutaneous Q8H     insulin aspart  1-6 Units Subcutaneous Q4H     ipratropium - albuterol 0.5 mg/2.5 mg/3 mL  3 mL Nebulization Q4H     melatonin  10 mg Oral At Bedtime     multivitamins w/minerals  15 mL Per Feeding Tube Daily     nicotine  1 patch Transdermal Daily     nicotine   Transdermal Q8H     protein modular  1 packet Per Feeding Tube 4x Daily     QUEtiapine  200 mg Oral or Feeding Tube QPM     thiamine  100 mg Oral or Feeding Tube Daily     ticagrelor  90 mg Oral or Feeding Tube BID       dextrose           Labs:   CMP  Recent Labs   Lab 12/22/21  0445 12/22/21  0439 12/21/21  2350 12/21/21  1936 12/21/21  1641 12/21/21  1133 12/21/21  1130 12/21/21  0357 12/21/21  0348 12/21/21  0032 12/20/21  2152 12/20/21  2019 12/20/21  1820 12/20/21  0354 12/20/21  0349 12/19/21  0917 12/19/21  0317   NA  --  152*  --   --  150*  --   --   --  147*  147*  --  146*  --  144  144   < > 144  144   < > 144    POTASSIUM  --  3.4  --   --  3.6  --  3.7  --  3.4  --   --   --  3.9  --  3.5   < > 3.5   CHLORIDE  --  118*  --   --  118*  --   --   --  114*  --   --   --  109  --  107   < > 112*   CO2  --  26  --   --  26  --   --   --  26  --   --   --  26  --  30   < > 25   ANIONGAP  --  8  --   --  6  --   --   --  7  --   --   --  9  --  7   < > 7   * 191*  170* 145*   < > 205*   < >  --    < > 196*  182*   < >  --    < > 188*   < > 177*  173*   < > 173*  167*   BUN  --  80*  --   --  88*  --   --   --  89*  --   --   --  82*  --  61*   < > 47*   CR  --  1.07  --   --  1.13  --   --   --  1.10  --   --   --  1.16  --  1.19   < > 0.96   GFRESTIMATED  --  81  --   --  76  --   --   --  75  --   --   --  70  --  68   < > 88   BRIDGETTE  --  8.9  --   --  8.6  --   --   --  8.6  --   --   --  8.1*  --  8.5   < > 8.2*   MAG  --  2.7*  --   --   --   --   --   --  2.5*  --   --   --   --   --  2.2  --  2.1   PHOS  --  3.6  --   --   --   --   --   --  4.2  --   --   --   --   --  4.8*  --  3.0   PROTTOTAL  --  7.9  --   --   --   --   --   --  7.8  --   --   --   --   --  7.9  --  7.2   ALBUMIN  --  2.3*  --   --   --   --   --   --  2.2*  --   --   --   --   --  2.1*  --  1.8*   BILITOTAL  --  0.3  --   --   --   --   --   --  0.3  --   --   --   --   --  0.5  --  0.8   ALKPHOS  --  101  --   --   --   --   --   --  98  --   --   --   --   --  98  --  98   AST  --  31  --   --   --   --   --   --  26  --   --   --   --   --  29  --  40   ALT  --  74*  --   --   --   --   --   --  59  --   --   --   --   --  55  --  58    < > = values in this interval not displayed.     CBC  Recent Labs   Lab 12/22/21  0439 12/21/21  0348 12/20/21  0349 12/19/21  0317   WBC 22.3* 17.1* 19.1* 15.7*   RBC 3.25* 3.02* 2.93* 2.82*   HGB 10.1* 9.3* 9.1* 8.5*   HCT 31.7* 28.9* 28.2* 26.9*   MCV 98 96 96 95   MCH 31.1 30.8 31.1 30.1   MCHC 31.9 32.2 32.3 31.6   RDW 13.8 13.2 13.0 13.1   * 621* 677* 619*     Arterial Blood Gas  Recent Labs    Lab 12/20/21  0225 12/18/21  0658 12/17/21  1028 12/17/21  0454 12/16/21  0742 12/15/21  2359 12/15/21  1950   PH  --   --   --  7.41 7.41 7.43 7.42   PCO2  --   --   --  22* 37 31* 31*   PO2  --   --   --  143* 99 79* 75*   HCO3  --   --   --  14* 23 20* 20*   O2PER 35 45 45 45 50 45 40         Pertinent Imaging Studies:  Coronary angiogram:   Refractory VT/VF cardiac arrest.  Cardiogenic shock.  STEMI involving the OM1 as culprit.  Three vessel severe CAD involving the  of the RCA, mid LCx and OM1 severe disease with occlusion of OM1 as culprit in STEMI, and moderate proximal LAD lesions likely hemodynamically significant.  CPR  VA ECMO placement.  IABP placement.  Thermogard placement with initiation of hypothermia protocol.  Right radial arterial line placement.  PCI with three drug eluting stents to the proximal, mid LCx and OM1.    Echo:  Left ventricular function is decreased. The ejection fraction is 40-45% (mildly reduced). Mild diffuse hypokinesis is present.  Global right ventricular function is normal. The right ventricle is normal size.  This study was compared with the study from 12/12/2021. There has been an  improvement in the biventricular function since the patient has been decannulated.       Mary Leger PA-C  Critical Care Cardiology  Pager: [phone]

## 2021-12-23 ENCOUNTER — APPOINTMENT (OUTPATIENT)
Dept: OCCUPATIONAL THERAPY | Facility: CLINIC | Age: 56
End: 2021-12-23
Payer: COMMERCIAL

## 2021-12-23 ENCOUNTER — APPOINTMENT (OUTPATIENT)
Dept: SPEECH THERAPY | Facility: CLINIC | Age: 56
End: 2021-12-23
Payer: COMMERCIAL

## 2021-12-23 LAB
ABO/RH(D): NORMAL
ALBUMIN SERPL-MCNC: 2.2 G/DL (ref 3.4–5)
ALP SERPL-CCNC: 93 U/L (ref 40–150)
ALT SERPL W P-5'-P-CCNC: 74 U/L (ref 0–70)
ANION GAP SERPL CALCULATED.3IONS-SCNC: 6 MMOL/L (ref 3–14)
ANTIBODY SCREEN: NEGATIVE
APTT PPP: 27 SECONDS (ref 22–38)
AST SERPL W P-5'-P-CCNC: 28 U/L (ref 0–45)
ATRIAL RATE - MUSE: 111 BPM
BILIRUB SERPL-MCNC: 0.3 MG/DL (ref 0.2–1.3)
BUN SERPL-MCNC: 64 MG/DL (ref 7–30)
CA-I BLD-MCNC: 4.6 MG/DL (ref 4.4–5.2)
CALCIUM SERPL-MCNC: 8.7 MG/DL (ref 8.5–10.1)
CHLORIDE BLD-SCNC: 117 MMOL/L (ref 94–109)
CO2 SERPL-SCNC: 27 MMOL/L (ref 20–32)
CREAT SERPL-MCNC: 0.88 MG/DL (ref 0.66–1.25)
DIASTOLIC BLOOD PRESSURE - MUSE: NORMAL MMHG
ERYTHROCYTE [DISTWIDTH] IN BLOOD BY AUTOMATED COUNT: 14.3 % (ref 10–15)
GFR SERPL CREATININE-BSD FRML MDRD: >90 ML/MIN/1.73M2
GLUCOSE BLD-MCNC: 181 MG/DL (ref 70–99)
GLUCOSE BLD-MCNC: 191 MG/DL (ref 70–99)
GLUCOSE BLDC GLUCOMTR-MCNC: 116 MG/DL (ref 70–99)
GLUCOSE BLDC GLUCOMTR-MCNC: 117 MG/DL (ref 70–99)
GLUCOSE BLDC GLUCOMTR-MCNC: 129 MG/DL (ref 70–99)
GLUCOSE BLDC GLUCOMTR-MCNC: 131 MG/DL (ref 70–99)
GLUCOSE BLDC GLUCOMTR-MCNC: 151 MG/DL (ref 70–99)
GLUCOSE BLDC GLUCOMTR-MCNC: 167 MG/DL (ref 70–99)
GLUCOSE BLDC GLUCOMTR-MCNC: 181 MG/DL (ref 70–99)
HCT VFR BLD AUTO: 28.8 % (ref 40–53)
HGB BLD-MCNC: 9 G/DL (ref 13.3–17.7)
INR PPP: 1.18 (ref 0.85–1.15)
INTERPRETATION ECG - MUSE: NORMAL
LACTATE SERPL-SCNC: 1.4 MMOL/L (ref 0.7–2)
LACTATE SERPL-SCNC: 1.6 MMOL/L (ref 0.7–2)
LDH SERPL L TO P-CCNC: 269 U/L (ref 85–227)
MAGNESIUM SERPL-MCNC: 2.3 MG/DL (ref 1.6–2.3)
MCH RBC QN AUTO: 31.3 PG (ref 26.5–33)
MCHC RBC AUTO-ENTMCNC: 31.3 G/DL (ref 31.5–36.5)
MCV RBC AUTO: 100 FL (ref 78–100)
P AXIS - MUSE: 47 DEGREES
PHOSPHATE SERPL-MCNC: 3 MG/DL (ref 2.5–4.5)
PLATELET # BLD AUTO: 535 10E3/UL (ref 150–450)
POTASSIUM BLD-SCNC: 3.4 MMOL/L (ref 3.4–5.3)
PR INTERVAL - MUSE: 128 MS
PROT SERPL-MCNC: 7.2 G/DL (ref 6.8–8.8)
QRS DURATION - MUSE: 92 MS
QT - MUSE: 338 MS
QTC - MUSE: 459 MS
R AXIS - MUSE: 41 DEGREES
RBC # BLD AUTO: 2.88 10E6/UL (ref 4.4–5.9)
SODIUM SERPL-SCNC: 146 MMOL/L (ref 133–144)
SODIUM SERPL-SCNC: 147 MMOL/L (ref 133–144)
SODIUM SERPL-SCNC: 149 MMOL/L (ref 133–144)
SODIUM SERPL-SCNC: 150 MMOL/L (ref 133–144)
SODIUM SERPL-SCNC: 150 MMOL/L (ref 133–144)
SPECIMEN EXPIRATION DATE: NORMAL
SYSTOLIC BLOOD PRESSURE - MUSE: NORMAL MMHG
T AXIS - MUSE: 179 DEGREES
VENTRICULAR RATE- MUSE: 111 BPM
WBC # BLD AUTO: 17.6 10E3/UL (ref 4–11)

## 2021-12-23 PROCEDURE — 36415 COLL VENOUS BLD VENIPUNCTURE: CPT | Performed by: INTERNAL MEDICINE

## 2021-12-23 PROCEDURE — 250N000013 HC RX MED GY IP 250 OP 250 PS 637: Performed by: PHYSICIAN ASSISTANT

## 2021-12-23 PROCEDURE — 92526 ORAL FUNCTION THERAPY: CPT | Mod: GN

## 2021-12-23 PROCEDURE — 85730 THROMBOPLASTIN TIME PARTIAL: CPT | Performed by: INTERNAL MEDICINE

## 2021-12-23 PROCEDURE — 250N000011 HC RX IP 250 OP 636: Performed by: NURSE PRACTITIONER

## 2021-12-23 PROCEDURE — 93005 ELECTROCARDIOGRAM TRACING: CPT

## 2021-12-23 PROCEDURE — 999N000157 HC STATISTIC RCP TIME EA 10 MIN

## 2021-12-23 PROCEDURE — 250N000013 HC RX MED GY IP 250 OP 250 PS 637: Performed by: STUDENT IN AN ORGANIZED HEALTH CARE EDUCATION/TRAINING PROGRAM

## 2021-12-23 PROCEDURE — 94640 AIRWAY INHALATION TREATMENT: CPT

## 2021-12-23 PROCEDURE — 82330 ASSAY OF CALCIUM: CPT | Performed by: INTERNAL MEDICINE

## 2021-12-23 PROCEDURE — 250N000009 HC RX 250: Performed by: PHYSICIAN ASSISTANT

## 2021-12-23 PROCEDURE — 86850 RBC ANTIBODY SCREEN: CPT | Performed by: INTERNAL MEDICINE

## 2021-12-23 PROCEDURE — 83605 ASSAY OF LACTIC ACID: CPT | Performed by: INTERNAL MEDICINE

## 2021-12-23 PROCEDURE — 83735 ASSAY OF MAGNESIUM: CPT | Performed by: NURSE PRACTITIONER

## 2021-12-23 PROCEDURE — 84295 ASSAY OF SERUM SODIUM: CPT | Performed by: INTERNAL MEDICINE

## 2021-12-23 PROCEDURE — 36415 COLL VENOUS BLD VENIPUNCTURE: CPT | Performed by: PHYSICIAN ASSISTANT

## 2021-12-23 PROCEDURE — 99233 SBSQ HOSP IP/OBS HIGH 50: CPT | Mod: GC | Performed by: INTERNAL MEDICINE

## 2021-12-23 PROCEDURE — 120N000003 HC R&B IMCU UMMC

## 2021-12-23 PROCEDURE — 83695 ASSAY OF LIPOPROTEIN(A): CPT | Performed by: NURSE PRACTITIONER

## 2021-12-23 PROCEDURE — 999N000128 HC STATISTIC PERIPHERAL IV START W/O US GUIDANCE

## 2021-12-23 PROCEDURE — 83615 LACTATE (LD) (LDH) ENZYME: CPT | Performed by: NURSE PRACTITIONER

## 2021-12-23 PROCEDURE — 84100 ASSAY OF PHOSPHORUS: CPT | Performed by: NURSE PRACTITIONER

## 2021-12-23 PROCEDURE — 84295 ASSAY OF SERUM SODIUM: CPT | Performed by: PHYSICIAN ASSISTANT

## 2021-12-23 PROCEDURE — 250N000013 HC RX MED GY IP 250 OP 250 PS 637: Performed by: NURSE PRACTITIONER

## 2021-12-23 PROCEDURE — 97535 SELF CARE MNGMENT TRAINING: CPT | Mod: GO | Performed by: OCCUPATIONAL THERAPIST

## 2021-12-23 PROCEDURE — 85610 PROTHROMBIN TIME: CPT | Performed by: NURSE PRACTITIONER

## 2021-12-23 PROCEDURE — 97530 THERAPEUTIC ACTIVITIES: CPT | Mod: GO | Performed by: OCCUPATIONAL THERAPIST

## 2021-12-23 PROCEDURE — 250N000009 HC RX 250: Performed by: STUDENT IN AN ORGANIZED HEALTH CARE EDUCATION/TRAINING PROGRAM

## 2021-12-23 PROCEDURE — 36415 COLL VENOUS BLD VENIPUNCTURE: CPT | Performed by: NURSE PRACTITIONER

## 2021-12-23 PROCEDURE — 250N000013 HC RX MED GY IP 250 OP 250 PS 637: Performed by: INTERNAL MEDICINE

## 2021-12-23 PROCEDURE — 250N000011 HC RX IP 250 OP 636: Performed by: STUDENT IN AN ORGANIZED HEALTH CARE EDUCATION/TRAINING PROGRAM

## 2021-12-23 PROCEDURE — 80053 COMPREHEN METABOLIC PANEL: CPT | Performed by: PHYSICIAN ASSISTANT

## 2021-12-23 PROCEDURE — 82947 ASSAY GLUCOSE BLOOD QUANT: CPT | Performed by: INTERNAL MEDICINE

## 2021-12-23 PROCEDURE — 85027 COMPLETE CBC AUTOMATED: CPT | Performed by: INTERNAL MEDICINE

## 2021-12-23 PROCEDURE — 93010 ELECTROCARDIOGRAM REPORT: CPT | Performed by: INTERNAL MEDICINE

## 2021-12-23 PROCEDURE — 94640 AIRWAY INHALATION TREATMENT: CPT | Mod: 76

## 2021-12-23 RX ORDER — POTASSIUM CHLORIDE 1.5 G/1.58G
20 POWDER, FOR SOLUTION ORAL ONCE
Status: COMPLETED | OUTPATIENT
Start: 2021-12-23 | End: 2021-12-23

## 2021-12-23 RX ORDER — IPRATROPIUM BROMIDE AND ALBUTEROL SULFATE 2.5; .5 MG/3ML; MG/3ML
3 SOLUTION RESPIRATORY (INHALATION)
Status: DISCONTINUED | OUTPATIENT
Start: 2021-12-23 | End: 2021-12-24

## 2021-12-23 RX ORDER — HALOPERIDOL 5 MG/ML
5 INJECTION INTRAMUSCULAR 2 TIMES DAILY
Status: DISCONTINUED | OUTPATIENT
Start: 2021-12-23 | End: 2021-12-25

## 2021-12-23 RX ORDER — QUETIAPINE FUMARATE 50 MG/1
100 TABLET, FILM COATED ORAL EVERY EVENING
Status: DISCONTINUED | OUTPATIENT
Start: 2021-12-23 | End: 2021-12-29

## 2021-12-23 RX ADMIN — ATORVASTATIN CALCIUM 40 MG: 40 TABLET, FILM COATED ORAL at 20:08

## 2021-12-23 RX ADMIN — Medication 1 PACKET: at 09:15

## 2021-12-23 RX ADMIN — Medication 1 PACKET: at 12:04

## 2021-12-23 RX ADMIN — Medication 2 SPRAY: at 12:03

## 2021-12-23 RX ADMIN — Medication 2 SPRAY: at 09:15

## 2021-12-23 RX ADMIN — Medication 1 PACKET: at 16:16

## 2021-12-23 RX ADMIN — ACETAMINOPHEN 650 MG: 325 TABLET, FILM COATED ORAL at 12:03

## 2021-12-23 RX ADMIN — Medication 2 SPRAY: at 16:16

## 2021-12-23 RX ADMIN — HEPARIN SODIUM 5000 UNITS: 5000 INJECTION, SOLUTION INTRAVENOUS; SUBCUTANEOUS at 20:08

## 2021-12-23 RX ADMIN — INSULIN ASPART 1 UNITS: 100 INJECTION, SOLUTION INTRAVENOUS; SUBCUTANEOUS at 01:26

## 2021-12-23 RX ADMIN — Medication 1 TABLET: at 09:14

## 2021-12-23 RX ADMIN — TICAGRELOR 90 MG: 90 TABLET ORAL at 09:14

## 2021-12-23 RX ADMIN — QUETIAPINE FUMARATE 100 MG: 50 TABLET ORAL at 20:08

## 2021-12-23 RX ADMIN — ASPIRIN 81 MG CHEWABLE TABLET 81 MG: 81 TABLET CHEWABLE at 09:14

## 2021-12-23 RX ADMIN — IPRATROPIUM BROMIDE AND ALBUTEROL SULFATE 3 ML: 2.5; .5 SOLUTION RESPIRATORY (INHALATION) at 08:04

## 2021-12-23 RX ADMIN — POTASSIUM CHLORIDE 20 MEQ: 1.5 POWDER, FOR SOLUTION ORAL at 09:15

## 2021-12-23 RX ADMIN — TICAGRELOR 90 MG: 90 TABLET ORAL at 20:11

## 2021-12-23 RX ADMIN — INSULIN ASPART 1 UNITS: 100 INJECTION, SOLUTION INTRAVENOUS; SUBCUTANEOUS at 12:04

## 2021-12-23 RX ADMIN — HEPARIN SODIUM 5000 UNITS: 5000 INJECTION, SOLUTION INTRAVENOUS; SUBCUTANEOUS at 12:03

## 2021-12-23 RX ADMIN — THIAMINE HCL TAB 100 MG 100 MG: 100 TAB at 09:15

## 2021-12-23 RX ADMIN — INSULIN ASPART 1 UNITS: 100 INJECTION, SOLUTION INTRAVENOUS; SUBCUTANEOUS at 04:43

## 2021-12-23 RX ADMIN — IPRATROPIUM BROMIDE AND ALBUTEROL SULFATE 3 ML: 2.5; .5 SOLUTION RESPIRATORY (INHALATION) at 14:33

## 2021-12-23 RX ADMIN — HALOPERIDOL LACTATE 5 MG: 5 INJECTION, SOLUTION INTRAMUSCULAR at 14:00

## 2021-12-23 RX ADMIN — Medication 1 PACKET: at 09:16

## 2021-12-23 RX ADMIN — Medication 2 SPRAY: at 20:07

## 2021-12-23 RX ADMIN — Medication 1 PACKET: at 20:16

## 2021-12-23 RX ADMIN — HEPARIN SODIUM 5000 UNITS: 5000 INJECTION, SOLUTION INTRAVENOUS; SUBCUTANEOUS at 04:43

## 2021-12-23 RX ADMIN — NICOTINE 1 PATCH: 14 PATCH, EXTENDED RELEASE TRANSDERMAL at 09:14

## 2021-12-23 RX ADMIN — FOLIC ACID 1 MG: 1 TABLET ORAL at 09:14

## 2021-12-23 RX ADMIN — Medication 10 MG: at 22:57

## 2021-12-23 ASSESSMENT — ACTIVITIES OF DAILY LIVING (ADL)
ADLS_ACUITY_SCORE: 15
ADLS_ACUITY_SCORE: 17
ADLS_ACUITY_SCORE: 15
ADLS_ACUITY_SCORE: 17
ADLS_ACUITY_SCORE: 15
ADLS_ACUITY_SCORE: 15
ADLS_ACUITY_SCORE: 17
ADLS_ACUITY_SCORE: 15
ADLS_ACUITY_SCORE: 17
ADLS_ACUITY_SCORE: 15
ADLS_ACUITY_SCORE: 17
ADLS_ACUITY_SCORE: 17
ADLS_ACUITY_SCORE: 15

## 2021-12-23 ASSESSMENT — MIFFLIN-ST. JEOR: SCORE: 1419.38

## 2021-12-23 NOTE — PROGRESS NOTES
"    [facility]   Critical Care Cardiology - Progress Note    Bruce Blount MRN: [id number]  Age: 56 year old, : 1965  Date: 2021    Assessment and Plan:  Bruce Blount is a 56 year old male with PMHx current tobacco use and ETOH abuse who presented to [facility]  after out of hospital cardiac arrest.      Per EMS and brother, patient was in his usual state of health prior to arrest. Patients brother heard a \"thump\" and found patient unresponsive and agonal breathing. 911 called and CPR initiated. EMS arrived within 4 minutes. Initial rhythm VF--> shocked x ~7 with ROSC upon arrival to ED. Total CPR team per EMS ~ 40 minutes. 3 mg Epinephrine, 300 mg Amio given via EMS. Patient EKG reveals Valentín inferior/posterior leads with reciprocal changes. Patient initially presented with an Igel and was intubated with ETT in the ED. He arrested and was again shocked in the ED with ROSC.      Taken urgently to CCL where he underwent coronary angiogram and again arrested requiring manual CPR and was then cannulated on VA ECMO via right with 17 Gabonese cannula RCFA, 25 Fr cannula RCFV. Coronary angiogram revealed  of RCA and acute culprit lesion of LCx/OM1, s/p PCI to pLCx, mid LCx, and OM1 with TERRY. IABP placed for decreased pulsatility. Thermogard cooling cath placed and patient was transferred to ICU for further management. Decannulated . IABP removed . Extubated .     During the last few days, patient remained in the ICU for delirium managmenent. Currently he is doing well and ready to be transferred to the floor.    Today's Plan:  - transition Duoneb from Q4 to Q6 hours while awake  - 500cc LR bolus this morning  - increase FWF, from 150 to 200cc Q4 hours  - sodium Q6 hours    Neurology  # Anoxic brain injury  # ICU delirium  # Chest wall pain    # Hx of possible ETOH abuse per family  S/p therapeutic hypothermia. Extubated . Initial head CT without acute " pathology Initially his delirium was difficult to manage but now his mentation seem improving and has been off precedex during the last 24 hours. He is able to follow commands and ready to go to the floor. We will cut off his psych meds to half.   - off precedex ggt  - 10mg melatonin at bedtime  - decrease seroquel from 200 to 100 at night  - 10 to 5 mg haldol BID (08:00 and 14:00)  - continue PRN haldol  - Tylenol and lidocaine patches for additional pain control as needed  - Folate, Thiamine and multi vitamin for ETOH abuse.     Cardiovascular  # Ischemic Cardiomyopathy (EF 40-45%)  # S/p PCI pLCx, mLCx, OM1  # Residual RCA   # HTN, HLD  # Refractory VF cardiac arrest 2/2 secondary to STEMI, resolved  # Cardiogenic shock requiring VA ECMO, resolved  # Concern for limb ischemia s/p distal reperfusion cannula left leg  S/p Peripheral V-A ECMO inserted via RCFA and RCFV 17/25 fr.  Decannulated 12/13. IABP removed 12/13.   - GDMT   - DAPT: Continue ASA 81mg and ticagrelor 90mg BID    - Statin: 40mg Lipitor daily   - Hold ACE/ARB given hypotension   - Holding beta blocker given shock/hypotension  -  of RCA not intervened upon  - suture removal 12/28     Pulmonary  # Acute hypoxemic respiratory failure   # Klebsiella pneumonia  # Rib Fractures  # Sternal Fracture  # Tobacco Abuse (2PPD)  # Probable COPD exacerbation  Extubated 12/14 to BiPAP. Now sating well on 2L NC.  Completed antibiotic/steroid course for COPD exacerbation 12/20  - RT pulmonary hygiene  - transition to Duoneb Q6 hours while awake     Gastrointestinal, Nutrition  # Loose Stool  # Shock liver 2/2 cardiac arrest, resolved  No known medical hx. C. Diff negative  - Monitor LFTs    -On full liquid diet and Cycle TF     Renal, Electrolytes  # Hypernatremia  # Hypoalbuminemia  # Acute Renal Injury, resolved  # Lactic acidosis, resolved  # Hyperkalemia, resolved   # Hypocalcemia, resolved  # Hyperphosphatemia, resolved   No CRRT since decannulation  12/13. Creatinine 1.07, HCO3 26 today. BUN 80. Sodium elevated for the last couple of days. Will continue increasing free water.  - 500cc LR this AM  - increase FWF, 150cc Q4 hours---200.  - sodium Q6 hours  - Monitor urine output     Infectious Disease  # Aspiration Pneumonia (klebsiella, staph aureus)  # Leukocytosis  # Fever  # Lactic acidosis, resolved  WBC 22.3. Tmax 98.8. Pan culture 12/14 given temp and WBC. Susceptibilities resulted the same as previous  - continue to monitor off antibiotics  - PRN acetaminophen  Cultures thus far:   - sputum 12/6: staph aureus, staph dysgalactia, klebsiella   - sputum 12/7: staph aureus   - sputum 12/8: staph and klebsiella   - Sputum 12/9: staph aureus and klebsiella   - Sputum 12/10: staph aureus and klebsiella   - sputum 12/13: staph aureus and klebsiella   - sputum 12/14: staph aureus  Antibiotics              - Vancomycin 12/6 - 12/7 (MRSA negative)              - Zosyn 12/6 - 12/9              - Rocephin 12/9 - 12/16   - Cefepime 12/16 - 12/21   - Flagyl 12/16 - 12/16   - Azithro 12/16 - 12/20      Hematology  # Anemia of critical illness  # Concern for HIT - screen negative  # Thrombocytopenia, resolved  # Loss of bilateral DP pulses, resolved  Hgb stable 10.1. Plt 718. No e/o bleeding.    - monitor hemoglobin     Endocrinology  # Hyperglycemia   No known medical history. Hemoglobin A1c 5.6%  - SSI     MSK/Skin  # Mottling LLE s/p LLE distal reperfusion cannula  # Dusky toes left foot  Pulses remain intact. IABP removed 12/13.   - continue to monitor    Pertinent Lines  RIJ CVC - remove  NG    ICU Cares:  Daily CXR: improved parenchyma  Fluids/Feeds: TF at goal. Small fluid bolus today  DVT Prophylaxis: subcutaneous heparin Q8 hours  GI Prophylaxis: N/A  Bowel Regimen: N/A - loose stools  Anticipated Floor Transfer: N/A    Family Update: brother, updated by me    Patient seen and discussed with Dr. Lorne Stewart.  Assessment and plan as above.    Jorge Reyes Castro,  MD  Cardiology Fellow     Interval History:   -No acute events overnight  -Slept well overnight  -Doing well this morning. In good spirits.     Objective Findings:  Temp:  [94.3  F (34.6  C)-98.8  F (37.1  C)] 94.3  F (34.6  C)  Pulse:  [108-128] 109  Resp:  [27-40] 33  BP: ()/(59-82) 112/71  SpO2:  [89 %-96 %] 95 %    I/O:  Intake/Output Summary (Last 24 hours) at 12/22/2021 0819  Last data filed at 12/22/2021 0700  Gross per 24 hour   Intake 1584.83 ml   Output 950 ml   Net 634.83 ml       Physical Exam:  GEN: sitting in chair. No in acute distress.   HEENT: pupils equal.  No appreciable cranial trauma  Pulm: seemingly CTAB.  Scattered rale  Cardiac: regular rhythm. tachycardia. No murmur, rub, gallop  GI: soft, non distended  Neuro: alert to self only.  Moves all extremities symmetrically  Integument: no appreciable skin breakdown  Vascular: Well perfused.       Medications:    artificial saliva  2 spray Swish & Spit 4x Daily     aspirin  81 mg Per Feeding Tube Daily     atorvastatin  40 mg Oral or Feeding Tube QPM     banatrol plus  1 packet Per Feeding Tube Daily     folic acid  1 mg Oral Daily     haloperidol lactate  10 mg Intravenous BID     heparin ANTICOAGULANT  5,000 Units Subcutaneous Q8H     insulin aspart  1-6 Units Subcutaneous Q4H     ipratropium - albuterol 0.5 mg/2.5 mg/3 mL  3 mL Nebulization Q4H While awake     melatonin  10 mg Oral At Bedtime     multivitamin w/minerals  1 tablet Oral Daily     nicotine  1 patch Transdermal Daily     nicotine   Transdermal Q8H     potassium chloride  20 mEq Oral or Feeding Tube Once     protein modular  1 packet Per Feeding Tube 4x Daily     QUEtiapine  200 mg Oral or Feeding Tube QPM     thiamine  100 mg Oral or Feeding Tube Daily     ticagrelor  90 mg Oral or Feeding Tube BID       dextrose           Labs:   CMP  Recent Labs   Lab 12/23/21  0539 12/23/21  0417 12/23/21  0125 12/22/21  2159 12/22/21  2008 12/22/21  1540 12/22/21  1159 12/22/21  0955  12/22/21  0445 12/22/21  0439 12/21/21  1936 12/21/21  1641 12/21/21  0357 12/21/21 0348 12/20/21 0354 12/20/21 0349   *  150*  --   --  149*  --  150*  150*  --  152*  --  152*  --  150*  --  147*  147*   < > 144  144   POTASSIUM 3.4  --   --   --   --  4.1  --  3.7  --  3.4  --  3.6   < > 3.4   < > 3.5   CHLORIDE 117*  --   --   --   --  117*  --   --   --  118*  --  118*  --  114*   < > 107   CO2 27  --   --   --   --  27  --   --   --  26  --  26  --  26   < > 30   ANIONGAP 6  --   --   --   --  6  --   --   --  8  --  6  --  7   < > 7   *  181* 181* 151*  --    < > 208*  180*   < >  --    < > 191*  170*   < > 205*   < > 196*  182*   < > 177*  173*   BUN 64*  --   --   --   --  74*  --   --   --  80*  --  88*  --  89*   < > 61*   CR 0.88  --   --   --   --  0.94  --   --   --  1.07  --  1.13  --  1.10   < > 1.19   GFRESTIMATED >90  --   --   --   --  >90  --   --   --  81  --  76  --  75   < > 68   BRIDGETTE 8.7  --   --   --   --  8.9  --   --   --  8.9  --  8.6  --  8.6   < > 8.5   MAG 2.3  --   --   --   --   --   --   --   --  2.7*  --   --   --  2.5*  --  2.2   PHOS 3.0  --   --   --   --   --   --   --   --  3.6  --   --   --  4.2  --  4.8*   PROTTOTAL 7.2  --   --   --   --   --   --   --   --  7.9  --   --   --  7.8  --  7.9   ALBUMIN 2.2*  --   --   --   --   --   --   --   --  2.3*  --   --   --  2.2*  --  2.1*   BILITOTAL 0.3  --   --   --   --   --   --   --   --  0.3  --   --   --  0.3  --  0.5   ALKPHOS 93  --   --   --   --   --   --   --   --  101  --   --   --  98  --  98   AST 28  --   --   --   --   --   --   --   --  31  --   --   --  26  --  29   ALT 74*  --   --   --   --   --   --   --   --  74*  --   --   --  59  --  55    < > = values in this interval not displayed.     CBC  Recent Labs   Lab 12/23/21  0547 12/22/21  0439 12/21/21  0348 12/20/21  0349   WBC 17.6* 22.3* 17.1* 19.1*   RBC 2.88* 3.25* 3.02* 2.93*   HGB 9.0* 10.1* 9.3* 9.1*   HCT 28.8* 31.7* 28.9* 28.2*     98 96 96   MCH 31.3 31.1 30.8 31.1   MCHC 31.3* 31.9 32.2 32.3   RDW 14.3 13.8 13.2 13.0   * 718* 621* 677*     Arterial Blood Gas  Recent Labs   Lab 12/20/21  0225 12/18/21  0658 12/17/21  1028 12/17/21  0454 12/16/21  0742   PH  --   --   --  7.41 7.41   PCO2  --   --   --  22* 37   PO2  --   --   --  143* 99   HCO3  --   --   --  14* 23   O2PER 35 45 45 45 50         Pertinent Imaging Studies:  Coronary angiogram:   Refractory VT/VF cardiac arrest.  Cardiogenic shock.  STEMI involving the OM1 as culprit.  Three vessel severe CAD involving the  of the RCA, mid LCx and OM1 severe disease with occlusion of OM1 as culprit in STEMI, and moderate proximal LAD lesions likely hemodynamically significant.  CPR  VA ECMO placement.  IABP placement.  Thermogard placement with initiation of hypothermia protocol.  Right radial arterial line placement.  PCI with three drug eluting stents to the proximal, mid LCx and OM1.    Echo:  Left ventricular function is decreased. The ejection fraction is 40-45% (mildly reduced). Mild diffuse hypokinesis is present.  Global right ventricular function is normal. The right ventricle is normal size.  This study was compared with the study from 12/12/2021. There has been an  improvement in the biventricular function since the patient has been decannulated.       Jorge Reyes Castro, MD  Cardiology Fellow

## 2021-12-23 NOTE — PROGRESS NOTES
[facility]    The SLUMS ([facility] Mental Status Exam) is a 30-point standardized cognitive screen used to identify the presence of cognitive deficits and/or to identify a change in cognition over time.  This screen assesses cognitive abilities in various domains.  ([email or web address])    Patient's performance was as follows:    Total Score: 4/30    Scoring If High School Educated If Less than High School Educated   Normal 27-30 25-30   Mild Neurocognitive Disorder 21-26 20-24   Dementia 1-20 1-19       Score Interpretation:  Please note that this examination is used to screen individuals to look for the presence of cognitive deficits and to identify changes in cognition over time.  This is not a diagnosis.  This examination can be followed by further cognitive assessments if appropriate and deemed necessary.

## 2021-12-23 NOTE — PROGRESS NOTES
Major Shift Events:   Pt more oriented. A/O to self place and a little more situation. Afebrile. Neuros intact. More cooperative. VSS. Sinus tach. 2L NC. Pt pulled NGT out last night. A new confirmed NGT placed and has not made any attempts to pull the new tube out.  TF started at goal 45 ml/hr per schedule [id number].  q 2 hrs. Thickened liquid diet. Uses commode appropriately.       Plan:   Transfer.       For vital signs and complete assessments, please see documentation flowsheets.

## 2021-12-23 NOTE — PLAN OF CARE
Major Shift Events: pt is alert to self, place, and time. Sitter at bedside. ST 's with PVC's, MAP>65, on 2L NC, R RIJ CVC removed. Na 150, 500 ml LR bolus, and free water flushed increased to 150 ml q2hr, TF cycled 8pm to 8am @ 45 ml/hr via NG. Fees completed this am by speech, Full liquid diet with thickened fluids. Uses urinal and commode. Refused to get out bed this morning and afternoon, up in the chair this evening.  Plan: continue to monitor, possible transfer to the floor tomorrow.  For vital signs and complete assessments, please see documentation flowsheets.

## 2021-12-24 ENCOUNTER — APPOINTMENT (OUTPATIENT)
Dept: GENERAL RADIOLOGY | Facility: CLINIC | Age: 56
End: 2021-12-24
Attending: NURSE PRACTITIONER
Payer: COMMERCIAL

## 2021-12-24 ENCOUNTER — APPOINTMENT (OUTPATIENT)
Dept: PHYSICAL THERAPY | Facility: CLINIC | Age: 56
End: 2021-12-24
Payer: COMMERCIAL

## 2021-12-24 ENCOUNTER — APPOINTMENT (OUTPATIENT)
Dept: SPEECH THERAPY | Facility: CLINIC | Age: 56
End: 2021-12-24
Payer: COMMERCIAL

## 2021-12-24 LAB
ALBUMIN SERPL-MCNC: 2.1 G/DL (ref 3.4–5)
ALP SERPL-CCNC: 92 U/L (ref 40–150)
ALT SERPL W P-5'-P-CCNC: 67 U/L (ref 0–70)
ANION GAP SERPL CALCULATED.3IONS-SCNC: 7 MMOL/L (ref 3–14)
AST SERPL W P-5'-P-CCNC: 36 U/L (ref 0–45)
BILIRUB SERPL-MCNC: 0.3 MG/DL (ref 0.2–1.3)
BUN SERPL-MCNC: 38 MG/DL (ref 7–30)
CA-I BLD-MCNC: 4.8 MG/DL (ref 4.4–5.2)
CALCIUM SERPL-MCNC: 8.6 MG/DL (ref 8.5–10.1)
CHLORIDE BLD-SCNC: 111 MMOL/L (ref 94–109)
CO2 SERPL-SCNC: 28 MMOL/L (ref 20–32)
CREAT SERPL-MCNC: 0.73 MG/DL (ref 0.66–1.25)
ERYTHROCYTE [DISTWIDTH] IN BLOOD BY AUTOMATED COUNT: 14.4 % (ref 10–15)
GFR SERPL CREATININE-BSD FRML MDRD: >90 ML/MIN/1.73M2
GLUCOSE BLD-MCNC: 141 MG/DL (ref 70–99)
GLUCOSE BLD-MCNC: 153 MG/DL (ref 70–99)
GLUCOSE BLDC GLUCOMTR-MCNC: 111 MG/DL (ref 70–99)
GLUCOSE BLDC GLUCOMTR-MCNC: 123 MG/DL (ref 70–99)
GLUCOSE BLDC GLUCOMTR-MCNC: 123 MG/DL (ref 70–99)
GLUCOSE BLDC GLUCOMTR-MCNC: 130 MG/DL (ref 70–99)
GLUCOSE BLDC GLUCOMTR-MCNC: 131 MG/DL (ref 70–99)
GLUCOSE BLDC GLUCOMTR-MCNC: 131 MG/DL (ref 70–99)
HCT VFR BLD AUTO: 26.9 % (ref 40–53)
HGB BLD-MCNC: 8.5 G/DL (ref 13.3–17.7)
INR PPP: 1.14 (ref 0.85–1.15)
LACTATE SERPL-SCNC: 0.7 MMOL/L (ref 0.7–2)
LPA SERPL-MCNC: 20 MG/DL
MAGNESIUM SERPL-MCNC: 2.1 MG/DL (ref 1.6–2.3)
MCH RBC QN AUTO: 31 PG (ref 26.5–33)
MCHC RBC AUTO-ENTMCNC: 31.6 G/DL (ref 31.5–36.5)
MCV RBC AUTO: 98 FL (ref 78–100)
PHOSPHATE SERPL-MCNC: 3.2 MG/DL (ref 2.5–4.5)
PLATELET # BLD AUTO: 495 10E3/UL (ref 150–450)
POTASSIUM BLD-SCNC: 3.6 MMOL/L (ref 3.4–5.3)
PROT SERPL-MCNC: 6.9 G/DL (ref 6.8–8.8)
RBC # BLD AUTO: 2.74 10E6/UL (ref 4.4–5.9)
SODIUM SERPL-SCNC: 144 MMOL/L (ref 133–144)
SODIUM SERPL-SCNC: 146 MMOL/L (ref 133–144)
WBC # BLD AUTO: 17.1 10E3/UL (ref 4–11)

## 2021-12-24 PROCEDURE — 999N000065 XR ABDOMEN PORT 1 VIEWS

## 2021-12-24 PROCEDURE — 82947 ASSAY GLUCOSE BLOOD QUANT: CPT | Performed by: INTERNAL MEDICINE

## 2021-12-24 PROCEDURE — 250N000011 HC RX IP 250 OP 636: Performed by: STUDENT IN AN ORGANIZED HEALTH CARE EDUCATION/TRAINING PROGRAM

## 2021-12-24 PROCEDURE — 83735 ASSAY OF MAGNESIUM: CPT | Performed by: NURSE PRACTITIONER

## 2021-12-24 PROCEDURE — 120N000003 HC R&B IMCU UMMC

## 2021-12-24 PROCEDURE — 97530 THERAPEUTIC ACTIVITIES: CPT | Mod: GP

## 2021-12-24 PROCEDURE — 74018 RADEX ABDOMEN 1 VIEW: CPT

## 2021-12-24 PROCEDURE — 85610 PROTHROMBIN TIME: CPT | Performed by: NURSE PRACTITIONER

## 2021-12-24 PROCEDURE — 36415 COLL VENOUS BLD VENIPUNCTURE: CPT | Performed by: INTERNAL MEDICINE

## 2021-12-24 PROCEDURE — 250N000013 HC RX MED GY IP 250 OP 250 PS 637: Performed by: STUDENT IN AN ORGANIZED HEALTH CARE EDUCATION/TRAINING PROGRAM

## 2021-12-24 PROCEDURE — 250N000013 HC RX MED GY IP 250 OP 250 PS 637: Performed by: PHYSICIAN ASSISTANT

## 2021-12-24 PROCEDURE — 83605 ASSAY OF LACTIC ACID: CPT | Performed by: INTERNAL MEDICINE

## 2021-12-24 PROCEDURE — 82330 ASSAY OF CALCIUM: CPT | Performed by: INTERNAL MEDICINE

## 2021-12-24 PROCEDURE — 250N000011 HC RX IP 250 OP 636: Performed by: NURSE PRACTITIONER

## 2021-12-24 PROCEDURE — 84100 ASSAY OF PHOSPHORUS: CPT | Performed by: NURSE PRACTITIONER

## 2021-12-24 PROCEDURE — 92526 ORAL FUNCTION THERAPY: CPT | Mod: GN

## 2021-12-24 PROCEDURE — 250N000009 HC RX 250: Performed by: STUDENT IN AN ORGANIZED HEALTH CARE EDUCATION/TRAINING PROGRAM

## 2021-12-24 PROCEDURE — 250N000013 HC RX MED GY IP 250 OP 250 PS 637: Performed by: NURSE PRACTITIONER

## 2021-12-24 PROCEDURE — 250N000013 HC RX MED GY IP 250 OP 250 PS 637: Performed by: INTERNAL MEDICINE

## 2021-12-24 PROCEDURE — 74018 RADEX ABDOMEN 1 VIEW: CPT | Mod: 26 | Performed by: STUDENT IN AN ORGANIZED HEALTH CARE EDUCATION/TRAINING PROGRAM

## 2021-12-24 PROCEDURE — 97116 GAIT TRAINING THERAPY: CPT | Mod: GP

## 2021-12-24 PROCEDURE — 94640 AIRWAY INHALATION TREATMENT: CPT

## 2021-12-24 PROCEDURE — 82040 ASSAY OF SERUM ALBUMIN: CPT | Performed by: INTERNAL MEDICINE

## 2021-12-24 PROCEDURE — 99231 SBSQ HOSP IP/OBS SF/LOW 25: CPT | Performed by: NURSE PRACTITIONER

## 2021-12-24 PROCEDURE — 84295 ASSAY OF SERUM SODIUM: CPT | Performed by: NURSE PRACTITIONER

## 2021-12-24 PROCEDURE — 85027 COMPLETE CBC AUTOMATED: CPT | Performed by: INTERNAL MEDICINE

## 2021-12-24 PROCEDURE — 36415 COLL VENOUS BLD VENIPUNCTURE: CPT | Performed by: NURSE PRACTITIONER

## 2021-12-24 PROCEDURE — 999N000157 HC STATISTIC RCP TIME EA 10 MIN

## 2021-12-24 PROCEDURE — 80053 COMPREHEN METABOLIC PANEL: CPT | Performed by: INTERNAL MEDICINE

## 2021-12-24 RX ORDER — LIDOCAINE 4 G/G
1 PATCH TOPICAL
Status: DISCONTINUED | OUTPATIENT
Start: 2021-12-24 | End: 2022-01-03 | Stop reason: HOSPADM

## 2021-12-24 RX ORDER — IPRATROPIUM BROMIDE AND ALBUTEROL SULFATE 2.5; .5 MG/3ML; MG/3ML
3 SOLUTION RESPIRATORY (INHALATION)
Status: DISCONTINUED | OUTPATIENT
Start: 2021-12-24 | End: 2021-12-28

## 2021-12-24 RX ADMIN — ASPIRIN 81 MG CHEWABLE TABLET 81 MG: 81 TABLET CHEWABLE at 08:53

## 2021-12-24 RX ADMIN — LIDOCAINE 1 PATCH: 560 PATCH PERCUTANEOUS; TOPICAL; TRANSDERMAL at 21:57

## 2021-12-24 RX ADMIN — HALOPERIDOL LACTATE 5 MG: 5 INJECTION, SOLUTION INTRAMUSCULAR at 08:57

## 2021-12-24 RX ADMIN — TICAGRELOR 90 MG: 90 TABLET ORAL at 21:56

## 2021-12-24 RX ADMIN — Medication 2 SPRAY: at 21:58

## 2021-12-24 RX ADMIN — HEPARIN SODIUM 5000 UNITS: 5000 INJECTION, SOLUTION INTRAVENOUS; SUBCUTANEOUS at 12:07

## 2021-12-24 RX ADMIN — ATORVASTATIN CALCIUM 40 MG: 40 TABLET, FILM COATED ORAL at 21:56

## 2021-12-24 RX ADMIN — Medication 1 TABLET: at 08:53

## 2021-12-24 RX ADMIN — HALOPERIDOL LACTATE 5 MG: 5 INJECTION, SOLUTION INTRAMUSCULAR at 14:26

## 2021-12-24 RX ADMIN — FOLIC ACID 1 MG: 1 TABLET ORAL at 08:53

## 2021-12-24 RX ADMIN — Medication 2 SPRAY: at 18:07

## 2021-12-24 RX ADMIN — THIAMINE HCL TAB 100 MG 100 MG: 100 TAB at 08:53

## 2021-12-24 RX ADMIN — Medication 10 MG: at 21:55

## 2021-12-24 RX ADMIN — Medication 12.5 MG: at 21:56

## 2021-12-24 RX ADMIN — HEPARIN SODIUM 5000 UNITS: 5000 INJECTION, SOLUTION INTRAVENOUS; SUBCUTANEOUS at 03:53

## 2021-12-24 RX ADMIN — HEPARIN SODIUM 5000 UNITS: 5000 INJECTION, SOLUTION INTRAVENOUS; SUBCUTANEOUS at 21:59

## 2021-12-24 RX ADMIN — Medication 1 PACKET: at 10:44

## 2021-12-24 RX ADMIN — TICAGRELOR 90 MG: 90 TABLET ORAL at 08:53

## 2021-12-24 RX ADMIN — Medication 1 PACKET: at 18:08

## 2021-12-24 RX ADMIN — Medication 1 PACKET: at 21:59

## 2021-12-24 RX ADMIN — Medication 1 PACKET: at 12:12

## 2021-12-24 RX ADMIN — QUETIAPINE FUMARATE 100 MG: 50 TABLET ORAL at 21:57

## 2021-12-24 RX ADMIN — IPRATROPIUM BROMIDE AND ALBUTEROL SULFATE 3 ML: 2.5; .5 SOLUTION RESPIRATORY (INHALATION) at 08:54

## 2021-12-24 ASSESSMENT — ACTIVITIES OF DAILY LIVING (ADL)
ADLS_ACUITY_SCORE: 16
ADLS_ACUITY_SCORE: 14
ADLS_ACUITY_SCORE: 14
ADLS_ACUITY_SCORE: 16
ADLS_ACUITY_SCORE: 16
ADLS_ACUITY_SCORE: 14
ADLS_ACUITY_SCORE: 15
ADLS_ACUITY_SCORE: 14
ADLS_ACUITY_SCORE: 14
ADLS_ACUITY_SCORE: 16
ADLS_ACUITY_SCORE: 16
ADLS_ACUITY_SCORE: 14
ADLS_ACUITY_SCORE: 16
ADLS_ACUITY_SCORE: 14
ADLS_ACUITY_SCORE: 16
ADLS_ACUITY_SCORE: 16
ADLS_ACUITY_SCORE: 14
ADLS_ACUITY_SCORE: 16
ADLS_ACUITY_SCORE: 16

## 2021-12-24 ASSESSMENT — MIFFLIN-ST. JEOR: SCORE: 1420.38

## 2021-12-24 NOTE — PROGRESS NOTES
Care Management Follow Up    Length of Stay (days): 18    Expected Discharge Date:  TBD     Concerns to be Addressed: Discharge planning     Patient plan of care discussed at interdisciplinary rounds: Yes    Anticipated Discharge Disposition:  TCU - TBD     Anticipated Discharge Services:  TCU  Anticipated Discharge DME:  BELLA    Patient/family educated on Medicare website which has current facility and service quality ratings:  NA  Education Provided on the Discharge Plan:  NA  Patient/Family in Agreement with the Plan:  NA    Referrals Placed by CM/SW:  NA  Private pay costs discussed: Not applicable    Additional Information:  Per chart review, pt will require a TCU stay. Pt has [employer] insurance, which does NOT have TCU benefits. PRERNA emailed  financial counselor re: if pt qualify's for MA. Per  financial counselor, will follow up.    GERBER James, LGSW  [facility]D [facility]   Ph:[phone]  Pager [phone]

## 2021-12-24 NOTE — PROGRESS NOTES
Transfer  Transferred from: 4e  Via:bed  Reason for transfer: Pt appropriate for 6B- improved patient condition  Family: Aware of transfer  Belongings: Received with pt  Chart: Received with pt  Medications: Meds received from old unit with pt  Code Status verified on armband: yes  2 RN Skin Assessment Completed By: Carisa Campbell rec completed: yes  Bed surface reassessed with algorithm and charted: yes  New bed surface ordered: no  Suction/Ambu bag/Flowmeter at bedside: yes    Report received from:   Pt status: vitally stable. No new neuro deficits.

## 2021-12-24 NOTE — PROGRESS NOTES
Interventional Cardiology Progress Note    Assessment & Plan:  Bruce Blount is a 56 year old male with PMHx current tobacco use and ETOH abuse who presented to [facility] 12/6 after out of hospital VF arrest. Coronary angiogram revealed  of RCA and acute culprit lesion of LCx/OM1, s/p PCI to pLCx, mid LCx, and OM1 with TERRY. Decannulated 12/13. IABP removed 12/13. Extubated 12/14.     Today's Plan:  - wean sitter if able  - decrease frequency of labs  - recheck Na this afternoon, if stable, will likely adjust FWF  - Encourage PO intake, ambulation  - Start Metoprolol     Neurology  # Anoxic brain injury  # ICU delirium, improving  # Chest wall pain    # Hx of possible ETOH abuse per family  S/p therapeutic hypothermia. Extubated 12/14. Initial head CT without acute pathology Initially his delirium was difficult to manage but now his mentation seem improving.    - 10mg melatonin at bedtime  - continue seroquel 100 at night  - continue 5 mg haldol BID (08:00 and 14:00)  - continue PRN haldol  - Tylenol and lidocaine patches for additional pain control as needed  - Folate, Thiamine and multi vitamin for ETOH abuse.     Cardiovascular  # Ischemic Cardiomyopathy (EF 40-45%)  # S/p PCI pLCx, mLCx, OM1  # Residual RCA   # HTN, HLD  # Refractory VF cardiac arrest 2/2 secondary to STEMI, resolved  # Cardiogenic shock requiring VA ECMO, resolved  # Concern for limb ischemia s/p distal reperfusion cannula left leg  S/p Peripheral V-A ECMO inserted via RCFA and RCFV 17/25 fr. Decannulated 12/13. IABP removed 12/13.   - GDMT                 - DAPT: Continue ASA 81mg and ticagrelor 90mg BID                  - Statin: 40mg Lipitor daily                 - ACEi/ARB: possible start this afternoon, tomorrow pending BP                 - Start low dose Metoprolol 12.5 mg BID  -  of RCA not intervened upon, will likely not need intervention unless patient symptomatic  - ECMO cannula site suture removal 12/28     Pulmonary  # Acute  hypoxemic respiratory failure, improving  # Klebsiella pneumonia  # Rib Fractures  # Sternal Fracture  # Tobacco Abuse (2PPD)  # Probable COPD exacerbation  Extubated 12/14 to BiPAP. Now sating well on 2L NC.  Completed antibiotic/steroid course for COPD exacerbation 12/20. Continue to have chest wall pain, likely 2/2 fractures  - RT pulmonary hygiene  - transition to Duoneb Q6 hours while awake  - Lidocaine patch for rib fracture pain  - If pain continues to be an issue, can consider gabapentin, currently would like to avoid given recent issues with delirium     Gastrointestinal, Nutrition  # Loose Stool  # Shock liver 2/2 cardiac arrest, resolved  # Severe Malnutrition in context of critical illness  C. Diff negative. LFT normal.   - SP following, diet advanced to minced/moist (DD5) and thick liquids, remains on cycle TF at night  - 3 day calorie count  - If PO intake increases, will remove NJ     Renal, Electrolytes  # Hypernatremia, improving  # Hypoalbuminemia  # Acute Renal Injury, resolved  # Lactic acidosis, resolved  # Hyperkalemia, resolved   # Hypocalcemia, resolved  # Hyperphosphatemia, resolved   No CRRT since decannulation 12/13. Creatinine 0.73, BUN 38. Sodium elevated for the last couple of days, improving.   - FWF, 150cc Q2 hours  - sodium Q12 hours  - continue to encourage PO intake  - Monitor urine output     Infectious Disease  # Aspiration Pneumonia (klebsiella, staph aureus)  # Leukocytosis  # Fever, resolved  # Lactic acidosis, resolved  WBC 17.1. Tmax 98.8. Pan culture 12/14 given temp and WBC. Susceptibilities resulted the same as previous  - continue to monitor off antibiotics  - PRN acetaminophen  Cultures thus far:                 - sputum 12/6: staph aureus, staph dysgalactia, klebsiella                 - sputum 12/7: staph aureus                 - sputum 12/8: staph and klebsiella                 - Sputum 12/9: staph aureus and klebsiella                 - Sputum 12/10: staph aureus and  "klebsiella                 - sputum 12/13: staph aureus and klebsiella                 - sputum 12/14: staph aureus  Antibiotics              - Vancomycin 12/6 - 12/7 (MRSA negative)              - Zosyn 12/6 - 12/9              - Rocephin 12/9 - 12/16                 - Cefepime 12/16 - 12/21                 - Flagyl 12/16 - 12/16                 - Azithro 12/16 - 12/20      Hematology  # Anemia of critical illness  # Concern for HIT - screen negative  # Thrombocytopenia, resolved  # Loss of bilateral DP pulses, resolved  Hgb stable 8.5. Plt 495. No e/o bleeding.    - daily CBC     Endocrinology  # Stress induced Hyperglycemia   No known medical history. Hemoglobin A1c 5.6%  - sliding scale insulin  - hypoglycemia protocol      MSK/Skin  # Mottling LLE s/p LLE distal reperfusion cannula  # Dusky toes left foot  Pulses remain intact. IABP removed 12/13.   - continue to monitor    Prophylaxis:  DVT: subcutaneous heparin    Diet: Minced/moist (DD5), mildly thick liquids, TF cycle at night  Activity: Up with assist as tolerated  Code Status: Full  Disposition: possible TCU vs ARU early next week pending PT/OT recs, need for 1:1, delirium    Patient discussed w/ Dr. Stewart.      Yi Davidson, APRN, CNP  [facility]  Interventional Cardiology  [phone]      Interval History:  - No acute events overnight  - Remains with 1:1 sitter, is alert and oriented x 4, appears restless at times  - Pt reports some chest wall pain with coughing (likely related to rib fracture)  - Na continues to improve, this am 146    Most recent vital signs:  /76 (BP Location: Left arm)   Pulse 102   Temp 97.6  F (36.4  C) (Oral)   Resp 18   Ht 1.753 m (5' 9\")   Wt 60 kg (132 lb 4.4 oz)   SpO2 98%   BMI 19.53 kg/m    Temp:  [48.2  F (9  C)-98.7  F (37.1  C)] 97.6  F (36.4  C)  Pulse:  [] 102  Resp:  [18-34] 18  BP: (100-116)/(62-76) 103/76  SpO2:  [92 %-99 %] 98 %  Wt Readings from Last 2 Encounters: "   12/24/21 60 kg (132 lb 4.4 oz)   09/23/19 77.5 kg (170 lb 12.8 oz)       Intake/Output Summary (Last 24 hours) at 12/24/2021 1226  Last data filed at 12/24/2021 0506  Gross per 24 hour   Intake 1135 ml   Output 900 ml   Net 235 ml       Physical exam:  General: In bed, restless, in NAD  HEENT: EOMI, PERRLA, no scleral icterus or injection  CARDIAC: RRR, no m/r/g appreciated. Peripheral pulses 2+  RESP: CTAB, no wheezes, rhonchi or crackles appreciated.  GI: NABS, NT/ND, no guarding or rebound  EXTREMITIES: NO LE edema, pulses 2+. Femoral access site w/o bleeding, dressing c/d/i. No bruits appreciated.   SKIN: No acute lesions appreciated  NEURO: Alert and oriented X 4    Labs (Past three days):  CBC  Recent Labs   Lab 12/24/21  0451 12/23/21  0547 12/22/21  0439 12/21/21  0348   WBC 17.1* 17.6* 22.3* 17.1*   RBC 2.74* 2.88* 3.25* 3.02*   HGB 8.5* 9.0* 10.1* 9.3*   HCT 26.9* 28.8* 31.7* 28.9*   MCV 98 100 98 96   MCH 31.0 31.3 31.1 30.8   MCHC 31.6 31.3* 31.9 32.2   RDW 14.4 14.3 13.8 13.2   * 535* 718* 621*     BMP  Recent Labs   Lab 12/24/21  1118 12/24/21  0729 12/24/21  0451 12/23/21  2311 12/23/21  2204 12/23/21 2008 12/23/21  1638 12/23/21  1200 12/23/21  1000 12/23/21  0803 12/23/21  0539 12/22/21 2008 12/22/21  1540 12/22/21  1159 12/22/21  0955 12/22/21  0445 12/22/21  0439 12/21/21  0357 12/21/21  0348   NA  --   --  146*  --  147*  --  146*  --  149*  --  150*  150*   < > 150*  150*  --  152*  --  152*   < > 147*  147*   POTASSIUM  --   --  3.6  --   --   --   --   --   --   --  3.4  --  4.1  --  3.7  --  3.4   < > 3.4   CHLORIDE  --   --  111*  --   --   --   --   --   --   --  117*  --  117*  --   --   --  118*   < > 114*   CO2  --   --  28  --   --   --   --   --   --   --  27  --  27  --   --   --  26   < > 26   ANIONGAP  --   --  7  --   --   --   --   --   --   --  6  --  6  --   --   --  8   < > 7   * 123* 153*  141*   < >  --    < >  --    < >  --    < > 191*  181*   < > 208*   180*   < >  --    < > 191*  170*   < > 196*  182*   BUN  --   --  38*  --   --   --   --   --   --   --  64*  --  74*  --   --   --  80*   < > 89*   CR  --   --  0.73  --   --   --   --   --   --   --  0.88  --  0.94  --   --   --  1.07   < > 1.10   GFRESTIMATED  --   --  >90  --   --   --   --   --   --   --  >90  --  >90  --   --   --  81   < > 75   BRIDGETTE  --   --  8.6  --   --   --   --   --   --   --  8.7  --  8.9  --   --   --  8.9   < > 8.6   MAG  --   --  2.1  --   --   --   --   --   --   --  2.3  --   --   --   --   --  2.7*  --  2.5*   PHOS  --   --  3.2  --   --   --   --   --   --   --  3.0  --   --   --   --   --  3.6  --  4.2    < > = values in this interval not displayed.     Troponins: No results found for: TROPI, TROPONIN, TROPR, TROPN     INR  Recent Labs   Lab 12/24/21  0451 12/23/21  0539 12/22/21  0439 12/21/21  0348   INR 1.14 1.18* 1.16* 1.24*     Liver panel  Recent Labs   Lab 12/24/21  0451 12/23/21  0539 12/22/21  0439 12/21/21  0348   PROTTOTAL 6.9 7.2 7.9 7.8   ALBUMIN 2.1* 2.2* 2.3* 2.2*   BILITOTAL 0.3 0.3 0.3 0.3   ALKPHOS 92 93 101 98   AST 36 28 31 26   ALT 67 74* 74* 59       Imaging/procedure results:  EKG 12 Lead 12/23/2021: ST with PVC's,       ECHO 12/14/2021  Interpretation Summary  Left ventricular function is decreased. The ejection fraction is 40-45%  (mildly reduced). Mild diffuse hypokinesis is present.  Global right ventricular function is normal. The right ventricle is normal  size.  This study was compared with the study from 12/12/2021. There has been an  improvement in the biventricular function since the patient has been  decannulated.

## 2021-12-24 NOTE — PLAN OF CARE
Neuro: A&Ox4.   Cardiac: SR-ST. HR 90-100s. VSS.   Respiratory: Sating 95% ON 2 LPM Oxi mask.   GI/: Adequate urine output. Via urinal  BM X1  Diet/appetite: Tolerating nectar full liquid. Cycled tube feed 8pm-8am running at 45 ml/hr. 150 ml FWF Q2.  Activity:  Assist of one with walker and gait belt.   Pain: At acceptable level on current regimen.   Skin: No new deficits noted.  LDA's: NG tube.     Plan: Continue with POC. Notify primary team with changes.

## 2021-12-24 NOTE — PROGRESS NOTES
Calorie Count  Intake recorded for: 12/23  Total Kcals: 155 Total Protein: 0g  Kcals from Hospital Food: 155  Protein: 0g  Kcals from Outside Food (average):0 Protein: 0g  # Meals Ordered from Kitchen: 1 small meal   # Meals Recorded: 1 meal - 100% cranberry juice, apple juice  # Supplements Recorded: 0

## 2021-12-24 NOTE — PROGRESS NOTES
Transferred to: Unit 6B at (time)  Belongings:   Gutiérrez removed? Yes  Central line removed? Yes  Chart and medications sent with patient Yes  Family notified: No:

## 2021-12-25 LAB
ALBUMIN SERPL-MCNC: 2.1 G/DL (ref 3.4–5)
ALP SERPL-CCNC: 95 U/L (ref 40–150)
ALT SERPL W P-5'-P-CCNC: 74 U/L (ref 0–70)
ANION GAP SERPL CALCULATED.3IONS-SCNC: 6 MMOL/L (ref 3–14)
AST SERPL W P-5'-P-CCNC: 39 U/L (ref 0–45)
BILIRUB SERPL-MCNC: 0.3 MG/DL (ref 0.2–1.3)
BUN SERPL-MCNC: 32 MG/DL (ref 7–30)
CA-I BLD-MCNC: 4.4 MG/DL (ref 4.4–5.2)
CALCIUM SERPL-MCNC: 8.1 MG/DL (ref 8.5–10.1)
CHLORIDE BLD-SCNC: 111 MMOL/L (ref 94–109)
CO2 SERPL-SCNC: 27 MMOL/L (ref 20–32)
CREAT SERPL-MCNC: 0.68 MG/DL (ref 0.66–1.25)
ERYTHROCYTE [DISTWIDTH] IN BLOOD BY AUTOMATED COUNT: 14.5 % (ref 10–15)
GFR SERPL CREATININE-BSD FRML MDRD: >90 ML/MIN/1.73M2
GLUCOSE BLD-MCNC: 137 MG/DL (ref 70–99)
GLUCOSE BLD-MCNC: 145 MG/DL (ref 70–99)
GLUCOSE BLDC GLUCOMTR-MCNC: 122 MG/DL (ref 70–99)
GLUCOSE BLDC GLUCOMTR-MCNC: 129 MG/DL (ref 70–99)
GLUCOSE BLDC GLUCOMTR-MCNC: 134 MG/DL (ref 70–99)
GLUCOSE BLDC GLUCOMTR-MCNC: 141 MG/DL (ref 70–99)
GLUCOSE BLDC GLUCOMTR-MCNC: 213 MG/DL (ref 70–99)
HCT VFR BLD AUTO: 26.9 % (ref 40–53)
HGB BLD-MCNC: 8.4 G/DL (ref 13.3–17.7)
MAGNESIUM SERPL-MCNC: 1.9 MG/DL (ref 1.6–2.3)
MCH RBC QN AUTO: 31.5 PG (ref 26.5–33)
MCHC RBC AUTO-ENTMCNC: 31.2 G/DL (ref 31.5–36.5)
MCV RBC AUTO: 101 FL (ref 78–100)
PHOSPHATE SERPL-MCNC: 3.7 MG/DL (ref 2.5–4.5)
PLATELET # BLD AUTO: 487 10E3/UL (ref 150–450)
POTASSIUM BLD-SCNC: 3.3 MMOL/L (ref 3.4–5.3)
PROT SERPL-MCNC: 6.8 G/DL (ref 6.8–8.8)
RBC # BLD AUTO: 2.67 10E6/UL (ref 4.4–5.9)
SODIUM SERPL-SCNC: 144 MMOL/L (ref 133–144)
SODIUM SERPL-SCNC: 144 MMOL/L (ref 133–144)
WBC # BLD AUTO: 15.4 10E3/UL (ref 4–11)

## 2021-12-25 PROCEDURE — 85027 COMPLETE CBC AUTOMATED: CPT | Performed by: INTERNAL MEDICINE

## 2021-12-25 PROCEDURE — 250N000013 HC RX MED GY IP 250 OP 250 PS 637: Performed by: PHYSICIAN ASSISTANT

## 2021-12-25 PROCEDURE — 250N000013 HC RX MED GY IP 250 OP 250 PS 637: Performed by: NURSE PRACTITIONER

## 2021-12-25 PROCEDURE — 82330 ASSAY OF CALCIUM: CPT | Performed by: INTERNAL MEDICINE

## 2021-12-25 PROCEDURE — 120N000003 HC R&B IMCU UMMC

## 2021-12-25 PROCEDURE — 94640 AIRWAY INHALATION TREATMENT: CPT

## 2021-12-25 PROCEDURE — 250N000013 HC RX MED GY IP 250 OP 250 PS 637: Performed by: STUDENT IN AN ORGANIZED HEALTH CARE EDUCATION/TRAINING PROGRAM

## 2021-12-25 PROCEDURE — 84295 ASSAY OF SERUM SODIUM: CPT | Performed by: NURSE PRACTITIONER

## 2021-12-25 PROCEDURE — 80053 COMPREHEN METABOLIC PANEL: CPT | Performed by: NURSE PRACTITIONER

## 2021-12-25 PROCEDURE — 83735 ASSAY OF MAGNESIUM: CPT | Performed by: NURSE PRACTITIONER

## 2021-12-25 PROCEDURE — 99232 SBSQ HOSP IP/OBS MODERATE 35: CPT | Performed by: PHYSICIAN ASSISTANT

## 2021-12-25 PROCEDURE — 84100 ASSAY OF PHOSPHORUS: CPT | Performed by: NURSE PRACTITIONER

## 2021-12-25 PROCEDURE — 250N000009 HC RX 250: Performed by: INTERNAL MEDICINE

## 2021-12-25 PROCEDURE — 250N000013 HC RX MED GY IP 250 OP 250 PS 637: Performed by: INTERNAL MEDICINE

## 2021-12-25 PROCEDURE — 84295 ASSAY OF SERUM SODIUM: CPT | Performed by: INTERNAL MEDICINE

## 2021-12-25 PROCEDURE — 250N000011 HC RX IP 250 OP 636: Performed by: NURSE PRACTITIONER

## 2021-12-25 PROCEDURE — 36415 COLL VENOUS BLD VENIPUNCTURE: CPT | Performed by: INTERNAL MEDICINE

## 2021-12-25 PROCEDURE — 82947 ASSAY GLUCOSE BLOOD QUANT: CPT | Performed by: INTERNAL MEDICINE

## 2021-12-25 PROCEDURE — 250N000011 HC RX IP 250 OP 636: Performed by: PHYSICIAN ASSISTANT

## 2021-12-25 PROCEDURE — 999N000157 HC STATISTIC RCP TIME EA 10 MIN

## 2021-12-25 PROCEDURE — 250N000011 HC RX IP 250 OP 636: Performed by: STUDENT IN AN ORGANIZED HEALTH CARE EDUCATION/TRAINING PROGRAM

## 2021-12-25 RX ORDER — HALOPERIDOL 5 MG/ML
2 INJECTION INTRAMUSCULAR 2 TIMES DAILY
Status: DISCONTINUED | OUTPATIENT
Start: 2021-12-25 | End: 2021-12-28

## 2021-12-25 RX ORDER — METOPROLOL TARTRATE 25 MG/1
25 TABLET, FILM COATED ORAL 2 TIMES DAILY
Status: DISCONTINUED | OUTPATIENT
Start: 2021-12-25 | End: 2021-12-28

## 2021-12-25 RX ADMIN — Medication 1 TABLET: at 08:07

## 2021-12-25 RX ADMIN — HALOPERIDOL LACTATE 2 MG: 5 INJECTION, SOLUTION INTRAMUSCULAR at 13:51

## 2021-12-25 RX ADMIN — Medication 12.5 MG: at 08:09

## 2021-12-25 RX ADMIN — IPRATROPIUM BROMIDE AND ALBUTEROL SULFATE 3 ML: 2.5; .5 SOLUTION RESPIRATORY (INHALATION) at 09:47

## 2021-12-25 RX ADMIN — ASPIRIN 81 MG CHEWABLE TABLET 81 MG: 81 TABLET CHEWABLE at 08:07

## 2021-12-25 RX ADMIN — HALOPERIDOL LACTATE 5 MG: 5 INJECTION, SOLUTION INTRAMUSCULAR at 08:12

## 2021-12-25 RX ADMIN — METOPROLOL TARTRATE 25 MG: 25 TABLET, FILM COATED ORAL at 20:39

## 2021-12-25 RX ADMIN — ATORVASTATIN CALCIUM 40 MG: 40 TABLET, FILM COATED ORAL at 20:39

## 2021-12-25 RX ADMIN — HEPARIN SODIUM 5000 UNITS: 5000 INJECTION, SOLUTION INTRAVENOUS; SUBCUTANEOUS at 13:51

## 2021-12-25 RX ADMIN — NICOTINE 1 PATCH: 14 PATCH, EXTENDED RELEASE TRANSDERMAL at 08:06

## 2021-12-25 RX ADMIN — Medication 10 MG: at 22:54

## 2021-12-25 RX ADMIN — LIDOCAINE 1 PATCH: 560 PATCH PERCUTANEOUS; TOPICAL; TRANSDERMAL at 20:41

## 2021-12-25 RX ADMIN — FOLIC ACID 1 MG: 1 TABLET ORAL at 08:07

## 2021-12-25 RX ADMIN — Medication 1 PACKET: at 20:47

## 2021-12-25 RX ADMIN — TICAGRELOR 90 MG: 90 TABLET ORAL at 08:07

## 2021-12-25 RX ADMIN — HEPARIN SODIUM 5000 UNITS: 5000 INJECTION, SOLUTION INTRAVENOUS; SUBCUTANEOUS at 06:22

## 2021-12-25 RX ADMIN — QUETIAPINE FUMARATE 100 MG: 50 TABLET ORAL at 20:39

## 2021-12-25 RX ADMIN — ACETAMINOPHEN 650 MG: 325 TABLET, FILM COATED ORAL at 14:02

## 2021-12-25 RX ADMIN — HEPARIN SODIUM 5000 UNITS: 5000 INJECTION, SOLUTION INTRAVENOUS; SUBCUTANEOUS at 22:54

## 2021-12-25 RX ADMIN — THIAMINE HCL TAB 100 MG 100 MG: 100 TAB at 08:09

## 2021-12-25 RX ADMIN — TICAGRELOR 90 MG: 90 TABLET ORAL at 20:39

## 2021-12-25 RX ADMIN — INSULIN ASPART 2 UNITS: 100 INJECTION, SOLUTION INTRAVENOUS; SUBCUTANEOUS at 17:08

## 2021-12-25 ASSESSMENT — ACTIVITIES OF DAILY LIVING (ADL)
ADLS_ACUITY_SCORE: 14
ADLS_ACUITY_SCORE: 16
ADLS_ACUITY_SCORE: 16
ADLS_ACUITY_SCORE: 14
ADLS_ACUITY_SCORE: 16
ADLS_ACUITY_SCORE: 14

## 2021-12-25 ASSESSMENT — MIFFLIN-ST. JEOR: SCORE: 1430.38

## 2021-12-25 NOTE — PLAN OF CARE
Neuro: A&Ox4. Able to make needs known.   Cardiac: SR/ST. Soft BP. VSS.   Respiratory: Sating >92% on 1 L NC.  GI/: Uses urinal at bedside. Adequate output. LBM 12/24.   Diet/appetite: Tolerating level 5 diet, minced and moist. Midly thickened liquids. Cycled TF 8p-8a 45 ml/hr, but started at 2200 this evening as pt was moving around in bed and slightly pulled feeding tube out. Was re inserted and placement confirmed by X-ray. /2 hr.   Activity:  Assist of 1 up to BSC.   Pain: At acceptable level on current regimen. Lidocaine patches in place on chest.    Skin: No new deficits noted. R groin ECMO site dressing changed d/t drainage.   LDA's: PIV L arm SL  NG tube    Other: Removed sitter from pts room around 0600. Per attendant pt was cooroperative, and did not pull at lines or attempt to get out of bed. Bed alarm is on and attendant doing frequent checks.   Plan: Continue with POC. Notify primary team with changes.

## 2021-12-25 NOTE — PROVIDER NOTIFICATION
Time of notification: 11:26 PM  Provider notified: Cards 2  Patient status: BP a little soft. 90/61 after a few re checks. No parameters. MAP 69.   Temp:  [97.4  F (36.3  C)-98.6  F (37  C)] 97.9  F (36.6  C)  Pulse:  [] 94  Resp:  [18-24] 22  BP: ()/(61-76) 90/61  SpO2:  [94 %-98 %] 98 %  Orders received: Ok with this BP. Goal would be SBP >90 but pts BP soft at baseline.     Time of notification: 6:58 AM  Provider notified: Cards 2  Patient status: Stable. Notified by tele pt had a 4 beat run of VT at 0347.   Temp:  [97.5  F (36.4  C)-98.6  F (37  C)] 97.6  F (36.4  C)  Pulse:  [] 103  Resp:  [18-28] 28  BP: ()/(57-76) 100/66  SpO2:  [91 %-98 %] 96 %  Orders received: No new orders.

## 2021-12-25 NOTE — CONSULTS
Care Management Follow Up    Length of Stay (days): 19    Expected Discharge Date: 12/28/2021     Concerns to be Addressed:  Discharge planning    Patient plan of care discussed at interdisciplinary rounds: No      Additional Information:  This note is to acknowledge the social work consult for discharge planning to TCU. Per Mr. Blount's chart, his sitter was discontinued this morning at 0600. Placement could therefore not occur until after 0600 tomorrow.      Mr. Blount's current insurance is a [employer] policy. This coverage does NOT include TCU/SNF placement. It would include home care or ARU. The unit  has already made a referral to financial services to see if Mr. Blount would qualify for Medicaid, as this could cover a TCU/SNF stay. This will likely not be determined prior to Monday at the earliest.     Social work will continue to follow for disposition planning.     GERBER Cobb, [facility]     Searchable on [facility]-search SOCIAL WORK - for text paging options    Saturday & Sunday & Holidays  On-Call Pager [phone]    4A 4C 4E 5A 5B [phone]    6A 6B 6C 6D  [phone]    5C 7A 7B 7C 7D [phone]    For Hours 1600-Midnight Pager [phone]

## 2021-12-25 NOTE — PLAN OF CARE
Neuro: A&Ox4. Remains with a sitter for pulling lines/impulsive behavior.   Cardiac: SR to ST. HR 90-100s. VSS.   Respiratory: Sating 95% on 2 L NC.  GI/: Adequate urine output via urinal and commode. One BM this shift.  Diet/appetite: Tolerating minced/soft diet. Cycled tube feed 8pm-8am running at 45 ml/hr.   Activity:  Walked with PT today. Assist of one with walker and gait belt.   Pain: Pt did not complain of pain this shift.  Skin: No new deficits noted.  LDA's: NG tube for tube feedings and meds. Left PIV.      Plan: Continue with POC. Notify primary team with changes.

## 2021-12-25 NOTE — PROGRESS NOTES
Interventional Cardiology Progress Note      Assessment & Plan:  Bruce Blount is a 56 year old male with PMHx current tobacco use and ETOH abuse who presented to [facility] 12/6 after out of hospital VF arrest. Coronary angiogram revealed  of RCA and acute culprit lesion of LCx/OM1, s/p PCI to pLCx, mid LCx, and OM1 with TERRY. Decannulated 12/13. IABP removed 12/13. Extubated 12/14. Transferred out of ICU on 12/23. Ongoing hospital issues include delirium.     Plan today:  - increase metoprolol tartrate to 25 mg BID   - decrease scheduled haldol from 5 mg to 2 mg BID  - calorie count      Neurology  # Anoxic brain injury  # ICU delirium, improving  # Chest wall pain, improved  # Hx of possible ETOH abuse per family  S/p therapeutic hypothermia. Extubated 12/14. Initial head CT without acute pathology.  Delirium was initially difficult to manage but now his mentation seems to be improving. Slowly working to decrease haldol. 1:1 sitter removed on 12/25/21. No pulling at tubes/lines per RN.   - 10mg melatonin at bedtime  - continue seroquel 100 at night  - decrease haldol to 2 mg BID (08:00 and 14:00)--reduced from 5mg  - continue PRN haldol  - Tylenol and lidocaine patches for additional pain control as needed  - Folate, Thiamine and multi vitamin for ETOH abuse.     Cardiovascular  # Ischemic Cardiomyopathy (EF 40-45%)  # S/p PCI pLCx, mLCx, OM1  # Residual RCA   # HTN, HLD  # Nonsustained VT  # Refractory VF cardiac arrest 2/2 secondary to STEMI, resolved  # Cardiogenic shock requiring VA ECMO, resolved  # Concern for limb ischemia s/p distal reperfusion cannula left leg  S/p Peripheral V-A ECMO inserted via RCFA and RCFV 17/25 fr. Decannulated 12/13. IABP removed 12/13.   - GDMT                 - DAPT: Continue ASA 81mg and ticagrelor 90mg BID                  - Statin: 40mg Lipitor daily                 - BB: increase metoprolol tartrate to 25 mg BID due to NSVT  -ACEI/ARB/ARNI: plan to start tomorrow if BP  allows given increase in BB today.  -  of RCA not intervened upon, will likely not need intervention unless patient symptomatic  - ECMO cannula site suture removal 12/28     Pulmonary  # Acute hypoxemic respiratory failure, improving  # Klebsiella pneumonia  # Rib Fractures  # Sternal Fracture  # Tobacco Abuse (2PPD)  # Probable COPD exacerbation  Extubated 12/14 to BiPAP. Now sating well on 2L NC.  Completed antibiotic/steroid course for COPD exacerbation 12/20. Continue to have chest wall pain, likely 2/2 fractures  - RT pulmonary hygiene  - Duoneb Q6 hours while awake  - Lidocaine patch for rib fracture pain     Gastrointestinal, Nutrition  # Dysphagia 2/2 intubation   # Loose Stool 2/2 tube feeds  # Shock liver 2/2 cardiac arrest, resolved  # Severe Malnutrition in context of critical illness  C. Diff negative. LFT normal.   - SP following, diet advanced to minced/moist (DD5) and thick liquids, remains on cycle TF at night  - 3 day calorie count  - If PO intake increases, will remove NJ     Renal, Electrolytes  # Hypoalbuminemia  # Hypokalemia, K 3.3  ESTER due to cardiac arrest and warming. Started on CRRT on 12/8/21. No CRRT since decannulation 12/13. Creatinine 0.68, BUN 32. Hypokalemia 3.3 today. Hypernatremia resolved.   - FWF, 150cc Q2 hours  - continue to encourage PO intake  - Monitor urine output  - potassium replacement protocol     Resolved renal issues:  # Acute Renal Injury, resolved  # Lactic acidosis, resolved  # Hyperkalemia, resolved   # Hypocalcemia, resolved  # Hyperphosphatemia, resolved   # Acute Renal Injury, resolved  # Lactic acidosis, resolved  # Hyperkalemia, resolved   # Hypocalcemia, resolved  # Hyperphosphatemia, resolved   # Hypernatremia, resolved     Infectious Disease  # Aspiration Pneumonia (klebsiella, staph aureus)  # Leukocytosis, improving   # Fever, resolved  # Lactic acidosis, resolved  Pan culture 12/14 given temp and WBC. Susceptibilities resulted the same as previous.  Completed treatment for aspiration pneumonia. Persistent leukocytosis likely stress related but trend is improving. Afebrile. WBC 15.4.   - continue to monitor off antibiotics  - PRN acetaminophen  Cultures thus far:                 - sputum 12/6: staph aureus, staph dysgalactia, klebsiella                 - sputum 12/7: staph aureus                 - sputum 12/8: staph and klebsiella                 - Sputum 12/9: staph aureus and klebsiella                 - Sputum 12/10: staph aureus and klebsiella                 - sputum 12/13: staph aureus and klebsiella                 - sputum 12/14: staph aureus  Antibiotics              - Vancomycin 12/6 - 12/7 (MRSA negative)              - Zosyn 12/6 - 12/9              - Rocephin 12/9 - 12/16                 - Cefepime 12/16 - 12/21                 - Flagyl 12/16 - 12/16                 - Azithro 12/16 - 12/20      Hematology  # Anemia of critical illness  # Concern for HIT - screen negative  # Thrombocytopenia, resolved  # Loss of bilateral DP pulses, resolved  Hgb stable 8.4. Plt 487. No e/o bleeding.    - daily CBC     Endocrinology  # Stress induced Hyperglycemia   No known medical history. Hemoglobin A1c 5.6%. Blood sugars have improved. No insulin needs since 12/23. -153.   - sliding scale insulin  - hypoglycemia protocol      MSK/Skin  # Mottling LLE s/p LLE distal reperfusion cannula  # Dusky toes left foot  Pulses remain intact. IABP removed 12/13.   - continue to monitor     Prophylaxis:  DVT: subcutaneous heparin     Diet: Minced/moist (DD5), mildly thick liquids, TF cycle at night  Activity: Up with assist as tolerated  Code Status: Full  Disposition: TCU pending placement      Patient discussed w/ Dr. Stewart.    Jayde Schafer PA-C  [facility]  Interventional Cardiology        Interval History:  No acute events. 1:1 sitter removed last night. WBC 15.4, down from 17 yesterday. Afebrile. BP soft 85//66 (MAP 62-84).  "Supplemental oxygen 1-2 LPM. Telemetry with 2 episodes of NSVT (3 and 4 beats) and frequent PVCs. Avg HR 95 bpm. Denies any pain or shortness of breath. Calorie count from yesterday with no oral intake.     Most recent vital signs:  /66 (BP Location: Right arm)   Pulse 103   Temp 97.6  F (36.4  C) (Axillary)   Resp 28   Ht 1.753 m (5' 9\")   Wt 61 kg (134 lb 7.7 oz)   SpO2 96%   BMI 19.86 kg/m    Temp:  [97.5  F (36.4  C)-98.6  F (37  C)] 97.6  F (36.4  C)  Pulse:  [] 103  Resp:  [18-28] 28  BP: ()/(57-76) 100/66  SpO2:  [91 %-98 %] 96 %  Wt Readings from Last 2 Encounters:   12/25/21 61 kg (134 lb 7.7 oz)   09/23/19 77.5 kg (170 lb 12.8 oz)       Intake/Output Summary (Last 24 hours) at 12/25/2021 0734  Last data filed at 12/25/2021 0500  Gross per 24 hour   Intake 1680 ml   Output 325 ml   Net 1355 ml       Physical exam:  General: Appears comfortable and in no acute distress. Alert and interactive  HEENT: Normocephalic, atraumatic. No scleral icterus or injection. Feeding tube in right nare.   Neck: JVP 7cm. Ecchymosis of right neck from prior line.   CARDIAC: Tachycardiac, regular rhythm. No obvious murmurs. Radial pulses palpable bilaterally.   RESP: Normal work of breathing without use of accessory muscles. Supplemental oxygen at 2 LPM via NC. Mild expiratory wheezes. No rales.     GI: Abdomen is flat.   EXTREMITIES: Without lower extremity edema. Warm and well perfused. No venous stasis changes.   SKIN: No acute lesions appreciated. Warm and dry to touch  NEURO: Alert and oriented to person, date. Knows that he is in the hospital but unable to recall which one and unable to state the reason for hospitalization. Moves all extremities.  PSYCH: Mood and affect are appropriate    Labs (Past three days):  CBC  Recent Labs   Lab 12/25/21  0346 12/24/21  0451 12/23/21  0547 12/22/21  0439   WBC 15.4* 17.1* 17.6* 22.3*   RBC 2.67* 2.74* 2.88* 3.25*   HGB 8.4* 8.5* 9.0* 10.1*   HCT 26.9* 26.9* " 28.8* 31.7*   * 98 100 98   MCH 31.5 31.0 31.3 31.1   MCHC 31.2* 31.6 31.3* 31.9   RDW 14.5 14.4 14.3 13.8   * 495* 535* 718*     BMP  Recent Labs   Lab 12/25/21  0432 12/25/21  0346 12/24/21  2344 12/24/21  1617 12/24/21  1559 12/24/21  0729 12/24/21  0451 12/23/21  2311 12/23/21  2204 12/23/21  0803 12/23/21  0539 12/22/21 2008 12/22/21  1540 12/22/21 0445 12/22/21 0439   NA  --  144  144  --   --  144  --  146*  --  147*   < > 150*  150*   < > 150*  150*   < > 152*   POTASSIUM  --  3.3*  --   --   --   --  3.6  --   --   --  3.4  --  4.1   < > 3.4   CHLORIDE  --  111*  --   --   --   --  111*  --   --   --  117*  --  117*  --  118*   CO2  --  27  --   --   --   --  28  --   --   --  27  --  27  --  26   ANIONGAP  --  6  --   --   --   --  7  --   --   --  6  --  6  --  8   * 145*  137* 123*   < >  --    < > 153*  141*   < >  --    < > 191*  181*   < > 208*  180*   < > 191*  170*   BUN  --  32*  --   --   --   --  38*  --   --   --  64*  --  74*  --  80*   CR  --  0.68  --   --   --   --  0.73  --   --   --  0.88  --  0.94  --  1.07   GFRESTIMATED  --  >90  --   --   --   --  >90  --   --   --  >90  --  >90  --  81   BRIDGETTE  --  8.1*  --   --   --   --  8.6  --   --   --  8.7  --  8.9  --  8.9   MAG  --  1.9  --   --   --   --  2.1  --   --   --  2.3  --   --   --  2.7*   PHOS  --  3.7  --   --   --   --  3.2  --   --   --  3.0  --   --   --  3.6    < > = values in this interval not displayed.      INR  Recent Labs   Lab 12/24/21  0451 12/23/21  0539 12/22/21  0439 12/21/21  0348   INR 1.14 1.18* 1.16* 1.24*     Liver panel  Recent Labs   Lab 12/25/21  0346 12/24/21  0451 12/23/21  0539 12/22/21  0439   PROTTOTAL 6.8 6.9 7.2 7.9   ALBUMIN 2.1* 2.1* 2.2* 2.3*   BILITOTAL 0.3 0.3 0.3 0.3   ALKPHOS 95 92 93 101   AST 39 36 28 31   ALT 74* 67 74* 74*     Telemetry 12/25/21: NSVT     Imaging/procedure results:  Imaging/procedure results:  EKG 12 Lead 12/23/2021: ST with PVC's, HR  111    ECHO 12/14/2021  Interpretation Summary  Left ventricular function is decreased. The ejection fraction is 40-45%  (mildly reduced). Mild diffuse hypokinesis is present.  Global right ventricular function is normal. The right ventricle is normal  size.  This study was compared with the study from 12/12/2021. There has been an  improvement in the biventricular function since the patient has been  Decannulated.    Coronary angiogram 12/6/21  Left Main   The vessel is large and is angiographically normal.   Left Anterior Descending   The vessel is large.   Ost LAD to Mid LAD lesion is 60% stenosed. The lesion is type C - high risk.   Mid LAD lesion is 30% stenosed.   First Diagonal Branch   The vessel is large and is angiographically normal.   Second Diagonal Branch   The vessel is small and is angiographically normal.   Third Diagonal Branch   The vessel is small and is angiographically normal.   Left Circumflex   The vessel is moderate in size.   Ost Cx to Prox Cx lesion is 70% stenosed. The lesion is type C - high risk.   Mid Cx to Dist Cx lesion is 30% stenosed.   First Obtuse Marginal Branch   The vessel is moderate in size.   1st Mrg lesion is 99% stenosed. The lesion is type C - high risk.   Second Obtuse Marginal Branch   The vessel is small.   2nd Mrg lesion is 35% stenosed.   Third Obtuse Marginal Branch   The vessel is small.   Right Coronary Artery   The vessel is moderate in size.   Prox RCA lesion is 70% stenosed.   Prox RCA to Dist RCA lesion is 100% stenosed. The lesion is chronic total occlusion.   Right Ventricular Branch   The vessel is small.   Right Posterior Descending Artery   The vessel is moderate in size and is angiographically normal.   Inferior Septal   Collaterals   Inf Sept filled by collaterals from 2nd Sept.      Right Posterior Atrioventricular Artery   The vessel is moderate in size and is angiographically normal.   Collaterals   RPAV filled by collaterals from Dist Cx.       First Right Posterolateral Branch   The vessel is small and is angiographically normal.   Second Right Posterolateral Branch   The vessel is small and is angiographically normal.       Intervention      Ost Cx to Prox Cx lesion   Stent   Lesion length: 13 mm. The pre-interventional distal flow is decreased (OCTAVIO 2). A STENT CORONARY TERRY SYNERGY XD MR US [id number] drug eluting stent was successfully placed. A STENT ORSIRO TERRY 2.50X13 [id number] drug-eluting stent was successfully placed. The post-interventional distal flow is normal (OCTAVIO 3). The intervention was successful. No complications occurred at this lesion.   There is a 0% residual stenosis post intervention.   1st Mrg lesion   Stent   Lesion length: 25 mm. The pre-interventional distal flow is decreased (OCTAVIO 1). A STENT CORONARY TERRY SYNERGY XD MR US [id number] [id number] drug eluting stent was successfully placed. The post-interventional distal flow is normal (OCTAVIO 3). The intervention was successful. No complications occurred at this lesion. Patient was placed on VA ECMO given hemodynamic instability after refractory VF arrest. Heparin was used to maintain ACT greater than 250 seconds. Given initial inferior ST elevation on the EKG, a JR4 GC was placed in the RCA and a RUNTHROUGH wire was used to wire the lesion, however the lesion was found to be a  and we switched to the left system. A 6 Bulgarian XB 3.5 guide catheter was used to engage the left main coronary artery. A run-through wire was attempted to be advanced down the left circumflex, however tortuosity prevented the wire to advance. The wire was advanced on the LAD. A second run-through wire was next attempted to be advanced into the OM1, however was unsuccessful across the lesion. A Fielder FC was used to advance down the OM1. A 1.5 x 20 mm emerge balloon was used to predilate the OM1. A 2.0 x 20 mm Emerge balloon was then used to predilate the OM1. Angiogram revealed disease in  the proximal and mid left circumflex in addition to the OM1 bifurcation. Decision was made to wire the circumflex as well. A run-through wire was first attempted to advance on the left circumflex, however was unsuccessful. The Fielder FC was next attempted to advanced down the left circumflex, however was again unsuccessful. A Suoh 3 wire was attempted to be advanced into the LCx, however again unsuccessful. A Fielder XT-A was able to wire down the LCx to the distal LCx. The Fielder was exchanged for a runthrough wire using a turnpike LP. A 1.5 x 20 mm emerge was then used to predilate the proximal to mid left circumflex. A 2.0 x 20 mm Emerge balloon was then next used to predilate the proximal and mid left circumflex. At this point decision was made to to stent the mid left circumflex into the proximal OM1 into proximal left circumflex. A 2.5 x 12 mm emerge was placed in the OM1. A 2.5 x 16 mm Synergy TERRY was deployed in the proximal mid left circumflex while the 2.5 x 12 mm Emerge balloon was inflated across the ostium to the mid circumflex. Next, a 2.5 x 28 mm Synergy TERRY was deployed in the proximal left circumflex into OM1. A new run-through wire was used to rewire the left circumflex and the jailed run-through wire was removed. The bifurcation was simultaneously inflated with a 2.5 x 15 mm emerge balloon in the left circumflex and a 2.5 x 12 mm Emerge balloon in the OM1. Follow-up angiogram revealed the mid left circumflex at the level of bifurcation appeared to have a lesion. A 2.5 x 8 mm Synergy TERRY was advanced into the mid left circumflex, however would not cross the proximal left circumflex. A 2.5 x 13 mm Orsiro TERRY was deployed in the proximal to mid left circumflex. After rewiring the OM1 with a RUNTHROUGH, the mid and proximal left circumflex was post dilated with a 2.5x12mm NC Emerge.The bifurcation was kissed with 2.5 x 12 mm NC Emerge balloon in the left circumflex and 2.5 x 12 mm Emerge balloon in  the OM 1. Follow-up angiogram revealed the stents were fully expanded well opposed and there is no evidence of dissection or perforation. All interventional materials subsequently removed.   There is a 0% residual stenosis post intervention.       Extracorporeal Membrane Oxygenation    PROCEDURE REPORT:    EXTRACORPOREAL MEMBRANE OXYGENATION CANNULATION:  A 17 Fr ECMO cannula was inserted into the right common femoral artery and a 25 Fr cannula was inserted into the right common femoral vein.  The arterial cannula was positioned in the iliac artery and the venous canula was positioned across the RA.  The ECMO circuit was primed, closely inspected to ensure no bubbles present and ECMO was initiated.  An anterograde perfusion 8 Fr sheath was inserted in the right superficial femoral artery.     Line Placement      Line Placement 1    Arterial Line was placed. the right radial artery. Placement was successful.      Line Placement 2    Central Venous Catheter was placed. Ultrasound was used to visualize the left femoral vein. Placement was successful.

## 2021-12-25 NOTE — PROGRESS NOTES
Calorie Count  Intake recorded for: 12/24  Total Kcals: 0 Total Protein: 0g  Kcals from Hospital Food: 0   Protein: 0g  Kcals from Outside Food (average):0 Protein: 0g  # Meals Ordered from Kitchen: 1 meal ordered  # Meals Recorded: 0 meals recorded  # Supplements Recorded: 0

## 2021-12-25 NOTE — PLAN OF CARE
Neuro: A&Ox4. Remains with a sitter today, but impulsive behavior is improving.  Cardiac: SR to ST. HR 90-100s. VSS.   Respiratory: Sating 95% on 1-2 L NC.  GI/: Adequate urine output via urinal and commode. Two BMs this shift.  Diet/appetite: Tolerating minced/soft diet. Cycled tube feed 8pm-8am at 45 ml/hr.   Activity: Assist of one with walker and gait belt.   Pain: Pt c/o headache and back pain this shift; tylenol given x1.  Skin: No new deficits noted. Placed mepilex on sacrum for prevention.  LDA's: NG tube for tube feedings and meds. Left PIV.      Plan: Continue with POC. Notify primary team with changes.

## 2021-12-26 ENCOUNTER — APPOINTMENT (OUTPATIENT)
Dept: GENERAL RADIOLOGY | Facility: CLINIC | Age: 56
End: 2021-12-26
Attending: NURSE PRACTITIONER
Payer: COMMERCIAL

## 2021-12-26 LAB
ALBUMIN SERPL-MCNC: 2.1 G/DL (ref 3.4–5)
ALP SERPL-CCNC: 97 U/L (ref 40–150)
ALT SERPL W P-5'-P-CCNC: 114 U/L (ref 0–70)
ANION GAP SERPL CALCULATED.3IONS-SCNC: 7 MMOL/L (ref 3–14)
AST SERPL W P-5'-P-CCNC: 66 U/L (ref 0–45)
ATRIAL RATE - MUSE: 127 BPM
BILIRUB SERPL-MCNC: 0.3 MG/DL (ref 0.2–1.3)
BUN SERPL-MCNC: 25 MG/DL (ref 7–30)
CA-I BLD-MCNC: 4.6 MG/DL (ref 4.4–5.2)
CALCIUM SERPL-MCNC: 8.3 MG/DL (ref 8.5–10.1)
CHLORIDE BLD-SCNC: 111 MMOL/L (ref 94–109)
CO2 SERPL-SCNC: 25 MMOL/L (ref 20–32)
CREAT SERPL-MCNC: 0.68 MG/DL (ref 0.66–1.25)
DIASTOLIC BLOOD PRESSURE - MUSE: NORMAL MMHG
ERYTHROCYTE [DISTWIDTH] IN BLOOD BY AUTOMATED COUNT: 14.5 % (ref 10–15)
GFR SERPL CREATININE-BSD FRML MDRD: >90 ML/MIN/1.73M2
GLUCOSE BLD-MCNC: 123 MG/DL (ref 70–99)
GLUCOSE BLD-MCNC: 127 MG/DL (ref 70–99)
GLUCOSE BLDC GLUCOMTR-MCNC: 119 MG/DL (ref 70–99)
GLUCOSE BLDC GLUCOMTR-MCNC: 123 MG/DL (ref 70–99)
GLUCOSE BLDC GLUCOMTR-MCNC: 123 MG/DL (ref 70–99)
GLUCOSE BLDC GLUCOMTR-MCNC: 131 MG/DL (ref 70–99)
GLUCOSE BLDC GLUCOMTR-MCNC: 134 MG/DL (ref 70–99)
GLUCOSE BLDC GLUCOMTR-MCNC: 138 MG/DL (ref 70–99)
GLUCOSE BLDC GLUCOMTR-MCNC: 143 MG/DL (ref 70–99)
HCT VFR BLD AUTO: 26.8 % (ref 40–53)
HGB BLD-MCNC: 8.3 G/DL (ref 13.3–17.7)
INTERPRETATION ECG - MUSE: NORMAL
LACTATE SERPL-SCNC: 0.9 MMOL/L (ref 0.7–2)
MAGNESIUM SERPL-MCNC: 2 MG/DL (ref 1.6–2.3)
MCH RBC QN AUTO: 31.1 PG (ref 26.5–33)
MCHC RBC AUTO-ENTMCNC: 31 G/DL (ref 31.5–36.5)
MCV RBC AUTO: 100 FL (ref 78–100)
P AXIS - MUSE: 67 DEGREES
PHOSPHATE SERPL-MCNC: 3.9 MG/DL (ref 2.5–4.5)
PLATELET # BLD AUTO: 494 10E3/UL (ref 150–450)
POTASSIUM BLD-SCNC: 3.2 MMOL/L (ref 3.4–5.3)
POTASSIUM BLD-SCNC: 3.4 MMOL/L (ref 3.4–5.3)
POTASSIUM BLD-SCNC: 3.4 MMOL/L (ref 3.4–5.3)
POTASSIUM BLD-SCNC: 4 MMOL/L (ref 3.4–5.3)
PR INTERVAL - MUSE: 126 MS
PROT SERPL-MCNC: 6.8 G/DL (ref 6.8–8.8)
QRS DURATION - MUSE: 86 MS
QT - MUSE: 306 MS
QTC - MUSE: 444 MS
R AXIS - MUSE: 41 DEGREES
RBC # BLD AUTO: 2.67 10E6/UL (ref 4.4–5.9)
SODIUM SERPL-SCNC: 143 MMOL/L (ref 133–144)
SYSTOLIC BLOOD PRESSURE - MUSE: NORMAL MMHG
T AXIS - MUSE: 200 DEGREES
VENTRICULAR RATE- MUSE: 127 BPM
WBC # BLD AUTO: 18.7 10E3/UL (ref 4–11)

## 2021-12-26 PROCEDURE — 250N000009 HC RX 250: Performed by: INTERNAL MEDICINE

## 2021-12-26 PROCEDURE — 250N000011 HC RX IP 250 OP 636: Performed by: NURSE PRACTITIONER

## 2021-12-26 PROCEDURE — 84132 ASSAY OF SERUM POTASSIUM: CPT | Performed by: INTERNAL MEDICINE

## 2021-12-26 PROCEDURE — 250N000013 HC RX MED GY IP 250 OP 250 PS 637: Performed by: PHYSICIAN ASSISTANT

## 2021-12-26 PROCEDURE — 94640 AIRWAY INHALATION TREATMENT: CPT | Mod: 76

## 2021-12-26 PROCEDURE — 84132 ASSAY OF SERUM POTASSIUM: CPT | Performed by: PHYSICIAN ASSISTANT

## 2021-12-26 PROCEDURE — 85027 COMPLETE CBC AUTOMATED: CPT | Performed by: INTERNAL MEDICINE

## 2021-12-26 PROCEDURE — 120N000003 HC R&B IMCU UMMC

## 2021-12-26 PROCEDURE — 250N000013 HC RX MED GY IP 250 OP 250 PS 637: Performed by: NURSE PRACTITIONER

## 2021-12-26 PROCEDURE — 250N000013 HC RX MED GY IP 250 OP 250 PS 637: Performed by: INTERNAL MEDICINE

## 2021-12-26 PROCEDURE — 83605 ASSAY OF LACTIC ACID: CPT | Performed by: INTERNAL MEDICINE

## 2021-12-26 PROCEDURE — 999N000157 HC STATISTIC RCP TIME EA 10 MIN

## 2021-12-26 PROCEDURE — 71045 X-RAY EXAM CHEST 1 VIEW: CPT | Mod: 26 | Performed by: STUDENT IN AN ORGANIZED HEALTH CARE EDUCATION/TRAINING PROGRAM

## 2021-12-26 PROCEDURE — 999N000127 HC STATISTIC PERIPHERAL IV START W US GUIDANCE

## 2021-12-26 PROCEDURE — 84100 ASSAY OF PHOSPHORUS: CPT | Performed by: NURSE PRACTITIONER

## 2021-12-26 PROCEDURE — 94640 AIRWAY INHALATION TREATMENT: CPT

## 2021-12-26 PROCEDURE — 250N000013 HC RX MED GY IP 250 OP 250 PS 637: Performed by: STUDENT IN AN ORGANIZED HEALTH CARE EDUCATION/TRAINING PROGRAM

## 2021-12-26 PROCEDURE — 36415 COLL VENOUS BLD VENIPUNCTURE: CPT | Performed by: INTERNAL MEDICINE

## 2021-12-26 PROCEDURE — 83735 ASSAY OF MAGNESIUM: CPT | Performed by: NURSE PRACTITIONER

## 2021-12-26 PROCEDURE — 80053 COMPREHEN METABOLIC PANEL: CPT | Performed by: INTERNAL MEDICINE

## 2021-12-26 PROCEDURE — 250N000011 HC RX IP 250 OP 636: Performed by: PHYSICIAN ASSISTANT

## 2021-12-26 PROCEDURE — 74018 RADEX ABDOMEN 1 VIEW: CPT

## 2021-12-26 PROCEDURE — 99232 SBSQ HOSP IP/OBS MODERATE 35: CPT | Performed by: NURSE PRACTITIONER

## 2021-12-26 PROCEDURE — 71045 X-RAY EXAM CHEST 1 VIEW: CPT

## 2021-12-26 PROCEDURE — 74018 RADEX ABDOMEN 1 VIEW: CPT | Mod: 26 | Performed by: STUDENT IN AN ORGANIZED HEALTH CARE EDUCATION/TRAINING PROGRAM

## 2021-12-26 PROCEDURE — 82947 ASSAY GLUCOSE BLOOD QUANT: CPT | Performed by: INTERNAL MEDICINE

## 2021-12-26 PROCEDURE — 82330 ASSAY OF CALCIUM: CPT | Performed by: INTERNAL MEDICINE

## 2021-12-26 PROCEDURE — 80069 RENAL FUNCTION PANEL: CPT | Performed by: INTERNAL MEDICINE

## 2021-12-26 RX ORDER — POTASSIUM CHLORIDE 1.5 G/1.58G
20 POWDER, FOR SOLUTION ORAL ONCE
Status: COMPLETED | OUTPATIENT
Start: 2021-12-26 | End: 2021-12-26

## 2021-12-26 RX ORDER — LISINOPRIL 2.5 MG/1
2.5 TABLET ORAL DAILY
Status: DISCONTINUED | OUTPATIENT
Start: 2021-12-26 | End: 2022-01-03

## 2021-12-26 RX ORDER — FUROSEMIDE 10 MG/ML
20 INJECTION INTRAMUSCULAR; INTRAVENOUS ONCE
Status: COMPLETED | OUTPATIENT
Start: 2021-12-26 | End: 2021-12-26

## 2021-12-26 RX ORDER — POTASSIUM CHLORIDE 20MEQ/15ML
20 LIQUID (ML) ORAL ONCE
Status: COMPLETED | OUTPATIENT
Start: 2021-12-26 | End: 2021-12-26

## 2021-12-26 RX ADMIN — LIDOCAINE 1 PATCH: 560 PATCH PERCUTANEOUS; TOPICAL; TRANSDERMAL at 20:34

## 2021-12-26 RX ADMIN — HALOPERIDOL LACTATE 2 MG: 5 INJECTION, SOLUTION INTRAMUSCULAR at 13:26

## 2021-12-26 RX ADMIN — Medication 1 PACKET: at 20:38

## 2021-12-26 RX ADMIN — Medication 1 TABLET: at 08:28

## 2021-12-26 RX ADMIN — Medication 10 MG: at 23:26

## 2021-12-26 RX ADMIN — TICAGRELOR 90 MG: 90 TABLET ORAL at 08:28

## 2021-12-26 RX ADMIN — METOPROLOL TARTRATE 25 MG: 25 TABLET, FILM COATED ORAL at 08:27

## 2021-12-26 RX ADMIN — FOLIC ACID 1 MG: 1 TABLET ORAL at 08:28

## 2021-12-26 RX ADMIN — INSULIN ASPART 1 UNITS: 100 INJECTION, SOLUTION INTRAVENOUS; SUBCUTANEOUS at 13:26

## 2021-12-26 RX ADMIN — ATORVASTATIN CALCIUM 40 MG: 40 TABLET, FILM COATED ORAL at 20:34

## 2021-12-26 RX ADMIN — LISINOPRIL 2.5 MG: 2.5 TABLET ORAL at 13:27

## 2021-12-26 RX ADMIN — THIAMINE HCL TAB 100 MG 100 MG: 100 TAB at 08:28

## 2021-12-26 RX ADMIN — TICAGRELOR 90 MG: 90 TABLET ORAL at 20:34

## 2021-12-26 RX ADMIN — HEPARIN SODIUM 5000 UNITS: 5000 INJECTION, SOLUTION INTRAVENOUS; SUBCUTANEOUS at 13:26

## 2021-12-26 RX ADMIN — HEPARIN SODIUM 5000 UNITS: 5000 INJECTION, SOLUTION INTRAVENOUS; SUBCUTANEOUS at 06:43

## 2021-12-26 RX ADMIN — QUETIAPINE FUMARATE 100 MG: 50 TABLET ORAL at 20:34

## 2021-12-26 RX ADMIN — HEPARIN SODIUM 5000 UNITS: 5000 INJECTION, SOLUTION INTRAVENOUS; SUBCUTANEOUS at 23:25

## 2021-12-26 RX ADMIN — HALOPERIDOL LACTATE 2 MG: 5 INJECTION, SOLUTION INTRAMUSCULAR at 08:27

## 2021-12-26 RX ADMIN — IPRATROPIUM BROMIDE AND ALBUTEROL SULFATE 3 ML: 2.5; .5 SOLUTION RESPIRATORY (INHALATION) at 17:42

## 2021-12-26 RX ADMIN — IPRATROPIUM BROMIDE AND ALBUTEROL SULFATE 3 ML: 2.5; .5 SOLUTION RESPIRATORY (INHALATION) at 08:51

## 2021-12-26 RX ADMIN — FUROSEMIDE 20 MG: 10 INJECTION, SOLUTION INTRAVENOUS at 15:05

## 2021-12-26 RX ADMIN — POTASSIUM CHLORIDE 20 MEQ: 1.5 POWDER, FOR SOLUTION ORAL at 08:27

## 2021-12-26 RX ADMIN — ASPIRIN 81 MG CHEWABLE TABLET 81 MG: 81 TABLET CHEWABLE at 08:27

## 2021-12-26 RX ADMIN — NICOTINE 1 PATCH: 14 PATCH, EXTENDED RELEASE TRANSDERMAL at 08:26

## 2021-12-26 RX ADMIN — POTASSIUM CHLORIDE 20 MEQ: 20 SOLUTION ORAL at 15:06

## 2021-12-26 ASSESSMENT — ACTIVITIES OF DAILY LIVING (ADL)
ADLS_ACUITY_SCORE: 14
ADLS_ACUITY_SCORE: 16
ADLS_ACUITY_SCORE: 14
ADLS_ACUITY_SCORE: 14
ADLS_ACUITY_SCORE: 16
ADLS_ACUITY_SCORE: 14
ADLS_ACUITY_SCORE: 14
ADLS_ACUITY_SCORE: 16
ADLS_ACUITY_SCORE: 14
ADLS_ACUITY_SCORE: 16
ADLS_ACUITY_SCORE: 14
ADLS_ACUITY_SCORE: 16
ADLS_ACUITY_SCORE: 16
ADLS_ACUITY_SCORE: 14
ADLS_ACUITY_SCORE: 16
ADLS_ACUITY_SCORE: 14
ADLS_ACUITY_SCORE: 14
ADLS_ACUITY_SCORE: 16

## 2021-12-26 ASSESSMENT — MIFFLIN-ST. JEOR: SCORE: 1415.38

## 2021-12-26 NOTE — PROGRESS NOTES
Calorie Count  Intake recorded for: 12/25  Total Kcals: 218 Total Protein: 7g  Kcals from Hospital Food: 218   Protein: 7g  Kcals from Outside Food (average):0 Protein: 0g  # Meals Ordered from Kitchen: 1 meal  # Meals Recorded: 0 meals  # Supplements Recorded: 75% 1 magic cup

## 2021-12-26 NOTE — PROGRESS NOTES
Interventional Cardiology Progress Note      Assessment & Plan:  Bruce Blount is a 56 year old male with PMHx current tobacco use and ETOH abuse who presented to [facility] 12/6 after out of hospital VF arrest. Coronary angiogram revealed  of RCA and acute culprit lesion of LCx/OM1, s/p PCI to pLCx, mid LCx, and OM1 with TERRY. Decannulated 12/13. IABP removed 12/13. Extubated 12/14. Transferred out of ICU on 12/23. Ongoing hospital issues include delirium.     Plan today:  - Start lisinopril 2.5 mg daily   - IV lasix 20 mg x1  - awaiting TCU placement       Neurology  # Anoxic brain injury  # ICU delirium, improving  # Chest wall pain, improved  # Hx of possible ETOH abuse per family  S/p therapeutic hypothermia. Extubated 12/14. Initial head CT without acute pathology.  Delirium was initially difficult to manage but now his mentation seems to be improving. Slowly working to decrease haldol. 1:1 sitter removed on 12/25/21. Patient intermittently pulling on NG tube. Tube pulled back but able to be advanced by bedside nurse on 12/26.   - 10mg melatonin at bedtime  - continue seroquel 100 at night  - haldol to 2 mg BID (08:00 and 14:00)  - continue PRN haldol  - Tylenol and lidocaine patches for additional pain control as needed  - Folate, Thiamine and multi vitamin for ETOH abuse.     Cardiovascular  # Ischemic Cardiomyopathy (EF 40-45%)  # S/p PCI pLCx, mLCx, OM1  # Residual RCA   # HTN, HLD  # Nonsustained VT  # Refractory VF cardiac arrest 2/2 secondary to STEMI, resolved  # Cardiogenic shock requiring VA ECMO, resolved  # Concern for limb ischemia s/p distal reperfusion cannula left leg  S/p Peripheral V-A ECMO inserted via RCFA and RCFV 17/25 fr. Decannulated 12/13. IABP removed 12/13.   - GDMT                 - DAPT: Continue ASA 81mg and ticagrelor 90mg BID                  - Statin: 40mg Lipitor daily                 - BB: metoprolol tartrate 25 mg BID   -ACEI/ARB/ARNI: start lisinopril 2.5 mg daily  -   of RCA not intervened upon, will likely not need intervention unless patient symptomatic  - volume status: appears euvolemic but CXR with slight worsening interstitial opacities and persistent oxygen requirements. Will give IV lasix 20 mg x1 today.   - ECMO cannula site suture removal 12/28     Pulmonary  # Acute hypoxemic respiratory failure, improving  # Klebsiella pneumonia  # Rib Fractures  # Sternal Fracture  # Tobacco Abuse (2PPD)  # Probable COPD exacerbation  Extubated 12/14 to BiPAP. Now sating well on 2L NC.  Completed antibiotic/steroid course for COPD exacerbation 12/20. Continue to have chest wall pain, likely 2/2 fractures. CXR 12/26 with slightly increased interstitial opacities but no pleural effusions.   - IV lasix 20 mg x1 today  - RT pulmonary hygiene  - Duoneb Q6 hours while awake  - Lidocaine patch for rib fracture pain     Gastrointestinal, Nutrition  # Dysphagia 2/2 intubation   # Loose Stool 2/2 tube feeds  # Shock liver 2/2 cardiac arrest, resolved  # Severe Malnutrition in context of critical illness  # Transaminitis   C. Diff negative. Shock liver resolved on 12/13, however, ALT julian from 74 to 114 and AST julian from 39 to 66 on 12/26.   - SP following, diet advanced to minced/moist (DD5) and thick liquids, remains on cycle TF at night  - continue calorie count  - continue to monitor LFTs     Renal, Electrolytes  # Hypoalbuminemia  # Hypokalemia, K 3.2  # Hypocalcemia   ESTER due to cardiac arrest and warming. Started on CRRT on 12/8/21. No CRRT since decannulation 12/13. Creatinine 0.68, BUN 32. Hypokalemia 3.3 today. Hypernatremia resolved.   - FWF, 150cc Q2 hours  - continue to encourage PO intake  - Monitor urine output  - potassium replacement protocol     Resolved renal issues:  # Acute Renal Injury, resolved  # Lactic acidosis, resolved  # Hyperkalemia, resolved   # Hypocalcemia, resolved  # Hyperphosphatemia, resolved   # Acute Renal Injury, resolved  # Lactic acidosis, resolved  #  Hyperkalemia, resolved   # Hyperphosphatemia, resolved   # Hypernatremia, resolved     Infectious Disease  # Aspiration Pneumonia (klebsiella, staph aureus)  # Fluctuating leukocytosis, improving   # Fever, resolved  # Lactic acidosis, resolved  Pan culture 12/14 given temp and WBC. Susceptibilities resulted the same as previous. Completed treatment for aspiration pneumonia. Persistent leukocytosis likely stress related but trend is improving. Afebrile. WBC 15.4 on 12/25 but 18.7 on 12/26.   - continue to monitor off antibiotics  - PRN acetaminophen  Cultures thus far:                 - sputum 12/6: staph aureus, staph dysgalactia, klebsiella                 - sputum 12/7: staph aureus                 - sputum 12/8: staph and klebsiella                 - Sputum 12/9: staph aureus and klebsiella                 - Sputum 12/10: staph aureus and klebsiella                 - sputum 12/13: staph aureus and klebsiella                 - sputum 12/14: staph aureus  Antibiotics              - Vancomycin 12/6 - 12/7 (MRSA negative)              - Zosyn 12/6 - 12/9              - Rocephin 12/9 - 12/16                 - Cefepime 12/16 - 12/21                 - Flagyl 12/16 - 12/16                 - Azithro 12/16 - 12/20      Hematology  # Anemia of critical illness  # Concern for HIT - screen negative  # Thrombocytopenia, resolved  # Loss of bilateral DP pulses, resolved  Hgb stable 8.4. Plt 487. No e/o bleeding.    - daily CBC     Endocrinology  # Stress induced Hyperglycemia   No known medical history. Hemoglobin A1c 5.6%. Blood sugars have improved. No insulin needs since 12/23. -153.   - sliding scale insulin  - hypoglycemia protocol      MSK/Skin  # Mottling LLE s/p LLE distal reperfusion cannula  # Dusky toes left foot  Pulses remain intact. IABP removed 12/13.   - continue to monitor     Prophylaxis:  DVT: subcutaneous heparin     Diet: Minced/moist (DD5), mildly thick liquids, TF cycle at night  Activity: Up with  "assist as tolerated  Code Status: Full  Disposition: TCU pending placement      Patient discussed w/ Dr. Stewart.    Jayde Schafer PA-C  [facility]  Interventional Cardiology        Interval History:  Total oral caloric intake yesterday 218 Kcal.  Telemetry with SR and avg HR 92 bpm. 3-10 beat runs of PSVT and only 1 run of NSVT (3 beats). Several isolated PVCs. Stable MAPs of 71-96 mmHg.     Most recent vital signs:  /64 (BP Location: Right arm)   Pulse 95   Temp 97.6  F (36.4  C) (Oral)   Resp 22   Ht 1.753 m (5' 9\")   Wt 59.5 kg (131 lb 2.8 oz)   SpO2 100%   BMI 19.37 kg/m    Temp:  [97.3  F (36.3  C)-99.3  F (37.4  C)] 97.6  F (36.4  C)  Pulse:  [84-96] 95  Resp:  [22-26] 22  BP: ()/(57-78) 104/64  SpO2:  [91 %-100 %] 100 %  Wt Readings from Last 2 Encounters:   12/26/21 59.5 kg (131 lb 2.8 oz)   09/23/19 77.5 kg (170 lb 12.8 oz)       Intake/Output Summary (Last 24 hours) at 12/25/2021 0734  Last data filed at 12/25/2021 0500  Gross per 24 hour   Intake 1680 ml   Output 325 ml   Net 1355 ml       Physical exam:  General: Frail, thin appearing middle age male. No acute distress. Alert and interactive.   HEENT: Normocephalic, atraumatic. No scleral icterus or injection. Feeding tube in right nare.   Neck: JVP 7cm.   CARDIAC: Regular rate and rhythm. No obvious murmurs. Radial pulses palpable bilaterally.   RESP: Normal work of breathing without use of accessory muscles. Supplemental oxygen at 2 LPM via NC. Faint bibasilar rales and expiratory wheezes.    GI: Abdomen is flat, soft, nontender. Normal bowel sounds. No rebound tenderness.   EXTREMITIES: Without lower extremity edema. Warm and well perfused. No venous stasis changes. Right groin ECMO site with bandage in place. Scant old drainage on bandage. Wound not evaluated.   SKIN: No acute lesions appreciated. Warm and dry to touch  NEURO: Alert and oriented to person, date. Knows that he is in the hospital but " unable to recall which one and unable to state the reason for hospitalization. Moves all extremities.  PSYCH: Mood and affect are appropriate    Labs (Past three days):  CBC  Recent Labs   Lab 12/26/21  0619 12/25/21  0346 12/24/21  0451 12/23/21  0547   WBC 18.7* 15.4* 17.1* 17.6*   RBC 2.67* 2.67* 2.74* 2.88*   HGB 8.3* 8.4* 8.5* 9.0*   HCT 26.8* 26.9* 26.9* 28.8*    101* 98 100   MCH 31.1 31.5 31.0 31.3   MCHC 31.0* 31.2* 31.6 31.3*   RDW 14.5 14.5 14.4 14.3   * 487* 495* 535*     BMP  Recent Labs   Lab 12/26/21  0731 12/26/21  0619 12/26/21  0446 12/25/21  0432 12/25/21  0346 12/24/21  1617 12/24/21  1559 12/24/21  0729 12/24/21  0451 12/23/21  0803 12/23/21  0539   NA  --  143  --   --  144  144  --  144  --  146*   < > 150*  150*   POTASSIUM  --  3.2*  --   --  3.3*  --   --   --  3.6  --  3.4   CHLORIDE  --  111*  --   --  111*  --   --   --  111*  --  117*   CO2  --  25  --   --  27  --   --   --  28  --  27   ANIONGAP  --  7  --   --  6  --   --   --  7  --  6   * 127*  123* 123*   < > 145*  137*   < >  --    < > 153*  141*   < > 191*  181*   BUN  --  25  --   --  32*  --   --   --  38*  --  64*   CR  --  0.68  --   --  0.68  --   --   --  0.73  --  0.88   GFRESTIMATED  --  >90  --   --  >90  --   --   --  >90  --  >90   BRIDGETTE  --  8.3*  --   --  8.1*  --   --   --  8.6  --  8.7   MAG  --  2.0  --   --  1.9  --   --   --  2.1  --  2.3   PHOS  --  3.9  --   --  3.7  --   --   --  3.2  --  3.0    < > = values in this interval not displayed.      INR  Recent Labs   Lab 12/24/21  0451 12/23/21  0539 12/22/21  0439 12/21/21  0348   INR 1.14 1.18* 1.16* 1.24*     Liver panel  Recent Labs   Lab 12/26/21  0619 12/25/21  0346 12/24/21  0451 12/23/21  0539   PROTTOTAL 6.8 6.8 6.9 7.2   ALBUMIN 2.1* 2.1* 2.1* 2.2*   BILITOTAL 0.3 0.3 0.3 0.3   ALKPHOS 97 95 92 93   AST 66* 39 36 28   * 74* 67 74*     Telemetry 12/25/21: NSVT     Imaging/procedure results:  Imaging/procedure  results:  EKG 12 Lead 12/23/2021: ST with PVC's,     ECHO 12/14/2021  Interpretation Summary  Left ventricular function is decreased. The ejection fraction is 40-45%  (mildly reduced). Mild diffuse hypokinesis is present.  Global right ventricular function is normal. The right ventricle is normal  size.  This study was compared with the study from 12/12/2021. There has been an  improvement in the biventricular function since the patient has been  Decannulated.    Coronary angiogram 12/6/21  Left Main   The vessel is large and is angiographically normal.   Left Anterior Descending   The vessel is large.   Ost LAD to Mid LAD lesion is 60% stenosed. The lesion is type C - high risk.   Mid LAD lesion is 30% stenosed.   First Diagonal Branch   The vessel is large and is angiographically normal.   Second Diagonal Branch   The vessel is small and is angiographically normal.   Third Diagonal Branch   The vessel is small and is angiographically normal.   Left Circumflex   The vessel is moderate in size.   Ost Cx to Prox Cx lesion is 70% stenosed. The lesion is type C - high risk.   Mid Cx to Dist Cx lesion is 30% stenosed.   First Obtuse Marginal Branch   The vessel is moderate in size.   1st Mrg lesion is 99% stenosed. The lesion is type C - high risk.   Second Obtuse Marginal Branch   The vessel is small.   2nd Mrg lesion is 35% stenosed.   Third Obtuse Marginal Branch   The vessel is small.   Right Coronary Artery   The vessel is moderate in size.   Prox RCA lesion is 70% stenosed.   Prox RCA to Dist RCA lesion is 100% stenosed. The lesion is chronic total occlusion.   Right Ventricular Branch   The vessel is small.   Right Posterior Descending Artery   The vessel is moderate in size and is angiographically normal.   Inferior Septal   Collaterals   Inf Sept filled by collaterals from 2nd Sept.      Right Posterior Atrioventricular Artery   The vessel is moderate in size and is angiographically normal.   Collaterals    RPAV filled by collaterals from Dist Cx.      First Right Posterolateral Branch   The vessel is small and is angiographically normal.   Second Right Posterolateral Branch   The vessel is small and is angiographically normal.       Intervention      Ost Cx to Prox Cx lesion   Stent   Lesion length: 13 mm. The pre-interventional distal flow is decreased (OCTAVIO 2). A STENT CORONARY TERRY SYNERGY XD MR US 2.34I70LC [id number] drug eluting stent was successfully placed. A STENT ORSIRO TERRY 2.50X13 [id number] drug-eluting stent was successfully placed. The post-interventional distal flow is normal (OCTAVIO 3). The intervention was successful. No complications occurred at this lesion.   There is a 0% residual stenosis post intervention.   1st Mrg lesion   Stent   Lesion length: 25 mm. The pre-interventional distal flow is decreased (OCTAVIO 1). A STENT CORONARY TERRY SYNERGY XD MR US 2.72F00RC [id number] drug eluting stent was successfully placed. The post-interventional distal flow is normal (OCTAVIO 3). The intervention was successful. No complications occurred at this lesion. Patient was placed on VA ECMO given hemodynamic instability after refractory VF arrest. Heparin was used to maintain ACT greater than 250 seconds. Given initial inferior ST elevation on the EKG, a JR4 GC was placed in the RCA and a RUNTHROUGH wire was used to wire the lesion, however the lesion was found to be a  and we switched to the left system. A 6 [place] XB 3.5 guide catheter was used to engage the left main coronary artery. A run-through wire was attempted to be advanced down the left circumflex, however tortuosity prevented the wire to advance. The wire was advanced on the LAD. A second run-through wire was next attempted to be advanced into the OM1, however was unsuccessful across the lesion. A Fielder FC was used to advance down the OM1. A 1.5 x 20 mm emerge balloon was used to predilate the OM1. A 2.0 x 20 mm Emerge balloon was then used to  predilate the OM1. Angiogram revealed disease in the proximal and mid left circumflex in addition to the OM1 bifurcation. Decision was made to wire the circumflex as well. A run-through wire was first attempted to advance on the left circumflex, however was unsuccessful. The Fielder FC was next attempted to advanced down the left circumflex, however was again unsuccessful. A Suoh 3 wire was attempted to be advanced into the LCx, however again unsuccessful. A Fielder XT-A was able to wire down the LCx to the distal LCx. The Fielder was exchanged for a runthrough wire using a turnpike LP. A 1.5 x 20 mm emerge was then used to predilate the proximal to mid left circumflex. A 2.0 x 20 mm Emerge balloon was then next used to predilate the proximal and mid left circumflex. At this point decision was made to to stent the mid left circumflex into the proximal OM1 into proximal left circumflex. A 2.5 x 12 mm emerge was placed in the OM1. A 2.5 x 16 mm Synergy TERRY was deployed in the proximal mid left circumflex while the 2.5 x 12 mm Emerge balloon was inflated across the ostium to the mid circumflex. Next, a 2.5 x 28 mm Synergy TERRY was deployed in the proximal left circumflex into OM1. A new run-through wire was used to rewire the left circumflex and the jailed run-through wire was removed. The bifurcation was simultaneously inflated with a 2.5 x 15 mm emerge balloon in the left circumflex and a 2.5 x 12 mm Emerge balloon in the OM1. Follow-up angiogram revealed the mid left circumflex at the level of bifurcation appeared to have a lesion. A 2.5 x 8 mm Synergy TERRY was advanced into the mid left circumflex, however would not cross the proximal left circumflex. A 2.5 x 13 mm Orsiro TERRY was deployed in the proximal to mid left circumflex. After rewiring the OM1 with a RUNTHROUGH, the mid and proximal left circumflex was post dilated with a 2.5x12mm NC Emerge.The bifurcation was kissed with 2.5 x 12 mm NC Emerge balloon in the  left circumflex and 2.5 x 12 mm Emerge balloon in the OM 1. Follow-up angiogram revealed the stents were fully expanded well opposed and there is no evidence of dissection or perforation. All interventional materials subsequently removed.   There is a 0% residual stenosis post intervention.       Extracorporeal Membrane Oxygenation    PROCEDURE REPORT:    EXTRACORPOREAL MEMBRANE OXYGENATION CANNULATION:  A 17 Fr ECMO cannula was inserted into the right common femoral artery and a 25 Fr cannula was inserted into the right common femoral vein.  The arterial cannula was positioned in the iliac artery and the venous canula was positioned across the RA.  The ECMO circuit was primed, closely inspected to ensure no bubbles present and ECMO was initiated.  An anterograde perfusion 8 Fr sheath was inserted in the right superficial femoral artery.     Line Placement      Line Placement 1    Arterial Line was placed. the right radial artery. Placement was successful.      Line Placement 2    Central Venous Catheter was placed. Ultrasound was used to visualize the left femoral vein. Placement was successful.

## 2021-12-26 NOTE — PLAN OF CARE
Neuro: A&Ox4.   Cardiac: SR to ST. HR 90-100s. VSS.   Respiratory: Sating 95% on 1-2 L NC.  GI/: Adequate urine output via urinal and commode. 2-3 BMs this shift. Lasix IV given once.  Diet/appetite: Tolerating minced/soft diet. Jose counts ordered from 12/27-12/29. Cycled tube feed 8pm-8am at 45 ml/hr.   Activity: Assist of one with walker and gait belt.   Pain: No complaints of pain this shift.  Skin: No new deficits noted. Placed mepilex on sacrum for prevention.  LDA's: NG tube for tube feedings and meds. NG came out to 30 cm today; notified team and they instructed to reinsert. Reinserted to original placement marker and obtained an abdominal x-ray to confirm placement. Team confirmed OK to use. Right PIV, saline locked.      Plan: Continue with POC. Notify primary team with changes.

## 2021-12-26 NOTE — PLAN OF CARE
"/64 (BP Location: Right arm)   Pulse 95   Temp 97.6  F (36.4  C) (Oral)   Resp 22   Ht 1.753 m (5' 9\")   Wt 59.5 kg (131 lb 2.8 oz)   SpO2 100%   BMI 19.37 kg/m    Neuro: A&Ox3-4, occasionally makes statements inappropriate to situation.  Cardiac: SR w/occasional PVCs. Team notified about 2 5-10 beat episodes of SVT.   Respiratory: Sating 90s on RA-1L NC.  GI/: Adequate urine output - intermittently incontinent. X1 large BM this shift.  Diet/appetite: Tolerating Tf/minced & moist diet with mildly thick liq. Pills whole.  Activity:  Assist of 1 when OOB. Pt not OOB this shift.  Pain: Reports sharp belly pains he believes to be d/t NG tube. Team notified.  Skin: No new deficits noted.  LDA's: NG tube, x1 PIV    Plan: Continue with POC. Notify primary team with changes.    "

## 2021-12-27 ENCOUNTER — APPOINTMENT (OUTPATIENT)
Dept: SPEECH THERAPY | Facility: CLINIC | Age: 56
End: 2021-12-27
Payer: COMMERCIAL

## 2021-12-27 ENCOUNTER — APPOINTMENT (OUTPATIENT)
Dept: OCCUPATIONAL THERAPY | Facility: CLINIC | Age: 56
End: 2021-12-27
Payer: COMMERCIAL

## 2021-12-27 LAB
ALBUMIN SERPL-MCNC: 2.1 G/DL (ref 3.4–5)
ALP SERPL-CCNC: 98 U/L (ref 40–150)
ALT SERPL W P-5'-P-CCNC: 126 U/L (ref 0–70)
ANION GAP SERPL CALCULATED.3IONS-SCNC: 7 MMOL/L (ref 3–14)
AST SERPL W P-5'-P-CCNC: 59 U/L (ref 0–45)
BILIRUB SERPL-MCNC: 0.3 MG/DL (ref 0.2–1.3)
BUN SERPL-MCNC: 29 MG/DL (ref 7–30)
CA-I BLD-MCNC: 4.2 MG/DL (ref 4.4–5.2)
CALCIUM SERPL-MCNC: 8.5 MG/DL (ref 8.5–10.1)
CHLORIDE BLD-SCNC: 108 MMOL/L (ref 94–109)
CO2 SERPL-SCNC: 23 MMOL/L (ref 20–32)
CREAT SERPL-MCNC: 0.68 MG/DL (ref 0.66–1.25)
ERYTHROCYTE [DISTWIDTH] IN BLOOD BY AUTOMATED COUNT: 15.4 % (ref 10–15)
GFR SERPL CREATININE-BSD FRML MDRD: >90 ML/MIN/1.73M2
GLUCOSE BLD-MCNC: 132 MG/DL (ref 70–99)
GLUCOSE BLD-MCNC: 136 MG/DL (ref 70–99)
GLUCOSE BLDC GLUCOMTR-MCNC: 124 MG/DL (ref 70–99)
GLUCOSE BLDC GLUCOMTR-MCNC: 139 MG/DL (ref 70–99)
GLUCOSE BLDC GLUCOMTR-MCNC: 152 MG/DL (ref 70–99)
GLUCOSE BLDC GLUCOMTR-MCNC: 152 MG/DL (ref 70–99)
GLUCOSE BLDC GLUCOMTR-MCNC: 163 MG/DL (ref 70–99)
GLUCOSE BLDC GLUCOMTR-MCNC: 319 MG/DL (ref 70–99)
HCT VFR BLD AUTO: 27.6 % (ref 40–53)
HGB BLD-MCNC: 8.6 G/DL (ref 13.3–17.7)
HGB BLD-MCNC: 8.8 G/DL (ref 13.3–17.7)
LACTATE SERPL-SCNC: 1.2 MMOL/L (ref 0.7–2)
LACTATE SERPL-SCNC: 1.2 MMOL/L (ref 0.7–2)
MAGNESIUM SERPL-MCNC: 1.9 MG/DL (ref 1.6–2.3)
MCH RBC QN AUTO: 31.9 PG (ref 26.5–33)
MCHC RBC AUTO-ENTMCNC: 31.9 G/DL (ref 31.5–36.5)
MCV RBC AUTO: 100 FL (ref 78–100)
PLATELET # BLD AUTO: 508 10E3/UL (ref 150–450)
POTASSIUM BLD-SCNC: 3.5 MMOL/L (ref 3.4–5.3)
PROT SERPL-MCNC: 6.8 G/DL (ref 6.8–8.8)
RBC # BLD AUTO: 2.76 10E6/UL (ref 4.4–5.9)
SODIUM SERPL-SCNC: 138 MMOL/L (ref 133–144)
WBC # BLD AUTO: 18.5 10E3/UL (ref 4–11)

## 2021-12-27 PROCEDURE — 250N000009 HC RX 250

## 2021-12-27 PROCEDURE — 80053 COMPREHEN METABOLIC PANEL: CPT | Performed by: INTERNAL MEDICINE

## 2021-12-27 PROCEDURE — 94640 AIRWAY INHALATION TREATMENT: CPT | Mod: 76

## 2021-12-27 PROCEDURE — 250N000011 HC RX IP 250 OP 636: Performed by: PHYSICIAN ASSISTANT

## 2021-12-27 PROCEDURE — 250N000009 HC RX 250: Performed by: INTERNAL MEDICINE

## 2021-12-27 PROCEDURE — 250N000011 HC RX IP 250 OP 636: Performed by: NURSE PRACTITIONER

## 2021-12-27 PROCEDURE — 36415 COLL VENOUS BLD VENIPUNCTURE: CPT | Performed by: STUDENT IN AN ORGANIZED HEALTH CARE EDUCATION/TRAINING PROGRAM

## 2021-12-27 PROCEDURE — 250N000013 HC RX MED GY IP 250 OP 250 PS 637: Performed by: STUDENT IN AN ORGANIZED HEALTH CARE EDUCATION/TRAINING PROGRAM

## 2021-12-27 PROCEDURE — 82947 ASSAY GLUCOSE BLOOD QUANT: CPT | Performed by: INTERNAL MEDICINE

## 2021-12-27 PROCEDURE — 250N000013 HC RX MED GY IP 250 OP 250 PS 637: Performed by: NURSE PRACTITIONER

## 2021-12-27 PROCEDURE — 120N000003 HC R&B IMCU UMMC

## 2021-12-27 PROCEDURE — 82330 ASSAY OF CALCIUM: CPT | Performed by: INTERNAL MEDICINE

## 2021-12-27 PROCEDURE — 250N000013 HC RX MED GY IP 250 OP 250 PS 637: Performed by: INTERNAL MEDICINE

## 2021-12-27 PROCEDURE — 94640 AIRWAY INHALATION TREATMENT: CPT

## 2021-12-27 PROCEDURE — 97535 SELF CARE MNGMENT TRAINING: CPT | Mod: GO

## 2021-12-27 PROCEDURE — 83735 ASSAY OF MAGNESIUM: CPT | Performed by: NURSE PRACTITIONER

## 2021-12-27 PROCEDURE — 36415 COLL VENOUS BLD VENIPUNCTURE: CPT | Performed by: INTERNAL MEDICINE

## 2021-12-27 PROCEDURE — 83605 ASSAY OF LACTIC ACID: CPT | Performed by: STUDENT IN AN ORGANIZED HEALTH CARE EDUCATION/TRAINING PROGRAM

## 2021-12-27 PROCEDURE — 85014 HEMATOCRIT: CPT | Performed by: INTERNAL MEDICINE

## 2021-12-27 PROCEDURE — 85018 HEMOGLOBIN: CPT | Performed by: SURGERY

## 2021-12-27 PROCEDURE — 250N000013 HC RX MED GY IP 250 OP 250 PS 637: Performed by: PHYSICIAN ASSISTANT

## 2021-12-27 PROCEDURE — 36415 COLL VENOUS BLD VENIPUNCTURE: CPT | Performed by: PHYSICIAN ASSISTANT

## 2021-12-27 PROCEDURE — 83605 ASSAY OF LACTIC ACID: CPT | Performed by: PHYSICIAN ASSISTANT

## 2021-12-27 PROCEDURE — 999N000157 HC STATISTIC RCP TIME EA 10 MIN

## 2021-12-27 PROCEDURE — 99232 SBSQ HOSP IP/OBS MODERATE 35: CPT | Performed by: NURSE PRACTITIONER

## 2021-12-27 PROCEDURE — 36415 COLL VENOUS BLD VENIPUNCTURE: CPT | Performed by: SURGERY

## 2021-12-27 PROCEDURE — 92526 ORAL FUNCTION THERAPY: CPT | Mod: GN

## 2021-12-27 RX ORDER — LIDOCAINE HYDROCHLORIDE 20 MG/ML
JELLY TOPICAL EVERY 8 HOURS PRN
Status: DISCONTINUED | OUTPATIENT
Start: 2021-12-27 | End: 2022-01-03 | Stop reason: HOSPADM

## 2021-12-27 RX ADMIN — INSULIN ASPART 1 UNITS: 100 INJECTION, SOLUTION INTRAVENOUS; SUBCUTANEOUS at 16:23

## 2021-12-27 RX ADMIN — HALOPERIDOL LACTATE 2 MG: 5 INJECTION, SOLUTION INTRAMUSCULAR at 15:13

## 2021-12-27 RX ADMIN — Medication 1 PACKET: at 12:31

## 2021-12-27 RX ADMIN — Medication 2 SPRAY: at 20:51

## 2021-12-27 RX ADMIN — HEPARIN SODIUM 5000 UNITS: 5000 INJECTION, SOLUTION INTRAVENOUS; SUBCUTANEOUS at 15:13

## 2021-12-27 RX ADMIN — TICAGRELOR 90 MG: 90 TABLET ORAL at 09:27

## 2021-12-27 RX ADMIN — NICOTINE 1 PATCH: 14 PATCH, EXTENDED RELEASE TRANSDERMAL at 09:29

## 2021-12-27 RX ADMIN — INSULIN ASPART 1 UNITS: 100 INJECTION, SOLUTION INTRAVENOUS; SUBCUTANEOUS at 12:30

## 2021-12-27 RX ADMIN — HEPARIN SODIUM 5000 UNITS: 5000 INJECTION, SOLUTION INTRAVENOUS; SUBCUTANEOUS at 06:28

## 2021-12-27 RX ADMIN — THIAMINE HCL TAB 100 MG 100 MG: 100 TAB at 09:27

## 2021-12-27 RX ADMIN — HALOPERIDOL LACTATE 2 MG: 5 INJECTION, SOLUTION INTRAMUSCULAR at 09:27

## 2021-12-27 RX ADMIN — IPRATROPIUM BROMIDE AND ALBUTEROL SULFATE 3 ML: 2.5; .5 SOLUTION RESPIRATORY (INHALATION) at 08:28

## 2021-12-27 RX ADMIN — Medication 1 PACKET: at 09:31

## 2021-12-27 RX ADMIN — LISINOPRIL 2.5 MG: 2.5 TABLET ORAL at 09:27

## 2021-12-27 RX ADMIN — IPRATROPIUM BROMIDE AND ALBUTEROL SULFATE 3 ML: 2.5; .5 SOLUTION RESPIRATORY (INHALATION) at 21:35

## 2021-12-27 RX ADMIN — TICAGRELOR 90 MG: 90 TABLET ORAL at 20:36

## 2021-12-27 RX ADMIN — ATORVASTATIN CALCIUM 40 MG: 40 TABLET, FILM COATED ORAL at 20:36

## 2021-12-27 RX ADMIN — ASPIRIN 81 MG CHEWABLE TABLET 81 MG: 81 TABLET CHEWABLE at 09:27

## 2021-12-27 RX ADMIN — Medication 1 TABLET: at 09:26

## 2021-12-27 RX ADMIN — FOLIC ACID 1 MG: 1 TABLET ORAL at 09:27

## 2021-12-27 RX ADMIN — Medication 10 MG: at 21:01

## 2021-12-27 RX ADMIN — LIDOCAINE HYDROCHLORIDE: 20 JELLY TOPICAL at 20:48

## 2021-12-27 RX ADMIN — Medication 1 PACKET: at 20:37

## 2021-12-27 RX ADMIN — METOPROLOL TARTRATE 25 MG: 25 TABLET, FILM COATED ORAL at 09:27

## 2021-12-27 RX ADMIN — INSULIN ASPART 4 UNITS: 100 INJECTION, SOLUTION INTRAVENOUS; SUBCUTANEOUS at 20:50

## 2021-12-27 RX ADMIN — QUETIAPINE FUMARATE 100 MG: 50 TABLET ORAL at 20:36

## 2021-12-27 RX ADMIN — LIDOCAINE 1 PATCH: 560 PATCH PERCUTANEOUS; TOPICAL; TRANSDERMAL at 20:49

## 2021-12-27 RX ADMIN — Medication 1 PACKET: at 16:24

## 2021-12-27 RX ADMIN — INSULIN ASPART 1 UNITS: 100 INJECTION, SOLUTION INTRAVENOUS; SUBCUTANEOUS at 09:31

## 2021-12-27 RX ADMIN — HEPARIN SODIUM 5000 UNITS: 5000 INJECTION, SOLUTION INTRAVENOUS; SUBCUTANEOUS at 21:01

## 2021-12-27 ASSESSMENT — ACTIVITIES OF DAILY LIVING (ADL)
ADLS_ACUITY_SCORE: 16
ADLS_ACUITY_SCORE: 14
ADLS_ACUITY_SCORE: 16
ADLS_ACUITY_SCORE: 14
ADLS_ACUITY_SCORE: 14
ADLS_ACUITY_SCORE: 16
ADLS_ACUITY_SCORE: 14
ADLS_ACUITY_SCORE: 14
ADLS_ACUITY_SCORE: 16
ADLS_ACUITY_SCORE: 14
ADLS_ACUITY_SCORE: 16
ADLS_ACUITY_SCORE: 14
ADLS_ACUITY_SCORE: 16

## 2021-12-27 ASSESSMENT — MIFFLIN-ST. JEOR: SCORE: 1419.38

## 2021-12-27 NOTE — PROGRESS NOTES
Interventional Cardiology Progress Note      Assessment & Plan:  Bruce Blount is a 56 year old male with PMHx current tobacco use and ETOH abuse who presented to [facility] 12/6 after out of hospital VF arrest. Coronary angiogram revealed  of RCA and acute culprit lesion of LCx/OM1, s/p PCI to pLCx, mid LCx, and OM1 with TERRY. Decannulated 12/13. IABP removed 12/13. Extubated 12/14. Transferred out of ICU on 12/23. Ongoing hospital issues include delirium.      Today's plan:   - Awaiting TCU placement       Neurology  # Anoxic brain injury  # ICU delirium, improving  # Chest wall pain, improved  # Hx of possible ETOH abuse per family  S/p therapeutic hypothermia. Extubated 12/14. Initial head CT without acute pathology.  Delirium was initially difficult to manage but now his mentation seems to be improving. Slowly working to decrease haldol. 1:1 sitter removed on 12/25/21. Patient intermittently pulling on NG tube. Tube pulled back but able to be advanced by bedside nurse on 12/26.   - 10mg melatonin at bedtime  - continue seroquel 100 at night  - haldol to 2 mg BID (08:00 and 14:00)  - continue PRN haldol  - Tylenol and lidocaine patches for additional pain control as needed  - Folate, Thiamine and multi vitamin for ETOH abuse.     Cardiovascular  # Ischemic Cardiomyopathy (EF 40-45%)  # S/p PCI pLCx, mLCx, OM1  # Residual RCA   # HTN, HLD  # Nonsustained VT  # Refractory VF cardiac arrest 2/2 secondary to STEMI, resolved  # Cardiogenic shock requiring VA ECMO, resolved  # Concern for limb ischemia s/p distal reperfusion cannula left leg  S/p Peripheral V-A ECMO inserted via RCFA and RCFV 17/25 fr. Decannulated 12/13. IABP removed 12/13.   - DAPT: Continue ASA 81mg and ticagrelor 90mg BID   - Statin: 40mg Lipitor daily  - BB: metoprolol tartrate 25 mg BID   - ACEI/ARB/ARNI:  lisinopril 2.5 mg daily  - Volume status: appears euvolemic on exam   - No indication to intervene on  of RCA unless patient becomes  symptomatic  - ECMO cannula site suture removal 12/28     Pulmonary  # Acute hypoxemic respiratory failure, improving  # Klebsiella pneumonia  # Rib Fractures  # Sternal Fracture  # Tobacco Abuse (2PPD)  # Probable COPD exacerbation  Extubated 12/14 to BiPAP. Now sating well on 2L NC.  Completed antibiotic/steroid course for COPD exacerbation 12/20. Continue to have chest wall pain, likely 2/2 fractures. CXR 12/26 with slightly increased interstitial opacities but no pleural effusions s/p IV lasix 20 mg x 1.  - RT pulmonary hygiene  - Duoneb Q6 hours while awake  - Lidocaine patch for rib fracture pain     Gastrointestinal, Nutrition  # Dysphagia 2/2 intubation   # Loose Stool 2/2 tube feeds  # Shock liver 2/2 cardiac arrest, resolved  # Severe Malnutrition in context of critical illness  # Transaminitis   C. Diff negative. Shock liver resolved on 12/13, however slight increase in LFTs since 12/26.   - SP following, diet advanced to minced/moist (DD5) and thick liquids, remains on cycle TF at night  - continue calorie count  - continue to monitor LFTs     Renal, Electrolytes  # Hypoalbuminemia  # Hypokalemia  # Hypocalcemia   ESTER due to cardiac arrest and warming. Started on CRRT on 12/8/21. No CRRT since decannulation 12/13. Creatinine 0.68, BUN 32.   - FWF, 150cc Q2 hours  - continue to encourage PO intake  - Monitor urine output  - Replace lytes per protocol      Resolved renal issues:  # Acute Renal Injury, resolved  # Lactic acidosis, resolved  # Hyperkalemia, resolved   # Hypocalcemia, resolved  # Hyperphosphatemia, resolved   # Acute Renal Injury, resolved  # Lactic acidosis, resolved  # Hyperkalemia, resolved   # Hyperphosphatemia, resolved   # Hypernatremia, resolved     Infectious Disease  # Aspiration Pneumonia (klebsiella, staph aureus)  # Fluctuating leukocytosis, improving   # Fever, resolved  # Lactic acidosis, resolved  Pan culture 12/14 given temp and WBC. Susceptibilities resulted the same as  previous. Completed treatment for aspiration pneumonia. Persistent leukocytosis likely stress related but trend is improving. Afebrile. WBC 15.4 on 12/25 but 18.7 on 12/26.   - continue to monitor off antibiotics  - PRN acetaminophen  Cultures thus far:                 - sputum 12/6: staph aureus, staph dysgalactia, klebsiella                 - sputum 12/7: staph aureus                 - sputum 12/8: staph and klebsiella                 - Sputum 12/9: staph aureus and klebsiella                 - Sputum 12/10: staph aureus and klebsiella                 - sputum 12/13: staph aureus and klebsiella                 - sputum 12/14: staph aureus  Antibiotics              - Vancomycin 12/6 - 12/7 (MRSA negative)              - Zosyn 12/6 - 12/9              - Rocephin 12/9 - 12/16              - Cefepime 12/16 - 12/21              - Flagyl 12/16 - 12/16              - Azithro 12/16 - 12/20      Hematology  # Anemia of critical illness  # Concern for HIT - screen negative  # Thrombocytopenia, resolved  # Loss of bilateral DP pulses, resolved  Hgb stable 8.4. Plt 487. No e/o bleeding.    - daily CBC     Endocrinology  # Stress induced Hyperglycemia   No known medical history. Hemoglobin A1c 5.6%. Blood sugars have improved. No insulin needs since 12/23. -153.   - sliding scale insulin  - hypoglycemia protocol      MSK/Skin  # Mottling LLE s/p LLE distal reperfusion cannula  # Dusky toes left foot  Pulses remain intact. IABP removed 12/13.   - continue to monitor     Prophylaxis:  DVT: subcutaneous heparin     Diet: Minced/moist (DD5), mildly thick liquids, TF cycle at night  Activity: Up with assist as tolerated  Code Status: Full  Disposition: TCU pending placement    Patient discussed with Dr. Stewart.      Meryl Langley DNP, APRN, CNP  [facility]  Interventional Cardiology  [phone]      Interval History: Arousable this morning. Disoriented to situation. TF at night. WBC 18.5 today,  "unchanged. , AST 59. Hemodynamically stable. No complaints.       Most recent vital signs:  /73 (BP Location: Right arm)   Pulse 108   Temp 97.7  F (36.5  C) (Oral)   Resp 20   Ht 1.753 m (5' 9\")   Wt 59.9 kg (132 lb 0.9 oz)   SpO2 100%   BMI 19.50 kg/m    Temp:  [97.6  F (36.4  C)-98.3  F (36.8  C)] 97.7  F (36.5  C)  Pulse:  [] 108  Resp:  [18-28] 20  BP: ()/(55-75) 106/73  SpO2:  [98 %-100 %] 100 %  Wt Readings from Last 2 Encounters:   12/27/21 59.9 kg (132 lb 0.9 oz)   09/23/19 77.5 kg (170 lb 12.8 oz)       Intake/Output Summary (Last 24 hours) at 12/27/2021 1030  Last data filed at 12/27/2021 0907  Gross per 24 hour   Intake 1620 ml   Output 1155 ml   Net 465 ml       Physical exam:  General: In bed, in NAD  HEENT: EOMI, PERRLA, no scleral icterus or injection  CARDIAC: RRR, no m/r/g appreciated. Peripheral pulses 2+  RESP: CTAB, no wheezes, rhonchi or crackles appreciated.  GI: NABS, NT/ND, no guarding or rebound  EXTREMITIES: 1+ LE edema, pulses 2+. Femoral access site w/o bleeding, dressing c/d/i. No bruits appreciated.   SKIN: No acute lesions appreciated. Sutures intact.   NEURO: Alert and oriented to self, pleasantly confused     Drains and Tubes: All drain and tube sites are all benign, no signs of infection  Access: All vascular access sites appear benign    Labs (Past three days):  CBC  Recent Labs   Lab 12/27/21  0930 12/26/21  0619 12/25/21  0346 12/24/21  0451   WBC 18.5* 18.7* 15.4* 17.1*   RBC 2.76* 2.67* 2.67* 2.74*   HGB 8.8* 8.3* 8.4* 8.5*   HCT 27.6* 26.8* 26.9* 26.9*    100 101* 98   MCH 31.9 31.1 31.5 31.0   MCHC 31.9 31.0* 31.2* 31.6   RDW 15.4* 14.5 14.5 14.4   * 494* 487* 495*     BMP  Recent Labs   Lab 12/27/21  0930 12/27/21  0728 12/27/21  0421 12/26/21  2346 12/26/21  1943 12/26/21  1907 12/26/21  1316 12/26/21  1203 12/26/21  0731 12/26/21  0619 12/25/21  0432 12/25/21  0346 12/24/21  1617 12/24/21  1559 12/24/21  0729 12/24/21  0451 " 12/23/21  0803 12/23/21  0539     --   --   --   --   --   --   --   --  143  --  144  144  --  144  --  146*   < > 150*  150*   POTASSIUM 3.5  --   --   --   --  4.0  --  3.4  3.4  --  3.2*  --  3.3*  --   --   --  3.6  --  3.4   CHLORIDE 108  --   --   --   --   --   --   --   --  111*  --  111*  --   --   --  111*  --  117*   CO2  --   --   --   --   --   --   --   --   --  25  --  27  --   --   --  28  --  27   ANIONGAP  --   --   --   --   --   --   --   --   --  7  --  6  --   --   --  7  --  6   * 152* 139* 138*   < >  --    < >  --    < > 127*  123*   < > 145*  137*   < >  --    < > 153*  141*   < > 191*  181*   BUN  --   --   --   --   --   --   --   --   --  25  --  32*  --   --   --  38*  --  64*   CR  --   --   --   --   --   --   --   --   --  0.68  --  0.68  --   --   --  0.73  --  0.88   GFRESTIMATED  --   --   --   --   --   --   --   --   --  >90  --  >90  --   --   --  >90  --  >90   BRIDGETTE  --   --   --   --   --   --   --   --   --  8.3*  --  8.1*  --   --   --  8.6  --  8.7   MAG  --   --   --   --   --   --   --   --   --  2.0  --  1.9  --   --   --  2.1  --  2.3   PHOS  --   --   --   --   --   --   --   --   --  3.9  --  3.7  --   --   --  3.2  --  3.0    < > = values in this interval not displayed.     Troponins: No results found for: TROPI, TROPONIN, TROPR, TROPN     INR  Recent Labs   Lab 12/24/21  0451 12/23/21  0539 12/22/21  0439 12/21/21  0348   INR 1.14 1.18* 1.16* 1.24*     Liver panel  Recent Labs   Lab 12/26/21  0619 12/25/21  0346 12/24/21  0451 12/23/21  0539   PROTTOTAL 6.8 6.8 6.9 7.2   ALBUMIN 2.1* 2.1* 2.1* 2.2*   BILITOTAL 0.3 0.3 0.3 0.3   ALKPHOS 97 95 92 93   AST 66* 39 36 28   * 74* 67 74*       Imaging/procedure results:  EKG 12 Lead: 12/23      ECHO: 12/14/21  Interpretation Summary  Left ventricular function is decreased. The ejection fraction is 40-45%  (mildly reduced). Mild diffuse hypokinesis is present.  Global right ventricular function  is normal. The right ventricle is normal  size.  This study was compared with the study from 12/12/2021. There has been an  improvement in the biventricular function since the patient has been  decannulated.    Coronary Angiogram: 12/6/21  Conclusion    Refractory VT/VF cardiac arrest.  Cardiogenic shock.  STEMI involving the OM1 as culprit.  Three vessel severe CAD involving the  of the RCA, mid LCx and OM1 severe disease with occlusion of OM1 as culprit in STEMI, and moderate proximal LAD lesions likely hemodynamically significant.  CPR  VA ECMO placement.  IABP placement.  Thermoguard placement with initiation of hypothermia protocol.  Right radial arterial line placement.  PCI with three drug eluting stents to the proximal, mid LCx and OM1.        Plan      Follow bedrest per protocol    Continued medical management and lifestyle modifications for cardiovascular risk factor optimizations.    Admit to inpatient        Continuation of dual antiplatelet therapy for 12 months   Post antiplatelet therapy of    Aspirin; give 81 mg qd .     Ticagrelor; give 180 mg now and 90 mg BID.      Continue high dose statin therapy  Admit to Cardiac ICU.  CT head, chest, abdomen, and pelvis.  Bilateral duplex arterial US  Echo  Hypothermia protocol  Mechanical ventilation  Sedation per protocol  ECMO  IABP 1:1

## 2021-12-27 NOTE — PLAN OF CARE
"BP 92/59 (BP Location: Right arm)   Pulse 100   Temp 97.6  F (36.4  C) (Axillary)   Resp 28   Ht 1.753 m (5' 9\")   Wt 59.9 kg (132 lb 0.9 oz)   SpO2 98%   BMI 19.50 kg/m    Neuro: Lethargic, however arousable to light touch. Disoriented to situation - making statements inappropriate to situation and setting off bed alarm.   Cardiac: SR-ST. Team notified about soft Bps (80/50s), however recovered back to 90/50-60s which is more normal for pt.   Respiratory: Sating 90 on 1-2L NC.  GI/: Adequate urine output via urinal/incontinence. X2 BM this shift.  Diet/appetite: Tolerating minced/moist and mildly thick diet. Pills whole. TF on from 8p-8a.  Activity:  Assist of 1 Gb/walker to commode.  Pain: At acceptable level on current regimen.   Skin: No new deficits noted.  LDA's: x1 PIV, NG    Plan: Continue with POC. Notify primary team with changes.    "

## 2021-12-27 NOTE — PLAN OF CARE
Neuro: A&Ox4.   Cardiac: SR. VSS.   Respiratory: Sating % on 2LNC  GI/: Adequate urine output. BM X1  Diet/appetite: Tolerating level 5 diet. Eating well. Cyclic TFs  Activity:  Assist of 1, up to chair and in halls.  Pain: At acceptable level on current regimen.   Skin: No new deficits noted.  LDA's:    Plan: Continue with POC. Notify primary team with changes.

## 2021-12-27 NOTE — PROGRESS NOTES
SPIRITUAL HEALTH SERVICES Progress Note  [facility] 6B    Visited patient, Wolf, per length of stay.    Wolf is on day 21 of hospitalization. He indicates that he is hoping to get home or to some sort of transitional care unit later this week. Wolf speaks about how good it feels to walk and eat and he implies that he is committed to doing what he needs to do to meet his goals of care.    I will continue to follow this patient as he remains on the unit.    Kiko Stephens MDiv  Chaplain Resident  Pager [phone]    * [facility] remains available 24/7 for emergent requests/referrals, either by having the switchboard page the on-call  or by entering an ASAP/STAT consult in Epic (this will also page the on-call ). Routine Epic consults receive an initial response within 24 hours.*

## 2021-12-28 ENCOUNTER — APPOINTMENT (OUTPATIENT)
Dept: OCCUPATIONAL THERAPY | Facility: CLINIC | Age: 56
End: 2021-12-28
Payer: COMMERCIAL

## 2021-12-28 ENCOUNTER — APPOINTMENT (OUTPATIENT)
Dept: SPEECH THERAPY | Facility: CLINIC | Age: 56
End: 2021-12-28
Payer: COMMERCIAL

## 2021-12-28 LAB
ALBUMIN SERPL-MCNC: 2 G/DL (ref 3.4–5)
ALP SERPL-CCNC: 94 U/L (ref 40–150)
ALT SERPL W P-5'-P-CCNC: 108 U/L (ref 0–70)
ANION GAP SERPL CALCULATED.3IONS-SCNC: 6 MMOL/L (ref 3–14)
AST SERPL W P-5'-P-CCNC: 42 U/L (ref 0–45)
BILIRUB SERPL-MCNC: 0.2 MG/DL (ref 0.2–1.3)
BUN SERPL-MCNC: 31 MG/DL (ref 7–30)
CA-I BLD-MCNC: 4.4 MG/DL (ref 4.4–5.2)
CALCIUM SERPL-MCNC: 8.2 MG/DL (ref 8.5–10.1)
CHLORIDE BLD-SCNC: 105 MMOL/L (ref 94–109)
CO2 SERPL-SCNC: 25 MMOL/L (ref 20–32)
CREAT SERPL-MCNC: 0.64 MG/DL (ref 0.66–1.25)
ERYTHROCYTE [DISTWIDTH] IN BLOOD BY AUTOMATED COUNT: 15.2 % (ref 10–15)
GFR SERPL CREATININE-BSD FRML MDRD: >90 ML/MIN/1.73M2
GLUCOSE BLD-MCNC: 133 MG/DL (ref 70–99)
GLUCOSE BLD-MCNC: 135 MG/DL (ref 70–99)
GLUCOSE BLDC GLUCOMTR-MCNC: 106 MG/DL (ref 70–99)
GLUCOSE BLDC GLUCOMTR-MCNC: 108 MG/DL (ref 70–99)
GLUCOSE BLDC GLUCOMTR-MCNC: 142 MG/DL (ref 70–99)
GLUCOSE BLDC GLUCOMTR-MCNC: 143 MG/DL (ref 70–99)
GLUCOSE BLDC GLUCOMTR-MCNC: 165 MG/DL (ref 70–99)
GLUCOSE BLDC GLUCOMTR-MCNC: 170 MG/DL (ref 70–99)
HCT VFR BLD AUTO: 25.6 % (ref 40–53)
HGB BLD-MCNC: 8.3 G/DL (ref 13.3–17.7)
LACTATE SERPL-SCNC: 0.9 MMOL/L (ref 0.7–2)
MAGNESIUM SERPL-MCNC: 1.9 MG/DL (ref 1.6–2.3)
MCH RBC QN AUTO: 31.3 PG (ref 26.5–33)
MCHC RBC AUTO-ENTMCNC: 32.4 G/DL (ref 31.5–36.5)
MCV RBC AUTO: 97 FL (ref 78–100)
PLATELET # BLD AUTO: 456 10E3/UL (ref 150–450)
POTASSIUM BLD-SCNC: 3.8 MMOL/L (ref 3.4–5.3)
PROT SERPL-MCNC: 6.6 G/DL (ref 6.8–8.8)
RBC # BLD AUTO: 2.65 10E6/UL (ref 4.4–5.9)
SARS-COV-2 RNA RESP QL NAA+PROBE: NEGATIVE
SODIUM SERPL-SCNC: 136 MMOL/L (ref 133–144)
WBC # BLD AUTO: 17.5 10E3/UL (ref 4–11)

## 2021-12-28 PROCEDURE — 250N000013 HC RX MED GY IP 250 OP 250 PS 637: Performed by: NURSE PRACTITIONER

## 2021-12-28 PROCEDURE — 250N000011 HC RX IP 250 OP 636: Performed by: NURSE PRACTITIONER

## 2021-12-28 PROCEDURE — 250N000013 HC RX MED GY IP 250 OP 250 PS 637: Performed by: PHYSICIAN ASSISTANT

## 2021-12-28 PROCEDURE — 83605 ASSAY OF LACTIC ACID: CPT | Performed by: INTERNAL MEDICINE

## 2021-12-28 PROCEDURE — 85027 COMPLETE CBC AUTOMATED: CPT | Performed by: INTERNAL MEDICINE

## 2021-12-28 PROCEDURE — 250N000013 HC RX MED GY IP 250 OP 250 PS 637: Performed by: STUDENT IN AN ORGANIZED HEALTH CARE EDUCATION/TRAINING PROGRAM

## 2021-12-28 PROCEDURE — 250N000013 HC RX MED GY IP 250 OP 250 PS 637: Performed by: INTERNAL MEDICINE

## 2021-12-28 PROCEDURE — 99232 SBSQ HOSP IP/OBS MODERATE 35: CPT | Performed by: NURSE PRACTITIONER

## 2021-12-28 PROCEDURE — 36415 COLL VENOUS BLD VENIPUNCTURE: CPT | Performed by: INTERNAL MEDICINE

## 2021-12-28 PROCEDURE — 82947 ASSAY GLUCOSE BLOOD QUANT: CPT | Performed by: INTERNAL MEDICINE

## 2021-12-28 PROCEDURE — 83735 ASSAY OF MAGNESIUM: CPT | Performed by: NURSE PRACTITIONER

## 2021-12-28 PROCEDURE — 120N000003 HC R&B IMCU UMMC

## 2021-12-28 PROCEDURE — 92526 ORAL FUNCTION THERAPY: CPT | Mod: GN

## 2021-12-28 PROCEDURE — 82330 ASSAY OF CALCIUM: CPT | Performed by: INTERNAL MEDICINE

## 2021-12-28 PROCEDURE — 250N000012 HC RX MED GY IP 250 OP 636 PS 637: Performed by: PHYSICIAN ASSISTANT

## 2021-12-28 PROCEDURE — 80053 COMPREHEN METABOLIC PANEL: CPT | Performed by: INTERNAL MEDICINE

## 2021-12-28 PROCEDURE — 250N000011 HC RX IP 250 OP 636: Performed by: PHYSICIAN ASSISTANT

## 2021-12-28 PROCEDURE — 250N000009 HC RX 250: Performed by: INTERNAL MEDICINE

## 2021-12-28 PROCEDURE — 999N000157 HC STATISTIC RCP TIME EA 10 MIN

## 2021-12-28 PROCEDURE — 97535 SELF CARE MNGMENT TRAINING: CPT | Mod: GO

## 2021-12-28 PROCEDURE — U0005 INFEC AGEN DETEC AMPLI PROBE: HCPCS | Performed by: NURSE PRACTITIONER

## 2021-12-28 PROCEDURE — 94640 AIRWAY INHALATION TREATMENT: CPT

## 2021-12-28 RX ORDER — FUROSEMIDE 20 MG
20 TABLET ORAL DAILY
Status: DISCONTINUED | OUTPATIENT
Start: 2021-12-28 | End: 2022-01-03

## 2021-12-28 RX ORDER — METOPROLOL SUCCINATE 25 MG/1
25 TABLET, EXTENDED RELEASE ORAL DAILY
Status: DISCONTINUED | OUTPATIENT
Start: 2021-12-28 | End: 2022-01-03 | Stop reason: HOSPADM

## 2021-12-28 RX ORDER — IPRATROPIUM BROMIDE AND ALBUTEROL SULFATE 2.5; .5 MG/3ML; MG/3ML
3 SOLUTION RESPIRATORY (INHALATION) EVERY 4 HOURS PRN
Status: DISCONTINUED | OUTPATIENT
Start: 2021-12-28 | End: 2022-01-03 | Stop reason: HOSPADM

## 2021-12-28 RX ADMIN — Medication 1 PACKET: at 08:09

## 2021-12-28 RX ADMIN — ACETAMINOPHEN 650 MG: 325 TABLET, FILM COATED ORAL at 09:39

## 2021-12-28 RX ADMIN — NICOTINE 1 PATCH: 14 PATCH, EXTENDED RELEASE TRANSDERMAL at 08:12

## 2021-12-28 RX ADMIN — INSULIN ASPART 1 UNITS: 100 INJECTION, SOLUTION INTRAVENOUS; SUBCUTANEOUS at 09:39

## 2021-12-28 RX ADMIN — METOPROLOL SUCCINATE 25 MG: 25 TABLET, EXTENDED RELEASE ORAL at 09:39

## 2021-12-28 RX ADMIN — THIAMINE HCL TAB 100 MG 100 MG: 100 TAB at 08:10

## 2021-12-28 RX ADMIN — HEPARIN SODIUM 5000 UNITS: 5000 INJECTION, SOLUTION INTRAVENOUS; SUBCUTANEOUS at 21:49

## 2021-12-28 RX ADMIN — ASPIRIN 81 MG CHEWABLE TABLET 81 MG: 81 TABLET CHEWABLE at 08:10

## 2021-12-28 RX ADMIN — Medication 10 MG: at 21:49

## 2021-12-28 RX ADMIN — HALOPERIDOL LACTATE 2 MG: 5 INJECTION, SOLUTION INTRAMUSCULAR at 08:10

## 2021-12-28 RX ADMIN — TICAGRELOR 90 MG: 90 TABLET ORAL at 20:33

## 2021-12-28 RX ADMIN — ATORVASTATIN CALCIUM 40 MG: 40 TABLET, FILM COATED ORAL at 20:33

## 2021-12-28 RX ADMIN — FUROSEMIDE 20 MG: 20 TABLET ORAL at 11:01

## 2021-12-28 RX ADMIN — LISINOPRIL 2.5 MG: 2.5 TABLET ORAL at 08:10

## 2021-12-28 RX ADMIN — HEPARIN SODIUM 5000 UNITS: 5000 INJECTION, SOLUTION INTRAVENOUS; SUBCUTANEOUS at 05:33

## 2021-12-28 RX ADMIN — FOLIC ACID 1 MG: 1 TABLET ORAL at 08:10

## 2021-12-28 RX ADMIN — HEPARIN SODIUM 5000 UNITS: 5000 INJECTION, SOLUTION INTRAVENOUS; SUBCUTANEOUS at 14:53

## 2021-12-28 RX ADMIN — QUETIAPINE FUMARATE 100 MG: 50 TABLET ORAL at 20:33

## 2021-12-28 RX ADMIN — LIDOCAINE 1 PATCH: 560 PATCH PERCUTANEOUS; TOPICAL; TRANSDERMAL at 20:32

## 2021-12-28 RX ADMIN — INSULIN ASPART 1 UNITS: 100 INJECTION, SOLUTION INTRAVENOUS; SUBCUTANEOUS at 04:24

## 2021-12-28 RX ADMIN — ACETAMINOPHEN 650 MG: 325 TABLET, FILM COATED ORAL at 18:31

## 2021-12-28 RX ADMIN — IPRATROPIUM BROMIDE AND ALBUTEROL SULFATE 3 ML: 2.5; .5 SOLUTION RESPIRATORY (INHALATION) at 08:54

## 2021-12-28 RX ADMIN — Medication 1 TABLET: at 08:10

## 2021-12-28 RX ADMIN — TICAGRELOR 90 MG: 90 TABLET ORAL at 08:10

## 2021-12-28 RX ADMIN — Medication 2 SPRAY: at 08:12

## 2021-12-28 ASSESSMENT — ACTIVITIES OF DAILY LIVING (ADL)
ADLS_ACUITY_SCORE: 14

## 2021-12-28 ASSESSMENT — MIFFLIN-ST. JEOR: SCORE: 1445.38

## 2021-12-28 NOTE — PROGRESS NOTES
Calorie Count  Intake recorded for: 12/27  Total Kcals: 390 Total Protein: 29g  Kcals from Hospital Food: 390   Protein: 29g  Kcals from Outside Food (average):0 Protein: 0g  # Meals Ordered from Kitchen: 2 meals  # Meals Recorded: 1 meal of 100% chicken, mashed potatoes, 50% thickened apple juice  # Supplements Recorded: 25% 2 Magic Cups

## 2021-12-28 NOTE — PROGRESS NOTES
Interventional Cardiology Progress Note      Assessment & Plan:  Bruce Blount is a 56 year old male with PMHx current tobacco use and ETOH abuse who presented to [facility] 12/6 after out of hospital VF arrest. Coronary angiogram revealed  of RCA and acute culprit lesion of LCx/OM1, s/p PCI to pLCx, mid LCx, and OM1 with TERRY. Decannulated 12/13. IABP removed 12/13. Extubated 12/14. Transferred out of ICU on 12/23. Ongoing hospital issues include delirium.      Today's plan:   - Transition to Toprol XL 25 mg daily from metoprolol tartrate   - Start oral lasix 20 mg daily   - STOP scheduled haldol, continue prn haldol   - Remove NG tube, continue to encourage oral intake, discontinue TFs  - PT/OT to revaluate dispo needs      # Anoxic brain injury, improving   # Delirium, improving   # Chest wall pain, improved  # Hx of possible ETOH abuse per family  S/p therapeutic hypothermia. Extubated 12/14. Initial head CT without acute pathology.  Delirium was initially difficult to manage but now his mentation seems to be improving. 1:1 sitter removed on 12/25/21. Slowing weaning haldol.   - continue melatonin 10mg HS  - continue seroquel 100 mg HS   - Discontinue scheduled haldol 2 mg BID, continue haldol prn   - Tylenol and lidocaine patches for additional pain control as needed  - Folate, Thiamine and multi vitamin for ETOH abuse     # Refractory VF cardiac arrest 2/2 secondary to STEMI, resolved  # Cardiogenic shock requiring VA ECMO, resolved  # Ischemic Cardiomyopathy (EF 40-45%)  # CAD s/p PCI pLCx, mLCx, OM1, residual  of RCA  # HTN  # HLD  # Nonsustained VT  # Right groin superficial dehiscence   S/p Peripheral VA ECMO inserted via RCFA and RCFV. Decannulated 12/13. IABP removed 12/13. R groin sutures removed 12/28 by CVTS with subsequent superficial dehiscence. Deeper layers remain intact and well healed without signs of hematoma beneath. No signs of infection. Packed with wet gauze.   - DAPT: continue ASA 81mg  and ticagrelor 90mg BID   - Statin: continue atorvastatin 40mg daily   - BB: transition to Toprol XL 25 mg daily from metoprolol tartrate   - ACE: continue lisinopril 2.5 mg daily  - Volume status: appears euvolemic on exam, lasix 20 mg daily   - Strict I&O's  - Replace lytes per protocol     - R groin BID dressing changes, CVTS will re-evaluate tomorrow for wound vac      # Acute hypoxemic respiratory failure, resolved   # Klebsiella pneumonia, resolved   # Rib Fractures  # Tobacco Abuse (2PPD)  # COPD exacerbation  Extubated 12/14 to BiPAP. Now sating well on 2L NC.  Completed antibiotic/steroid course for COPD exacerbation 12/20. Continue to have chest wall pain, likely 2/2 fractures. CXR 12/26 with slightly increased interstitial opacities but no pleural effusions s/p IV lasix 20 mg x 1.  - RT pulmonary hygiene  - Duoneb Q6 hours while awake  - Lidocaine patch for rib fracture pain     # Dysphagia, improving    # Shock liver 2/2 cardiac arrest, resolved  # Severe Malnutrition in context of critical illness  C. Diff negative. Shock liver resolved on 12/13, however slight increase in LFTs since 12/26, now improving. Patient tolerating DD5. Calorie counts remain low although patient reports irritation with NG tube which makes it difficult to eat. Discussed removal of NG tube and the importance of consuming more calories to improve healing. Patient states he will be more motivated to eat if NG removed. Remains on cycled TF at night.   - SLP following  -Diet advanced to minced/moist (DD5) and thick liquids  -Remove NG tube today, discontinue TF  -Continue calorie count, encourage oral intake   -Trend LFTs     # Hypoalbuminemia  # Hypokalemia, resolved   # Hypocalcemia   ESTER due to cardiac arrest and warming. Started on CRRT on 12/8/21. No CRRT since decannulation 12/13. Creatinine 0.68, BUN 32.   - Continue to encourage PO intake  - Replace lytes per protocol   - Trend CMP      # Aspiration Pneumonia (klebsiella,  staph aureus), resolved   # Leukocytosis, improving   # Lactic acidosis, resolved  Pan culture 12/14 given temp and WBC. Susceptibilities resulted the same as previous. Completed treatment for aspiration pneumonia. Persistent leukocytosis likely stress related but trend is improving. Afebrile. No s/s of infection.   Cultures thus far:                 - sputum 12/6: staph aureus, staph dysgalactia, klebsiella                 - sputum 12/7: staph aureus                 - sputum 12/8: staph and klebsiella                 - Sputum 12/9: staph aureus and klebsiella                 - Sputum 12/10: staph aureus and klebsiella                 - sputum 12/13: staph aureus and klebsiella                 - sputum 12/14: staph aureus  Antibiotics              - Vancomycin 12/6 - 12/7 (MRSA negative)              - Zosyn 12/6 - 12/9              - Rocephin 12/9 - 12/16              - Cefepime 12/16 - 12/21              - Flagyl 12/16 - 12/16              - Azithro 12/16 - 12/20   - Trend CBC  - Continue to monitor for infection    # Anemia of critical illness, improving   # Thrombocytopenia, resolved  Hgb stable 8.4. Plt 487. No e/o bleeding. HIT screening negative.   - Trend CBC      #Hyperglycemia likely stress-induced, improving    No known medical history. Hemoglobin A1c 5.6%. Blood sugars have improved. No insulin needs since 12/23. -153.   - POCT BG checks   - sliding scale insulin  - hypoglycemia protocol          Diet: Minced/moist (DD5), mildly thick liquids, TF cycle at night  Activity: Up with assist as tolerated  Code Status: Full  Disposition: TCU pending placement    Patient discussed with Dr. Stewart.      Meryl Langley DNP, APRN, CNP  [facility]  Interventional Cardiology  [phone]      Interval History: Mentation continues to improve, A&Ox4. Reports irritation with NG tube which prevents him from eating. Otherwise no complaints. R groin sutures removed today with subsequent  "superficial dehiscence. No s/s of infection. Afebrile. WBC 17 today, unchanged. Liver function improving. Hemodynamically stable.     Most recent vital signs:  BP 98/64 (BP Location: Right arm)   Pulse 103   Temp 97.8  F (36.6  C) (Oral)   Resp 18   Ht 1.753 m (5' 9\")   Wt 62.5 kg (137 lb 12.6 oz)   SpO2 100%   BMI 20.35 kg/m    Temp:  [97.2  F (36.2  C)-98.4  F (36.9  C)] 97.8  F (36.6  C)  Pulse:  [] 103  Resp:  [16-20] 18  BP: ()/(43-70) 98/64  SpO2:  [96 %-100 %] 100 %  Wt Readings from Last 2 Encounters:   12/28/21 62.5 kg (137 lb 12.6 oz)   09/23/19 77.5 kg (170 lb 12.8 oz)       Intake/Output Summary (Last 24 hours) at 12/27/2021 1030  Last data filed at 12/27/2021 0907  Gross per 24 hour   Intake 1620 ml   Output 1155 ml   Net 465 ml       Physical exam:  General: In bed, in NAD  HEENT: EOMI, PERRLA, no scleral icterus or injection  CARDIAC: RRR, no m/r/g appreciated. Peripheral pulses 2+  RESP: CTAB, no wheezes, rhonchi or crackles appreciated.  GI: NABS, NT/ND, no guarding or rebound  EXTREMITIES: 1+ LE edema, pulses 2+.    SKIN: R groin superficial dehiscence following suture removal with scant drainage.   NEURO: Alert and oriented to self, pleasantly confused     Drains and Tubes: All drain and tube sites are all benign, no signs of infection  Access: All vascular access sites appear benign    Labs (Past three days):  CBC  Recent Labs   Lab 12/28/21  0440 12/27/21  1531 12/27/21  0930 12/26/21  0619 12/25/21  0346   WBC 17.5*  --  18.5* 18.7* 15.4*   RBC 2.65*  --  2.76* 2.67* 2.67*   HGB 8.3* 8.6* 8.8* 8.3* 8.4*   HCT 25.6*  --  27.6* 26.8* 26.9*   MCV 97  --  100 100 101*   MCH 31.3  --  31.9 31.1 31.5   MCHC 32.4  --  31.9 31.0* 31.2*   RDW 15.2*  --  15.4* 14.5 14.5   *  --  508* 494* 487*     BMP  Recent Labs   Lab 12/28/21  0808 12/28/21  0440 12/28/21  0417 12/27/21  1143 12/27/21  0930 12/26/21  1943 12/26/21  1907 12/26/21  1316 12/26/21  1203 12/26/21  0731 " 12/26/21  0619 12/25/21  0432 12/25/21  0346 12/24/21  0729 12/24/21  0451 12/23/21  0803 12/23/21  0539   NA  --  136  --   --  138  --   --   --   --   --  143  --  144  144   < > 146*   < > 150*  150*   POTASSIUM  --  3.8  --   --  3.5  --  4.0  --  3.4  3.4  --  3.2*  --  3.3*  --  3.6  --  3.4   CHLORIDE  --  105  --   --  108  --   --   --   --   --  111*  --  111*  --  111*  --  117*   CO2  --  25  --   --  23  --   --   --   --   --  25  --  27  --  28  --  27   ANIONGAP  --  6  --   --  7  --   --   --   --   --  7  --  6  --  7  --  6   * 133*  135* 143*   < > 136*  132*   < >  --    < >  --    < > 127*  123*   < > 145*  137*   < > 153*  141*   < > 191*  181*   BUN  --  31*  --   --  29  --   --   --   --   --  25  --  32*  --  38*  --  64*   CR  --  0.64*  --   --  0.68  --   --   --   --   --  0.68  --  0.68  --  0.73  --  0.88   GFRESTIMATED  --  >90  --   --  >90  --   --   --   --   --  >90  --  >90  --  >90  --  >90   BRIDGETTE  --  8.2*  --   --  8.5  --   --   --   --   --  8.3*  --  8.1*  --  8.6  --  8.7   MAG  --  1.9  --   --  1.9  --   --   --   --   --  2.0  --  1.9  --  2.1  --  2.3   PHOS  --   --   --   --   --   --   --   --   --   --  3.9  --  3.7  --  3.2  --  3.0    < > = values in this interval not displayed.     Troponins: No results found for: TROPI, TROPONIN, TROPR, TROPN     INR  Recent Labs   Lab 12/24/21  0451 12/23/21  0539 12/22/21  0439   INR 1.14 1.18* 1.16*     Liver panel  Recent Labs   Lab 12/28/21  0440 12/27/21  0930 12/26/21  0619 12/25/21  0346   PROTTOTAL 6.6* 6.8 6.8 6.8   ALBUMIN 2.0* 2.1* 2.1* 2.1*   BILITOTAL 0.2 0.3 0.3 0.3   ALKPHOS 94 98 97 95   AST 42 59* 66* 39   * 126* 114* 74*       Imaging/procedure results:  EKG 12 Lead: 12/23      ECHO: 12/14/21  Interpretation Summary  Left ventricular function is decreased. The ejection fraction is 40-45%  (mildly reduced). Mild diffuse hypokinesis is present.  Global right ventricular function is  normal. The right ventricle is normal  size.  This study was compared with the study from 12/12/2021. There has been an  improvement in the biventricular function since the patient has been  decannulated.    Coronary Angiogram: 12/6/21  Conclusion    Refractory VT/VF cardiac arrest.  Cardiogenic shock.  STEMI involving the OM1 as culprit.  Three vessel severe CAD involving the  of the RCA, mid LCx and OM1 severe disease with occlusion of OM1 as culprit in STEMI, and moderate proximal LAD lesions likely hemodynamically significant.  CPR  VA ECMO placement.  IABP placement.  Thermoguard placement with initiation of hypothermia protocol.  Right radial arterial line placement.  PCI with three drug eluting stents to the proximal, mid LCx and OM1.        Plan      Follow bedrest per protocol    Continued medical management and lifestyle modifications for cardiovascular risk factor optimizations.    Admit to inpatient        Continuation of dual antiplatelet therapy for 12 months   Post antiplatelet therapy of    Aspirin; give 81 mg qd .     Ticagrelor; give 180 mg now and 90 mg BID.      Continue high dose statin therapy  Admit to Cardiac ICU.  CT head, chest, abdomen, and pelvis.  Bilateral duplex arterial US  Echo  Hypothermia protocol  Mechanical ventilation  Sedation per protocol  ECMO  IABP 1:1

## 2021-12-28 NOTE — PLAN OF CARE
Neuro: WNL.   Cardiac: VSS.       Respiratory: 90's on 2L - 3L  GI/: Good UOP. No BM  Diet/appetite: No snacks  Activity:  Turns self  Pain: 0 s/s.   Skin: No new deficits noted.  LDA's:     Plan: Continue with POC. Notify primary team with changes.

## 2021-12-28 NOTE — PROGRESS NOTES
CV Surgery    S/p ECMO decan on 12/13. Skin vac removed one week ago. Incision appeared to be healing well, retention sutures removed. After this, the medial aspect of the wound had scant drainage, when palpated, the medial aspect of the wound opened. Under skin was a small gelatinous hematoma likely preventing would from healing under the skin. The deeper layers remained intact and well healed without signs of hematoma beneath. No signs of infection. The wound opening measures 4.5 cm L x 1 cm D with minimal width. This was packed with wet gauze. Will continue wet to dry dressing changes BID. Will evaluate tomorrow for wound vac, however wound is small and could be managed with wet to dry dressings. Other cares per primary team.     Discussed with Dr. Randy Salcido PA-C  Cardiothoracic Surgery  p: [phone]

## 2021-12-28 NOTE — PROGRESS NOTES
Care Management Follow Up    Length of Stay (days): 22    Expected Discharge Date: 12/29/2021     Concerns to be Addressed: Medical readiness, discharge planning.   Patient plan of care discussed at interdisciplinary rounds: Yes    Anticipated Discharge Disposition:  TBD  Anticipated Discharge Services:  TBD  Anticipated Discharge DME:  TBD    Additional Information:  Voicemail received from patient's brother, Amor requesting to discuss discharge planning, patient gave permission to speak with his brother. Patient has TCU recommendations and his current insurance plan ([place] [employer] product) does not cover TCU. Writer discussed that patient would need to have ARU recommendations or he may have to discharge to home w/HH therapy, appropriate DME and 24hr assist per PT/OT recommendations. Amor confirms that patient would have many steps to navigate within the home and he would not have 24hr assistance, as his brother whom he lives with is still employed full time, voiced concern over his brother discharging to their home before he was ready. Writer discussed that financial counseling was going to follow up with patient, and if unable to obtain information needed they would reach out to patient's brother. Awaiting ongoing PT/OT recs and financial counseling follow up. Care coordination will continue to follow for discharge planning.     1425 Addendum:  Spoke w/financial counselor, Tammy regarding patient's insurance. Tammy was able to confirm that patient has not been receiving unemployment benefits this year. Patient will need to contact [facility] directly to update on financials w/hopeful plan to qualify for MA, writer and financial counselor will follow up with patient tomorrow morning to help facilitate. Patient's brother has been updated.     Of note, call received from  ARU/TCU liaison, patient likely to need 24hr supervision at discharge in which he does not currently have and they feel patient is not  appropriate for placement at  ARU/TCU at this time.     Rosette Akhtar, RNCC, BSN    [facility] [address]  [address] [place] [address]    [email or web address]    Office: [phone] Pager: [phone]  To contact the weekend RNCC, page [phone].

## 2021-12-29 ENCOUNTER — APPOINTMENT (OUTPATIENT)
Dept: OCCUPATIONAL THERAPY | Facility: CLINIC | Age: 56
End: 2021-12-29
Payer: COMMERCIAL

## 2021-12-29 ENCOUNTER — APPOINTMENT (OUTPATIENT)
Dept: PHYSICAL THERAPY | Facility: CLINIC | Age: 56
End: 2021-12-29
Payer: COMMERCIAL

## 2021-12-29 LAB
ALBUMIN SERPL-MCNC: 2.4 G/DL (ref 3.4–5)
ALP SERPL-CCNC: 108 U/L (ref 40–150)
ALT SERPL W P-5'-P-CCNC: 92 U/L (ref 0–70)
ANION GAP SERPL CALCULATED.3IONS-SCNC: 9 MMOL/L (ref 3–14)
AST SERPL W P-5'-P-CCNC: 39 U/L (ref 0–45)
BILIRUB SERPL-MCNC: 0.5 MG/DL (ref 0.2–1.3)
BUN SERPL-MCNC: 21 MG/DL (ref 7–30)
CA-I BLD-MCNC: 4.4 MG/DL (ref 4.4–5.2)
CALCIUM SERPL-MCNC: 8.7 MG/DL (ref 8.5–10.1)
CHLORIDE BLD-SCNC: 106 MMOL/L (ref 94–109)
CO2 SERPL-SCNC: 22 MMOL/L (ref 20–32)
CREAT SERPL-MCNC: 0.71 MG/DL (ref 0.66–1.25)
ERYTHROCYTE [DISTWIDTH] IN BLOOD BY AUTOMATED COUNT: 15.1 % (ref 10–15)
GFR SERPL CREATININE-BSD FRML MDRD: >90 ML/MIN/1.73M2
GLUCOSE BLD-MCNC: 116 MG/DL (ref 70–99)
GLUCOSE BLD-MCNC: 117 MG/DL (ref 70–99)
GLUCOSE BLDC GLUCOMTR-MCNC: 102 MG/DL (ref 70–99)
GLUCOSE BLDC GLUCOMTR-MCNC: 106 MG/DL (ref 70–99)
GLUCOSE BLDC GLUCOMTR-MCNC: 109 MG/DL (ref 70–99)
GLUCOSE BLDC GLUCOMTR-MCNC: 118 MG/DL (ref 70–99)
GLUCOSE BLDC GLUCOMTR-MCNC: 125 MG/DL (ref 70–99)
GLUCOSE BLDC GLUCOMTR-MCNC: 98 MG/DL (ref 70–99)
HCT VFR BLD AUTO: 28.4 % (ref 40–53)
HGB BLD-MCNC: 9.1 G/DL (ref 13.3–17.7)
LACTATE SERPL-SCNC: 1.6 MMOL/L (ref 0.7–2)
MAGNESIUM SERPL-MCNC: 2 MG/DL (ref 1.6–2.3)
MCH RBC QN AUTO: 31.4 PG (ref 26.5–33)
MCHC RBC AUTO-ENTMCNC: 32 G/DL (ref 31.5–36.5)
MCV RBC AUTO: 98 FL (ref 78–100)
PLATELET # BLD AUTO: 580 10E3/UL (ref 150–450)
POTASSIUM BLD-SCNC: 3.6 MMOL/L (ref 3.4–5.3)
PROT SERPL-MCNC: 7.4 G/DL (ref 6.8–8.8)
RBC # BLD AUTO: 2.9 10E6/UL (ref 4.4–5.9)
SODIUM SERPL-SCNC: 137 MMOL/L (ref 133–144)
WBC # BLD AUTO: 19.1 10E3/UL (ref 4–11)

## 2021-12-29 PROCEDURE — 82947 ASSAY GLUCOSE BLOOD QUANT: CPT | Performed by: INTERNAL MEDICINE

## 2021-12-29 PROCEDURE — 82330 ASSAY OF CALCIUM: CPT | Performed by: INTERNAL MEDICINE

## 2021-12-29 PROCEDURE — 250N000013 HC RX MED GY IP 250 OP 250 PS 637: Performed by: NURSE PRACTITIONER

## 2021-12-29 PROCEDURE — 83605 ASSAY OF LACTIC ACID: CPT | Performed by: INTERNAL MEDICINE

## 2021-12-29 PROCEDURE — 250N000013 HC RX MED GY IP 250 OP 250 PS 637: Performed by: STUDENT IN AN ORGANIZED HEALTH CARE EDUCATION/TRAINING PROGRAM

## 2021-12-29 PROCEDURE — 85027 COMPLETE CBC AUTOMATED: CPT | Performed by: INTERNAL MEDICINE

## 2021-12-29 PROCEDURE — 97535 SELF CARE MNGMENT TRAINING: CPT | Mod: GO

## 2021-12-29 PROCEDURE — 99232 SBSQ HOSP IP/OBS MODERATE 35: CPT | Performed by: NURSE PRACTITIONER

## 2021-12-29 PROCEDURE — 36415 COLL VENOUS BLD VENIPUNCTURE: CPT | Performed by: INTERNAL MEDICINE

## 2021-12-29 PROCEDURE — 83735 ASSAY OF MAGNESIUM: CPT | Performed by: NURSE PRACTITIONER

## 2021-12-29 PROCEDURE — 250N000013 HC RX MED GY IP 250 OP 250 PS 637: Performed by: PHYSICIAN ASSISTANT

## 2021-12-29 PROCEDURE — 80053 COMPREHEN METABOLIC PANEL: CPT | Performed by: INTERNAL MEDICINE

## 2021-12-29 PROCEDURE — 120N000003 HC R&B IMCU UMMC

## 2021-12-29 PROCEDURE — 250N000013 HC RX MED GY IP 250 OP 250 PS 637: Performed by: INTERNAL MEDICINE

## 2021-12-29 PROCEDURE — 97116 GAIT TRAINING THERAPY: CPT | Mod: GP | Performed by: PHYSICAL THERAPIST

## 2021-12-29 PROCEDURE — 250N000011 HC RX IP 250 OP 636: Performed by: NURSE PRACTITIONER

## 2021-12-29 RX ORDER — QUETIAPINE FUMARATE 50 MG/1
50 TABLET, FILM COATED ORAL EVERY EVENING
Status: DISCONTINUED | OUTPATIENT
Start: 2021-12-29 | End: 2021-12-30

## 2021-12-29 RX ADMIN — Medication 1 TABLET: at 08:06

## 2021-12-29 RX ADMIN — ACETAMINOPHEN 650 MG: 325 TABLET, FILM COATED ORAL at 03:56

## 2021-12-29 RX ADMIN — FUROSEMIDE 20 MG: 20 TABLET ORAL at 08:06

## 2021-12-29 RX ADMIN — QUETIAPINE FUMARATE 50 MG: 50 TABLET ORAL at 20:22

## 2021-12-29 RX ADMIN — FOLIC ACID 1 MG: 1 TABLET ORAL at 08:06

## 2021-12-29 RX ADMIN — NICOTINE 1 PATCH: 14 PATCH, EXTENDED RELEASE TRANSDERMAL at 08:06

## 2021-12-29 RX ADMIN — ATORVASTATIN CALCIUM 40 MG: 40 TABLET, FILM COATED ORAL at 20:22

## 2021-12-29 RX ADMIN — THIAMINE HCL TAB 100 MG 100 MG: 100 TAB at 08:06

## 2021-12-29 RX ADMIN — HEPARIN SODIUM 5000 UNITS: 5000 INJECTION, SOLUTION INTRAVENOUS; SUBCUTANEOUS at 05:54

## 2021-12-29 RX ADMIN — METOPROLOL SUCCINATE 25 MG: 25 TABLET, EXTENDED RELEASE ORAL at 08:44

## 2021-12-29 RX ADMIN — ACETAMINOPHEN 650 MG: 325 TABLET, FILM COATED ORAL at 20:21

## 2021-12-29 RX ADMIN — Medication 10 MG: at 21:43

## 2021-12-29 RX ADMIN — HEPARIN SODIUM 5000 UNITS: 5000 INJECTION, SOLUTION INTRAVENOUS; SUBCUTANEOUS at 21:43

## 2021-12-29 RX ADMIN — HEPARIN SODIUM 5000 UNITS: 5000 INJECTION, SOLUTION INTRAVENOUS; SUBCUTANEOUS at 14:12

## 2021-12-29 RX ADMIN — ASPIRIN 81 MG CHEWABLE TABLET 81 MG: 81 TABLET CHEWABLE at 08:06

## 2021-12-29 RX ADMIN — ACETAMINOPHEN 650 MG: 325 TABLET, FILM COATED ORAL at 13:59

## 2021-12-29 RX ADMIN — TICAGRELOR 90 MG: 90 TABLET ORAL at 20:21

## 2021-12-29 RX ADMIN — TICAGRELOR 90 MG: 90 TABLET ORAL at 08:06

## 2021-12-29 RX ADMIN — LIDOCAINE 1 PATCH: 560 PATCH PERCUTANEOUS; TOPICAL; TRANSDERMAL at 20:23

## 2021-12-29 ASSESSMENT — ACTIVITIES OF DAILY LIVING (ADL)
ADLS_ACUITY_SCORE: 14
ADLS_ACUITY_SCORE: 16
ADLS_ACUITY_SCORE: 16
ADLS_ACUITY_SCORE: 14
ADLS_ACUITY_SCORE: 16
ADLS_ACUITY_SCORE: 14
ADLS_ACUITY_SCORE: 16
ADLS_ACUITY_SCORE: 14

## 2021-12-29 ASSESSMENT — MIFFLIN-ST. JEOR: SCORE: 1445.38

## 2021-12-29 NOTE — PROGRESS NOTES
"Care Management Follow Up    Length of Stay (days): 23    Expected Discharge Date: TBD     Concerns to be Addressed: Medical readiness, discharge planning.   Patient plan of care discussed at interdisciplinary rounds: Yes    Anticipated Discharge Disposition: TCU    Education Provided on the Discharge Plan: Yes  Patient/Family in Agreement with the Plan:  Yes    Referrals Placed by CM/SW: Financial Counselor     Additional Information:  Met with patient to call financial counselor and [employer], updated patient's address and income, likely to qualify for MA. Requested expedited processing, as patient will need medical assistance for TCU placement. Patient much less confused this morning, able to appropriately answer questions and understood reason for insurance update/switch. Unable to set up patient's voicemail on his phone, multiple \"errors.\" Patient will need to call [employer] back w/assistance of financial counselor as they will be unable to leave a voicemail on patient's phone with an update. Patient is agreeable to TCU placement,  updated. Care coordination will continue to follow for discharge planning.     2241 Addendum:  Voicemail received from patient's brother, update provided on insurance process. Brother requesting information regarding assistance programs that patient may be eligible for in regards to bills (rent, electricity, etc), social work to follow up on TCU referrals and possible financial assistance programs.     5507 Addendum:  Per financial counselor, patient has active medical assistance, updated patient at this time, he stated he would update his brother.     Rosette Akhtar, RNCC, BSN    [facility]    [address]  [address] [place] [address]    [email or web address]  [email or web address]    Office: [phone] Pager: [phone]  To contact the weekend RNCC, page [phone].         "

## 2021-12-29 NOTE — PROGRESS NOTES
Interventional Cardiology Progress Note      Assessment & Plan:  Bruce Blount is a 56 year old male with PMHx current tobacco use and ETOH abuse who presented to [facility] 12/6 after out of hospital VF arrest. Coronary angiogram revealed  of RCA and acute culprit lesion of LCx/OM1, s/p PCI to pLCx, mid LCx, and OM1 with TERRY. Decannulated 12/13. IABP removed 12/13. Extubated 12/14. Transferred out of ICU on 12/23. Ongoing hospital issues include delirium.      Today's plan:   - Hold lisinopril due to soft BPs  - Decrease Seroquel to 50 mg HS   - Encourage oral intake   - SW to assist with discharge planning      # Anoxic brain injury, improving   # Delirium, improving   # Chest wall pain, improved  # Hx of possible ETOH abuse per family  S/p therapeutic hypothermia. Extubated 12/14. Initial head CT without acute pathology.  Delirium was initially difficult to manage but now his mentation seems to be improving. 1:1 sitter removed on 12/25/21. Scheduled haldol discontinued 12/28.   - continue melatonin 10mg HS  - decrease seroquel to 50 mg HS    - continue haldol prn   - Tylenol and lidocaine patches for additional pain control as needed  - Folate, Thiamine and multi vitamin for ETOH abuse     # Refractory VF cardiac arrest 2/2 secondary to STEMI, resolved  # Cardiogenic shock requiring VA ECMO, resolved  # Ischemic Cardiomyopathy (EF 40-45%)  # CAD s/p PCI pLCx, mLCx, OM1, residual  of RCA  # HTN  # HLD  # Nonsustained VT  # Right groin superficial dehiscence   S/p Peripheral VA ECMO inserted via RCFA and RCFV. Decannulated 12/13. IABP removed 12/13. R groin sutures removed 12/28 by CVTS with subsequent superficial dehiscence. Deeper layers remain intact and well healed without signs of hematoma beneath. No signs of infection. Packed with wet gauze.   - DAPT: continue ASA 81mg and ticagrelor 90mg BID   - Statin: continue atorvastatin 40mg daily   - BB: continue Toprol XL 25 mg daily    - ACE: hold lisinopril 2.5  mg daily due to soft BPs  - Volume status: appears euvolemic on exam, continue lasix 20 mg daily   - Strict I&O's  - Replace lytes per protocol     - R groin BID dressing changes, CVTS following       # Acute hypoxemic respiratory failure, resolved   # Klebsiella pneumonia, resolved   # Rib Fractures  # Tobacco Abuse (2PPD)  # COPD exacerbation  Extubated 12/14 to BiPAP. Now sating well on 2L NC.  Completed antibiotic/steroid course for COPD exacerbation 12/20. Continue to have chest wall pain, likely 2/2 fractures. CXR 12/26 with slightly increased interstitial opacities but no pleural effusions s/p IV lasix 20 mg x 1.  - RT pulmonary hygiene  - Duoneb Q6 hours while awake  - Lidocaine patch for rib fracture pain     # Dysphagia, improving    # Shock liver 2/2 cardiac arrest, resolved  # Severe Malnutrition in context of critical illness  C. Diff negative. Shock liver resolved on 12/13, however slight increase in LFTs since 12/26, now improving. Patient tolerating DD5. Calorie counts remain low although patient reports irritation with NG tube which makes it difficult to eat. NG removed 12/28.   -SLP following  -Diet advanced to soft and bite sized (level 6) with thin liquids  -Continue calorie count, encourage oral intake   -Trend LFTs     # Hypoalbuminemia  # Hypokalemia, resolved   # Hypocalcemia   ESTER due to cardiac arrest and warming. Started on CRRT on 12/8/21. No CRRT since decannulation 12/13. Creatinine 0.68, BUN 32.   - Continue to encourage PO intake  - Replace lytes per protocol   - Trend CMP      # Aspiration Pneumonia (klebsiella, staph aureus), resolved   # Leukocytosis, improving   # Lactic acidosis, resolved  Pan culture 12/14 given temp and WBC. Susceptibilities resulted the same as previous. Completed treatment for aspiration pneumonia. Persistent leukocytosis likely stress related but trend is improving. Afebrile. No s/s of infection.   Cultures thus far:                 - sputum 12/6: staph  "aureus, staph dysgalactia, klebsiella                 - sputum 12/7: staph aureus                 - sputum 12/8: staph and klebsiella                 - Sputum 12/9: staph aureus and klebsiella                 - Sputum 12/10: staph aureus and klebsiella                 - sputum 12/13: staph aureus and klebsiella                 - sputum 12/14: staph aureus  Antibiotics              - Vancomycin 12/6 - 12/7 (MRSA negative)              - Zosyn 12/6 - 12/9              - Rocephin 12/9 - 12/16              - Cefepime 12/16 - 12/21              - Flagyl 12/16 - 12/16              - Azithro 12/16 - 12/20   - Trend CBC  - Continue to monitor for infection    # Anemia of critical illness, improving   # Thrombocytopenia, resolved  Hgb stable 8.4. Plt 487. No e/o bleeding. HIT screening negative.   - Trend CBC      #Hyperglycemia likely stress-induced, improving    No known medical history. Hemoglobin A1c 5.6%. Blood sugars have improved. No insulin needs since 12/23. -153.   - POCT BG checks   - sliding scale insulin  - hypoglycemia protocol          Diet: Soft and bite sized (level 6) with thin liquids   Activity: Up with assist as tolerated  Code Status: Full  Disposition: pending rehab placement    Patient discussed with Dr. Stewart.      Meryl Langley DNP, APRN, CNP  [facility]  Interventional Cardiology  [phone]      Interval History: Mentation continues to improve, A&Ox4. NG removed yesterday, reports improved appetite. Diet advanced to level 6. R groin with scant serosanguinous drainage, superficial dehiscence. No s/s of infection. Afebrile. Awaiting lab results. Hypotensive this AM with SBP in the 70's, now SBP > 100. Asymptomatic.     Most recent vital signs:  BP (!) 75/45 (BP Location: Right arm)   Pulse 86   Temp 98.7  F (37.1  C) (Oral)   Resp 20   Ht 1.753 m (5' 9\")   Wt 62.5 kg (137 lb 12.6 oz)   SpO2 92%   BMI 20.35 kg/m    Temp:  [98  F (36.7  C)-98.7  F (37.1 "  C)] 98.7  F (37.1  C)  Pulse:  [] 86  Resp:  [16-20] 20  BP: ()/(45-63) 75/45  SpO2:  [92 %-100 %] 92 %  Wt Readings from Last 2 Encounters:   12/29/21 62.5 kg (137 lb 12.6 oz)   09/23/19 77.5 kg (170 lb 12.8 oz)       Intake/Output Summary (Last 24 hours) at 12/27/2021 1030  Last data filed at 12/27/2021 0907  Gross per 24 hour   Intake 1620 ml   Output 1155 ml   Net 465 ml       Physical exam:  General: In bed, in NAD  HEENT: EOMI, PERRLA, no scleral icterus or injection  CARDIAC: RRR, no m/r/g appreciated. Peripheral pulses 2+  RESP: CTAB, no wheezes, rhonchi or crackles appreciated.  GI: NABS, NT/ND, no guarding or rebound  EXTREMITIES: 1+ LE edema, pulses 2+.    SKIN: R groin superficial dehiscence following suture removal with scant drainage.   NEURO: Alert and oriented to self, pleasantly confused     Drains and Tubes: All drain and tube sites are all benign, no signs of infection  Access: All vascular access sites appear benign    Labs (Past three days):  CBC  Recent Labs   Lab 12/28/21  0440 12/27/21  1531 12/27/21  0930 12/26/21  0619 12/25/21  0346   WBC 17.5*  --  18.5* 18.7* 15.4*   RBC 2.65*  --  2.76* 2.67* 2.67*   HGB 8.3* 8.6* 8.8* 8.3* 8.4*   HCT 25.6*  --  27.6* 26.8* 26.9*   MCV 97  --  100 100 101*   MCH 31.3  --  31.9 31.1 31.5   MCHC 32.4  --  31.9 31.0* 31.2*   RDW 15.2*  --  15.4* 14.5 14.5   *  --  508* 494* 487*     BMP  Recent Labs   Lab 12/29/21  0708 12/29/21  0025 12/28/21  1943 12/28/21  1624 12/28/21  0808 12/28/21  0440 12/27/21  1143 12/27/21  0930 12/26/21  1943 12/26/21  1907 12/26/21  1316 12/26/21  1203 12/26/21  0731 12/26/21  0619 12/25/21  0432 12/25/21  0346 12/24/21  0729 12/24/21  0451 12/23/21  0803 12/23/21  0539   NA  --   --   --   --   --  136  --  138  --   --   --   --   --  143  --  144  144   < > 146*   < > 150*  150*   POTASSIUM  --   --   --   --   --  3.8  --  3.5  --  4.0  --  3.4  3.4  --  3.2*  --  3.3*  --  3.6  --  3.4   CHLORIDE   --   --   --   --   --  105  --  108  --   --   --   --   --  111*  --  111*  --  111*  --  117*   CO2  --   --   --   --   --  25  --  23  --   --   --   --   --  25  --  27  --  28  --  27   ANIONGAP  --   --   --   --   --  6  --  7  --   --   --   --   --  7  --  6  --  7  --  6   GLC 98 109* 106* 108*   < > 133*  135*   < > 136*  132*   < >  --    < >  --    < > 127*  123*   < > 145*  137*   < > 153*  141*   < > 191*  181*   BUN  --   --   --   --   --  31*  --  29  --   --   --   --   --  25  --  32*  --  38*  --  64*   CR  --   --   --   --   --  0.64*  --  0.68  --   --   --   --   --  0.68  --  0.68  --  0.73  --  0.88   GFRESTIMATED  --   --   --   --   --  >90  --  >90  --   --   --   --   --  >90  --  >90  --  >90  --  >90   BRIDGETTE  --   --   --   --   --  8.2*  --  8.5  --   --   --   --   --  8.3*  --  8.1*  --  8.6  --  8.7   MAG  --   --   --   --   --  1.9  --  1.9  --   --   --   --   --  2.0  --  1.9  --  2.1  --  2.3   PHOS  --   --   --   --   --   --   --   --   --   --   --   --   --  3.9  --  3.7  --  3.2  --  3.0    < > = values in this interval not displayed.     Troponins: No results found for: TROPI, TROPONIN, TROPR, TROPN     INR  Recent Labs   Lab 12/24/21  0451 12/23/21  0539   INR 1.14 1.18*     Liver panel  Recent Labs   Lab 12/28/21  0120 12/27/21  0930 12/26/21  0619 12/25/21  0346   PROTTOTAL 6.6* 6.8 6.8 6.8   ALBUMIN 2.0* 2.1* 2.1* 2.1*   BILITOTAL 0.2 0.3 0.3 0.3   ALKPHOS 94 98 97 95   AST 42 59* 66* 39   * 126* 114* 74*       Imaging/procedure results:  EKG 12 Lead: 12/23      ECHO: 12/14/21  Interpretation Summary  Left ventricular function is decreased. The ejection fraction is 40-45%  (mildly reduced). Mild diffuse hypokinesis is present.  Global right ventricular function is normal. The right ventricle is normal  size.  This study was compared with the study from 12/12/2021. There has been an  improvement in the biventricular function since the patient has  been  decannulated.    Coronary Angiogram: 12/6/21  Conclusion    Refractory VT/VF cardiac arrest.  Cardiogenic shock.  STEMI involving the OM1 as culprit.  Three vessel severe CAD involving the  of the RCA, mid LCx and OM1 severe disease with occlusion of OM1 as culprit in STEMI, and moderate proximal LAD lesions likely hemodynamically significant.  CPR  VA ECMO placement.  IABP placement.  Thermoguard placement with initiation of hypothermia protocol.  Right radial arterial line placement.  PCI with three drug eluting stents to the proximal, mid LCx and OM1.        Plan      Follow bedrest per protocol    Continued medical management and lifestyle modifications for cardiovascular risk factor optimizations.    Admit to inpatient        Continuation of dual antiplatelet therapy for 12 months   Post antiplatelet therapy of    Aspirin; give 81 mg qd .     Ticagrelor; give 180 mg now and 90 mg BID.      Continue high dose statin therapy  Admit to Cardiac ICU.  CT head, chest, abdomen, and pelvis.  Bilateral duplex arterial US  Echo  Hypothermia protocol  Mechanical ventilation  Sedation per protocol  ECMO  IABP 1:1

## 2021-12-29 NOTE — PROGRESS NOTES
Brief CVTS Progress Note:    S: Patient denies purulent drainage, erythema, or increased heat or tenderness at the site of the wound. He denies Fever, chills, upset stomach, vomiting, nausea, or diarrhea. States he his anxious about getting home.    O:  General: Patient lying in bed in NAD. Appears AAO x 4.   MSK/wound: Right side ECMO canulation site incision has been opened about half way. Wound measures 4-5 cm long x 1-2 cm deep. Wound edges would be approximated if tissue had tension. Dressing removed demonstrated scant bloody drainage with clot (dressing was mostly dry outside of wound site). No serous or purulent drainage noted. Patient did not appear to be in pain during dressing change. No erythema noted. The deeper layers remained intact and well healed without signs of hematoma beneath.    A/P:  # ECMO Decannulation site open wound  - S/p ECMO decan on 12/13. Skin vac has since been removed. The deeper layers remained intact and well healed without signs of hematoma beneath. Lateral portion of the incision remained closed even with palpation/agitation. Medial portion of the wound was open with blood and hematoma present. Wound was probed with a cotton tipped applicator with no tunneling noted.   - Continue Wet to dry dressing changes BID  - Care recommendations per Primary team  - CVTS will continue to follow for wound checks for now    Discussed with Surgeon, Dr. Perez via written and verbal commnication.     Tate Montgomery PA-c  Pager: [phone]  12:54 PM December 29, 2021

## 2021-12-29 NOTE — PROGRESS NOTES
Care Management Follow Up    Length of Stay (days): 23    Expected Discharge Date: 12/31/2021     Concerns to be Addressed: discharge planning     Patient plan of care discussed at interdisciplinary rounds: Yes    Anticipated Discharge Disposition: TCU recommended- TBD   Anticipated Discharge Services: TBD   Anticipated Discharge DME: TBD     Patient/family educated on Medicare website which has current facility and service quality ratings: yes    Education Provided on the Discharge Plan: yes    Patient/Family in Agreement with the Plan: yes    Referrals Placed by CM/SW: None at this time. Pt given list of TCU facilities to identify choices.     Private pay costs discussed: Not applicable    Additional Information:  SW following for dispo needs- per pt's med team, pt may be med ready for discharge in the next few days or so.     Pt has TCU recommendations and his current insurance plan ([employer]) does not cover TCU. Pt is in the process of working with financial counseling to apply for medical assistance to TCU and financial counseling has requested an expedited processing. Pt will need medical assistance for TCU  Placement.     SW met with pt in room to introduce self, role, and to discuss discharge planning. SW spoke with pt about therapy recommendations and pt is agreeable to TCU. Pt understands he is waiting for MA to be approved and that it may take a few days or so. SW provided pt with list of TCUs near [place] as requested and pt will plan on reviewing list with brother. SW asked pt if he wanted SW to call brother and pt shared that he will update his brother himself.     SW to gather TCU choices in the next day or so and initiate referrals once MA approved. SW to continue following for dispo needs.     GERBER Diaz, LGSW  6B   [facility]  Phone: [phone]  Pager: [phone]

## 2021-12-29 NOTE — PROGRESS NOTES
Neuro: A&Ox4.   Cardiac: ST 100s. VSS.   Respiratory: Sating 98 on 2L NC.  GI/: Adequate urine output. BM X2  Diet/appetite: Tolerating Level 6 diet. Eating well.  Activity:  Assist of 1, up to chair and in halls.  Pain: At acceptable level on current regimen. PRN tylenol given x2 for mild back pain.  Skin: R groin site stitches removed, & dressing change BID, wet to dry per team. MDs to assess in AM 12/29 to see if pt needs woundvac.  LDA's: PIV SL. NG removed this am.     Plan: Continue with POC. Notify primary team with changes.

## 2021-12-29 NOTE — PROGRESS NOTES
12/29/21 1500   Signing Clinician's Name / Credentials   Signing clinician's name / credentials Conner Gray PT   Dynamic Gait Index (Edgar and Goldberg Eber, 1995)   Gait Level Surface 1   Change in Gait Speed 2   Gait and Horizontal Head Turns 0   Gait with Vertical Head Turns 2   Gait and Pivot Turns 3   Step Over Obstacle 2   Step Around Obstacles 2   Steps 1   Total Dynamic Gait Index Score  (A score of 19 or less has been correlated to an increased risk of falls in community dwelling older adults, patients with vestibular disorders, and patients with MS.)   Total Score (out of 24) 13   Dynamic Gait Index (DGI):The DGI is a measure of balance during gait that is reliable and valid for the elderly and individuals with Parkinson's disease, MS, vestibular disorders, or s/p stroke. Gait assistive device used: None    Patient score: 13/24  Scores ?19/24 indicate an increased risk for falls according to Monserrat et al 2000  Minimal Detectable Change = 2.9 in community dwelling elderly according to Samson et al 2011

## 2021-12-29 NOTE — PROGRESS NOTES
Calorie Count  Intake recorded for: 12/28  Total Kcals: 0 Total Protein: 0g  Kcals from Hospital Food: 0   Protein: 0g  Kcals from Outside Food (average):0 Protein: 0g  # Meals Ordered from Kitchen: 2 meals   # Meals Recorded: no intake recorded.   # Supplements Recorded: no intake recorded.

## 2021-12-30 ENCOUNTER — APPOINTMENT (OUTPATIENT)
Dept: PHYSICAL THERAPY | Facility: CLINIC | Age: 56
End: 2021-12-30
Payer: COMMERCIAL

## 2021-12-30 LAB
ALBUMIN SERPL-MCNC: 2.3 G/DL (ref 3.4–5)
ALP SERPL-CCNC: 104 U/L (ref 40–150)
ALT SERPL W P-5'-P-CCNC: 74 U/L (ref 0–70)
ANION GAP SERPL CALCULATED.3IONS-SCNC: 4 MMOL/L (ref 3–14)
AST SERPL W P-5'-P-CCNC: 28 U/L (ref 0–45)
BILIRUB SERPL-MCNC: 0.3 MG/DL (ref 0.2–1.3)
BUN SERPL-MCNC: 16 MG/DL (ref 7–30)
CA-I BLD-MCNC: 4.5 MG/DL (ref 4.4–5.2)
CALCIUM SERPL-MCNC: 8.5 MG/DL (ref 8.5–10.1)
CHLORIDE BLD-SCNC: 107 MMOL/L (ref 94–109)
CO2 SERPL-SCNC: 25 MMOL/L (ref 20–32)
CREAT SERPL-MCNC: 0.66 MG/DL (ref 0.66–1.25)
ERYTHROCYTE [DISTWIDTH] IN BLOOD BY AUTOMATED COUNT: 14.9 % (ref 10–15)
GFR SERPL CREATININE-BSD FRML MDRD: >90 ML/MIN/1.73M2
GLUCOSE BLD-MCNC: 104 MG/DL (ref 70–99)
GLUCOSE BLD-MCNC: 110 MG/DL (ref 70–99)
GLUCOSE BLDC GLUCOMTR-MCNC: 126 MG/DL (ref 70–99)
HCT VFR BLD AUTO: 27.7 % (ref 40–53)
HGB BLD-MCNC: 9 G/DL (ref 13.3–17.7)
MAGNESIUM SERPL-MCNC: 2 MG/DL (ref 1.6–2.3)
MCH RBC QN AUTO: 31.5 PG (ref 26.5–33)
MCHC RBC AUTO-ENTMCNC: 32.5 G/DL (ref 31.5–36.5)
MCV RBC AUTO: 97 FL (ref 78–100)
PLATELET # BLD AUTO: 519 10E3/UL (ref 150–450)
POTASSIUM BLD-SCNC: 3.7 MMOL/L (ref 3.4–5.3)
PROT SERPL-MCNC: 7.2 G/DL (ref 6.8–8.8)
RBC # BLD AUTO: 2.86 10E6/UL (ref 4.4–5.9)
SODIUM SERPL-SCNC: 136 MMOL/L (ref 133–144)
WBC # BLD AUTO: 13.9 10E3/UL (ref 4–11)

## 2021-12-30 PROCEDURE — 250N000013 HC RX MED GY IP 250 OP 250 PS 637: Performed by: INTERNAL MEDICINE

## 2021-12-30 PROCEDURE — 250N000013 HC RX MED GY IP 250 OP 250 PS 637: Performed by: NURSE PRACTITIONER

## 2021-12-30 PROCEDURE — 250N000013 HC RX MED GY IP 250 OP 250 PS 637: Performed by: STUDENT IN AN ORGANIZED HEALTH CARE EDUCATION/TRAINING PROGRAM

## 2021-12-30 PROCEDURE — 250N000013 HC RX MED GY IP 250 OP 250 PS 637: Performed by: PHYSICIAN ASSISTANT

## 2021-12-30 PROCEDURE — 97116 GAIT TRAINING THERAPY: CPT | Mod: GP

## 2021-12-30 PROCEDURE — 99231 SBSQ HOSP IP/OBS SF/LOW 25: CPT | Performed by: NURSE PRACTITIONER

## 2021-12-30 PROCEDURE — 82330 ASSAY OF CALCIUM: CPT | Performed by: INTERNAL MEDICINE

## 2021-12-30 PROCEDURE — 250N000011 HC RX IP 250 OP 636: Performed by: NURSE PRACTITIONER

## 2021-12-30 PROCEDURE — 82947 ASSAY GLUCOSE BLOOD QUANT: CPT | Performed by: INTERNAL MEDICINE

## 2021-12-30 PROCEDURE — 97110 THERAPEUTIC EXERCISES: CPT | Mod: GP

## 2021-12-30 PROCEDURE — 120N000003 HC R&B IMCU UMMC

## 2021-12-30 PROCEDURE — 83735 ASSAY OF MAGNESIUM: CPT | Performed by: NURSE PRACTITIONER

## 2021-12-30 PROCEDURE — 36415 COLL VENOUS BLD VENIPUNCTURE: CPT | Performed by: INTERNAL MEDICINE

## 2021-12-30 PROCEDURE — 80053 COMPREHEN METABOLIC PANEL: CPT | Performed by: INTERNAL MEDICINE

## 2021-12-30 PROCEDURE — 85027 COMPLETE CBC AUTOMATED: CPT | Performed by: INTERNAL MEDICINE

## 2021-12-30 RX ORDER — HALOPERIDOL 5 MG/ML
5 INJECTION INTRAMUSCULAR EVERY 4 HOURS PRN
Status: DISCONTINUED | OUTPATIENT
Start: 2021-12-30 | End: 2021-12-31

## 2021-12-30 RX ORDER — QUETIAPINE FUMARATE 25 MG/1
25 TABLET, FILM COATED ORAL EVERY EVENING
Status: DISCONTINUED | OUTPATIENT
Start: 2021-12-30 | End: 2021-12-31

## 2021-12-30 RX ORDER — ASPIRIN 81 MG/1
81 TABLET, CHEWABLE ORAL DAILY
Status: DISCONTINUED | OUTPATIENT
Start: 2021-12-31 | End: 2022-01-03 | Stop reason: HOSPADM

## 2021-12-30 RX ADMIN — FOLIC ACID 1 MG: 1 TABLET ORAL at 09:08

## 2021-12-30 RX ADMIN — THIAMINE HCL TAB 100 MG 100 MG: 100 TAB at 09:09

## 2021-12-30 RX ADMIN — METOPROLOL SUCCINATE 25 MG: 25 TABLET, EXTENDED RELEASE ORAL at 09:08

## 2021-12-30 RX ADMIN — FUROSEMIDE 20 MG: 20 TABLET ORAL at 09:08

## 2021-12-30 RX ADMIN — ACETAMINOPHEN 650 MG: 325 TABLET, FILM COATED ORAL at 17:53

## 2021-12-30 RX ADMIN — HEPARIN SODIUM 5000 UNITS: 5000 INJECTION, SOLUTION INTRAVENOUS; SUBCUTANEOUS at 21:00

## 2021-12-30 RX ADMIN — ATORVASTATIN CALCIUM 40 MG: 40 TABLET, FILM COATED ORAL at 20:57

## 2021-12-30 RX ADMIN — ACETAMINOPHEN 650 MG: 325 TABLET, FILM COATED ORAL at 06:31

## 2021-12-30 RX ADMIN — Medication 1 TABLET: at 09:09

## 2021-12-30 RX ADMIN — NICOTINE 1 PATCH: 14 PATCH, EXTENDED RELEASE TRANSDERMAL at 09:10

## 2021-12-30 RX ADMIN — HEPARIN SODIUM 5000 UNITS: 5000 INJECTION, SOLUTION INTRAVENOUS; SUBCUTANEOUS at 05:52

## 2021-12-30 RX ADMIN — Medication 10 MG: at 21:00

## 2021-12-30 RX ADMIN — QUETIAPINE FUMARATE 25 MG: 25 TABLET ORAL at 20:57

## 2021-12-30 RX ADMIN — ACETAMINOPHEN 650 MG: 325 TABLET, FILM COATED ORAL at 13:02

## 2021-12-30 RX ADMIN — TICAGRELOR 90 MG: 90 TABLET ORAL at 09:09

## 2021-12-30 RX ADMIN — HEPARIN SODIUM 5000 UNITS: 5000 INJECTION, SOLUTION INTRAVENOUS; SUBCUTANEOUS at 13:02

## 2021-12-30 RX ADMIN — ASPIRIN 81 MG CHEWABLE TABLET 81 MG: 81 TABLET CHEWABLE at 09:08

## 2021-12-30 RX ADMIN — LIDOCAINE 1 PATCH: 560 PATCH PERCUTANEOUS; TOPICAL; TRANSDERMAL at 20:57

## 2021-12-30 RX ADMIN — TICAGRELOR 90 MG: 90 TABLET ORAL at 20:57

## 2021-12-30 ASSESSMENT — ACTIVITIES OF DAILY LIVING (ADL)
ADLS_ACUITY_SCORE: 14

## 2021-12-30 NOTE — PLAN OF CARE
Neuro: WNL.   Cardiac: SR. VSS.       Respiratory: 90's ORA.  GI/: Good UOP. BM x 1  Diet/appetite: Reg diet  Activity:  SBA.  Pain: Back pain r/t bed. Tylenol given.   Skin: No new deficits noted.  LDA's:     Plan: Continue with POC. Notify primary team with changes.

## 2021-12-30 NOTE — PLAN OF CARE
Neuro: A&Ox4.   Cardiac: SR. VSS.   Respiratory: Sating 95% or greater on 2LNC  GI/: Adequate urine output. BM X1  Diet/appetite: Tolerating Level 6 diet. Eating well.  Activity:  SBA, up to chair and in halls.  Pain: At acceptable level on current regimen.   Skin: No new deficits noted.  LDA's:    Plan: Continue with POC. Notify primary team with changes.

## 2021-12-30 NOTE — PROGRESS NOTES
Interventional Cardiology Progress Note      Assessment & Plan:  Bruce Blount is a 56 year old male with PMHx current tobacco use and ETOH abuse who presented to [facility] 12/6 after out of hospital VF arrest. Coronary angiogram revealed  of RCA and acute culprit lesion of LCx/OM1, s/p PCI to pLCx, mid LCx, and OM1 with TERRY. Decannulated 12/13. IABP removed 12/13. Extubated 12/14. Transferred out of ICU on 12/23. Ongoing hospital issues include delirium.      Today's plan:   - Hold lisinopril due to soft BPs  - Decrease Seroquel to 25 mg HS, then likely prn in coming days   - Encourage oral intake   - SW to assist with discharge planning     # Anoxic brain injury, improving   # Delirium, improving   # Chest wall pain, improved  # Hx of possible ETOH abuse per family  S/p therapeutic hypothermia. Extubated 12/14. Initial head CT without acute pathology.  Delirium was initially difficult to manage but now his mentation seems to be improving. 1:1 sitter removed on 12/25/21. Scheduled haldol discontinued 12/28.   - continue melatonin 10mg HS  - decrease seroquel to 25 mg HS    - continue haldol prn, likely discontinue in coming days, pt not requiring  - Tylenol and lidocaine patches for additional pain control as needed  - Folate, Thiamine and multi vitamin for ETOH abuse     # Refractory VF cardiac arrest 2/2 secondary to STEMI, resolved  # Cardiogenic shock requiring VA ECMO, resolved  # Ischemic Cardiomyopathy (EF 40-45%)  # CAD s/p PCI pLCx, mLCx, OM1, residual  of RCA  # HTN  # HLD  # Nonsustained VT  # Right groin superficial dehiscence   S/p Peripheral VA ECMO inserted via RCFA and RCFV. Decannulated 12/13. IABP removed 12/13. R groin sutures removed 12/28 by CVTS with subsequent superficial dehiscence. Deeper layers remain intact and well healed without signs of hematoma beneath. No signs of infection. Packed with wet gauze.   - DAPT: continue ASA 81mg and ticagrelor 90mg BID   - Statin: continue  atorvastatin 40mg daily   - BB: continue Toprol XL 25 mg daily    - ACE: hold lisinopril 2.5 mg daily due to soft BPs  - Volume status: appears euvolemic on exam, continue lasix 20 mg daily, holding parameters in place   - Strict I&O's  - Replace lytes per protocol     - R groin BID dressing changes, CVTS following       # Acute hypoxemic respiratory failure, resolved   # Klebsiella pneumonia, resolved   # Rib Fractures  # Tobacco Abuse (2PPD)  # COPD exacerbation  Extubated 12/14 to BiPAP. Now sating well on 2L NC.  Completed antibiotic/steroid course for COPD exacerbation 12/20. Continue to have chest wall pain, likely 2/2 fractures. CXR 12/26 with slightly increased interstitial opacities but no pleural effusions s/p IV lasix 20 mg x 1.  - RT pulmonary hygiene  - Duoneb Q6 hours while awake  - Lidocaine patch for rib fracture pain     # Dysphagia, improving    # Shock liver 2/2 cardiac arrest, resolved  # Severe Malnutrition in context of critical illness  C. Diff negative. Shock liver resolved on 12/13, however slight increase in LFTs since 12/26, now improving. Patient tolerating DD5. Calorie counts remain low although patient reports irritation with NG tube which makes it difficult to eat. NG removed 12/28.   - SLP following  - Diet advanced to soft and bite sized (level 6) with thin liquids  - Continue calorie count, encourage oral intake   - Trend LFTs     # Hypoalbuminemia  # Hypokalemia, resolved   # Hypocalcemia   ESTER due to cardiac arrest and warming. Started on CRRT on 12/8/21. No CRRT since decannulation 12/13. Creatinine 0.68, BUN 32.   - Continue to encourage PO intake  - Replace lytes per protocol   - Trend CMP      # Aspiration Pneumonia (klebsiella, staph aureus), resolved   # Leukocytosis, improving   # Lactic acidosis, resolved  Pan culture 12/14 given temp and WBC. Susceptibilities resulted the same as previous. Completed treatment for aspiration pneumonia. Persistent leukocytosis likely stress  related but trend is improving. Afebrile. No s/s of infection.   Cultures thus far:                 - sputum 12/6: staph aureus, staph dysgalactia, klebsiella                 - sputum 12/7: staph aureus                 - sputum 12/8: staph and klebsiella                 - Sputum 12/9: staph aureus and klebsiella                 - Sputum 12/10: staph aureus and klebsiella                 - sputum 12/13: staph aureus and klebsiella                 - sputum 12/14: staph aureus  Antibiotics              - Vancomycin 12/6 - 12/7 (MRSA negative)              - Zosyn 12/6 - 12/9              - Rocephin 12/9 - 12/16              - Cefepime 12/16 - 12/21              - Flagyl 12/16 - 12/16              - Azithro 12/16 - 12/20   - Trend CBC  - Continue to monitor for infection     # Anemia of critical illness, improving   # Thrombocytopenia, resolved  Hgb stable. Plt stable. No e/o bleeding. HIT screening negative.   - Trend CBC      #Hyperglycemia likely stress-induced, improving    No known medical history. Hemoglobin A1c 5.6%. Blood sugars have improved. No insulin needs since 12/23. -153.   - POCT BG checks   - sliding scale insulin  - hypoglycemia protocol     Prophylaxis:  DVT: subcutaneous heparin    Diet: Soft, Bit Sized, Thin Liquids  Activity: up as tolerated  Code Status: Full  Disposition: TCU 1-2 days, pending BP, stable on HF medications    Patient discussed w/ Dr. Pat Davidson, APRN, CNP  [facility]  Interventional Cardiology  [phone]      Interval History:  - No acute events overnight  - Pt reports some mild chest wall pain with MADIHA, coughing, occasionally when ambulating in hallway  - BP soft this am, MAP 60-72, holding parameters in place for anti-HTN medications  - Remains on calorie count, currently stating 0 kcal 12/28, 12/29, white slips with food orders currently attached to white board (rather than in envelope on door, may have been missed), pt reports  "eating small amounts each meal daily      Most recent vital signs:  BP 91/64 (BP Location: Right arm)   Pulse 89   Temp 97.3  F (36.3  C) (Oral)   Resp 18   Ht 1.753 m (5' 9\")   Wt 62.5 kg (137 lb 12.6 oz)   SpO2 98%   BMI 20.35 kg/m    Temp:  [97.3  F (36.3  C)-99.2  F (37.3  C)] 97.3  F (36.3  C)  Pulse:  [] 89  Resp:  [18-24] 18  BP: ()/(48-64) 91/64  Cuff Mean (mmHg):  [69-70] 69  SpO2:  [95 %-99 %] 98 %  Wt Readings from Last 2 Encounters:   12/29/21 62.5 kg (137 lb 12.6 oz)   09/23/19 77.5 kg (170 lb 12.8 oz)     No intake or output data in the 24 hours ending 12/30/21 1022    Physical exam:  General: In bed, in NAD  HEENT: EOMI, PERRLA, no scleral icterus or injection  CARDIAC: RRR, no m/r/g appreciated. Peripheral pulses 2+  RESP: CTAB, no wheezes, rhonchi or crackles appreciated.  GI: NABS, NT/ND, no guarding or rebound  EXTREMITIES: NO LE edema, pulses 2+. Right Femoral access site w/o bleeding, dressing c/d/i. No bruits appreciated.   SKIN: No acute lesions appreciated  NEURO: Alert and oriented X3    Labs (Past three days):  CBC  Recent Labs   Lab 12/29/21  1007 12/28/21  0440 12/27/21  1531 12/27/21  0930 12/26/21  0619   WBC 19.1* 17.5*  --  18.5* 18.7*   RBC 2.90* 2.65*  --  2.76* 2.67*   HGB 9.1* 8.3* 8.6* 8.8* 8.3*   HCT 28.4* 25.6*  --  27.6* 26.8*   MCV 98 97  --  100 100   MCH 31.4 31.3  --  31.9 31.1   MCHC 32.0 32.4  --  31.9 31.0*   RDW 15.1* 15.2*  --  15.4* 14.5   * 456*  --  508* 494*     [facility]  Recent Labs   Lab 12/29/21  2326 12/29/21  1946 12/29/21  1606 12/29/21  1051 12/29/21  1007 12/28/21  0808 12/28/21  0440 12/27/21  1143 12/27/21  0930 12/26/21  1943 12/26/21  1907 12/26/21  0731 12/26/21  0619 12/25/21  0432 12/25/21  0346 12/24/21  0729 12/24/21  0451   NA  --   --   --   --  137  --  136  --  138  --   --   --  143  --  144  144   < > 146*   POTASSIUM  --   --   --   --  3.6  --  3.8  --  3.5  --  4.0   < > 3.2*  --  3.3*  --  3.6   CHLORIDE  --   --   " --   --  106  --  105  --  108  --   --   --  111*  --  111*  --  111*   CO2  --   --   --   --  22  --  25  --  23  --   --   --  25  --  27  --  28   ANIONGAP  --   --   --   --  9  --  6  --  7  --   --   --  7  --  6  --  7   * 106* 102* 125* 116*  117*   < > 133*  135*   < > 136*  132*   < >  --    < > 127*  123*   < > 145*  137*   < > 153*  141*   BUN  --   --   --   --  21  --  31*  --  29  --   --   --  25  --  32*  --  38*   CR  --   --   --   --  0.71  --  0.64*  --  0.68  --   --   --  0.68  --  0.68  --  0.73   GFRESTIMATED  --   --   --   --  >90  --  >90  --  >90  --   --   --  >90  --  >90  --  >90   BRIDGETTE  --   --   --   --  8.7  --  8.2*  --  8.5  --   --   --  8.3*  --  8.1*  --  8.6   MAG  --   --   --   --  2.0  --  1.9  --  1.9  --   --   --  2.0  --  1.9  --  2.1   PHOS  --   --   --   --   --   --   --   --   --   --   --   --  3.9  --  3.7  --  3.2    < > = values in this interval not displayed.     Troponins: No results found for: TROPI, TROPONIN, TROPR, TROPN     INR  Recent Labs   Lab 12/24/21  0451   INR 1.14     Liver panel  Recent Labs   Lab 12/29/21  1007 12/28/21  0440 12/27/21  0930 12/26/21  0619   PROTTOTAL 7.4 6.6* 6.8 6.8   ALBUMIN 2.4* 2.0* 2.1* 2.1*   BILITOTAL 0.5 0.2 0.3 0.3   ALKPHOS 108 94 98 97   AST 39 42 59* 66*   ALT 92* 108* 126* 114*       Imaging/procedure results:  EKG 12 Lead: 12/23       ECHO: 12/14/21  Interpretation Summary  Left ventricular function is decreased. The ejection fraction is 40-45%  (mildly reduced). Mild diffuse hypokinesis is present.  Global right ventricular function is normal. The right ventricle is normal  size.  This study was compared with the study from 12/12/2021. There has been an  improvement in the biventricular function since the patient has been  decannulated.     Coronary Angiogram: 12/6/21  Conclusion  Refractory VT/VF cardiac arrest.  Cardiogenic shock.  STEMI involving the OM1 as culprit.  Three vessel severe CAD  involving the  of the RCA, mid LCx and OM1 severe disease with occlusion of OM1 as culprit in STEMI, and moderate proximal LAD lesions likely hemodynamically significant.  CPR  VA ECMO placement.  IABP placement.  Thermoguard placement with initiation of hypothermia protocol.  Right radial arterial line placement.  PCI with three drug eluting stents to the proximal, mid LCx and OM1.           Plan    Follow bedrest per protocol    Continued medical management and lifestyle modifications for cardiovascular risk factor optimizations.    Admit to inpatient    Continuation of dual antiplatelet therapy for 12 months   Post antiplatelet therapy of    Aspirin; give 81 mg qd .     Ticagrelor; give 180 mg now and 90 mg BID.      Continue high dose statin therapy  Admit to Cardiac ICU.  CT head, chest, abdomen, and pelvis.  Bilateral duplex arterial US  Echo  Hypothermia protocol  Mechanical ventilation  Sedation per protocol  ECMO  IABP 1:1

## 2021-12-30 NOTE — PROGRESS NOTES
Calorie Count  Intake recorded for: 12/29  Total Kcals: 0 Total Protein: 0g  Kcals from Hospital Food: 0   Protein: 0g  Kcals from Outside Food (average):0 Protein: 0g  # Meals Ordered from Kitchen: 2 meals   # Meals Recorded: no intake recorded.   # Supplements Recorded: no intake recorded.

## 2021-12-30 NOTE — PLAN OF CARE
Neuro: A&Ox4.   Cardiac: SR. VSS.   Respiratory: Sating % on 2LNC.  GI/: Adequate urine output. BM X3  Diet/appetite: Tolerating Regular diet. Eating well.  Activity:  Assist of 1, up to chair and in halls.  Pain: At acceptable level on current regimen.   Skin: No new deficits noted.  LDA's:    Plan: Continue with POC. Notify primary team with changes.

## 2021-12-30 NOTE — PLAN OF CARE
"BP 94/63 (BP Location: Left arm)   Pulse 94   Temp 99.2  F (37.3  C) (Oral)   Resp 22   Ht 1.753 m (5' 9\")   Wt 62.5 kg (137 lb 12.6 oz)   SpO2 97%   BMI 20.35 kg/m      [id number]. A&Ox4, forgetful. Irritable at times. Bed alarm as needed. SR/ST, denied chest pain. Assist of one person, SBA to bedside chair/commode. R groin surgical wound dressing changed. L groin LIZET and intact. L toes black. No bm this shift, active bowel sound. Bedside urinal. PIV saline locked.  and Care Coordinator following him for TCU discharge plan.    "

## 2021-12-31 ENCOUNTER — APPOINTMENT (OUTPATIENT)
Dept: SPEECH THERAPY | Facility: CLINIC | Age: 56
End: 2021-12-31
Payer: COMMERCIAL

## 2021-12-31 ENCOUNTER — APPOINTMENT (OUTPATIENT)
Dept: PHYSICAL THERAPY | Facility: CLINIC | Age: 56
End: 2021-12-31
Payer: COMMERCIAL

## 2021-12-31 LAB
ALBUMIN SERPL-MCNC: 2.4 G/DL (ref 3.4–5)
ALP SERPL-CCNC: 102 U/L (ref 40–150)
ALT SERPL W P-5'-P-CCNC: 71 U/L (ref 0–70)
ANION GAP SERPL CALCULATED.3IONS-SCNC: 9 MMOL/L (ref 3–14)
AST SERPL W P-5'-P-CCNC: 32 U/L (ref 0–45)
BILIRUB SERPL-MCNC: 0.4 MG/DL (ref 0.2–1.3)
BUN SERPL-MCNC: 17 MG/DL (ref 7–30)
CALCIUM SERPL-MCNC: 8.5 MG/DL (ref 8.5–10.1)
CHLORIDE BLD-SCNC: 102 MMOL/L (ref 94–109)
CO2 SERPL-SCNC: 25 MMOL/L (ref 20–32)
CREAT SERPL-MCNC: 0.72 MG/DL (ref 0.66–1.25)
ERYTHROCYTE [DISTWIDTH] IN BLOOD BY AUTOMATED COUNT: 15 % (ref 10–15)
GFR SERPL CREATININE-BSD FRML MDRD: >90 ML/MIN/1.73M2
GLUCOSE BLD-MCNC: 101 MG/DL (ref 70–99)
GLUCOSE BLD-MCNC: 107 MG/DL (ref 70–99)
HCT VFR BLD AUTO: 29.2 % (ref 40–53)
HGB BLD-MCNC: 9.3 G/DL (ref 13.3–17.7)
LACTATE SERPL-SCNC: 0.9 MMOL/L (ref 0.7–2)
MAGNESIUM SERPL-MCNC: 2 MG/DL (ref 1.6–2.3)
MCH RBC QN AUTO: 31.2 PG (ref 26.5–33)
MCHC RBC AUTO-ENTMCNC: 31.8 G/DL (ref 31.5–36.5)
MCV RBC AUTO: 98 FL (ref 78–100)
PLATELET # BLD AUTO: 534 10E3/UL (ref 150–450)
POTASSIUM BLD-SCNC: 3.9 MMOL/L (ref 3.4–5.3)
PROT SERPL-MCNC: 7.2 G/DL (ref 6.8–8.8)
RBC # BLD AUTO: 2.98 10E6/UL (ref 4.4–5.9)
SODIUM SERPL-SCNC: 136 MMOL/L (ref 133–144)
WBC # BLD AUTO: 15 10E3/UL (ref 4–11)

## 2021-12-31 PROCEDURE — 250N000013 HC RX MED GY IP 250 OP 250 PS 637: Performed by: NURSE PRACTITIONER

## 2021-12-31 PROCEDURE — 36415 COLL VENOUS BLD VENIPUNCTURE: CPT | Performed by: INTERNAL MEDICINE

## 2021-12-31 PROCEDURE — 83735 ASSAY OF MAGNESIUM: CPT | Performed by: NURSE PRACTITIONER

## 2021-12-31 PROCEDURE — 92526 ORAL FUNCTION THERAPY: CPT | Mod: GN

## 2021-12-31 PROCEDURE — 80053 COMPREHEN METABOLIC PANEL: CPT | Performed by: INTERNAL MEDICINE

## 2021-12-31 PROCEDURE — 82947 ASSAY GLUCOSE BLOOD QUANT: CPT | Performed by: INTERNAL MEDICINE

## 2021-12-31 PROCEDURE — 85014 HEMATOCRIT: CPT | Performed by: INTERNAL MEDICINE

## 2021-12-31 PROCEDURE — 250N000013 HC RX MED GY IP 250 OP 250 PS 637: Performed by: STUDENT IN AN ORGANIZED HEALTH CARE EDUCATION/TRAINING PROGRAM

## 2021-12-31 PROCEDURE — 250N000013 HC RX MED GY IP 250 OP 250 PS 637: Performed by: PHYSICIAN ASSISTANT

## 2021-12-31 PROCEDURE — 120N000003 HC R&B IMCU UMMC

## 2021-12-31 PROCEDURE — 99231 SBSQ HOSP IP/OBS SF/LOW 25: CPT | Performed by: NURSE PRACTITIONER

## 2021-12-31 PROCEDURE — 250N000013 HC RX MED GY IP 250 OP 250 PS 637: Performed by: INTERNAL MEDICINE

## 2021-12-31 PROCEDURE — 250N000011 HC RX IP 250 OP 636: Performed by: NURSE PRACTITIONER

## 2021-12-31 PROCEDURE — 97110 THERAPEUTIC EXERCISES: CPT | Mod: GP

## 2021-12-31 PROCEDURE — 83605 ASSAY OF LACTIC ACID: CPT | Performed by: INTERNAL MEDICINE

## 2021-12-31 RX ORDER — QUETIAPINE FUMARATE 25 MG/1
25 TABLET, FILM COATED ORAL
Status: DISCONTINUED | OUTPATIENT
Start: 2021-12-31 | End: 2022-01-03 | Stop reason: HOSPADM

## 2021-12-31 RX ADMIN — ACETAMINOPHEN 650 MG: 325 TABLET, FILM COATED ORAL at 21:28

## 2021-12-31 RX ADMIN — HEPARIN SODIUM 5000 UNITS: 5000 INJECTION, SOLUTION INTRAVENOUS; SUBCUTANEOUS at 06:36

## 2021-12-31 RX ADMIN — ACETAMINOPHEN 650 MG: 325 TABLET, FILM COATED ORAL at 12:28

## 2021-12-31 RX ADMIN — ACETAMINOPHEN 650 MG: 325 TABLET, FILM COATED ORAL at 04:28

## 2021-12-31 RX ADMIN — Medication 10 MG: at 21:28

## 2021-12-31 RX ADMIN — LIDOCAINE 1 PATCH: 560 PATCH PERCUTANEOUS; TOPICAL; TRANSDERMAL at 20:47

## 2021-12-31 RX ADMIN — ATORVASTATIN CALCIUM 40 MG: 40 TABLET, FILM COATED ORAL at 20:39

## 2021-12-31 RX ADMIN — Medication 1 TABLET: at 08:29

## 2021-12-31 RX ADMIN — TICAGRELOR 90 MG: 90 TABLET ORAL at 08:29

## 2021-12-31 RX ADMIN — THIAMINE HCL TAB 100 MG 100 MG: 100 TAB at 08:29

## 2021-12-31 RX ADMIN — HEPARIN SODIUM 5000 UNITS: 5000 INJECTION, SOLUTION INTRAVENOUS; SUBCUTANEOUS at 14:29

## 2021-12-31 RX ADMIN — HEPARIN SODIUM 5000 UNITS: 5000 INJECTION, SOLUTION INTRAVENOUS; SUBCUTANEOUS at 21:28

## 2021-12-31 RX ADMIN — FUROSEMIDE 20 MG: 20 TABLET ORAL at 08:29

## 2021-12-31 RX ADMIN — FOLIC ACID 1 MG: 1 TABLET ORAL at 08:29

## 2021-12-31 RX ADMIN — ASPIRIN 81 MG CHEWABLE TABLET 81 MG: 81 TABLET CHEWABLE at 08:29

## 2021-12-31 RX ADMIN — METOPROLOL SUCCINATE 25 MG: 25 TABLET, EXTENDED RELEASE ORAL at 08:29

## 2021-12-31 RX ADMIN — TICAGRELOR 90 MG: 90 TABLET ORAL at 20:38

## 2021-12-31 RX ADMIN — ACETAMINOPHEN 650 MG: 325 TABLET, FILM COATED ORAL at 08:30

## 2021-12-31 RX ADMIN — ACETAMINOPHEN 650 MG: 325 TABLET, FILM COATED ORAL at 17:19

## 2021-12-31 RX ADMIN — NICOTINE 1 PATCH: 14 PATCH, EXTENDED RELEASE TRANSDERMAL at 08:28

## 2021-12-31 ASSESSMENT — ACTIVITIES OF DAILY LIVING (ADL)
ADLS_ACUITY_SCORE: 14
ADLS_ACUITY_SCORE: 7
ADLS_ACUITY_SCORE: 14
ADLS_ACUITY_SCORE: 14

## 2021-12-31 ASSESSMENT — MIFFLIN-ST. JEOR
SCORE: 1418.38
SCORE: 1430.38

## 2021-12-31 NOTE — PLAN OF CARE
Cared for patient [id number]    No changes. Gave tylenol for ongoing headache. Forgetful but oriented. Up in the chair.
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
## 2021-12-31 NOTE — PROGRESS NOTES
CV Surgery Brief Progress Note    Bruce Blount is a 56 year old male with VF arrest requiring PCI and VA ECMO. Underwent peripheral VA ECMO decan 12/13 with Dr. Padilla with primary repair of right common femoral artery and vein. Retention sutures removed 12/29 with noted superficial dehiscence; started on BID wet to dry packing changes. Right groin wound check today.    - Wound 4 cm L x 0.5 cm W x 1 cm D, appears healthy today, no purulence  - Continue BID packing with wet-to-dry gauze  - CV surgery will follow up in several days for repeat wound check. Call with any questions or concerns.     Rosa Hsieh PA-C  Cardiothoracic Surgery  Pager [phone]

## 2021-12-31 NOTE — PLAN OF CARE
"Neuro: A&Ox4.   Cardiac: NSR/ST. VSS.   Respiratory: Sating >93% on RA.  GI/: Adequate urine output. BM X2. Inaccurate I/Os 2/2 urine mixing with stool  Diet/appetite: Tolerating level 6 diet.  Activity:  SBA in room  Pain: At acceptable level on current regimen.   Skin: No new deficits noted. R groin dressing changed as ordered.  LDA's: PIVx1    Plan: Continue with POC. Notify primary team with changes.    /71 (BP Location: Left arm)   Pulse 99   Temp 98.1  F (36.7  C) (Oral)   Resp 18   Ht 1.753 m (5' 9\")   Wt 59.8 kg (131 lb 13.4 oz)   SpO2 93%   BMI 19.47 kg/m    "

## 2021-12-31 NOTE — PLAN OF CARE
Speech Language Therapy Discharge Summary    Reason for therapy discharge:    All goals and outcomes met, no further needs identified.    Progress towards therapy goal(s). See goals on Care Plan in [facility] electronic health record for goal details.  Goals met    Therapy recommendation(s):    No further therapy is recommended. Recommend regular diet/thin liquids.

## 2021-12-31 NOTE — PROGRESS NOTES
Care Management Follow Up    Length of Stay (days): 25    Expected Discharge Date: 01/03/2022     Concerns to be Addressed: discharge planning      Patient plan of care discussed at interdisciplinary rounds: Yes    Anticipated Discharge Disposition: TCU recommended- TBD  Anticipated Discharge Services: TBD  Anticipated Discharge DME: TBD    Patient/family educated on Medicare website which has current facility and service quality ratings: yes   Education Provided on the Discharge Plan: yes  Patient/Family in Agreement with the Plan: yes    Referrals Placed by CM/SW:   [facility]  [address], [place] [address] [phone]  12/31: PRERNA faxed referral via [email or web address] for admissions to review.      [facility]  [address] [place] [address] [phone]  12/31: Per discussion with admissions, they may have open beds on Monday. PRERNA faxed referral via [email or web address] for review.      [facility]  [address] [place] [id number] [phone]  12/31: Per discussion with admissions, the facility may have an open bed later next week. PRERNA faxed referral via epic for review.      Private pay costs discussed: Not applicable    Additional Information:  SW following for dispo needs- per pt's med team, pt may be med ready for discharge early next week.    PRERNA met with pt and his brother, Amor, in room to check in. PRERNA provided update that SW will be sending referrals today and encouraged pt and Amor to keep reviewing list for additional TCU choices. PRERNA provided Amor a list of TCU choices as well.     PRERNA faxed referral via [email or web address] for admissions to review. PRERNA confirmed with pt that SW is able to communicate with pt's brother, Amor, regarding discharge planning.     SW to continue following for dispo needs and for support.    GERBER Diaz, LGSW  6B   [facility]  Phone: [phone]  Pager: [phone]

## 2021-12-31 NOTE — PLAN OF CARE
Neuro: A&Ox4, forgetful.   Cardiac: SR/ST, HR 90s-low 100s. OVSS, afebrile.   Respiratory: Sating 95% on RA, GUZMNA.  GI/: Adequate urine output via bedside urinal  Diet/appetite: Advanced to regular diet this shift, tolerating  Activity:  SBA, A1 in halls  Pain: Tylenol x 2 for chronic low back pain, leg soreness from PT yesterday with some relief  Skin: No new deficits noted. R groin drsg changed per plan of care  LDA's: PIV SL     Plan: Continue with POC. Notify primary team with changes.

## 2021-12-31 NOTE — DISCHARGE SUMMARY
[address] [address]  p: [phone]    Discharge Summary: Cardiology Service    Bruce Blount MRN# [id number]   YOB: 1965 Age: 56 year old       Admission Date: 12/6/2021  Discharge Date: 01/03/2022    Discharge Diagnoses:  # Anoxic brain injury, resolved  # Delirium, resolved  # Hx of possible ETOH abuse per family  # Refractory VF cardiac arrest 2/2 secondary to STEMI, resolved  # Cardiogenic shock requiring VA ECMO, resolved  # Ischemic Cardiomyopathy (EF 40-45%)  # CAD s/p PCI pLCx, mLCx, OM1, residual  of RCA  # Hx HTN  # HLD  # Nonsustained VT  # Right groin superficial dehiscence   # Left foot dry gangrene  # Acute hypoxemic respiratory failure, resolved   # Klebsiella pneumonia, resolved   # Rib Fractures  # Tobacco Abuse (2PPD)  # COPD exacerbation  # Dysphagia, resolved  # Shock liver 2/2 cardiac arrest, resolved  # Severe Malnutrition in context of critical illness  # Hypoalbuminemia  # Hypokalemia, resolved   # Hypocalcemia, resolved  # Aspiration Pneumonia (klebsiella, staph aureus), resolved   # Leukocytosis, improving   # Lactic acidosis, resolved  # Anemia of critical illness, improving   # Thrombocytopenia, resolved  #Hyperglycemia likely stress-induced, resolved     Brief HPI:  Bruce Blount is a 56 year old male with PMHx current tobacco use and ETOH abuse who [facility] 12/6 after out of hospital VF arrest. Coronary angiogram revealed  of RCA and acute culprit lesion of LCx/OM1, s/p PCI to pLCx, mid LCx, and OM1 with TERRY. Decannulated 12/13. IABP removed 12/13. Extubated 12/14. Transferred out of ICU on 12/23. Hospitalization prolonged 2/2 delirium, this has now resolved     Hospital Course by Diagnosis:  # Anoxic brain injury, resolved  # Delirium, resolved  # Hx of possible ETOH abuse per family  S/p therapeutic hypothermia. Extubated 12/14. Initial head CT without acute pathology.  Delirium  was initially difficult to manage but now his mentation seems to be improving. 1:1 sitter removed on 12/25/21. Scheduled haldol discontinued 12/28.   - Folate, Thiamine and multi vitamin for ETOH abuse  - PT/OT recs previously for TCU, have now been changed to home with PT  - SLUMS 4/30 completed when patient on large doses of Seroquel and Haldol, delirium as resolved, pt A/O x 4.   - Repeat MOCA screening, 14/30 (16 considered normal)     # Refractory VF cardiac arrest 2/2 secondary to STEMI, resolved  # Cardiogenic shock requiring VA ECMO, resolved  # Ischemic Cardiomyopathy (EF 40-45%)  # CAD s/p PCI pLCx, mLCx, OM1, residual  of RCA  # Hx HTN  # HLD  # Nonsustained VT  # Right groin superficial dehiscence   # Necrotic Toes, Left Foot  S/p Peripheral VA ECMO inserted via RCFA and RCFV. Decannulated 12/13. IABP removed 12/13. R groin sutures removed 12/28 by CVTS with subsequent superficial dehiscence. Deeper layers remain intact and well healed without signs of hematoma beneath. No signs of infection.       - DAPT: continue ASA 81mg and ticagrelor 90mg BID   - Statin: continue atorvastatin 40mg daily   - BB: continue Toprol XL 25 mg daily    - ACE: stopped lisinopril 2.5 mg daily due to soft BPs, consider starting as OP if BP allows  - Volume status: appears euvolemic on exam, continue lasix 20 mg daily  - CVTS following right groin wound: current recs BID dressing changes with nugauze, Cleanse prior to packing with wound . Cover with 4X4 gauze.   - Follow up with CV surgery in one week for repeat wound check  - Podiatry and Ortho consulted for necrotic toes; felt to be dry gangrene, betadine to toes daily, no coverings needed  - Follow up with Ortho in 1 week  - Follow up with ECMO MCKENZIE in 2 weeks  - OP Cardiac Rehab     # Acute hypoxemic respiratory failure, resolved   # Klebsiella pneumonia, resolved   # Rib Fractures  # Tobacco Abuse (2PPD)  # COPD exacerbation  Extubated 12/14 to BiPAP. Now sating  well on room air.  Completed antibiotic/steroid course for COPD exacerbation 12/20. Continue to have chest wall pain, likely 2/2 fractures. CXR 12/26 with slightly increased interstitial opacities but no pleural effusions s/p IV lasix 20 mg x 1. Pt now off diuretics  - Tylenol, Gabapentin, Lidocaine patch for rib fracture pain  - Nicotine patch     # Dysphagia, resolved  # Shock liver 2/2 cardiac arrest, resolved  # Severe Malnutrition in context of critical illness  C. Diff negative. Shock liver resolved on 12/13, however slight increase in LFTs since 12/26, now resolved.  NG removed 12/28.   - Diet advanced to regular     # Hypoalbuminemia  # Hypokalemia, resolved   # Hypocalcemia, resolved  ESTER due to cardiac arrest and warming. Started on CRRT on 12/8/21. No CRRT since decannulation 12/13. Creatinine stable, good UOP.  - Continue to encourage PO intake     # Aspiration Pneumonia (klebsiella, staph aureus), resolved   # Leukocytosis, improving   # Lactic acidosis, resolved  Pan culture 12/14 given temp and WBC. Susceptibilities resulted the same as previous. Completed treatment for aspiration pneumonia. Persistent leukocytosis likely stress related but trend is improving. Triggered sepsis protocol 1/3, lactic acid 2.2. Pt given 250 mL bolus IVF, repeat lactic 1.1.  Afebrile. No s/s of infection. Likely dry. Lasix discontinued (as listed above)  Cultures thus far:                 - sputum 12/6: staph aureus, staph dysgalactia, klebsiella                 - sputum 12/7: staph aureus                 - sputum 12/8: staph and klebsiella                 - Sputum 12/9: staph aureus and klebsiella                 - Sputum 12/10: staph aureus and klebsiella                 - sputum 12/13: staph aureus and klebsiella                 - sputum 12/14: staph aureus  Antibiotics              - Vancomycin 12/6 - 12/7 (MRSA negative)              - Zosyn 12/6 - 12/9              - Rocephin 12/9 - 12/16              - Cefepime  12/16 - 12/21              - Flagyl 12/16 - 12/16              - Azithro 12/16 - 12/20   - No further work up needed at this time     # Anemia of critical illness, improving   # Thrombocytopenia, resolved  Hgb stable. Plt stable. No e/o bleeding. HIT screening negative.   - No further work up at this time     # Hyperglycemia likely stress-induced, resolved   No known medical history. Hemoglobin A1c 5.6%. Blood sugars have improved. No insulin needs since 12/23. -153.   - No further work up needed at this time    Pertinent Dates:  VA ECMO 12/6-12/13  IABP 12/13  Extubated 12/14  Transferred to Floor 12/23    Consults:  Neurology  Palliative  Nephrology  Cardiothoracic Surgery  Podiatry  Orthopedics  PT/OT  SP  RD  SW/ RN Care Coordinator    Discharge medications:   Current Discharge Medication List      START taking these medications    Details   acetaminophen (TYLENOL) 325 MG tablet Take 2 tablets (650 mg) by mouth every 4 hours as needed for mild pain or fever  Qty: 90 tablet, Refills: 0    Associated Diagnoses: Closed fracture of multiple ribs of both sides, initial encounter      aspirin (ASA) 81 MG EC tablet Take 1 tablet (81 mg) by mouth daily  Qty: 90 tablet, Refills: 3    Associated Diagnoses: Cardiac arrest (H)      atorvastatin (LIPITOR) 40 MG tablet 1 tablet (40 mg) by Oral or Feeding Tube route every evening  Qty: 90 tablet, Refills: 3    Associated Diagnoses: Cardiac arrest (H); ST elevation myocardial infarction involving left circumflex coronary artery (H)      folic acid (FOLVITE) 1 MG tablet Take 1 tablet (1 mg) by mouth daily  Qty: 90 tablet, Refills: 3    Associated Diagnoses: ETOH abuse      gabapentin (NEURONTIN) 100 MG capsule Take 1 capsule (100 mg) by mouth 3 times daily  Qty: 90 capsule, Refills: 0    Associated Diagnoses: Closed fracture of multiple ribs of both sides, initial encounter      Lidocaine (LIDOCARE) 4 % Patch Place 1 patch onto the skin every 24 hours To prevent lidocaine  toxicity, patient should be patch free for 12 hrs daily.  Qty: 30 patch, Refills: 0    Associated Diagnoses: Closed fracture of multiple ribs of both sides, initial encounter      metoprolol succinate ER (TOPROL-XL) 25 MG 24 hr tablet Take 1 tablet (25 mg) by mouth daily  Qty: 90 tablet, Refills: 3    Associated Diagnoses: Cardiac arrest (H); Ischemic cardiomyopathy; ST elevation myocardial infarction involving left circumflex coronary artery (H)      multivitamin w/minerals (THERA-VIT-M) tablet Take 1 tablet by mouth daily  Qty: 90 tablet, Refills: 3    Associated Diagnoses: ETOH abuse      nicotine (NICODERM CQ) 14 MG/24HR 24 hr patch Place 1 patch onto the skin daily  Qty: 28 patch, Refills: 1    Associated Diagnoses: Tobacco use      povidone-iodine (BETADINE) 10 % topical solution Apply topically daily Apply to Left toes daily  Qty: 263 mL, Refills: 0    Associated Diagnoses: Gangrene of left foot (H)      thiamine (B-1) 100 MG tablet 1 tablet (100 mg) by Oral or Feeding Tube route daily  Qty: 90 tablet, Refills: 3    Associated Diagnoses: ETOH abuse      ticagrelor (BRILINTA) 90 MG tablet 1 tablet (90 mg) by Oral or Feeding Tube route 2 times daily  Qty: 180 tablet, Refills: 3    Associated Diagnoses: Cardiac arrest (H); Ischemic cardiomyopathy; ST elevation myocardial infarction involving left circumflex coronary artery (H)         STOP taking these medications       ibuprofen (ADVIL/MOTRIN) 200 MG tablet Comments:   Reason for Stopping:               Follow-up:  - PCP 7-10 days for post hospitalization visit, med change eval  - ECMO [facility]CK[facility] 2 weeks for cardiac arrest follow up  - CVTS 1 week for right femoral wound check  - Podiatry 1 week for left foot dry gangrene check    Code status:  Full    Condition on discharge  Temp:  [97.3  F (36.3  C)-97.9  F (36.6  C)] 97.3  F (36.3  C)  Pulse:  [88-99] 89  Resp:  [16-18] 18  BP: ()/(53-69) 97/64  SpO2:  [96 %-100 %] 99 %  General: Alert, interactive,  NAD  Eyes: sclera anicteric, EOMI  Neck: no JVD, carotid 2+ bilaterally  Cardiovascular: regular rate and rhythm, normal S1 and S2, no murmurs, gallops, or rubs  Resp: clear to auscultation bilaterally, no rales, wheezes, or rhonchi  GI: Soft, nontender, nondistended. +BS.  No HSM or masses, no rebound or guarding.  Extremities: no edema, no cyanosis or clubbing, dorsalis pedis and posterior tibialis pulses 2+ bilaterally  Skin: Warm and dry, left toes (1-5) dry gangrene, warm up to toes, pt denies N/T, denies pain; right femoral access site with dressing in place, CDI, no erythema around site  Neuro: CN 2-12 intact, moves all extremities equally  Psych: Alert & oriented x 3    Imaging with results:  EKG 12 Lead: 12/23       ECHO: 12/14/21  Interpretation Summary  Left ventricular function is decreased. The ejection fraction is 40-45%  (mildly reduced). Mild diffuse hypokinesis is present.  Global right ventricular function is normal. The right ventricle is normal  size.  This study was compared with the study from 12/12/2021. There has been an  improvement in the biventricular function since the patient has been  decannulated.     Coronary Angiogram: 12/6/21  Conclusion  Refractory VT/VF cardiac arrest.  Cardiogenic shock.  STEMI involving the OM1 as culprit.  Three vessel severe CAD involving the  of the RCA, mid LCx and OM1 severe disease with occlusion of OM1 as culprit in STEMI, and moderate proximal LAD lesions likely hemodynamically significant.  CPR  VA ECMO placement.  IABP placement.  Thermoguard placement with initiation of hypothermia protocol.  Right radial arterial line placement.  PCI with three drug eluting stents to the proximal, mid LCx and OM1.           Plan    Follow bedrest per protocol    Continued medical management and lifestyle modifications for cardiovascular risk factor optimizations.    Admit to inpatient    Continuation of dual antiplatelet therapy for 12 months   Post antiplatelet  therapy of    Aspirin; give 81 mg qd .     Ticagrelor; give 180 mg now and 90 mg BID.      Continue high dose statin therapy  Admit to Cardiac ICU.  CT head, chest, abdomen, and pelvis.  Bilateral duplex arterial US  Echo  Hypothermia protocol  Mechanical ventilation  Sedation per protocol  ECMO  IABP 1:1       Patient Care Team:  No Ref-Primary, Physician as PCP - General    45 minutes spent in discharge, including >50% in counseling and coordination of care, medication review and plan of care recommended on follow up. Questions were answered with patient, and brother Walter, over the phone  It was our pleasure to care for Wolf during this hospitalization. Please do not hesitate to contact me should there be questions regarding the hospital course or discharge plan.      KATHARINA Concepcion, CNP  [facility]  [phone]      Physician Attestation   I, Dave Lizarraga MD, have reviewed and discussed with the advanced practice provider their discharge plan for Bruce Blount. I did not participate in a shared visit by interviewing or examining the patient and this should be billed as an advanced practice provider only discharge.    Dave Lizarraga

## 2021-12-31 NOTE — PROVIDER NOTIFICATION
0120: CSI on call: pt triggered sepsis BPA, refusing lactic acid blood draw and repeat BP.     Lactic acid retimed to 0400 by provider

## 2022-01-01 ENCOUNTER — APPOINTMENT (OUTPATIENT)
Dept: GENERAL RADIOLOGY | Facility: CLINIC | Age: 57
End: 2022-01-01
Attending: NURSE PRACTITIONER
Payer: COMMERCIAL

## 2022-01-01 LAB
ALBUMIN SERPL-MCNC: 2.2 G/DL (ref 3.4–5)
ALP SERPL-CCNC: 94 U/L (ref 40–150)
ALT SERPL W P-5'-P-CCNC: 60 U/L (ref 0–70)
ANION GAP SERPL CALCULATED.3IONS-SCNC: 9 MMOL/L (ref 3–14)
AST SERPL W P-5'-P-CCNC: 28 U/L (ref 0–45)
BILIRUB SERPL-MCNC: 0.3 MG/DL (ref 0.2–1.3)
BUN SERPL-MCNC: 14 MG/DL (ref 7–30)
CALCIUM SERPL-MCNC: 8.2 MG/DL (ref 8.5–10.1)
CHLORIDE BLD-SCNC: 108 MMOL/L (ref 94–109)
CO2 SERPL-SCNC: 22 MMOL/L (ref 20–32)
CREAT SERPL-MCNC: 0.68 MG/DL (ref 0.66–1.25)
ERYTHROCYTE [DISTWIDTH] IN BLOOD BY AUTOMATED COUNT: 14.8 % (ref 10–15)
GFR SERPL CREATININE-BSD FRML MDRD: >90 ML/MIN/1.73M2
GLUCOSE BLD-MCNC: 101 MG/DL (ref 70–99)
HCT VFR BLD AUTO: 26.3 % (ref 40–53)
HGB BLD-MCNC: 8.4 G/DL (ref 13.3–17.7)
LACTATE SERPL-SCNC: 1.1 MMOL/L (ref 0.7–2)
MAGNESIUM SERPL-MCNC: 1.9 MG/DL (ref 1.6–2.3)
MCH RBC QN AUTO: 31.2 PG (ref 26.5–33)
MCHC RBC AUTO-ENTMCNC: 31.9 G/DL (ref 31.5–36.5)
MCV RBC AUTO: 98 FL (ref 78–100)
PLATELET # BLD AUTO: 472 10E3/UL (ref 150–450)
POTASSIUM BLD-SCNC: 3.7 MMOL/L (ref 3.4–5.3)
PROT SERPL-MCNC: 6.7 G/DL (ref 6.8–8.8)
RBC # BLD AUTO: 2.69 10E6/UL (ref 4.4–5.9)
SODIUM SERPL-SCNC: 139 MMOL/L (ref 133–144)
WBC # BLD AUTO: 14 10E3/UL (ref 4–11)

## 2022-01-01 PROCEDURE — 250N000011 HC RX IP 250 OP 636: Performed by: NURSE PRACTITIONER

## 2022-01-01 PROCEDURE — 250N000013 HC RX MED GY IP 250 OP 250 PS 637: Performed by: INTERNAL MEDICINE

## 2022-01-01 PROCEDURE — 250N000013 HC RX MED GY IP 250 OP 250 PS 637: Performed by: NURSE PRACTITIONER

## 2022-01-01 PROCEDURE — 250N000013 HC RX MED GY IP 250 OP 250 PS 637: Performed by: STUDENT IN AN ORGANIZED HEALTH CARE EDUCATION/TRAINING PROGRAM

## 2022-01-01 PROCEDURE — 250N000013 HC RX MED GY IP 250 OP 250 PS 637: Performed by: PHYSICIAN ASSISTANT

## 2022-01-01 PROCEDURE — 80053 COMPREHEN METABOLIC PANEL: CPT | Performed by: INTERNAL MEDICINE

## 2022-01-01 PROCEDURE — 120N000003 HC R&B IMCU UMMC

## 2022-01-01 PROCEDURE — 83735 ASSAY OF MAGNESIUM: CPT | Performed by: NURSE PRACTITIONER

## 2022-01-01 PROCEDURE — 99232 SBSQ HOSP IP/OBS MODERATE 35: CPT | Performed by: NURSE PRACTITIONER

## 2022-01-01 PROCEDURE — 71045 X-RAY EXAM CHEST 1 VIEW: CPT | Mod: 26 | Performed by: RADIOLOGY

## 2022-01-01 PROCEDURE — 36415 COLL VENOUS BLD VENIPUNCTURE: CPT | Performed by: INTERNAL MEDICINE

## 2022-01-01 PROCEDURE — 71045 X-RAY EXAM CHEST 1 VIEW: CPT

## 2022-01-01 PROCEDURE — 85027 COMPLETE CBC AUTOMATED: CPT | Performed by: INTERNAL MEDICINE

## 2022-01-01 PROCEDURE — 83605 ASSAY OF LACTIC ACID: CPT | Performed by: INTERNAL MEDICINE

## 2022-01-01 PROCEDURE — 82040 ASSAY OF SERUM ALBUMIN: CPT | Performed by: INTERNAL MEDICINE

## 2022-01-01 RX ORDER — POTASSIUM CHLORIDE 750 MG/1
10 TABLET, EXTENDED RELEASE ORAL ONCE
Status: COMPLETED | OUTPATIENT
Start: 2022-01-01 | End: 2022-01-01

## 2022-01-01 RX ORDER — GABAPENTIN 100 MG/1
100 CAPSULE ORAL 3 TIMES DAILY
Status: DISCONTINUED | OUTPATIENT
Start: 2022-01-01 | End: 2022-01-03 | Stop reason: HOSPADM

## 2022-01-01 RX ADMIN — POTASSIUM CHLORIDE 10 MEQ: 750 TABLET, EXTENDED RELEASE ORAL at 11:20

## 2022-01-01 RX ADMIN — FOLIC ACID 1 MG: 1 TABLET ORAL at 08:22

## 2022-01-01 RX ADMIN — LIDOCAINE 1 PATCH: 560 PATCH PERCUTANEOUS; TOPICAL; TRANSDERMAL at 20:14

## 2022-01-01 RX ADMIN — ASPIRIN 81 MG CHEWABLE TABLET 81 MG: 81 TABLET CHEWABLE at 08:18

## 2022-01-01 RX ADMIN — ACETAMINOPHEN 650 MG: 325 TABLET, FILM COATED ORAL at 01:26

## 2022-01-01 RX ADMIN — TICAGRELOR 90 MG: 90 TABLET ORAL at 08:23

## 2022-01-01 RX ADMIN — NICOTINE 1 PATCH: 14 PATCH, EXTENDED RELEASE TRANSDERMAL at 08:25

## 2022-01-01 RX ADMIN — QUETIAPINE FUMARATE 25 MG: 25 TABLET ORAL at 01:26

## 2022-01-01 RX ADMIN — GABAPENTIN 100 MG: 100 CAPSULE ORAL at 20:14

## 2022-01-01 RX ADMIN — ACETAMINOPHEN 650 MG: 325 TABLET, FILM COATED ORAL at 17:35

## 2022-01-01 RX ADMIN — HEPARIN SODIUM 5000 UNITS: 5000 INJECTION, SOLUTION INTRAVENOUS; SUBCUTANEOUS at 13:58

## 2022-01-01 RX ADMIN — METOPROLOL SUCCINATE 25 MG: 25 TABLET, EXTENDED RELEASE ORAL at 08:23

## 2022-01-01 RX ADMIN — HEPARIN SODIUM 5000 UNITS: 5000 INJECTION, SOLUTION INTRAVENOUS; SUBCUTANEOUS at 07:03

## 2022-01-01 RX ADMIN — TICAGRELOR 90 MG: 90 TABLET ORAL at 20:14

## 2022-01-01 RX ADMIN — Medication 1 TABLET: at 08:23

## 2022-01-01 RX ADMIN — Medication 10 MG: at 22:42

## 2022-01-01 RX ADMIN — GABAPENTIN 100 MG: 100 CAPSULE ORAL at 13:58

## 2022-01-01 RX ADMIN — ATORVASTATIN CALCIUM 40 MG: 40 TABLET, FILM COATED ORAL at 20:14

## 2022-01-01 RX ADMIN — HEPARIN SODIUM 5000 UNITS: 5000 INJECTION, SOLUTION INTRAVENOUS; SUBCUTANEOUS at 22:42

## 2022-01-01 RX ADMIN — ACETAMINOPHEN 650 MG: 325 TABLET, FILM COATED ORAL at 22:42

## 2022-01-01 RX ADMIN — THIAMINE HCL TAB 100 MG 100 MG: 100 TAB at 08:22

## 2022-01-01 RX ADMIN — ACETAMINOPHEN 650 MG: 325 TABLET, FILM COATED ORAL at 09:45

## 2022-01-01 RX ADMIN — FUROSEMIDE 20 MG: 20 TABLET ORAL at 08:23

## 2022-01-01 RX ADMIN — ACETAMINOPHEN 650 MG: 325 TABLET, FILM COATED ORAL at 05:36

## 2022-01-01 ASSESSMENT — ACTIVITIES OF DAILY LIVING (ADL)
ADLS_ACUITY_SCORE: 7
ADLS_ACUITY_SCORE: 7
ADLS_ACUITY_SCORE: 5
ADLS_ACUITY_SCORE: 7
ADLS_ACUITY_SCORE: 5
ADLS_ACUITY_SCORE: 5
ADLS_ACUITY_SCORE: 7
ADLS_ACUITY_SCORE: 5
ADLS_ACUITY_SCORE: 7
ADLS_ACUITY_SCORE: 5
ADLS_ACUITY_SCORE: 7
ADLS_ACUITY_SCORE: 5
ADLS_ACUITY_SCORE: 7
ADLS_ACUITY_SCORE: 5
ADLS_ACUITY_SCORE: 7

## 2022-01-01 ASSESSMENT — MIFFLIN-ST. JEOR: SCORE: 1418.38

## 2022-01-01 NOTE — PLAN OF CARE
Neuro: A&Ox4.   Cardiac: SR with PVCs. VSS.   Respiratory: Sating >95% on RA. LS clear  GI/: Adequate urine output. BM X1 per patient report  Diet/appetite: Tolerating regular diet. Eating well.  Activity:  Independent in room, steady gait  Pain: Lower back and left toe pain improved with gabapentin and PRN tylenol  Skin: No new deficits noted. Right groin dressing CDI  LDA's: PIV - SL    Plan: Continue with POC. Notify primary team with changes.

## 2022-01-01 NOTE — PLAN OF CARE
Neuro: A&Ox4. Pt became more irritable as the night progressed d/t discomfort from back pain and inability to fall asleep in combination of pt care assessments. RN attempted to cluster cares and coordinate with pt to allow for more sleep between cares/assessments.   Cardiac: SR with HR low-high 90's. VSS.   Respiratory: RA. Oxygen saturation >94%. GUZMAN.  GI/: Adequate urine output. No BM this shift.   Diet/appetite: Regular diet.   Activity:  SBA. Independently moves and repositions self in bed.  Pain: PRN Tylenol administered x3 with little improvement.   Skin: No new deficits noted. R groin dressing change completed this evening per plan of care.  LDA's: PIV.    Plan: Continue with POC. Notify primary team with changes.

## 2022-01-01 NOTE — PLAN OF CARE
Patient is A&O x 4, in no distress on room air. Hemodynamically stable with SBP 90s, HR 90-100s. He c/o 5/10 back ache/soreness that is slightly relieved with tylenol. Poor appetite. Steady gait walking to bathroom. He denies any lightheadedness, dizziness, nausea/vomitting, or SOB. K 3.7 - potassium replacement given. Left toes necrotic - patient c/o numbness only in toes, CMS intact, pulses palpable, lotion applied.     1130: Pt c/o unrelieved back ache and left toe soreness, currently receiving tylenol Q4 hrs, CSI contacted - order was placed for gabapentin    Right groin site with wet gauze dressing changed at 1400.  Bedside report given to Sherri WEBSTER at 1500.

## 2022-01-01 NOTE — PROGRESS NOTES
Interventional Cardiology Progress Note    Assessment & Plan:  Bruce Blount is a 56 year old male with PMHx current tobacco use and ETOH abuse who presented to [facility] 12/6 after out of hospital VF arrest. Coronary angiogram revealed  of RCA and acute culprit lesion of LCx/OM1, s/p PCI to pLCx, mid LCx, and OM1 with TERRY. Decannulated 12/13. IABP removed 12/13. Extubated 12/14. Transferred out of ICU on 12/23. Ongoing hospital issues include delirium.      Today's plan:   - Continue to hold lisinopril due to soft BPs  - Gabapentin for left foot and back pain  - Encourage use of IS  - SW to assist with discharge planning. As patient improves, may qualify for discharge home? Will discuss with OT  - CVTS following for slight superficial dehiscence     # Anoxic brain injury, improving   # Delirium, resolved  # Hx of possible ETOH abuse per family  S/p therapeutic hypothermia. Extubated 12/14. Initial head CT without acute pathology.  Delirium was initially difficult to manage but now his mentation seems to be improving. 1:1 sitter removed on 12/25/21. Scheduled haldol discontinued 12/28.    - continue melatonin 10mg HS  - Seroquel to 25 mg prn at bedtime  - Discontinue prn haldol  - Folate, Thiamine and multi vitamin for ETOH abuse  - PT/OT recs TCU  - SLUMS 4/30 completed when patient on large doses of Seroquel and Haldol, delirium as resolved, pt A/O x 4. OT repeat SLUMS      # Refractory VF cardiac arrest 2/2 secondary to STEMI, resolved  # Cardiogenic shock requiring VA ECMO, resolved  # Ischemic Cardiomyopathy (EF 40-45%)  # CAD s/p PCI pLCx, mLCx, OM1, residual  of RCA  # Hx HTN  # HLD  # Nonsustained VT  # Right groin superficial dehiscence   S/p Peripheral VA ECMO inserted via RCFA and RCFV. Decannulated 12/13. IABP removed 12/13. R groin sutures removed 12/28 by CVTS with subsequent superficial dehiscence. Deeper layers remain intact and well healed without signs of hematoma beneath. No signs of  infection. Packed with wet gauze.     - DAPT: continue ASA 81mg and ticagrelor 90mg BID   - Statin: continue atorvastatin 40mg daily   - BB: continue Toprol XL 25 mg daily    - ACE: hold lisinopril 2.5 mg daily due to soft BPs, may need to start as OP  - Volume status: appears euvolemic on exam, continue lasix 20 mg daily  - Strict I&O's  - Replace lytes per protocol     - R groin BID dressing changes, CVTS following       # Acute hypoxemic respiratory failure, resolved   # Klebsiella pneumonia, resolved   # Rib Fractures  # Tobacco Abuse (2PPD)  # COPD exacerbation  Extubated 12/14 to BiPAP. Now sating well on room air.  Completed antibiotic/steroid course for COPD exacerbation 12/20. Continue to have chest wall pain, likely 2/2 fractures. CXR 12/26 with slightly increased interstitial opacities but no pleural effusions s/p IV lasix 20 mg x 1, no on oral Lasix  - RT pulmonary hygiene  - Duoneb Q6 hours while awake  - Tylenol, Lidocaine patch for rib fracture pain     # Dysphagia, resolved  # Shock liver 2/2 cardiac arrest, resolved  # Severe Malnutrition in context of critical illness  C. Diff negative. Shock liver resolved on 12/13, however slight increase in LFTs since 12/26, now improving. Patient tolerating DD5. Calorie counts remain low although patient reports irritation with NG tube which makes it difficult to eat. NG removed 12/28.   - SLP following  - Diet advanced to regular  - Continue calorie count, encourage oral intake      # Hypoalbuminemia  # Hypokalemia, resolved   # Hypocalcemia, resolved  ESTER due to cardiac arrest and warming. Started on CRRT on 12/8/21. No CRRT since decannulation 12/13. Creatinine stable, good UOP.  - Continue to encourage PO intake  - Replace lytes per protocol   - Trend CMP      # Aspiration Pneumonia (klebsiella, staph aureus), resolved   # Leukocytosis, stable  # Lactic acidosis, resolved  # Superficial dehiscence of ECMO decann site  Pan culture 12/14 given temp and  WBC. Susceptibilities resulted the same as previous. Completed treatment for aspiration pneumonia. Persistent leukocytosis likely stress related but trend is improving. Triggered sepsis protocol 12/31, lactic acid normal. Afebrile. No s/s of infection.   Cultures thus far:                 - sputum 12/6: staph aureus, staph dysgalactia, klebsiella                 - sputum 12/7: staph aureus                 - sputum 12/8: staph and klebsiella                 - Sputum 12/9: staph aureus and klebsiella                 - Sputum 12/10: staph aureus and klebsiella                 - sputum 12/13: staph aureus and klebsiella                 - sputum 12/14: staph aureus  Antibiotics              - Vancomycin 12/6 - 12/7 (MRSA negative)              - Zosyn 12/6 - 12/9              - Rocephin 12/9 - 12/16              - Cefepime 12/16 - 12/21              - Flagyl 12/16 - 12/16              - Azithro 12/16 - 12/20   - Trend CBC  - Continue to monitor for infection     # Anemia of critical illness, improving   # Thrombocytopenia, resolved  Hgb stable. Plt stable. No e/o bleeding. HIT screening negative.   - Trend CBC      #Hyperglycemia likely stress-induced, resolved   No known medical history. Hemoglobin A1c 5.6%. Blood sugars have improved. No insulin needs since 12/23. -153.   - stop POCT glucose checks  - continue hypoglycemia protocol if needed    Prophylaxis:  DVT: subcutaneous heparin    Diet: Advanced to regular  Activity: Up with assist as tolerated  Code Status: Full  Disposition: TCU early next week    Patient discussed w/ Dr. Pat Hernandez, APRN, CNP  [facility]  Interventional Cardiology  [phone]      Interval History:  - No acute events overnight  - Pt reports feeling well this am  - He denies any lightheadedness, dizziness, SOB    Most recent vital signs:  BP 97/67 (BP Location: Left arm)   Pulse 97   Temp 97.6  F (36.4  C) (Oral)   Resp 18   Ht 1.753 m (5'  "9\")   Wt 59.8 kg (131 lb 13.4 oz)   SpO2 95%   BMI 19.47 kg/m    Temp:  [97.4  F (36.3  C)-98.2  F (36.8  C)] 97.6  F (36.4  C)  Pulse:  [93-98] 97  Resp:  [16-18] 18  BP: ()/(59-75) 97/67  Cuff Mean (mmHg):  [82] 82  SpO2:  [92 %-96 %] 95 %  Wt Readings from Last 2 Encounters:   01/01/22 59.8 kg (131 lb 13.4 oz)   09/23/19 77.5 kg (170 lb 12.8 oz)       Intake/Output Summary (Last 24 hours) at 12/31/2021 1142  Last data filed at 12/31/2021 0825  Gross per 24 hour   Intake 240 ml   Output 750 ml   Net -510 ml       Physical exam:  General: In bed, in NAD  HEENT: EOMI, PERRLA, no scleral icterus or injection  CARDIAC: RRR, no m/r/g appreciated. Peripheral pulses 2+  RESP: CTAB, diminished in bases  GI: NABS, NT/ND, no guarding or rebound  EXTREMITIES: NO LE edema, pulses 2+. Right femoral access site CDI, wet-dry dressing in place, no erythema, no bruising, non-tender to touch  SKIN: No acute lesions appreciated  NEURO: Alert and oriented X3    Labs (Past three days):  CBC  Recent Labs   Lab 01/01/22  0531 12/31/21  0441 12/30/21  1046 12/29/21  1007   WBC 14.0* 15.0* 13.9* 19.1*   RBC 2.69* 2.98* 2.86* 2.90*   HGB 8.4* 9.3* 9.0* 9.1*   HCT 26.3* 29.2* 27.7* 28.4*   MCV 98 98 97 98   MCH 31.2 31.2 31.5 31.4   MCHC 31.9 31.8 32.5 32.0   RDW 14.8 15.0 14.9 15.1*   * 534* 519* 580*     BMP  Recent Labs   Lab 01/01/22  0531 12/31/21  0441 12/30/21  1123 12/30/21  1046 12/29/21  1051 12/29/21  1007 12/26/21  0731 12/26/21  0619    136  --  136  --  137   < > 143   POTASSIUM 3.7 3.9  --  3.7  --  3.6   < > 3.2*   CHLORIDE 108 102  --  107  --  106   < > 111*   CO2 22 25  --  25  --  22   < > 25   ANIONGAP 9 9  --  4  --  9   < > 7   * 107*  101* 126* 110*  104*   < > 116*  117*   < > 127*  123*   BUN 14 17  --  16  --  21   < > 25   CR 0.68 0.72  --  0.66  --  0.71   < > 0.68   GFRESTIMATED >90 >90  --  >90  --  >90   < > >90   BRIDGETTE 8.2* 8.5  --  8.5  --  8.7   < > 8.3*   MAG 1.9 2.0  --  " 2.0  --  2.0   < > 2.0   PHOS  --   --   --   --   --   --   --  3.9    < > = values in this interval not displayed.     Troponins: No results found for: TROPI, TROPONIN, TROPR, TROPN     INRNo lab results found in last 7 days.  Liver panel  Recent Labs   Lab 01/01/22  0531 12/31/21  0441 12/30/21  1046 12/29/21  1007   PROTTOTAL 6.7* 7.2 7.2 7.4   ALBUMIN 2.2* 2.4* 2.3* 2.4*   BILITOTAL 0.3 0.4 0.3 0.5   ALKPHOS 94 102 104 108   AST 28 32 28 39   ALT 60 71* 74* 92*       Imaging/procedure results:  EKG 12 Lead: 12/23       ECHO: 12/14/21  Interpretation Summary  Left ventricular function is decreased. The ejection fraction is 40-45%  (mildly reduced). Mild diffuse hypokinesis is present.  Global right ventricular function is normal. The right ventricle is normal  size.  This study was compared with the study from 12/12/2021. There has been an  improvement in the biventricular function since the patient has been  decannulated.     Coronary Angiogram: 12/6/21  Conclusion  Refractory VT/VF cardiac arrest.  Cardiogenic shock.  STEMI involving the OM1 as culprit.  Three vessel severe CAD involving the  of the RCA, mid LCx and OM1 severe disease with occlusion of OM1 as culprit in STEMI, and moderate proximal LAD lesions likely hemodynamically significant.  CPR  VA ECMO placement.  IABP placement.  Thermoguard placement with initiation of hypothermia protocol.  Right radial arterial line placement.  PCI with three drug eluting stents to the proximal, mid LCx and OM1.           Plan    Follow bedrest per protocol    Continued medical management and lifestyle modifications for cardiovascular risk factor optimizations.    Admit to inpatient    Continuation of dual antiplatelet therapy for 12 months   Post antiplatelet therapy of    Aspirin; give 81 mg qd .     Ticagrelor; give 180 mg now and 90 mg BID.      Continue high dose statin therapy  Admit to Cardiac ICU.  CT head, chest, abdomen, and pelvis.  Bilateral duplex  arterial US  Echo  Hypothermia protocol  Mechanical ventilation  Sedation per protocol  ECMO  IABP 1:1

## 2022-01-02 ENCOUNTER — APPOINTMENT (OUTPATIENT)
Dept: GENERAL RADIOLOGY | Facility: CLINIC | Age: 57
End: 2022-01-02
Attending: NURSE PRACTITIONER
Payer: COMMERCIAL

## 2022-01-02 LAB
ALBUMIN SERPL-MCNC: 2.2 G/DL (ref 3.4–5)
ALP SERPL-CCNC: 90 U/L (ref 40–150)
ALT SERPL W P-5'-P-CCNC: 60 U/L (ref 0–70)
ANION GAP SERPL CALCULATED.3IONS-SCNC: 7 MMOL/L (ref 3–14)
AST SERPL W P-5'-P-CCNC: 33 U/L (ref 0–45)
BILIRUB SERPL-MCNC: 0.2 MG/DL (ref 0.2–1.3)
BUN SERPL-MCNC: 14 MG/DL (ref 7–30)
CALCIUM SERPL-MCNC: 8.3 MG/DL (ref 8.5–10.1)
CHLORIDE BLD-SCNC: 106 MMOL/L (ref 94–109)
CO2 SERPL-SCNC: 24 MMOL/L (ref 20–32)
CREAT SERPL-MCNC: 0.68 MG/DL (ref 0.66–1.25)
ERYTHROCYTE [DISTWIDTH] IN BLOOD BY AUTOMATED COUNT: 15.1 % (ref 10–15)
GFR SERPL CREATININE-BSD FRML MDRD: >90 ML/MIN/1.73M2
GLUCOSE BLD-MCNC: 103 MG/DL (ref 70–99)
GLUCOSE BLD-MCNC: 108 MG/DL (ref 70–99)
HCT VFR BLD AUTO: 26.5 % (ref 40–53)
HGB BLD-MCNC: 8.3 G/DL (ref 13.3–17.7)
LACTATE SERPL-SCNC: 2.2 MMOL/L (ref 0.7–2)
MAGNESIUM SERPL-MCNC: 1.9 MG/DL (ref 1.6–2.3)
MCH RBC QN AUTO: 31.2 PG (ref 26.5–33)
MCHC RBC AUTO-ENTMCNC: 31.3 G/DL (ref 31.5–36.5)
MCV RBC AUTO: 100 FL (ref 78–100)
PLATELET # BLD AUTO: 429 10E3/UL (ref 150–450)
POTASSIUM BLD-SCNC: 3.8 MMOL/L (ref 3.4–5.3)
PROT SERPL-MCNC: 6.4 G/DL (ref 6.8–8.8)
RBC # BLD AUTO: 2.66 10E6/UL (ref 4.4–5.9)
SODIUM SERPL-SCNC: 137 MMOL/L (ref 133–144)
WBC # BLD AUTO: 12.3 10E3/UL (ref 4–11)

## 2022-01-02 PROCEDURE — 258N000003 HC RX IP 258 OP 636: Performed by: STUDENT IN AN ORGANIZED HEALTH CARE EDUCATION/TRAINING PROGRAM

## 2022-01-02 PROCEDURE — 94640 AIRWAY INHALATION TREATMENT: CPT

## 2022-01-02 PROCEDURE — 71045 X-RAY EXAM CHEST 1 VIEW: CPT

## 2022-01-02 PROCEDURE — 82947 ASSAY GLUCOSE BLOOD QUANT: CPT | Performed by: INTERNAL MEDICINE

## 2022-01-02 PROCEDURE — 36415 COLL VENOUS BLD VENIPUNCTURE: CPT

## 2022-01-02 PROCEDURE — 250N000013 HC RX MED GY IP 250 OP 250 PS 637: Performed by: NURSE PRACTITIONER

## 2022-01-02 PROCEDURE — 83735 ASSAY OF MAGNESIUM: CPT | Performed by: NURSE PRACTITIONER

## 2022-01-02 PROCEDURE — 71045 X-RAY EXAM CHEST 1 VIEW: CPT | Mod: 26 | Performed by: RADIOLOGY

## 2022-01-02 PROCEDURE — 80053 COMPREHEN METABOLIC PANEL: CPT | Performed by: INTERNAL MEDICINE

## 2022-01-02 PROCEDURE — 120N000003 HC R&B IMCU UMMC

## 2022-01-02 PROCEDURE — 36415 COLL VENOUS BLD VENIPUNCTURE: CPT | Performed by: INTERNAL MEDICINE

## 2022-01-02 PROCEDURE — 250N000011 HC RX IP 250 OP 636: Performed by: NURSE PRACTITIONER

## 2022-01-02 PROCEDURE — 999N000128 HC STATISTIC PERIPHERAL IV START W/O US GUIDANCE

## 2022-01-02 PROCEDURE — 250N000013 HC RX MED GY IP 250 OP 250 PS 637: Performed by: STUDENT IN AN ORGANIZED HEALTH CARE EDUCATION/TRAINING PROGRAM

## 2022-01-02 PROCEDURE — 99232 SBSQ HOSP IP/OBS MODERATE 35: CPT | Performed by: NURSE PRACTITIONER

## 2022-01-02 PROCEDURE — 250N000009 HC RX 250: Performed by: NURSE PRACTITIONER

## 2022-01-02 PROCEDURE — 83605 ASSAY OF LACTIC ACID: CPT

## 2022-01-02 PROCEDURE — 999N000157 HC STATISTIC RCP TIME EA 10 MIN

## 2022-01-02 PROCEDURE — 250N000013 HC RX MED GY IP 250 OP 250 PS 637: Performed by: PHYSICIAN ASSISTANT

## 2022-01-02 PROCEDURE — 85027 COMPLETE CBC AUTOMATED: CPT | Performed by: INTERNAL MEDICINE

## 2022-01-02 PROCEDURE — 250N000013 HC RX MED GY IP 250 OP 250 PS 637: Performed by: INTERNAL MEDICINE

## 2022-01-02 RX ORDER — CARBOXYMETHYLCELLULOSE SODIUM 5 MG/ML
1 SOLUTION/ DROPS OPHTHALMIC
Status: DISCONTINUED | OUTPATIENT
Start: 2022-01-02 | End: 2022-01-03 | Stop reason: HOSPADM

## 2022-01-02 RX ORDER — HYDROXYZINE HYDROCHLORIDE 25 MG/1
25 TABLET, FILM COATED ORAL 3 TIMES DAILY PRN
Status: DISCONTINUED | OUTPATIENT
Start: 2022-01-02 | End: 2022-01-03 | Stop reason: HOSPADM

## 2022-01-02 RX ORDER — OXYCODONE HYDROCHLORIDE 5 MG/1
5 TABLET ORAL
Status: DISCONTINUED | OUTPATIENT
Start: 2022-01-02 | End: 2022-01-03

## 2022-01-02 RX ORDER — IPRATROPIUM BROMIDE AND ALBUTEROL SULFATE 2.5; .5 MG/3ML; MG/3ML
3 SOLUTION RESPIRATORY (INHALATION)
Status: DISCONTINUED | OUTPATIENT
Start: 2022-01-02 | End: 2022-01-02

## 2022-01-02 RX ORDER — POTASSIUM CHLORIDE 750 MG/1
10 TABLET, EXTENDED RELEASE ORAL ONCE
Status: COMPLETED | OUTPATIENT
Start: 2022-01-02 | End: 2022-01-02

## 2022-01-02 RX ADMIN — ACETAMINOPHEN 650 MG: 325 TABLET, FILM COATED ORAL at 23:00

## 2022-01-02 RX ADMIN — TICAGRELOR 90 MG: 90 TABLET ORAL at 08:32

## 2022-01-02 RX ADMIN — FOLIC ACID 1 MG: 1 TABLET ORAL at 08:32

## 2022-01-02 RX ADMIN — ACETAMINOPHEN 650 MG: 325 TABLET, FILM COATED ORAL at 10:07

## 2022-01-02 RX ADMIN — HEPARIN SODIUM 5000 UNITS: 5000 INJECTION, SOLUTION INTRAVENOUS; SUBCUTANEOUS at 07:12

## 2022-01-02 RX ADMIN — POTASSIUM CHLORIDE 10 MEQ: 750 TABLET, EXTENDED RELEASE ORAL at 13:40

## 2022-01-02 RX ADMIN — ACETAMINOPHEN 650 MG: 325 TABLET, FILM COATED ORAL at 13:40

## 2022-01-02 RX ADMIN — THIAMINE HCL TAB 100 MG 100 MG: 100 TAB at 08:32

## 2022-01-02 RX ADMIN — Medication 1 TABLET: at 08:32

## 2022-01-02 RX ADMIN — Medication 10 MG: at 23:00

## 2022-01-02 RX ADMIN — FUROSEMIDE 20 MG: 20 TABLET ORAL at 08:32

## 2022-01-02 RX ADMIN — HEPARIN SODIUM 5000 UNITS: 5000 INJECTION, SOLUTION INTRAVENOUS; SUBCUTANEOUS at 22:19

## 2022-01-02 RX ADMIN — METOPROLOL SUCCINATE 25 MG: 25 TABLET, EXTENDED RELEASE ORAL at 08:32

## 2022-01-02 RX ADMIN — HYDROXYZINE HYDROCHLORIDE 25 MG: 25 TABLET, FILM COATED ORAL at 04:37

## 2022-01-02 RX ADMIN — TICAGRELOR 90 MG: 90 TABLET ORAL at 20:15

## 2022-01-02 RX ADMIN — SODIUM CHLORIDE, POTASSIUM CHLORIDE, SODIUM LACTATE AND CALCIUM CHLORIDE 250 ML: 600; 310; 30; 20 INJECTION, SOLUTION INTRAVENOUS at 23:00

## 2022-01-02 RX ADMIN — ACETAMINOPHEN 650 MG: 325 TABLET, FILM COATED ORAL at 02:45

## 2022-01-02 RX ADMIN — NICOTINE 1 PATCH: 14 PATCH, EXTENDED RELEASE TRANSDERMAL at 08:35

## 2022-01-02 RX ADMIN — IPRATROPIUM BROMIDE AND ALBUTEROL SULFATE 3 ML: .5; 2.5 SOLUTION RESPIRATORY (INHALATION) at 09:58

## 2022-01-02 RX ADMIN — ATORVASTATIN CALCIUM 40 MG: 40 TABLET, FILM COATED ORAL at 20:15

## 2022-01-02 RX ADMIN — GABAPENTIN 100 MG: 100 CAPSULE ORAL at 20:15

## 2022-01-02 RX ADMIN — LIDOCAINE 1 PATCH: 560 PATCH PERCUTANEOUS; TOPICAL; TRANSDERMAL at 20:16

## 2022-01-02 RX ADMIN — ASPIRIN 81 MG CHEWABLE TABLET 81 MG: 81 TABLET CHEWABLE at 08:32

## 2022-01-02 RX ADMIN — HEPARIN SODIUM 5000 UNITS: 5000 INJECTION, SOLUTION INTRAVENOUS; SUBCUTANEOUS at 13:40

## 2022-01-02 RX ADMIN — GABAPENTIN 100 MG: 100 CAPSULE ORAL at 13:40

## 2022-01-02 RX ADMIN — HYDROXYZINE HYDROCHLORIDE 25 MG: 25 TABLET, FILM COATED ORAL at 20:27

## 2022-01-02 RX ADMIN — GABAPENTIN 100 MG: 100 CAPSULE ORAL at 08:32

## 2022-01-02 ASSESSMENT — ACTIVITIES OF DAILY LIVING (ADL)
ADLS_ACUITY_SCORE: 5

## 2022-01-02 ASSESSMENT — MIFFLIN-ST. JEOR: SCORE: 1455.38

## 2022-01-02 NOTE — PROGRESS NOTES
"RT went to give 9:30am neb and pt was reluctant to do. The RT explained why it was ordered and the pt stated \"they are just trying to get more money.\" He did agree to take the neb despite this. However, when time came for next neb treatment pt stated \"My lungs are going to be wheezy because I am a smoker. I am breathing fine and I do not need them.\" RT explained the benefits of the neb but pt declined and wants them discontinued. Pt was notified that the PRN order would be in place and to ask if feeling short of breath. Pt assured RT he would not need it. RN on shift was notified and RT will discontinue scheduled order.    RT will continue to monitor and follow.     Antoine Brown, RRT.  "

## 2022-01-02 NOTE — PROVIDER NOTIFICATION
Called [facility] at 0328 (1/2/22) regarding patient c/o pain 20/10 on numeric pain scale. Pain located on back that radiates to chest bilaterally. Pt states unable to sleep or position self to be comfortable. Pt requesting for PRN pain medication 2-3 hours after Tylenol administration each time overnight.   Response: MD spoke to patient at bedside and reviewed pt chart. Atarax PRN ordered, see eMAR.     Called [facility] at 0434 (1/2/22): Patient had 5 beat of V-tach at 0220 (1/2/22) and 5 beats of V-tach again at 0417 (1/2/22).   - morning labs to be drawn and pending

## 2022-01-02 NOTE — PROGRESS NOTES
"Care Management Follow Up    Length of Stay (days): 27    Expected Discharge Date: 01/03/2022     Concerns to be Addressed:    Disposition planning   Patient plan of care discussed at interdisciplinary rounds: Yes    Anticipated Discharge Disposition:  At this time, OT and Physical Therapy are recommending TCU placement     Anticipated Discharge Services:  At this time, OT and Physical Therapy are recommending TCU placementAnticipated Discharge DME:      Patient/family educated on Medicare website which has current facility and service quality ratings:  yes  Education Provided on the Discharge Plan:  yes  Patient/Family in Agreement with the Plan:  yes    Referrals Placed by CM/SW:  Post acute care facility's  Private pay costs discussed: Not applicable at this time    Additional Information:  SW is following pt for discharge planning.  At this time, OT and Physical Therapy are recommending TCU placement.  Nursing notes are indicating that pt is up indep.  Per review of 1/2/2022 progress note entry written by NP, Kitty Hernandez, discharge is anticipated early the week of 1/3/2022.  Ortho is consulted today for necrotic toes and nebs were added for wheezing.  Pt's sitter was discontinued on 12/25/2022.  Pt's anoxic brain injury is reportedly improving.  Per Account notes, pt's MA is effective back to 12/1/2021, PMI number [id number].    PRERNA placed follow up calls to Admissions Coordinator's at the following facility's:  - [facility], PRERNA spoke with Marla who states that they will not be reviewing pt until 1/3/2022 as they are \"swamped with weekend referrals.\"  - [facility], per phone call, voicemail indicates that they are not assessing any referrals between the time period 12/31/2021 through 1/2/2022.  Voice mail indicates that they will review received referrals (pt was referred on 12/31/2021) on 1/3/2022.  Voice mail did not leave an opportunity to leave a message.  - [facility], per phone " call, they do not have anyone working in the Admissions Dept today, SW left a voice mail for Admissions to call in regards to acceptance.    PRERNA will continue to follow for discharge planning.    ROCIO Gama  Social Work, 6A  Phone:  [phone]  Pager:  [phone]  1/2/2022          MAXIMO Chao

## 2022-01-02 NOTE — PLAN OF CARE
Neuro: A&Ox4.   Cardiac: SR/ST. 5 beats of V-tach once at 02:20AM and again at 04:17AM. CSI overnight notified, see provider notification note. Systolic blood pressures have been 's. VSS.   Respiratory: Room air. Oxygen saturation >95%. No SOB or dyspnea reported overnight.   GI/: Adequate urine output. Urinal at bedside.   Diet/appetite: Regular diet.   Activity:  Up ad maria m. Independently moves around and positions self in bed and pivots to chair.   Pain: PRN tylenol administered x2 with some improvement. Pt requested for PRN tylenol after 2-3 hours of administration. C/o pain 20/10 on numeric pain scale each time and expressed unable to fall asleep or get in a comfortable position. RN massaged patient's upper back for 5 minutes with little relief, and pain still ongoing. RN requested for additional pain medication (see provider notification note). PRN atarax administered x1 with some improvement.   Skin: No new deficits noted. Dressing change for groin site completed this evening. Purulent and serosanguinous drainage noted on removed wet guaze packing. Cleansed with wound cleanser and wet to dry dressing completed.   LDA's: PIV.    Plan: Continue with POC. Notify primary team with changes.

## 2022-01-02 NOTE — CONSULTS
[facility] Orthopedic Surgery Consultation    Bruce Blount MRN# [id number]   Age: 56 year old YOB: 1965   Date of Admission: 12/6/2021    Reason for consult: Toe wounds.    Requesting physician: Brayan Thompson*   Level of consult: One-time consult to assist in determining a diagnosis and to recommend an appropriate treatment plan          Assessment and Plan:   Assessment:  Bruce Blount is a 56 year old male with past medical history of tobacco and alcohol use who was hospitalized after VF arrest.  His hospital stay included extensive pressor use and his dry gangrenous toes are likely related to pressors versus thrombosis.  There is no sign of active infection.  He will need outpatient management with podiatry.      Plan:  - Follow-up: If still inpatient on Thursday, will be seen by podiatry at that time.  Message sent to podiatry regarding the need for outpatient follow-up in this patient if he is to discharge prior to Thursday.  - No need for surgical management at this time as is not an active infectious process.  Orthopedics will sign off at this time with continued management from podiatry.    Discussed with Dr. Dennis YOUNG PGY-5.    --  Mansoor Klein MD  Orthopedic Surgery PGY-1  [id number]    Please page me directly via [facility] with any questions/concerns during regular weekday hours before 7pm. If there is no response, if it is a weekend, or if it is during evening hours, then please page the orthopedic surgery resident on call.      Please page me with any questions/concerns during regular weekday hours before 7pm. If there is no response, if it is a weekend, or if it is during evening/night hours, then please page the orthopedic surgery resident on call.         History of Present Illness:   Bruce Blount is a 56-year-old male with history of tobacco use and alcohol abuse who presented to [facility] on 12 6 after a VF arrest.  His hospital course included VA ECMO and extensive pressor use.   Recently he began having dusky toes on his left foot including all 5 digits.  This is progressed to dry gangrene.  He denies any fevers, chills, nausea, vomiting, shortness of breath, fatigue.      A 10 point review of systems was otherwise negative other than as noted in history above.         Past Medical History:   No past medical history on file.    Patient denies any personal history of bleeding disorders, clotting disorders, or adverse reactions to anesthesia.         Past Surgical History:     Past Surgical History:   Procedure Laterality Date     CV ARTERIAL LINE PLACEMENT N/A 12/6/2021    Procedure: Arterial Line Placement;  Surgeon: Brayan Thompson MD;  Location: [facility] CARDIAC CATH LAB     CV ARTERIAL LINE PLACEMENT N/A 12/8/2021    Procedure: Arterial Line Placement;  Surgeon: Brayan Thompson MD;  Location: [facility] CARDIAC CATH LAB     CV CENTRAL VENOUS CATHETER PLACEMENT N/A 12/6/2021    Procedure: Central Venous Catheter Placement;  Surgeon: Brayan Thompson MD;  Location: [facility] CARDIAC CATH LAB     CV CORONARY ANGIOGRAM N/A 12/6/2021    Procedure: CV CORONARY ANGIOGRAM;  Surgeon: Brayan Thompson MD;  Location: [facility] CARDIAC CATH LAB     CV CPR WITH CHEST COMPRESSION N/A 12/6/2021    Procedure: CPR with Chest Compression System;  Surgeon: Brayan Thompson MD;  Location: [facility] CARDIAC CATH LAB     CV EXTRACORPERAL MEMBRANE OXYGENATION N/A 12/6/2021    Procedure: Extracorporeal Membrance Oxygenation;  Surgeon: Brayan Thompson MD;  Location: [facility] CARDIAC CATH LAB     CV PCI STENT DRUG ELUTING N/A 12/6/2021    Procedure: Percutaneous Coronary Intervention Stent Drug Eluting;  Surgeon: Brayan Thompson MD;  Location: [facility] CARDIAC CATH LAB     CV THERAPEUTIC HYPOTHERMIA N/A 12/6/2021    Procedure: Therapeutic Hypothermia;  Surgeon: Brayan Thompson MD;  Location: [facility]  CARDIAC CATH LAB     REMOVE EXTRACORPORAL MEMBRANE OXYGENATOR ADULT N/A 12/13/2021    Procedure: REMOVAL, CANNULA, ADULT, FOR ECMO;  Surgeon: Allen Padilla MD;  Location:  OR            Social History:     Social History     Socioeconomic History     Marital status: Single     Spouse name: Not on file     Number of children: Not on file     Years of education: Not on file     Highest education level: Not on file   Occupational History     Not on file   Tobacco Use     Smoking status: Not on file     Smokeless tobacco: Not on file   Substance and Sexual Activity     Alcohol use: Not on file     Drug use: Not on file     Sexual activity: Not on file   Other Topics Concern     Not on file   Social History Narrative     Not on file     Social Determinants of Health     Financial Resource Strain: Not on file   Food Insecurity: Not on file   Transportation Needs: Not on file   Physical Activity: Not on file   Stress: Not on file   Social Connections: Not on file   Intimate Partner Violence: Not on file   Housing Stability: Not on file             Family History:   No family history on file.    Patient denies known family history of bleeding, clotting, or anesthesia-related complications.            Allergies:   No Known Allergies          Medications:     Prior to Admission medications    Medication Sig Last Dose Taking? Auth Provider   ibuprofen (ADVIL/MOTRIN) 200 MG tablet Take 200 mg by mouth every 4 hours as needed for mild pain   Unknown, Entered By History              Physical Exam:     Vitals:    01/02/22 1103 01/02/22 1529 01/02/22 1902 01/02/22 2000   BP: 98/55 95/69 (!) 88/53 100/64   BP Location: Left arm Left arm Left arm Left arm   Pulse: 94 99 88 96   Resp: 18 18 18 18   Temp: 97.6  F (36.4  C) 97.6  F (36.4  C) 97.4  F (36.3  C) 97.9  F (36.6  C)   TempSrc: Oral Oral Oral Oral   SpO2: 98% 97% 100% 97%   Weight:       Height:           General: alert and oriented, answers questions appropriately,    Neuro: EOM grossly intact  HEENT: atraumatic  Lungs: breathing comfortably on RA  Heart/Cardiovascular: well perfused    Back: Nontender to palpation throughout, no step-offs or deformities appreciated. Bilateral SI joints non-tender.   Pelvis: Stable to AP and lateral compression, non-tender.    Right Upper Extremity:   - No gross deformity, skin intact.  - No significant tenderness to palpation over sternoclavicular joint, clavicle, acromioclavicular joint, shoulder, arm, elbow, forearm, wrist, hand, fingers.  - No pain with ROM shoulder, elbow, wrist.  - Motor intact distally with deltoid, FPL, EPL, and IO 5/5 strength.  - SILT median, ulnar, radial, axillary nerve distributions.  - Radial pulse palpable, fingers warm and well perfused.    Left Upper Extremity:   - No gross deformity, skin intact  - No significant tenderness to palpation over sternoclavicular joint, clavicle, acromioclavicular joint, shoulder, arm, elbow, forearm, wrist, hand, fingers.  - No pain with ROM shoulder, elbow, wrist.  - Motor intact distally with deltoid, FPL, EPL, and IO 5/5 strength.  - SILT median, ulnar, radial, axillary nerve distributions.  - Radial pulse palpable, fingers warm and well perfused.    Right Lower Extremity:   - dry gangrenous toes 1-5. Non-tender. Picture in media tab of chart  - No significant tenderness to palpation over thigh, knee, leg, ankle, foot, toes.  - No pain with ROM hip, knee, ankle.   - Motor intact distally TA, GSC, EHL, FHL with 5/5 strength.  - no sensation in any digit  - DP/PT pulses palpable, toes warm and well perfused.  - Able to straight leg raise.    Left Lower Extremity:   - No gross deformity, skin intact.  - No significant tenderness to palpation over thigh, knee, leg, ankle, foot, toes.  - No pain with ROM hip, knee, ankle.   - Motor intact distally TA, GSC, EHL, FHL with 5/5 strength.  - SILT superficial peroneal, deep peroneal, saphenous, sural, and tibial nerve distributions.   -  DP/PT pulses palpable, toes warm and well perfused.  - Able to straight leg raise.          Imaging:   All imaging independently reviewed.              Labs:   CBC:  Lab Results   Component Value Date    WBC 12.3 (H) 01/02/2022    HGB 8.3 (L) 01/02/2022     01/02/2022    INR 1.14 12/24/2021       BMP:  Lab Results   Component Value Date     01/02/2022    POTASSIUM 3.8 01/02/2022    CHLORIDE 106 01/02/2022    CO2 24 01/02/2022    BUN 14 01/02/2022    CR 0.68 01/02/2022    ANIONGAP 7 01/02/2022    BRIDGETTE 8.3 (L) 01/02/2022     (H) 01/02/2022     (H) 01/02/2022       Inflammatory Markers:  Lab Results   Component Value Date    WBC 12.3 (H) 01/02/2022    .0 (H) 12/15/2021     (H) 12/15/2021

## 2022-01-02 NOTE — PROGRESS NOTES
Interventional Cardiology Progress Note    Assessment & Plan:  Bruce Blount is a 56 year old male with PMHx current tobacco use and ETOH abuse who presented to [facility] 12/6 after out of hospital VF arrest. Coronary angiogram revealed  of RCA and acute culprit lesion of LCx/OM1, s/p PCI to pLCx, mid LCx, and OM1 with TERRY. Decannulated 12/13. IABP removed 12/13. Extubated 12/14. Transferred out of ICU on 12/23. Ongoing hospital issues include delirium.      Today's plan:   - Ortho consult today vs tomorrow for necrotic toes  - Gabapentin for neuropathy/left foot pain  - Wheezing; Add scheduled duonebs to q4h today. CXR if worsens  - Continue to hold lisinopril due to soft BPs  - Encourage use of IS  - SW to assist with discharge planning. As patient improves, may qualify for discharge home? Will discuss with OT  - CVTS following for slight superficial dehiscence at ECMO cannulation site     # Anoxic brain injury, improving   # Delirium, resolved  # Hx of possible ETOH abuse per family  # Acute pain  S/p therapeutic hypothermia. Extubated 12/14. Initial head CT without acute pathology.  Delirium was initially difficult to manage but now his mentation seems to be improving. 1:1 sitter removed on 12/25/21. Scheduled haldol discontinued 12/28.    - continue melatonin 10mg HS  - Seroquel to 25 mg prn at bedtime  - Gabapentin for neuropathy, low dose and monitor for sedating effects  - Folate, Thiamine and multi vitamin for ETOH abuse  - PT/OT recs TCU  - SLUMS 4/30 completed when patient on large doses of Seroquel and Haldol, delirium as resolved, pt A/O x 4. OT repeat SLUMS      # Refractory VF cardiac arrest 2/2 secondary to STEMI, resolved  # Cardiogenic shock requiring VA ECMO, resolved  # Ischemic Cardiomyopathy (EF 40-45%)  # CAD s/p PCI pLCx, mLCx, OM1, residual  of RCA  # Hx HTN  # HLD  # Nonsustained VT  # Right groin superficial dehiscence   S/p Peripheral VA ECMO inserted via RCFA and RCFV. Decannulated  12/13. IABP removed 12/13. R groin sutures removed 12/28 by CVTS with subsequent superficial dehiscence. Deeper layers remain intact and well healed without signs of hematoma beneath. No signs of infection. Packed with wet gauze. CVTS will follow up    - DAPT: continue ASA 81mg and ticagrelor 90mg BID   - Statin: continue atorvastatin 40mg daily   - BB: continue Toprol XL 25 mg daily    - ACE: hold lisinopril 2.5 mg daily due to soft BPs, may need to start as OP  - Volume status: appears euvolemic on exam, continue lasix 20 mg daily  - Strict I&O's  - Replace lytes per protocol     - R groin BID dressing changes, CVTS following       # Acute hypoxemic respiratory failure, resolved   # Klebsiella pneumonia, resolved   # Rib Fractures  # Tobacco Abuse (2PPD)  # COPD exacerbation  Extubated 12/14 to BiPAP. Now sating well on room air.  Completed antibiotic/steroid course for COPD exacerbation 12/20. Continue to have chest wall pain, likely 2/2 fractures. CXR 12/26 with slightly increased interstitial opacities but no pleural effusions s/p IV lasix 20 mg x 1, no on oral Lasix  - RT pulmonary hygiene  - Duoneb Q4 hours today given wheezing  - Tylenol, Lidocaine patch for rib fracture pain, Mignon for leg pain as above     # Dysphagia, resolved  # Shock liver 2/2 cardiac arrest, resolved  # Severe Malnutrition in context of critical illness  C. Diff negative. Shock liver resolved on 12/13, however slight increase in LFTs since 12/26, now improving. Patient tolerating DD5. Calorie counts remain low although patient reports irritation with NG tube which makes it difficult to eat. NG removed 12/28.   - SLP following  - Diet advanced to regular  - Continue calorie count, encourage oral intake      # Hypoalbuminemia  # Hypokalemia, resolved   # Hypocalcemia, resolved  ESTER due to cardiac arrest and warming. Started on CRRT on 12/8/21. No CRRT since decannulation 12/13. Creatinine stable, good UOP.  - Continue to encourage PO  intake  - Replace lytes per protocol   - Trend CMP      # Aspiration Pneumonia (klebsiella, staph aureus), resolved   # Leukocytosis, stable  # Lactic acidosis, resolved  # Superficial dehiscence of ECMO decann site  WBC stable and downtrenidng. Afebrile  Pan culture 12/14 given temp and WBC. Susceptibilities resulted the same as previous. Completed treatment for aspiration pneumonia. Persistent leukocytosis likely stress related but trend is improving. Triggered sepsis protocol 12/31, lactic acid normal. Afebrile. No s/s of infection.   Cultures thus far:                 - sputum 12/6: staph aureus, staph dysgalactia, klebsiella                 - sputum 12/7: staph aureus                 - sputum 12/8: staph and klebsiella                 - Sputum 12/9: staph aureus and klebsiella                 - Sputum 12/10: staph aureus and klebsiella                 - sputum 12/13: staph aureus and klebsiella                 - sputum 12/14: staph aureus  Antibiotics              - Vancomycin 12/6 - 12/7 (MRSA negative)              - Zosyn 12/6 - 12/9              - Rocephin 12/9 - 12/16              - Cefepime 12/16 - 12/21              - Flagyl 12/16 - 12/16              - Azithro 12/16 - 12/20   - Trend CBC  - Continue to monitor for infection     # Anemia of critical illness, improving   # Thrombocytopenia, resolved  Hgb stable. Plt stable. No e/o bleeding. HIT screening negative.   - Trend CBC      #Hyperglycemia likely stress-induced, resolved   No known medical history. Hemoglobin A1c 5.6%. Blood sugars have improved. No insulin needs since 12/23. -153.   - stop POCT glucose checks  - continue hypoglycemia protocol if needed    Prophylaxis:  DVT: subcutaneous heparin    Diet: Advanced to regular  Activity: Up with assist as tolerated  Code Status: Full  Disposition: TCU early next week    Patient discussed w/ Dr. Pat Hernandez, APRN, CNP  [facility]  Interventional  "Cardiology  [phone]    Interval History:  - No acute events overnight. Rib pain and leg/foot pain left side  - Pt reports feeling well this am  - He denies any lightheadedness, dizziness, SOB. Sounds wheezy this. Remains very clear and cognitively intact    Most recent vital signs:  /68 (BP Location: Left arm)   Pulse 101   Temp 97.4  F (36.3  C) (Oral)   Resp 18   Ht 1.753 m (5' 9\")   Wt 63.5 kg (139 lb 15.9 oz)   SpO2 97%   BMI 20.67 kg/m    Temp:  [97.4  F (36.3  C)-97.9  F (36.6  C)] 97.4  F (36.3  C)  Pulse:  [] 101  Resp:  [16-18] 18  BP: ()/(54-77) 103/68  SpO2:  [95 %-98 %] 97 %  Wt Readings from Last 2 Encounters:   01/02/22 63.5 kg (139 lb 15.9 oz)   09/23/19 77.5 kg (170 lb 12.8 oz)         Intake/Output Summary (Last 24 hours) at 1/2/2022 0911  Last data filed at 1/2/2022 0350  Gross per 24 hour   Intake 300 ml   Output 1025 ml   Net -725 ml       Physical exam:  General: In bed, in NAD  HEENT: EOMI, PERRLA, no scleral icterus or injection  CARDIAC: RRR, no m/r/g appreciated. Peripheral pulses 2+  RESP: CTAB, diminished in bases  GI: NABS, NT/ND, no guarding or rebound  EXTREMITIES: NO LE edema, pulses 2+. Right femoral access site CDI, wet-dry dressing in place, no erythema, no bruising, non-tender to touch  SKIN: No acute lesions appreciated  NEURO: Alert and oriented X3    Labs (Past three days):  CBC  Recent Labs   Lab 01/02/22  0526 01/01/22  0531 12/31/21  0441 12/30/21  1046   WBC 12.3* 14.0* 15.0* 13.9*   RBC 2.66* 2.69* 2.98* 2.86*   HGB 8.3* 8.4* 9.3* 9.0*   HCT 26.5* 26.3* 29.2* 27.7*    98 98 97   MCH 31.2 31.2 31.2 31.5   MCHC 31.3* 31.9 31.8 32.5   RDW 15.1* 14.8 15.0 14.9    472* 534* 519*     BMP  Recent Labs   Lab 01/02/22  0526 01/01/22  0531 12/31/21  0441 12/30/21  1123 12/30/21  1046    139 136  --  136   POTASSIUM 3.8 3.7 3.9  --  3.7   CHLORIDE 106 108 102  --  107   CO2 24 22 25  --  25   ANIONGAP 7 9 9  --  4   *  108* " 101* 107*  101*   < > 110*  104*   BUN 14 14 17  --  16   CR 0.68 0.68 0.72  --  0.66   GFRESTIMATED >90 >90 >90  --  >90   BRIDGETTE 8.3* 8.2* 8.5  --  8.5   MAG 1.9 1.9 2.0  --  2.0    < > = values in this interval not displayed.     Troponins: No results found for: TROPI, TROPONIN, TROPR, TROPN     INRNo lab results found in last 7 days.  Liver panel  Recent Labs   Lab 01/02/22  0526 01/01/22  0531 12/31/21  0441 12/30/21  1046   PROTTOTAL 6.4* 6.7* 7.2 7.2   ALBUMIN 2.2* 2.2* 2.4* 2.3*   BILITOTAL 0.2 0.3 0.4 0.3   ALKPHOS 90 94 102 104   AST 33 28 32 28   ALT 60 60 71* 74*       Imaging/procedure results:  EKG 12 Lead: 12/23       ECHO: 12/14/21  Interpretation Summary  Left ventricular function is decreased. The ejection fraction is 40-45%  (mildly reduced). Mild diffuse hypokinesis is present.  Global right ventricular function is normal. The right ventricle is normal  size.  This study was compared with the study from 12/12/2021. There has been an  improvement in the biventricular function since the patient has been  decannulated.     Coronary Angiogram: 12/6/21  Conclusion  Refractory VT/VF cardiac arrest.  Cardiogenic shock.  STEMI involving the OM1 as culprit.  Three vessel severe CAD involving the  of the RCA, mid LCx and OM1 severe disease with occlusion of OM1 as culprit in STEMI, and moderate proximal LAD lesions likely hemodynamically significant.  CPR  VA ECMO placement.  IABP placement.  Thermoguard placement with initiation of hypothermia protocol.  Right radial arterial line placement.  PCI with three drug eluting stents to the proximal, mid LCx and OM1.           Plan    Follow bedrest per protocol    Continued medical management and lifestyle modifications for cardiovascular risk factor optimizations.    Admit to inpatient    Continuation of dual antiplatelet therapy for 12 months   Post antiplatelet therapy of    Aspirin; give 81 mg qd .     Ticagrelor; give 180 mg now and 90 mg BID.       Continue high dose statin therapy  Admit to Cardiac ICU.  CT head, chest, abdomen, and pelvis.  Bilateral duplex arterial US  Echo  Hypothermia protocol  Mechanical ventilation  Sedation per protocol  ECMO  IABP 1:1

## 2022-01-03 ENCOUNTER — APPOINTMENT (OUTPATIENT)
Dept: OCCUPATIONAL THERAPY | Facility: CLINIC | Age: 57
End: 2022-01-03
Attending: NURSE PRACTITIONER
Payer: COMMERCIAL

## 2022-01-03 ENCOUNTER — APPOINTMENT (OUTPATIENT)
Dept: PHYSICAL THERAPY | Facility: CLINIC | Age: 57
End: 2022-01-03
Payer: COMMERCIAL

## 2022-01-03 VITALS
OXYGEN SATURATION: 99 % | WEIGHT: 135.58 LBS | BODY MASS INDEX: 20.08 KG/M2 | DIASTOLIC BLOOD PRESSURE: 71 MMHG | HEIGHT: 69 IN | SYSTOLIC BLOOD PRESSURE: 97 MMHG | RESPIRATION RATE: 18 BRPM | TEMPERATURE: 97.6 F | HEART RATE: 95 BPM

## 2022-01-03 LAB
ALBUMIN SERPL-MCNC: 2.1 G/DL (ref 3.4–5)
ALP SERPL-CCNC: 86 U/L (ref 40–150)
ALT SERPL W P-5'-P-CCNC: 68 U/L (ref 0–70)
ANION GAP SERPL CALCULATED.3IONS-SCNC: 7 MMOL/L (ref 3–14)
AST SERPL W P-5'-P-CCNC: 37 U/L (ref 0–45)
ATRIAL RATE - MUSE: 87 BPM
BILIRUB SERPL-MCNC: 0.2 MG/DL (ref 0.2–1.3)
BUN SERPL-MCNC: 10 MG/DL (ref 7–30)
CALCIUM SERPL-MCNC: 8.2 MG/DL (ref 8.5–10.1)
CHLORIDE BLD-SCNC: 106 MMOL/L (ref 94–109)
CO2 SERPL-SCNC: 24 MMOL/L (ref 20–32)
CREAT SERPL-MCNC: 0.69 MG/DL (ref 0.66–1.25)
DIASTOLIC BLOOD PRESSURE - MUSE: NORMAL MMHG
ERYTHROCYTE [DISTWIDTH] IN BLOOD BY AUTOMATED COUNT: 15 % (ref 10–15)
GFR SERPL CREATININE-BSD FRML MDRD: >90 ML/MIN/1.73M2
GLUCOSE BLD-MCNC: 93 MG/DL (ref 70–99)
GLUCOSE BLD-MCNC: 98 MG/DL (ref 70–99)
HCT VFR BLD AUTO: 27.5 % (ref 40–53)
HGB BLD-MCNC: 8.6 G/DL (ref 13.3–17.7)
INTERPRETATION ECG - MUSE: NORMAL
LACTATE SERPL-SCNC: 1.1 MMOL/L (ref 0.7–2)
MAGNESIUM SERPL-MCNC: 1.7 MG/DL (ref 1.6–2.3)
MCH RBC QN AUTO: 31.4 PG (ref 26.5–33)
MCHC RBC AUTO-ENTMCNC: 31.3 G/DL (ref 31.5–36.5)
MCV RBC AUTO: 100 FL (ref 78–100)
P AXIS - MUSE: 61 DEGREES
PLATELET # BLD AUTO: 429 10E3/UL (ref 150–450)
POTASSIUM BLD-SCNC: 3.6 MMOL/L (ref 3.4–5.3)
PR INTERVAL - MUSE: 124 MS
PROT SERPL-MCNC: 6.2 G/DL (ref 6.8–8.8)
QRS DURATION - MUSE: 98 MS
QT - MUSE: 394 MS
QTC - MUSE: 474 MS
R AXIS - MUSE: 36 DEGREES
RBC # BLD AUTO: 2.74 10E6/UL (ref 4.4–5.9)
SODIUM SERPL-SCNC: 137 MMOL/L (ref 133–144)
SYSTOLIC BLOOD PRESSURE - MUSE: NORMAL MMHG
T AXIS - MUSE: 197 DEGREES
VENTRICULAR RATE- MUSE: 87 BPM
WBC # BLD AUTO: 12.4 10E3/UL (ref 4–11)

## 2022-01-03 PROCEDURE — 80053 COMPREHEN METABOLIC PANEL: CPT | Performed by: INTERNAL MEDICINE

## 2022-01-03 PROCEDURE — 93010 ELECTROCARDIOGRAM REPORT: CPT | Performed by: INTERNAL MEDICINE

## 2022-01-03 PROCEDURE — 99221 1ST HOSP IP/OBS SF/LOW 40: CPT | Performed by: PODIATRIST

## 2022-01-03 PROCEDURE — 97116 GAIT TRAINING THERAPY: CPT | Mod: GP

## 2022-01-03 PROCEDURE — 250N000013 HC RX MED GY IP 250 OP 250 PS 637: Performed by: NURSE PRACTITIONER

## 2022-01-03 PROCEDURE — 82947 ASSAY GLUCOSE BLOOD QUANT: CPT | Performed by: INTERNAL MEDICINE

## 2022-01-03 PROCEDURE — 85014 HEMATOCRIT: CPT | Performed by: INTERNAL MEDICINE

## 2022-01-03 PROCEDURE — 250N000013 HC RX MED GY IP 250 OP 250 PS 637: Performed by: STUDENT IN AN ORGANIZED HEALTH CARE EDUCATION/TRAINING PROGRAM

## 2022-01-03 PROCEDURE — 99239 HOSP IP/OBS DSCHRG MGMT >30: CPT | Performed by: NURSE PRACTITIONER

## 2022-01-03 PROCEDURE — 97535 SELF CARE MNGMENT TRAINING: CPT | Mod: GO | Performed by: OCCUPATIONAL THERAPIST

## 2022-01-03 PROCEDURE — 250N000013 HC RX MED GY IP 250 OP 250 PS 637: Performed by: INTERNAL MEDICINE

## 2022-01-03 PROCEDURE — 83605 ASSAY OF LACTIC ACID: CPT | Performed by: STUDENT IN AN ORGANIZED HEALTH CARE EDUCATION/TRAINING PROGRAM

## 2022-01-03 PROCEDURE — 93005 ELECTROCARDIOGRAM TRACING: CPT

## 2022-01-03 PROCEDURE — 36415 COLL VENOUS BLD VENIPUNCTURE: CPT | Performed by: INTERNAL MEDICINE

## 2022-01-03 PROCEDURE — 250N000013 HC RX MED GY IP 250 OP 250 PS 637: Performed by: PHYSICIAN ASSISTANT

## 2022-01-03 PROCEDURE — 83735 ASSAY OF MAGNESIUM: CPT | Performed by: NURSE PRACTITIONER

## 2022-01-03 PROCEDURE — G0103 PSA SCREENING: HCPCS

## 2022-01-03 PROCEDURE — 250N000011 HC RX IP 250 OP 636: Performed by: NURSE PRACTITIONER

## 2022-01-03 RX ORDER — NICOTINE 21 MG/24HR
1 PATCH, TRANSDERMAL 24 HOURS TRANSDERMAL DAILY
Qty: 28 PATCH | Refills: 1 | Status: SHIPPED | OUTPATIENT
Start: 2022-01-04

## 2022-01-03 RX ORDER — SALIVA STIMULANT COMB. NO.3
2 SPRAY, NON-AEROSOL (ML) MUCOUS MEMBRANE 4 TIMES DAILY PRN
Status: DISCONTINUED | OUTPATIENT
Start: 2022-01-03 | End: 2022-01-03 | Stop reason: HOSPADM

## 2022-01-03 RX ORDER — ACETAMINOPHEN 650 MG
TABLET, EXTENDED RELEASE ORAL DAILY
Qty: 263 ML | Refills: 0 | Status: SHIPPED | OUTPATIENT
Start: 2022-01-03

## 2022-01-03 RX ORDER — LIDOCAINE 4 G/G
1 PATCH TOPICAL EVERY 24 HOURS
Qty: 30 PATCH | Refills: 0 | Status: SHIPPED | OUTPATIENT
Start: 2022-01-03 | End: 2022-01-05

## 2022-01-03 RX ORDER — ATORVASTATIN CALCIUM 40 MG/1
40 TABLET, FILM COATED ORAL EVERY EVENING
Qty: 90 TABLET | Refills: 3 | Status: SHIPPED | OUTPATIENT
Start: 2022-01-03

## 2022-01-03 RX ORDER — METOPROLOL SUCCINATE 25 MG/1
25 TABLET, EXTENDED RELEASE ORAL DAILY
Qty: 90 TABLET | Refills: 3 | Status: SHIPPED | OUTPATIENT
Start: 2022-01-04

## 2022-01-03 RX ORDER — ACETAMINOPHEN 325 MG/1
650 TABLET ORAL EVERY 4 HOURS PRN
Qty: 90 TABLET | Refills: 0 | Status: SHIPPED | OUTPATIENT
Start: 2022-01-03

## 2022-01-03 RX ORDER — LANOLIN ALCOHOL/MO/W.PET/CERES
100 CREAM (GRAM) TOPICAL DAILY
Qty: 90 TABLET | Refills: 3 | Status: SHIPPED | OUTPATIENT
Start: 2022-01-04 | End: 2022-01-31

## 2022-01-03 RX ORDER — GABAPENTIN 100 MG/1
100 CAPSULE ORAL 3 TIMES DAILY
Qty: 90 CAPSULE | Refills: 0 | Status: SHIPPED | OUTPATIENT
Start: 2022-01-03 | End: 2022-01-31

## 2022-01-03 RX ORDER — MULTIPLE VITAMINS W/ MINERALS TAB 9MG-400MCG
1 TAB ORAL DAILY
Qty: 90 TABLET | Refills: 3 | Status: SHIPPED | OUTPATIENT
Start: 2022-01-04

## 2022-01-03 RX ORDER — FOLIC ACID 1 MG/1
1 TABLET ORAL DAILY
Qty: 90 TABLET | Refills: 3 | Status: SHIPPED | OUTPATIENT
Start: 2022-01-04

## 2022-01-03 RX ADMIN — HEPARIN SODIUM 5000 UNITS: 5000 INJECTION, SOLUTION INTRAVENOUS; SUBCUTANEOUS at 05:49

## 2022-01-03 RX ADMIN — THIAMINE HCL TAB 100 MG 100 MG: 100 TAB at 09:06

## 2022-01-03 RX ADMIN — GABAPENTIN 100 MG: 100 CAPSULE ORAL at 09:13

## 2022-01-03 RX ADMIN — GABAPENTIN 100 MG: 100 CAPSULE ORAL at 14:46

## 2022-01-03 RX ADMIN — ACETAMINOPHEN 650 MG: 325 TABLET, FILM COATED ORAL at 03:16

## 2022-01-03 RX ADMIN — METOPROLOL SUCCINATE 25 MG: 25 TABLET, EXTENDED RELEASE ORAL at 09:06

## 2022-01-03 RX ADMIN — HEPARIN SODIUM 5000 UNITS: 5000 INJECTION, SOLUTION INTRAVENOUS; SUBCUTANEOUS at 14:46

## 2022-01-03 RX ADMIN — Medication 1 TABLET: at 09:07

## 2022-01-03 RX ADMIN — ACETAMINOPHEN 650 MG: 325 TABLET, FILM COATED ORAL at 14:45

## 2022-01-03 RX ADMIN — NICOTINE 1 PATCH: 14 PATCH, EXTENDED RELEASE TRANSDERMAL at 09:07

## 2022-01-03 RX ADMIN — Medication 1 DROP: at 14:46

## 2022-01-03 RX ADMIN — FOLIC ACID 1 MG: 1 TABLET ORAL at 09:06

## 2022-01-03 RX ADMIN — HYDROXYZINE HYDROCHLORIDE 25 MG: 25 TABLET, FILM COATED ORAL at 03:16

## 2022-01-03 RX ADMIN — TICAGRELOR 90 MG: 90 TABLET ORAL at 09:13

## 2022-01-03 RX ADMIN — ASPIRIN 81 MG CHEWABLE TABLET 81 MG: 81 TABLET CHEWABLE at 09:06

## 2022-01-03 RX ADMIN — ACETAMINOPHEN 650 MG: 325 TABLET, FILM COATED ORAL at 09:13

## 2022-01-03 ASSESSMENT — ACTIVITIES OF DAILY LIVING (ADL)
ADLS_ACUITY_SCORE: 5

## 2022-01-03 ASSESSMENT — MIFFLIN-ST. JEOR: SCORE: 1435.38

## 2022-01-03 NOTE — PLAN OF CARE
Occupational Therapy Discharge Summary    Reason for therapy discharge:    All goals and outcomes met, no further needs identified.    Progress towards therapy goal(s). See goals on Care Plan in [facility] electronic health record for goal details.  Goals partially met.  Barriers to achieving goals:   discharge from facility.    Therapy recommendation(s):    Continued therapy is recommended.  Rationale/Recommendations:  Home OT to address cognitive impairments and ADL independence.

## 2022-01-03 NOTE — PROGRESS NOTES
BRIEF CARDIOLOGY PROGRESS NOTE    Notified by RN of lactate 2.2 following trigger of sepsis protocol. Appears this was triggered for lower BP (stable) and leukocytosis (improving). On evaluation, the patient had no new complaints. Still having foot pain, chest discomfort but no changes since yesterday. Asking the writer when he can be discharged. He denies new cough, shortness of breath, fevers/chills, abdominal pain/diarrhea, dysuria. He does report intermittent blurry vision that resolves after blinking several times. On exam he was comfortable, RRR, CTAB, extremities well perfused with exception of LLE distal toes which are necrotic without signs of superimposed infection. He does not have fevers, his WBC is trending in the right direction. The patient is feeling well. Do not suspect infection at this time. He is eating and drinking well. Will give small 250mL bolus of LR with lactate recheck in AM. Artificial tears ordered for likely xerophthalmia.

## 2022-01-03 NOTE — PROGRESS NOTES
"Care Management Follow Up    Length of Stay (days): 28    Expected Discharge Date: 01/04/2022     Concerns to be Addressed: discharge planning       Patient plan of care discussed at interdisciplinary rounds: Yes    Anticipated Discharge Disposition: TBD  Anticipated Discharge Services:   Anticipated Discharge DME:      Patient/family educated on Medicare website which has current facility and service quality ratings:    Education Provided on the Discharge Plan:    Patient/Family in Agreement with the Plan:      Referrals Placed by CM/SW:    Pending  [facility]  [address] [place], [place] [address] [phone]  1/3: Pt will be reviewed today.      [facility]  [address] [place] [address] [phone]  1/3: VM left with admissions to follow up on referral.     Discontinued/ Declined  [facility]  [address] [place] [address] [phone]  1/3: Pt declined as facility is unable to take MA pts at the moment.     Private pay costs discussed: Not applicable    Additional Information:  SW following for dispo needs- per pt's med team, pt is med ready for discharge. SW informed that pt's brother requesting an update re: discharge planning.     SW called pt's brother, Amor ([phone]), to discuss updates. SW shared that per nursing notes, pt is moving independently in the room and SW is uncertain what therapy will recommend. SW shared that she is waiting for therapy's updated notes to determine dispo. Pt has expressed desire to discharge home and Amor is aware. Amor shared that it will be hard for pt to discharge home as there is \"nobody there during the day\". PRERNA validated Amor's concerns and shared that SW is unable to determine dispo recs until therapy is able to work with pt again today.     SW to keep Amor updated re: discharge planning.    Regan Hawthorne, MSW, LGSW  6B   [facility] " [place]  Phone: [phone]  Pager: [phone]

## 2022-01-03 NOTE — DISCHARGE INSTRUCTIONS
"  Going home after a Heart Attack with Stent Placement    PROCEDURE SITE:  It is normal to have soreness, mild bruising or a small lump at the puncture site. You may shower but please do not use a hot tub, bath tub or pool for 2 days. Do not apply any lotion or powder near the site for 2 days. For 2 days when you cough, sneeze or push with a bowel movement place your hand over the puncture site and apply gentle pressure. For the first 2 days avoid squatting. Avoid lifting more than 10 pounds for at least 5 days. Further activity restrictions as below. If you feel a \"pop\" with pain and/or notice significant increase in pain, swelling or bleeding from the site- immediately lie down, press firmly on the site and proceed to the nearest medical facility. The cardiothoracic surgery team will see you in clinic in about 1 week to check on right groin wound site. Please continue to do dressing changes twice a week    ACTIVITY:  Keep in mind everyone will recover at a different pace. This can be related to your activity level prior to the heart attack as well as how much damage your heart attack caused. Eventually the goal per the American Heart Association is 30 minutes of moderate exercise 5 times a week. In the first 2 weeks it is best for all patient to avoid strenuous/vigorous exercise including sex.    MEDICATIONS:  1. You have begun Brilinta (ticagrelor). This medication is essential for your stent(s). Do not stop it without speaking first to your heart doctor or their nurse- this includes if you have concerns about side effects or cost. Also, if another health provider tells you to stop it, let them know they need to discuss it with your heart doctor.   2. If you are on Metformin (Glucophage) or any medications that contain this, you should not take it for at least 48 hours (2 days) from the time of your procedure. If you have baseline kidney problems, you may be instructed to also have a lab test drawn in 2-3 days " before getting the okay to restart it.  3. If you have not been continued on other medications you were previously taking at home it is probably for reason though please discuss your concerns with any of your doctors.     DIET:  We recommend a diet low in saturated fat, trans fat and cholesterol. In addition it will be helpful to be cautious of sodium intake and carbohydrates. Try to increase the amount of lean meats you eat like fish and chicken, but avoid frying; and reduce the amount of red meat you eat. Eat more fresh fruits and vegetables and try to avoid canned and processed food. Please reference the handouts you received for more specific information.    CARDIAC REHAB:  After the initial healing process of the procedure site, we recommend cardiac rehabilitation for all heart attack and stent patients. Cardiac rehabilitation will help you:  - Rebuild stamina, strength and balance.  - Learn how to participate in activities safely, as well as help you regain confidence to do so.  - Return to activities of daily living and leisure.  You should receive a call from them within the next week, but if you do not or you would like to call you can contact their central scheduling at [phone]    OTHER INFORMATION:  1. Consider having your family members learn CPR if they do not know it already.  2. It's not uncommon for heart attack sufferers to experience feelings of depression, anxiety or denial. Please do not be afraid to discuss these symptoms with any of your health providers at any point in your recovery.  3. If you are a smoker, quitting smoking will be one of the most important things you can do for yourself. There are nicotine replacement options or medications they might be able to be prescribed. Please discuss this with your doctors. Consider calling the QuitPlan at [phone] ([phone] as they can offer ongoing support after discharge.    CALL YOUR DOCTOR IF:  -You have a large or growing lump/bump  around the procedure site  -The site is red, swollen, hot, tender or has drainage  -You have hives, a rash or unusual itching  -You have increasing or worsening shortness of breath or chest pain    FOLLOW UP:  We prefer you to follow up with your primary care provider within one week. You will be arranged to see the cardiologist (heart doctor) in clinic in about two weeks    Should you need to contact us:  Cardiology clinic for scheduling or triage nurse questions/concerns:  [phone]

## 2022-01-03 NOTE — PROGRESS NOTES
CV Surgery Brief Progress Note    Bruce Blount is a 56 year old male with VF arrest requiring PCI and VA ECMO. Underwent peripheral VA ECMO decan 12/13 with Dr. Padilla with primary repair of right common femoral artery and vein. Retention sutures removed 12/29 with noted superficial dehiscence; started on BID wet to dry packing changes. Right groin wound check today.    - Wound 4 cm L x 0.5 cm W x 0.5 cm D, appears healthy today, no purulence  - BID packing with 0.5 inch nugauze. Cleanse prior to packing with wound . Cover with 4X4 gauze.   - Follow up with CV surgery in one week for repeat wound check, office will arrange. Call with any questions or concerns.     Ira Calderon, LAYLA, CNP  [facility] Cardiothoracic Surgery  O: [phone]  Pgr [phone]

## 2022-01-03 NOTE — PLAN OF CARE
Neuro: A&Ox4.   Cardiac: SR/ST. VSS. B/P: 95/69, T: 97.6, P: 99, R: 18  Respiratory: O2 sats stable on RA/  GI/: Adequate urine output. No BM this shift.  Diet/appetite: Tolerating regular diet.  Activity:  Independent in room.  Pain: Back/chest pain. PRN tylenol given x2.  Skin: No new deficits noted. R groin site dressing changed x1 today.  LDA's: PIV SL    Plan for possible discharge Monday 1/3    Plan: Continue with POC. Notify primary team with changes.

## 2022-01-03 NOTE — PLAN OF CARE
"Blood pressure 97/71, pulse 95, temperature 97.6  F (36.4  C), temperature source Oral, resp. rate 18, height 1.753 m (5' 9\"), weight 61.5 kg (135 lb 9.3 oz), SpO2 99 %.  DISCHARGE                         1/3/2022  4:10 PM  ----------------------------------------------------------------------------  Discharged to: Home  Via: private transportation  Accompanied by: Family  Discharge Instructions: diet, activity, medications, follow up appointments, when to call the MD, aftercare instructions.  Prescriptions: To be filled by [facility] discharge  pharmacy; medication list reviewed & sent with pt  Follow Up Appointments: arranged; information given  Belongings: All sent with pt  IV: d/c'd  Telemetry: d/c'd  Pt exhibits understanding of above discharge instructions; all questions answered.  Verbalized understanding that groin wound dressing needs to be done twice per day, all supplies sent with patient.   Discharge Paperwork: Signed, copied, and sent home with patient.     "

## 2022-01-03 NOTE — PLAN OF CARE
Neuro: A&Ox4.   Cardiac: SR/ST. Occasional PVC's. VSS.     Respiratory: Room air. Oxygen saturation >95%. No SOB or dyspnea reported overnight.   GI/: Adequate urine output.  Diet/appetite: Regular diet.   Activity:  Up ad maria m. Independently moves around and positions self in bed and pivots to chair. Uses urinal at bedside.   Pain: PRN tylenol administered x2 with improvement.  PRN atarax administered x2 with improvement. Per pt request, RN removed Lidocaine patch from mid upper back. Pt states the patch made the pain worse. Lidocaine patch removed around 0100 1/3, please see eMAR.  Skin: No new deficits noted. Dressing change for groin site completed this evening. Purulent and serosanguinous drainage noted on removed wet guaze packing. Cleansed with wound cleanser and wet to dry dressing completed.   LDA's: PIV.    Plan: Continue with POC. Notify primary team with changes.

## 2022-01-03 NOTE — CONSULTS
Assessment: Bruce is a 56-year-old with left foot forefoot gangrene.  Is likely secondary to pressor use.  I did discuss with him today that there are few treatment options that we could do.  The area is dry gangrene and it is not infected.  I would recommend that he keeps the area painted with some Betadine.  I will order this.  He does not need dressings on the toes.  He should changes every day.  He should not get the toes wet.  We could watch the areas to see if the toes will auto amputate.  We could also see where the line of demarcation will and, and then send him to see the surgeon to discuss toe amputations.  In this moment, he does not need to make a decision.  He relates that he does work as a .  He will be unable to wear steel toed shoes.  This will be a barrier to getting this to heal.  I discussed them that he would likely feel better if he is not still so she will follow his at work.    Plan:  -Patient seen and evaluated.  -Start Betadine to the toes daily.  I will order this.  He does not need a covering on the toes.  -We will order surgical shoe for while he is in-house.  -We will see him again next week to see how he is doing in clinic.  -Please page Ortho on-call with any questions.    HPI: Bruce is a 56-year-old who was admitted on 6 December status post his brother hearing a thud and finding him down in his house.  CPR was began and EMS arrived 4 minutes later he was found to be in V. fib and shocked 7 times.  He did have ROSC on arrival to the hospital.  He was on heavy pressor usage.  Because this, he is now has gangrenous left toes.  He relates the toes do cause him some pain when he is walking.  He relates that he works as a  and is on his feet quite often.    No past medical history on file.    Past Surgical History:   Procedure Laterality Date     CV ARTERIAL LINE PLACEMENT N/A 12/6/2021    Procedure: Arterial Line Placement;  Surgeon: Brayan Thompson MD;   Location: [facility] CARDIAC CATH LAB     CV ARTERIAL LINE PLACEMENT N/A 12/8/2021    Procedure: Arterial Line Placement;  Surgeon: Brayan Thompson MD;  Location: [facility] CARDIAC CATH LAB     CV CENTRAL VENOUS CATHETER PLACEMENT N/A 12/6/2021    Procedure: Central Venous Catheter Placement;  Surgeon: Brayan Thompson MD;  Location: [facility] CARDIAC CATH LAB     CV CORONARY ANGIOGRAM N/A 12/6/2021    Procedure: CV CORONARY ANGIOGRAM;  Surgeon: Brayan Thompson MD;  Location: [facility] CARDIAC CATH LAB     CV CPR WITH CHEST COMPRESSION N/A 12/6/2021    Procedure: CPR with Chest Compression System;  Surgeon: Brayan Thompson MD;  Location: [facility] CARDIAC CATH LAB     CV EXTRACORPERAL MEMBRANE OXYGENATION N/A 12/6/2021    Procedure: Extracorporeal Membrance Oxygenation;  Surgeon: Brayan Thompson MD;  Location: [facility] CARDIAC CATH LAB     CV PCI STENT DRUG ELUTING N/A 12/6/2021    Procedure: Percutaneous Coronary Intervention Stent Drug Eluting;  Surgeon: Brayan Thompson MD;  Location: [facility] CARDIAC CATH LAB     CV THERAPEUTIC HYPOTHERMIA N/A 12/6/2021    Procedure: Therapeutic Hypothermia;  Surgeon: Brayan Thompson MD;  Location: [facility] CARDIAC CATH LAB     REMOVE EXTRACORPORAL MEMBRANE OXYGENATOR ADULT N/A 12/13/2021    Procedure: REMOVAL, CANNULA, ADULT, FOR ECMO;  Surgeon: Allen Padilla MD;  Location:  OR      No Known Allergies  Social History     Socioeconomic History     Marital status: Single     Spouse name: Not on file     Number of children: Not on file     Years of education: Not on file     Highest education level: Not on file   Occupational History     Not on file   Tobacco Use     Smoking status: Not on file     Smokeless tobacco: Not on file   Substance and Sexual Activity     Alcohol use: Not on file     Drug use: Not on file     Sexual activity: Not on file   Other Topics Concern     Not on  "file   Social History Narrative     Not on file     Social Determinants of Health     Financial Resource Strain: Not on file   Food Insecurity: Not on file   Transportation Needs: Not on file   Physical Activity: Not on file   Stress: Not on file   Social Connections: Not on file   Intimate Partner Violence: Not on file   Housing Stability: Not on file     Objective:  Vital signs:  Temp: 97.8  F (36.6  C) Temp src: Oral BP: 92/56 Pulse: 99   Resp: 18 SpO2: 99 % O2 Device: None (Room air) Oxygen Delivery: 2 LPM Height: 175.3 cm (5' 9\") Weight: 61.5 kg (135 lb 9.3 oz)  Estimated body mass index is 20.02 kg/m  as calculated from the following:    Height as of this encounter: 1.753 m (5' 9\").    Weight as of this encounter: 61.5 kg (135 lb 9.3 oz).        Physical exam:  DP and PT pulses are 1/4.  Capillary refill time to the left foot is nonexistent.  He does have dry gangrenous toes to the distal digits.  Equinus is noted bilaterally.            "

## 2022-01-03 NOTE — PROGRESS NOTES
Care Management Discharge Note    Discharge Date: 01/04/2022     Discharge Disposition:  Home    Discharge Services: OP PT/OT and cardiac rehab    Discharge DME: No DME noted.     Discharge Transportation:  Brother will provide    Private pay costs discussed: Not applicable    Education Provided on the Discharge Plan:  Yes  Persons Notified of Discharge Plans: Brother, bedside nurse, patient.   Patient/Family in Agreement with the Plan: Yes    Handoff Referral Completed: No    Additional Information:  Per PT/OT evals, patient is now appropriate to return home w/home health services and assistance from his brother. Per chart review and confirmation w/patient, he does not currently have a primary care provider. Patient is agreeable to establishing care with a provider at the [facility] w/Dr. Lizarraga at 11:40 on 1/5/2021, discharge AVS updated. Patient's brotherWalter updated on discharge planning, appreciative of help and updates. OP PT/OT referrals placed at this time. No additional discharge needs noted.     Rosette Akhtar, RNCC, BSN    [facility]    [address]  [address] [place] [id number]    [email or web address]    Office: [phone] Pager: [phone]  To contact the weekend RNCC, page [phone].

## 2022-01-03 NOTE — PLAN OF CARE
Physical Therapy Discharge Summary    Reason for therapy discharge:    Discharged to home with home therapy.    Progress towards therapy goal(s). See goals on Care Plan in [facility] electronic health record for goal details.  Goals partially met.  Barriers to achieving goals:   discharge from facility.    Therapy recommendation(s):    Continued therapy is recommended.  Rationale/Recommendations:  progress safety with functional mobility.

## 2022-01-03 NOTE — PROVIDER NOTIFICATION
20:32 1/2/22 notified [facility]SI: Triggered sepsis alert and Lactic at 2.2.   Interventions ordered: Bolus 250 of LR over 2 hours.

## 2022-01-05 ENCOUNTER — OFFICE VISIT (OUTPATIENT)
Dept: FAMILY MEDICINE | Facility: CLINIC | Age: 57
End: 2022-01-05
Payer: COMMERCIAL

## 2022-01-05 VITALS
SYSTOLIC BLOOD PRESSURE: 128 MMHG | TEMPERATURE: 98.6 F | BODY MASS INDEX: 21.82 KG/M2 | RESPIRATION RATE: 16 BRPM | HEIGHT: 69 IN | HEART RATE: 109 BPM | OXYGEN SATURATION: 99 % | WEIGHT: 147.3 LBS | DIASTOLIC BLOOD PRESSURE: 70 MMHG

## 2022-01-05 DIAGNOSIS — I96 DRY GANGRENE (H): ICD-10-CM

## 2022-01-05 DIAGNOSIS — Z72.0 TOBACCO USE: ICD-10-CM

## 2022-01-05 DIAGNOSIS — Z12.5 SCREENING FOR PROSTATE CANCER: ICD-10-CM

## 2022-01-05 DIAGNOSIS — I46.9 CARDIAC ARREST (H): Primary | ICD-10-CM

## 2022-01-05 LAB — PSA SERPL-MCNC: 1.86 UG/L (ref 0–4)

## 2022-01-05 PROCEDURE — 99495 TRANSJ CARE MGMT MOD F2F 14D: CPT | Performed by: FAMILY MEDICINE

## 2022-01-05 ASSESSMENT — MIFFLIN-ST. JEOR: SCORE: 1488.53

## 2022-01-05 NOTE — PROGRESS NOTES
Assessment & Plan     Cardiac arrest (H): overall, doing well. No chest pain. His blood pressure is stable, however, he is having some lightheadedness and therefore will hold off on starting lisinopril for now. Will be following up with cardiology and could consider restarting 2.5 mg lisinopril at that time. Continue DAPT with Brillinta and ASA. Continue atorvastatin, metoprolol. Encourage ongoing smoking cessation and sent Rx for nicotine gum to accompany nicotine patch.    Tobacco use  - nicotine (NICORETTE) 2 MG gum; Place 1 each (2 mg) inside cheek every hour as needed for smoking cessation    Dry gangrene (H): needs follow up with podiatry. Will place new referral and also recommend calling to make appointment.  - Orthopedic  Referral; Future    Screening for prostate cancer  - PSA, screen; Future      No follow-ups on file.    Kiko Lizarraga DO  [facility]  [place]    Pepe Barrera is a 56 year old who presents for the following health issues  accompanied by his brother.    HPI       Hospital Follow-up Visit:    Hospital/Nursing Home/IP Rehab Facility: [facility]  Date of Admission: 12/6/2021  Date of Discharge: 1/3/2022  Reason(s) for Admission: Bruce Blount is a 56 year old male with PMHx current tobacco use and ETOH abuse who presented to [facility] 12/6 after out of hospital VF arrest. Coronary angiogram revealed  of RCA and acute culprit lesion of LCx/OM1, s/p PCI to pLCx, mid LCx, and OM1 with TERRY. Decannulated 12/13. IABP removed 12/13. Extubated 12/14. Transferred out of ICU on 12/23. Hospitalization prolonged 2/2 delirium, this has now resolved       Was your hospitalization related to COVID-19? No   Problems taking medications regularly:  None  Medication changes since discharge: None  Problems adhering to non-medication therapy:  None    Summary of hospitalization:  [facility] hospital discharge summary  "reviewed  Diagnostic Tests/Treatments reviewed.  Follow up needed: none  Other Healthcare Providers Involved in Patient s Care:         Specialist appointment - cardiology, podiatry  Update since discharge: improved.   Post Discharge Medication Reconciliation: discharge medications reconciled, continue medications without change.  Plan of care communicated with patient            He has been noticing some lightheadedness throughout the day -- still having pain from broken ribs but improving. NO other chest pain, shortness of breath, or dyspnea.         Review of Systems   Constitutional, HEENT, cardiovascular, pulmonary, gi and gu systems are negative, except as otherwise noted.      Objective    /70   Pulse 109   Temp 98.6  F (37  C) (Tympanic)   Resp 16   Ht 1.753 m (5' 9\")   Wt 66.8 kg (147 lb 4.8 oz)   SpO2 99%   BMI 21.75 kg/m    Body mass index is 21.75 kg/m .  Physical Exam   GENERAL: healthy, alert and no distress  RESP: lungs clear to auscultation - no rales, rhonchi or wheezes  CV: regular rate and rhythm, normal S1 S2, no S3 or S4, no murmur, click or rub, no peripheral edema and peripheral pulses strong  SKIN: no suspicious lesions or rashes; left toes black - dry gangrene          "

## 2022-01-06 ENCOUNTER — TELEPHONE (OUTPATIENT)
Dept: CARDIOLOGY | Facility: CLINIC | Age: 57
End: 2022-01-06
Payer: COMMERCIAL

## 2022-01-06 NOTE — TELEPHONE ENCOUNTER
S-(situation): Patient discharged January 3, 2022 post VF cardiac arrest secondary to STEMI requiring VA ECMO. TERRY to ostial LCX, 1sr marginal.    B-(background): Bruce Blount is a 56 year old male with PMHx current tobacco use and ETOH abuse who presented to [facility] 12/6 after out of hospital VF arrest. Coronary angiogram revealed  of RCA and acute culprit lesion of LCx/OM1, s/p PCI to pLCx, mid LCx, and OM1 with TERRY. Decannulated 12/13. IABP removed 12/13. Extubated 12/14. Transferred out of ICU on 12/23. Hospitalization prolonged 2/2 delirium, this has now resolved     A-(assessment): S/P ECMO    R-(recommendations): follow up with Lorne Stewart MD

## 2022-01-28 NOTE — PROGRESS NOTES
Cardiology Clinic Note    Date of Service (when I saw the patient): 01/31/22    ASSESSMENT AND PLAN:     Refractory VF cardiac arrest: Etiology ischemia.  Cardiac MRI not performed.  LVEF 40-45 (12/14/21) prior to discharge.  ICD implanted prior to discharge: no.  --Referral to neuropsych   --Referral to [place] sudden cardiac arrest network for support groups ([email or web address]; [phone])  --Referral to behavior health to pursue psychotherapy; he feels fine no mood issues  --Referral to cardiac rehab  --Medical therapy with ASA 81mg and ticagrelor 90mg BID   -- return to work: he thinks he will go back to work when his toes are better (racing )     CAD  STEMI involving the OM1 as culprit (12/6/21)   Three vessel severe CAD involving the  of the RCA, mid LCx and OM1 severe disease with occlusion of OM1 as culprit in STEMI, and moderate proximal LAD lesions likely hemodynamically significant.  - DAPT: continue ASA 81mg and ticagrelor 90mg BID   - Statin: continue atorvastatin 40mg daily   - BB: continue Toprol XL 25 mg daily    - will address potential hemodynamic assessment of LAD in next visit     Right groin wound  - CVTS following right groin wound: current recs BID dressing changes with nugauze, Cleanse prior to packing with wound . Cover with 4X4 gauze.   - Follow up with CV surgery for repeat wound check    Chronic combined systolic and diastolic heart failure   Ischemic Cardiomyopathy   MFmEF  NYHA stage II (able to walk atleast 1 block without getting SOB)   Last EF 40-45%   - BB: continue Toprol XL 25 mg daily    - ACE: will start on next visit   - Volume status: appears euvolemic on exam, continue lasix 20 mg daily  - ICD not indicated     Gangrenous left toes  Likely from pressor use during hospital stay   F/u with podiatry     Follow-up 3 months w/ TTE    Angelique Agarwal MD     Discussed with Dr Stewart      History of Present Illness    Bruce Blount is a 56 year old  male with PMHx  tobacco use and ETOH abuse, HTN, HLD who presented to [facility] 12/6 after out of hospital VF arrest. Coronary angiogram revealed  of RCA and acute culprit lesion of LCx/OM1, s/p PCI to pLCx, mid LCx, and OM1 with TERRY. Decannulated 12/13. IABP removed 12/13. Extubated 12/14. Transferred out of ICU on 12/23. Hospitalization prolonged 2/2 delirium, this has now resolved     In terms of his arrest it was a VF arrest at home. His brother heard him hit the floor and found him unresponsive. On EMS arrival, he was found to be in VF and CPR was started. Patient received multiple shocks, multiple rounds of epi, 300mg amiodarone. In the ED, ROSC was obtained. EKG demonstrated inferior STEMI. He was intubated in the ED. He had another VF arrest requiring a single shock.     He denied chest pain, SOB, palpitations, orthopnea, PND, syncope today.     His only complaints is his toe gangrene . Otherwise his mood is good.  He lives at home and is able to perform all of his daily activities.     He will follow up with podiatry for the toes.       Past Medical History    # Anoxic brain injury, resolved  # Delirium, resolved  # Hx of possible ETOH abuse per family  # Refractory VF cardiac arrest 2/2 secondary to STEMI, resolved  # Cardiogenic shock requiring VA ECMO, resolved  # Ischemic Cardiomyopathy (EF 40-45%)  # CAD s/p PCI pLCx, mLCx, OM1, residual  of RCA  # Hx HTN  # HLD  # Nonsustained VT  # Right groin superficial dehiscence   # Left foot dry gangrene  # Acute hypoxemic respiratory failure, resolved   # Klebsiella pneumonia, resolved   # Rib Fractures  # Tobacco Abuse (2PPD)  # COPD exacerbation  # Dysphagia, resolved  # Shock liver 2/2 cardiac arrest, resolved  # Severe Malnutrition in context of critical illness  # Hypoalbuminemia  # Hypokalemia, resolved   # Hypocalcemia, resolved  # Aspiration Pneumonia (klebsiella, staph aureus), resolved   # Leukocytosis, improving   # Lactic acidosis, resolved  #  Anemia of critical illness, improving   # Thrombocytopenia, resolved  #Hyperglycemia likely stress-induced, resolved     Past Surgical History   I have reviewed this patient's surgical history and updated it with pertinent information if needed.  Past Surgical History:   Procedure Laterality Date     CV ARTERIAL LINE PLACEMENT N/A 12/6/2021    Procedure: Arterial Line Placement;  Surgeon: Brayan Thompson MD;  Location: [facility] CARDIAC CATH LAB     CV ARTERIAL LINE PLACEMENT N/A 12/8/2021    Procedure: Arterial Line Placement;  Surgeon: Brayan Thompson MD;  Location: [facility] CARDIAC CATH LAB     CV CENTRAL VENOUS CATHETER PLACEMENT N/A 12/6/2021    Procedure: Central Venous Catheter Placement;  Surgeon: Brayan Thompson MD;  Location: [facility] CARDIAC CATH LAB     CV CORONARY ANGIOGRAM N/A 12/6/2021    Procedure: CV CORONARY ANGIOGRAM;  Surgeon: Brayan Thompson MD;  Location: [facility] CARDIAC CATH LAB     CV CPR WITH CHEST COMPRESSION N/A 12/6/2021    Procedure: CPR with Chest Compression System;  Surgeon: Brayan Thompson MD;  Location: [facility] CARDIAC CATH LAB     CV EXTRACORPERAL MEMBRANE OXYGENATION N/A 12/6/2021    Procedure: Extracorporeal Membrance Oxygenation;  Surgeon: Brayan Thompson MD;  Location: [facility] CARDIAC CATH LAB     CV PCI STENT DRUG ELUTING N/A 12/6/2021    Procedure: Percutaneous Coronary Intervention Stent Drug Eluting;  Surgeon: Brayan Thompson MD;  Location: [facility] CARDIAC CATH LAB     CV THERAPEUTIC HYPOTHERMIA N/A 12/6/2021    Procedure: Therapeutic Hypothermia;  Surgeon: Brayan Thompson MD;  Location: [facility] CARDIAC CATH LAB     REMOVE EXTRACORPORAL MEMBRANE OXYGENATOR ADULT N/A 12/13/2021    Procedure: REMOVAL, CANNULA, ADULT, FOR ECMO;  Surgeon: Allen Padilla MD;  Location:  OR       Prior to Admission Medications   Cannot display prior to admission  medications because the patient has not been admitted in this contact.     Allergies   No Known Allergies    Social History   I have reviewed this patient's social history and updated it with pertinent information if needed. Bruce Blount  reports that he quit smoking about 7 weeks ago. He has never used smokeless tobacco. He reports previous alcohol use. He reports that he does not use drugs.    Family History   I have reviewed this patient's family history and updated it with pertinent information if needed.   Family History   Problem Relation Age of Onset     Hypertension Brother      Colon Cancer Maternal Grandfather      Myocardial Infarction Paternal Grandfather        Review of Systems   The 10 point Review of Systems is negative other than noted in the HPI or here.     Physical Exam                      Vital Signs with Ranges  Pulse:  [88] 88  BP: (126)/(88) 126/88  SpO2:  [99 %] 99 %  145 lbs 11.2 oz    GEN: NAD, pleasant  HEENT: no icterus  CV: RRR, normal s1/s2, no murmurs/rubs/s3/s4, no heave. JVP 5 cmH20   CHEST: CTAB  ABD: soft, NT/ND, NABS  : no flank/suprapubic tenderness  NEURO: AA&Ox3, fluent/appropriate, motor grossly nonfocal  PSYCH: cooperative, affect appropriate    Data   Data reviewed today:  ECHO 12/14/21  Interpretation Summary  Left ventricular function is decreased. The ejection fraction is 40-45%  (mildly reduced). Mild diffuse hypokinesis is present.  Global right ventricular function is normal. The right ventricle is normal  size.  This study was compared with the study from 12/12/2021. There has been an  improvement in the biventricular function since the patient has been  Decannulated.        Coronary Angiogram: 12/6/21  Conclusion  Refractory VT/VF cardiac arrest.  Cardiogenic shock.  STEMI involving the OM1 as culprit.  Three vessel severe CAD involving the  of the RCA, mid LCx and OM1 severe disease with occlusion of OM1 as culprit in STEMI, and moderate proximal LAD  lesions likely hemodynamically significant.  CPR  VA ECMO placement.  IABP placement.  Thermoguard placement with initiation of hypothermia protocol.  Right radial arterial line placement.  PCI with three drug eluting stents to the proximal, mid LCx and OM1.              ATTENDING ATTESTATION:   I personally examined and evaluated this patient on January 31, 2022.  I have personally reviewed today's vital signs, medications, all labs, and all imaging/cardiac studies described above. I have reviewed and edited, as necessary, the history, review of systems, physical examination, and assessment and plan.  I discussed the patient with Dr. Agarwal and agree with the assessment and plan of care as documented in the note above.  I personally spent 40 min today reviewing the medical record, meeting with the patient, and completing this note.  Thank you for allowing us to take part in the care of this very pleasant patient.  Please do not hesitate to call if any further questions or concerns arise.    Lorne Stewart MD, PhD  Interventional/Critical Care Cardiology  [phone]    January 31, 2022

## 2022-01-31 ENCOUNTER — OFFICE VISIT (OUTPATIENT)
Dept: CARDIOLOGY | Facility: CLINIC | Age: 57
End: 2022-01-31
Attending: INTERNAL MEDICINE
Payer: COMMERCIAL

## 2022-01-31 VITALS
WEIGHT: 145.7 LBS | BODY MASS INDEX: 21.52 KG/M2 | SYSTOLIC BLOOD PRESSURE: 126 MMHG | HEART RATE: 88 BPM | DIASTOLIC BLOOD PRESSURE: 88 MMHG | OXYGEN SATURATION: 99 %

## 2022-01-31 DIAGNOSIS — I21.21 ST ELEVATION MYOCARDIAL INFARCTION INVOLVING LEFT CIRCUMFLEX CORONARY ARTERY (H): ICD-10-CM

## 2022-01-31 DIAGNOSIS — I25.5 ISCHEMIC CARDIOMYOPATHY: ICD-10-CM

## 2022-01-31 DIAGNOSIS — F10.10 ETOH ABUSE: Chronic | ICD-10-CM

## 2022-01-31 DIAGNOSIS — I46.9 CARDIAC ARREST (H): Primary | ICD-10-CM

## 2022-01-31 DIAGNOSIS — S22.43XA CLOSED FRACTURE OF MULTIPLE RIBS OF BOTH SIDES, INITIAL ENCOUNTER: ICD-10-CM

## 2022-01-31 PROCEDURE — 99215 OFFICE O/P EST HI 40 MIN: CPT | Mod: GC | Performed by: INTERNAL MEDICINE

## 2022-01-31 PROCEDURE — G0463 HOSPITAL OUTPT CLINIC VISIT: HCPCS

## 2022-01-31 RX ORDER — GABAPENTIN 100 MG/1
100 CAPSULE ORAL 3 TIMES DAILY
Qty: 270 CAPSULE | Refills: 3 | Status: SHIPPED | OUTPATIENT
Start: 2022-01-31

## 2022-01-31 RX ORDER — LANOLIN ALCOHOL/MO/W.PET/CERES
100 CREAM (GRAM) TOPICAL DAILY
Qty: 90 TABLET | Refills: 3 | Status: SHIPPED | OUTPATIENT
Start: 2022-01-31

## 2022-01-31 ASSESSMENT — PAIN SCALES - GENERAL: PAINLEVEL: NO PAIN (0)

## 2022-01-31 NOTE — NURSING NOTE
Chief Complaint   Patient presents with     Follow Up     follow up arrest/ECMO 12/6/21     Vitals were taken and medications reconciled.    VISHAL Wild  1:33 PM

## 2022-01-31 NOTE — PATIENT INSTRUCTIONS
Patient Instructions:  It was a pleasure to see you in the cardiology clinic today.      If you have any questions, call  Noemy Schneider RN, at [phone].   [facility].  To schedule an appointment or to leave a message for your Care Team Press #1  If you are a physician calling for another physician Press #2  For Billing Press #3  For Medical Records Press #4  We are encouraging the use of Skyepackt to communicate with your HealthCare Provider    Note the new medications: none  Stop the following medications: none    The results from today include: none  Please follow up with Dr. Stewart in three months with an ECHO      Referral to podiatry      [facility] - [email or web address]    Noemy Schneider RN, BSN, CVN  Interventional Cardiology Coordinator  [phone]      If you have an urgent need after hours (8:00 am to 4:30 pm) please call [phone] and ask for the cardiology fellow on call.

## 2022-01-31 NOTE — LETTER
1/31/2022      RE: Bruce Blount  [address] [facility] [address]       Dear Colleague,    Thank you for the opportunity to participate in the care of your patient, Bruce Blount, at the [facility] HEART CLINIC [place] [facility]. Please see a copy of my visit note below.    Cardiology Clinic Note    Date of Service (when I saw the patient): 01/31/22    ASSESSMENT AND PLAN:     Refractory VF cardiac arrest: Etiology ischemia.  Cardiac MRI not performed.  LVEF 40-45 (12/14/21) prior to discharge.  ICD implanted prior to discharge: no.  --Referral to neuropsych   --Referral to [place] sudden cardiac arrest network for support groups ([place]sc[facility]vivor[facility]org; 429.884.4011)  --Referral to behavior health to pursue psychotherapy; he feels fine no mood issues  --Referral to cardiac rehab  --Medical therapy with ASA 81mg and ticagrelor 90mg BID   -- return to work: he thinks he will go back to work when his toes are better (racing )     CAD  STEMI involving the OM1 as culprit (12/6/21)   Three vessel severe CAD involving the  of the RCA, mid LCx and OM1 severe disease with occlusion of OM1 as culprit in STEMI, and moderate proximal LAD lesions likely hemodynamically significant.  - DAPT: continue ASA 81mg and ticagrelor 90mg BID   - Statin: continue atorvastatin 40mg daily   - BB: continue Toprol XL 25 mg daily    - will address potential hemodynamic assessment of LAD in next visit     Right groin wound  - CVTS following right groin wound: current recs BID dressing changes with nugauze, Cleanse prior to packing with wound . Cover with 4X4 gauze.   - Follow up with CV surgery for repeat wound check    Chronic combined systolic and diastolic heart failure   Ischemic Cardiomyopathy   MFmEF  NYHA stage II (able to walk atleast 1 block without getting SOB)   Last EF 40-45%   - BB: continue Toprol XL 25 mg daily    - ACE: will start on next visit   -  Volume status: appears euvolemic on exam, continue lasix 20 mg daily  - ICD not indicated     Gangrenous left toes  Likely from pressor use during hospital stay   F/u with podiatry     Follow-up 3 months w/ TTE    Angelique Agarwal MD     Discussed with Dr Stewart      History of Present Illness    Bruce Blount is a 56 year old male with PMHx  tobacco use and ETOH abuse, HTN, HLD who presented to [facility] 12/6 after out of hospital VF arrest. Coronary angiogram revealed  of RCA and acute culprit lesion of LCx/OM1, s/p PCI to pLCx, mid LCx, and OM1 with TERRY. Decannulated 12/13. IABP removed 12/13. Extubated 12/14. Transferred out of ICU on 12/23. Hospitalization prolonged 2/2 delirium, this has now resolved     In terms of his arrest it was a VF arrest at home. His brother heard him hit the floor and found him unresponsive. On EMS arrival, he was found to be in VF and CPR was started. Patient received multiple shocks, multiple rounds of epi, 300mg amiodarone. In the ED, ROSC was obtained. EKG demonstrated inferior STEMI. He was intubated in the ED. He had another VF arrest requiring a single shock.     He denied chest pain, SOB, palpitations, orthopnea, PND, syncope today.     His only complaints is his toe gangrene . Otherwise his mood is good.  He lives at home and is able to perform all of his daily activities.     He will follow up with podiatry for the toes.       Past Medical History    # Anoxic brain injury, resolved  # Delirium, resolved  # Hx of possible ETOH abuse per family  # Refractory VF cardiac arrest 2/2 secondary to STEMI, resolved  # Cardiogenic shock requiring VA ECMO, resolved  # Ischemic Cardiomyopathy (EF 40-45%)  # CAD s/p PCI pLCx, mLCx, OM1, residual  of RCA  # Hx HTN  # HLD  # Nonsustained VT  # Right groin superficial dehiscence   # Left foot dry gangrene  # Acute hypoxemic respiratory failure, resolved   # Klebsiella pneumonia, resolved   # Rib Fractures  # Tobacco Abuse (2PPD)  #  COPD exacerbation  # Dysphagia, resolved  # Shock liver 2/2 cardiac arrest, resolved  # Severe Malnutrition in context of critical illness  # Hypoalbuminemia  # Hypokalemia, resolved   # Hypocalcemia, resolved  # Aspiration Pneumonia (klebsiella, staph aureus), resolved   # Leukocytosis, improving   # Lactic acidosis, resolved  # Anemia of critical illness, improving   # Thrombocytopenia, resolved  #Hyperglycemia likely stress-induced, resolved     Past Surgical History   I have reviewed this patient's surgical history and updated it with pertinent information if needed.  Past Surgical History:   Procedure Laterality Date     CV ARTERIAL LINE PLACEMENT N/A 12/6/2021    Procedure: Arterial Line Placement;  Surgeon: Brayan Thompson MD;  Location: [facility] CARDIAC CATH LAB     CV ARTERIAL LINE PLACEMENT N/A 12/8/2021    Procedure: Arterial Line Placement;  Surgeon: Brayan Thompson MD;  Location: [facility] CARDIAC CATH LAB     CV CENTRAL VENOUS CATHETER PLACEMENT N/A 12/6/2021    Procedure: Central Venous Catheter Placement;  Surgeon: Brayan Thompson MD;  Location: [facility] CARDIAC CATH LAB     CV CORONARY ANGIOGRAM N/A 12/6/2021    Procedure: CV CORONARY ANGIOGRAM;  Surgeon: Brayna Thompson MD;  Location: [facility] CARDIAC CATH LAB     CV CPR WITH CHEST COMPRESSION N/A 12/6/2021    Procedure: CPR with Chest Compression System;  Surgeon: Brayan Thompson MD;  Location: [facility] CARDIAC CATH LAB     CV EXTRACORPERAL MEMBRANE OXYGENATION N/A 12/6/2021    Procedure: Extracorporeal Membrance Oxygenation;  Surgeon: Brayan Thompson MD;  Location: [facility] CARDIAC CATH LAB     CV PCI STENT DRUG ELUTING N/A 12/6/2021    Procedure: Percutaneous Coronary Intervention Stent Drug Eluting;  Surgeon: Brayan Thompson MD;  Location: [facility] CARDIAC CATH LAB     CV THERAPEUTIC HYPOTHERMIA N/A 12/6/2021    Procedure: Therapeutic  Hypothermia;  Surgeon: Brayan Thompson MD;  Location: [facility] HEART CARDIAC CATH LAB     REMOVE EXTRACORPORAL MEMBRANE OXYGENATOR ADULT N/A 12/13/2021    Procedure: REMOVAL, CANNULA, ADULT, FOR ECMO;  Surgeon: Allen Padilla MD;  Location: [facility] OR       Prior to Admission Medications   Cannot display prior to admission medications because the patient has not been admitted in this contact.     Allergies   No Known Allergies    Social History   I have reviewed this patient's social history and updated it with pertinent information if needed. Bruce Blount  reports that he quit smoking about 7 weeks ago. He has never used smokeless tobacco. He reports previous alcohol use. He reports that he does not use drugs.    Family History   I have reviewed this patient's family history and updated it with pertinent information if needed.   Family History   Problem Relation Age of Onset     Hypertension Brother      Colon Cancer Maternal Grandfather      Myocardial Infarction Paternal Grandfather        Review of Systems   The 10 point Review of Systems is negative other than noted in the HPI or here.     Physical Exam                      Vital Signs with Ranges  Pulse:  [88] 88  BP: (126)/(88) 126/88  SpO2:  [99 %] 99 %  145 lbs 11.2 oz    GEN: NAD, pleasant  HEENT: no icterus  CV: RRR, normal s1/s2, no murmurs/rubs/s3/s4, no heave. JVP 5 cmH20   CHEST: CTAB  ABD: soft, NT/ND, NABS  : no flank/suprapubic tenderness  NEURO: AA&Ox3, fluent/appropriate, motor grossly nonfocal  PSYCH: cooperative, affect appropriate    Data   Data reviewed today:  ECHO 12/14/21  Interpretation Summary  Left ventricular function is decreased. The ejection fraction is 40-45%  (mildly reduced). Mild diffuse hypokinesis is present.  Global right ventricular function is normal. The right ventricle is normal  size.  This study was compared with the study from 12/12/2021. There has been an  improvement in the biventricular function since the  patient has been  Decannulated.        Coronary Angiogram: 12/6/21  Conclusion  Refractory VT/VF cardiac arrest.  Cardiogenic shock.  STEMI involving the OM1 as culprit.  Three vessel severe CAD involving the  of the RCA, mid LCx and OM1 severe disease with occlusion of OM1 as culprit in STEMI, and moderate proximal LAD lesions likely hemodynamically significant.  CPR  VA ECMO placement.  IABP placement.  Thermoguard placement with initiation of hypothermia protocol.  Right radial arterial line placement.  PCI with three drug eluting stents to the proximal, mid LCx and OM1.              ATTENDING ATTESTATION:   I personally examined and evaluated this patient on January 31, 2022.  I have personally reviewed today's vital signs, medications, all labs, and all imaging/cardiac studies described above. I have reviewed and edited, as necessary, the history, review of systems, physical examination, and assessment and plan.  I discussed the patient with Dr. Agarwal and agree with the assessment and plan of care as documented in the note above.  I personally spent 40 min today reviewing the medical record, meeting with the patient, and completing this note.  Thank you for allowing us to take part in the care of this very pleasant patient.  Please do not hesitate to call if any further questions or concerns arise.    Lorne Stewart MD, PhD  Interventional/Critical Care Cardiology  [phone]    January 31, 2022

## 2022-02-04 ENCOUNTER — OFFICE VISIT (OUTPATIENT)
Dept: PODIATRY | Facility: CLINIC | Age: 57
End: 2022-02-04
Payer: COMMERCIAL

## 2022-02-04 VITALS — SYSTOLIC BLOOD PRESSURE: 124 MMHG | BODY MASS INDEX: 22.3 KG/M2 | DIASTOLIC BLOOD PRESSURE: 68 MMHG | WEIGHT: 151 LBS

## 2022-02-04 DIAGNOSIS — I46.9 CARDIAC ARREST (H): ICD-10-CM

## 2022-02-04 DIAGNOSIS — I96 DRY GANGRENE (H): Primary | ICD-10-CM

## 2022-02-04 PROCEDURE — 99204 OFFICE O/P NEW MOD 45 MIN: CPT | Performed by: PODIATRIST

## 2022-02-04 RX ORDER — HYDROCODONE BITARTRATE AND ACETAMINOPHEN 5; 325 MG/1; MG/1
1 TABLET ORAL EVERY 6 HOURS PRN
Qty: 18 TABLET | Refills: 0 | Status: SHIPPED | OUTPATIENT
Start: 2022-02-04

## 2022-02-04 NOTE — Clinical Note
Good morning    I saw Bruce recently for follow-up regarding his now well-demarcated gangrene of toes 1-5 of the left foot.  We are planning on proceeding with partial amputation of the toes in the near future.    Thank you    Kashif

## 2022-02-04 NOTE — PROGRESS NOTES
"Foot & Ankle Surgery  February 4, 2022    CC: Gangrene toes left foot    I was asked to see Bruce Blount regarding the chief complaint by:  Dr. YANI Stewart    HPI:  Pt is a 56 year old male who presents with above complaint.  Gangrene at the end of toes 1 through 5 left lower extremity.  The patient had a recent myocardial infarction and was admitted to the hospital from 12/6/2021 2 1/3/2022.  He subsequently developed gangrenous changes at the ends of toes 1 through 5 of the left foot.  This was felt to be from pressor utilization.  He was seen by Dr. Tavon Morrison in the hospital and Betadine wound care instructions were placed.  Patient describes burning, aching, throbbing, tingling, shooting pain, 9 out of 10 \"every day\".  The patient has a history of heavy alcohol use and 2 pack/day smoking history    ROS:   Pos for CC.  The patient denies current nausea, vomiting, chills, fevers, belly pain, calf pain, chest pain or SOB.  Complete remainder of ROS is otherwise neg.    VITALS:    Vitals:    02/04/22 1544   BP: 124/68   BP Location: Right arm   Patient Position: Chair   Cuff Size: Adult Regular   Weight: 68.5 kg (151 lb)       PMH:  No past medical history on file.    SXHX:    Past Surgical History:   Procedure Laterality Date     CV ARTERIAL LINE PLACEMENT N/A 12/6/2021    Procedure: Arterial Line Placement;  Surgeon: Brayan Thompson MD;  Location: [facility] CARDIAC CATH LAB     CV ARTERIAL LINE PLACEMENT N/A 12/8/2021    Procedure: Arterial Line Placement;  Surgeon: Brayan Thompson MD;  Location: [facility] CARDIAC CATH LAB     CV CENTRAL VENOUS CATHETER PLACEMENT N/A 12/6/2021    Procedure: Central Venous Catheter Placement;  Surgeon: Brayan Thompson MD;  Location: [facility] CARDIAC CATH LAB     CV CORONARY ANGIOGRAM N/A 12/6/2021    Procedure: CV CORONARY ANGIOGRAM;  Surgeon: Brayan Thompson MD;  Location: [facility] CARDIAC CATH LAB     CV CPR WITH " CHEST COMPRESSION N/A 12/6/2021    Procedure: CPR with Chest Compression System;  Surgeon: Brayan Thompson MD;  Location:  HEART CARDIAC CATH LAB     CV EXTRACORPERAL MEMBRANE OXYGENATION N/A 12/6/2021    Procedure: Extracorporeal Membrance Oxygenation;  Surgeon: Brayan Thompson MD;  Location:  HEART CARDIAC CATH LAB     CV PCI STENT DRUG ELUTING N/A 12/6/2021    Procedure: Percutaneous Coronary Intervention Stent Drug Eluting;  Surgeon: Brayan Thompson MD;  Location:  HEART CARDIAC CATH LAB     CV THERAPEUTIC HYPOTHERMIA N/A 12/6/2021    Procedure: Therapeutic Hypothermia;  Surgeon: Brayan Thompson MD;  Location:  HEART CARDIAC CATH LAB     REMOVE EXTRACORPORAL MEMBRANE OXYGENATOR ADULT N/A 12/13/2021    Procedure: REMOVAL, CANNULA, ADULT, FOR ECMO;  Surgeon: Allen Padilla MD;  Location:  OR        MEDS:    Current Outpatient Medications   Medication     aspirin (ASA) 81 MG EC tablet     atorvastatin (LIPITOR) 40 MG tablet     folic acid (FOLVITE) 1 MG tablet     gabapentin (NEURONTIN) 100 MG capsule     ibuprofen (ADVIL/MOTRIN) 100 MG tablet     metoprolol succinate ER (TOPROL-XL) 25 MG 24 hr tablet     multivitamin w/minerals (THERA-VIT-M) tablet     nicotine (NICODERM CQ) 14 MG/24HR 24 hr patch     nicotine (NICORETTE) 2 MG gum     povidone-iodine (BETADINE) 10 % topical solution     thiamine (B-1) 100 MG tablet     ticagrelor (BRILINTA) 90 MG tablet     acetaminophen (TYLENOL) 325 MG tablet     No current facility-administered medications for this visit.       ALL:   No Known Allergies    FMH:    Family History   Problem Relation Age of Onset     Hypertension Brother      Colon Cancer Maternal Grandfather      Myocardial Infarction Paternal Grandfather        SocHx:    Social History     Socioeconomic History     Marital status: Single     Spouse name: Not on file     Number of children: Not on file     Years of education: Not on file      Highest education level: Not on file   Occupational History     Not on file   Tobacco Use     Smoking status: Current Some Day Smoker     Last attempt to quit: 2021     Years since quittin.1     Smokeless tobacco: Never Used   Substance and Sexual Activity     Alcohol use: Not Currently     Comment: previously was drinking at least a 6 pack per day.     Drug use: Never     Sexual activity: Not on file   Other Topics Concern     Not on file   Social History Narrative     Not on file     Social Determinants of Health     Financial Resource Strain: Not on file   Food Insecurity: Not on file   Transportation Needs: Not on file   Physical Activity: Not on file   Stress: Not on file   Social Connections: Not on file   Intimate Partner Violence: Not on file   Housing Stability: Not on file           EXAMINATION:  Gen:   No apparent distress  Neuro:   A&Ox3, no deficits  Psych:    Answering questions appropriately for age and situation with normal affect  Head:    NCAT  Eye:    Visual scanning without deficit  Ear:    Response to auditory stimuli wnl  Lung:    Non-labored breathing on RA noted  Abd:    NTND per patient report  Lymph:    Neg for pitting/non-pitting edema BLE  Vasc:    Pulses weakly palpable, CFT minimally delayed  Neuro:    Light touch sensation intact to all sensory nerve distributions without paresthesias  Derm:    Dry gangrene at the end of toes 1 through 5 left foot.  This is well demarcated at this point.  There is no wet gangrene and no local signs of infection.  MSK:    ROM, strength wnl without limitation, no pain on palpation noted.  Calf:    Neg for redness, swelling or tenderness    Assessment:  56 year old male with dry gangrene at the end of toes 1 through 5 left foot, now well demarcated, without signs of infection status post recent severe myocardial infarction with pressor utilization      Plan:  Discussed etiologies, anatomy and options  1.  Dry gangrene at the end of toes 1 through  5 left foot, now well demarcated, without signs of infection status post recent severe myocardial infarction with pressor use  -I personally reviewed and interpreted the patient's lower extremity history pertinent to today's visit, including imaging/labs, in preparation for initiating a treatment program.  -No signs of infection, no indication for oral antibiotic  -Continue daily Betadine utilization to keep toes dry and stable  -I advised against getting them wet.  -He will continue his comfortable shoe.  We did discuss the option for surgical shoe  -We discussed the importance of keeping them protected  -As they are now well demarcated, we did discuss proceeding with a toe amputation.  This could be done as a same-day procedure.  Our surgery scheduling handout was dispensed  -I did give him a prescription for Norco 5-325 18 tablets, 1 tablet every 6 hours as needed pain.  I do not anticipate any further narcotic prescriptions    Follow up: 1-2 weeks or sooner with acute issues      Patient's medical history was reviewed today      Kashif Hendrix DPM FACFAS FACFAOM  Podiatric Foot & Ankle Surgeon  [facility]  [phone]    Disclaimer: This note consists of symbols derived from keyboarding, dictation and/or voice recognition software. As a result, there may be errors in the script that have gone undetected. Please consider this when interpreting information found in this chart.

## 2022-02-04 NOTE — PATIENT INSTRUCTIONS
"Thank you for choosing [facility] Podiatry / Foot & Ankle Surgery!    DR CASTILLO'S CLINIC:  [facility]   [address]   [place] [address] [phone]      SET UP SURGERY: [phone]   RADIOLOGY: [phone]   APPOINTMENTS: [phone]   BILLING QUESTIONS: [phone]   FAX NUMBER: [phone]       Schedule surgery of left foot.       Surgical planning.  If you have decided to have surgery, follow these few steps to get the procedure scheduled and to have the proper paperwork filled out.  If you are unsure about surgery, or would like to sit down and further discuss your issue and treatment options, please make a clinic appointment with Dr Castillo.    1.  Pick the date that you would like to have surgery.  Keep in mind that you will likely need at least 2 weeks off after the procedure for proper rest and healing.    2.  Call the surgery scheduling line at [phone] to get the procedure scheduled.  My , Lionel, will assist you in getting surgery set up.      3.  Make an appointment to see Dr Castillo within 1-2 weeks of the date of surgery for your pre-operative consult.  When making the appointment, say \"I need to make a 30 minute surgical consult with Dr Castillo\".  All the paperwork will be ready for you during the visit.  It is recommended that you bring a spouse, family member or friend with you.  There will be lots of information presented.  It can be overwhelming, and it is better to have someone there to help sort out the details.    4.  Make an appointment to see your Primary Care Physician within 4 weeks of the date of surgery for your \"Pre-operative History and Physical\".  This is done to make sure you are medically healthy to undergo surgery.        If you have any post-operative questions regarding your procedure, call our triage nurses at [phone], option 3.        Flu vaccines are now available at all [facility] " clinics and retail pharmacies across the [facility]. Appointments are required for clinic locations. To schedule an appointment online, please log into [employer] or create an account if you are a new user. You can also call [phone], or simply walk in at one of the [facility] retail pharmacy locations.

## 2022-02-07 ENCOUNTER — TELEPHONE (OUTPATIENT)
Dept: PODIATRY | Facility: CLINIC | Age: 57
End: 2022-02-07
Payer: COMMERCIAL

## 2022-02-07 ENCOUNTER — PREP FOR PROCEDURE (OUTPATIENT)
Dept: PODIATRY | Facility: CLINIC | Age: 57
End: 2022-02-07
Payer: COMMERCIAL

## 2022-02-07 DIAGNOSIS — I96 DRY GANGRENE (H): Primary | ICD-10-CM

## 2022-02-07 NOTE — PROGRESS NOTES
"Order signed for \"partial amputation of toes 1 through 5 left foot\"    MAC    Supine    Same day    No vendor    MOSHE ReyesM FACFAS FACFAOM  Podiatric Foot & Ankle Surgeon  [facility]  [phone]      "

## 2022-02-08 ENCOUNTER — HOSPITAL ENCOUNTER (OUTPATIENT)
Facility: CLINIC | Age: 57
End: 2022-02-08
Attending: PODIATRIST | Admitting: PODIATRIST
Payer: COMMERCIAL

## 2022-02-08 DIAGNOSIS — Z11.59 ENCOUNTER FOR SCREENING FOR OTHER VIRAL DISEASES: Primary | ICD-10-CM

## 2022-02-08 NOTE — TELEPHONE ENCOUNTER
Spoke to patient. Possible surgery date next week, 2/15/22 at [facility]. Schedule H&P appt for Friday 2/11/22.     Awaiting qventus confirmation.        Lionel Toure, Surgery Scheduler

## 2022-02-09 ENCOUNTER — TELEPHONE (OUTPATIENT)
Dept: FAMILY MEDICINE | Facility: CLINIC | Age: 57
End: 2022-02-09
Payer: COMMERCIAL

## 2022-02-09 NOTE — TELEPHONE ENCOUNTER
Message handled by Nurse Triage with [employer] - provider name: AL-NP, please contact patient to advise will need cardiology clearance as well as pre-op prior to surgery. Pt had major cardiac event recently.     Pt called, advised of note above, Miya Fenton would like cardiology clearance also. Pt advised to contact cardiology to discuss and have cardiologist addend note for clearance or advise.  Pt declined wanting to reach out, stated he is being forced to jump through hoops to get his toes cut off, Pt noted is upset needing this done in first place. Writer advised will reach out to cardiology team to discuss further.      writer called Dr. Lorne Stewart office, at [phone], office was closed at time of calling. Will route note to Dr. Stewart, will also reach out again tomorrow to discuss.     Damien TILLMAN RN   [facility]

## 2022-02-09 NOTE — TELEPHONE ENCOUNTER
Scheduled surgery.     Type of surgery: partial toe amputation, toes 1-5  Location of surgery: [facility]  Date and time of surgery: 2/15/22  Surgeon: Ruma  Pre-Op Appt Date: 2/11/22  Post-Op Appt Date: 2/24/22   Packet sent out: Yes  Pre-cert/Authorization completed:  Not Applicable  Date: 2/9/22      Lionel Toure, Surgery Scheduler

## 2022-02-10 NOTE — TELEPHONE ENCOUNTER
Called Phone # [phone] to talk with Wolf about cancelling the preop appointment here at the clinic.     Wolf's brother answered the phone and stated Wolf passed away this morning.     Jeanne MEYER RN    [facility] Triage

## 2022-02-10 NOTE — TELEPHONE ENCOUNTER
Just to clarify as there seems to be some confusion here.   I spoke with his surgeon this am. Surgery urgent not emergent.   He is on my schedule tomorrow and that appointment should be cancelled.   I don't think cardiology should addend their note from 2 weeks ago. I think he needs seen with cardiology for his preop appointment not see by family practice just to send him to cardiology to be seen to clear him.     Please let me know if any other questions or concerns I can clear up. I will be back in the office tomorrow.         Miya Fenton, CHARITYP-BC

## 2022-02-10 NOTE — TELEPHONE ENCOUNTER
Writer called cardiology office @ [phone] and left a message for care team to call me back.      informed the cardiology clinic  of Miya Grace's note that she feels patient needs cardiac clearance for his podiatry procedure 2/15 not primary care whom is not established with and given his recent cardiac history.     Asked if cardiology would clear him off the 1/31/22 visit and update surgery clearance in note or could they get  the patient in for a preop before Tuesday?      Once the Office calls me back I will call  the patient.     Jeanne MEYER RN   [facility] Triage

## 2022-02-10 NOTE — TELEPHONE ENCOUNTER
"I have talked with his surgeon this am. Surgery is urgent BUT not emergent and he agrees and knows he needs cardiac clearance.     Surgery will be delayed if he doesn't see cardiology for his preop so lets try our best to help him facilitate this.   Please call patient and cardiology team and get him in to see them asap. He  DOES NOT need 2 preops. Family practice should not be doing his pre op we would just send him to cardiology. Cancel his appointment with me on Friday once he is talked to please.   He is high risk for surgery given his recent cardiac event. This is not optional. He is not expected to \"jumps through hoops\" that is not what is going on. Everyone is trying to help him. He doesn't need two appointments he needs the right one with cardiology.      Thanks,     JOSAFAT Malcolm           "

## 2022-02-11 ENCOUNTER — TELEPHONE (OUTPATIENT)
Dept: PODIATRY | Facility: CLINIC | Age: 57
End: 2022-02-11

## 2024-06-12 NOTE — PROGRESS NOTES
Nephrology Progress Note  12/10/2021       Bruce Blount is a 56 yom with hx of ETOH and tobacco use who presents to [facility] after out of hospital arrest on IABP and ECMO.  Had witnessed arrest at home (brother heard him fall and called 911), found in VF and coded by EMS.  UOP dwindling and has mild hyperkalemia after rewarming, nephrology consulted for ESTER and management of K.       Interval History :   Mr Blount continues on CRRT started 12/8 to manage mild hyperkalemia and volume.  Net even yesterday, net negative so far today but this is mainly due to GI losses.  K now low on 4k baths likely due to GI losses.  May give a break from CRRT soon and see how things trend with UOP and chemistries, continuing CRRT for now.       Assessment & Recommendations:   ESTER-Presentation Cr 1.0, normal imaging on CT with no hydro.  Cr up after arrest and K 5.4 in setting of ongoing rewarming from cooling protocol.  Still making some UOP but with need for IABP, ECMO and 2 pressors ESTER is unlikely to improve in the short term so started CRRT 12/8.                  -CRRT, mix of 4k baths, 50-100cc/hr net negative fluid goal.                  -Access is via ECMO circuit.      Volume-Mild edema, about net even yesterday.  Plan to pull 1-2L today although most of his output is GI with liquid stool.  Still with some UOP but is oliguric.       Electrolytes/pH-K 3.7, bicarb 26.       BMD-Ca 8.2, Mg and Phos mildly up consistent with ESTER.       VF arrest-EF currently 5-10%.       Anemia-Hgb 8.5, down from admission, acute management per team.       Seen and discussed with Dr Guillory     Recommendations were communicated to primary team via verbal communication.        KATHARINA Kemp CNS  Clinical Nurse Specialist  [phone]    Review of Systems:   I reviewed the following systems:  ROS not done due to vent/sedation.     Physical Exam:   I/O last 3 completed shifts:  In: 2484.58 [I.V.:869.58; NG/GT:590]  Out: 4005 [Urine:336;  "Other:3369; Stool:300]   /78   Pulse 106   Temp 99.3  F (37.4  C)   Resp 18   Ht 1.753 m (5' 9\")   Wt 70.2 kg (154 lb 12.2 oz)   SpO2 95%   BMI 22.85 kg/m       GENERAL APPEARANCE: Intubated and sedated, on IABP and ECMO.    EYES: No scleral icterus  NECK:  Difficult to assess JVD  Pulmonary: lungs clear to auscultation with equal breath sounds bilaterally, no clubbing or cyanosis  CV: Regular rhythm, normal rate, no rub   - Edema +1 generalized.   GI: soft, nontender, normal bowel sounds  MS: no evidence of inflammation in joints, no muscle tenderness  : + Gutiérrez  SKIN: no rash, warm, dry  NEURO: Intubated and sedated.     Labs:   All labs reviewed by me  Electrolytes/Renal - Recent Labs   Lab Test 12/10/21  1153 12/10/21  0959 12/10/21  0343 12/10/21  0342 12/09/21  2008 12/09/21  1959   NA  --  140  140  --  140  --  141  141   POTASSIUM  --  3.7  3.7  --  3.6  --  3.8  3.8   CHLORIDE  --  109  109  --  110*  --  111*  110*   CO2  --  26  26  --  26  --  25  26   BUN  --  44*  44*  --  45*  --  40*  40*   CR  --  1.48*  1.48*  --  1.58*  --  1.48*  1.53*   * 134*  134*  137*   < > 165*   < > 144*  145*   BRIDGETTE  --  8.2*  8.2*  --  8.5  --  8.0*  8.0*   MAG  --  2.6*  --  2.6*  --  2.5*  2.5*   PHOS  --  4.3  --  4.2  --  4.1    < > = values in this interval not displayed.       CBC -   Recent Labs   Lab Test 12/10/21  0958 12/10/21  0342 12/09/21  1959   WBC 19.6* 19.4* 17.0*   HGB 8.5* 8.6* 8.8*   PLT 90* 86* 88*       LFTs -   Recent Labs   Lab Test 12/10/21  0959 12/10/21  0342 12/09/21 1959   ALKPHOS 59 50 44   BILITOTAL 0.5 0.5 0.5   ALT 74* 74* 74*   * 121* 124*   PROTTOTAL 6.1* 6.1* 5.9*   ALBUMIN 1.8*  1.8* 1.8* 1.7*  1.7*       Iron Panel - No lab results found.        Current Medications:    aspirin  81 mg Per Feeding Tube Daily     cefTRIAXone  2 g Intravenous Q24H     chlorhexidine  15 mL Swish & Spit BID     folic acid  1 mg Oral or Feeding Tube Daily "     insulin aspart  1-6 Units Subcutaneous Q4H     ipratropium - albuterol 0.5 mg/2.5 mg/3 mL  3 mL Nebulization 4x daily     multivitamins w/minerals  15 mL Per Feeding Tube Daily     pantoprazole  40 mg Oral QAM AC     protein modular  2 packet Per Feeding Tube 4x Daily     thiamine  100 mg Oral or Feeding Tube Daily     ticagrelor  90 mg Oral or Feeding Tube BID       Bivalirudin (ANGIOMAX) 0.02 mg/mL in 0.9% NaCl FOR REPERFUSION CATHETER 3 mL/hr at 12/09/21 1406     Bivalirudin (ANGIOMAX) 0.02 mg/mL in 0.9% NaCl FOR REPERFUSION CATHETER 3 mL/hr at 12/09/21 1351     bivalirudin ANTICOAGULANT (ANGIOMAX) 250 mg/250 mL in 0.9% Sodium Chloride 0.02 mg/kg/hr (12/10/21 1300)     dextrose       dextrose       CRRT replacement solution 12.5 mL/kg/hr (12/10/21 0918)     fentaNYL Stopped (12/10/21 1000)     midazolam Stopped (12/10/21 1000)     niCARdipine Stopped (12/10/21 0844)     - MEDICATION INSTRUCTIONS -       norepinephrine Stopped (12/09/21 1515)     CRRT replacement solution 200 mL/hr at 12/10/21 0331     CRRT replacement solution 12.5 mL/kg/hr (12/10/21 0918)     vasopressin Stopped (12/09/21 1700)            stated

## (undated) DEVICE — CLIP HORIZON MED BLUE 002200

## (undated) DEVICE — SOL ADH LIQUID BENZOIN SWAB 0.6ML C1544

## (undated) DEVICE — NDL ANGIOCATH 14GA 1.25" 4048

## (undated) DEVICE — CATH BALLOON EMERGE 1.5X20MM H7493918920150

## (undated) DEVICE — CATH BALLOON EMERGE OTW 1.5X20MM H7493919020150

## (undated) DEVICE — 8FR X 11CM SUPER ARROW-FLEX PERCUTANEOUS SHEATH INTRODUCER SET WITH INTEGRAL HEMOSTASIS VALVE/SIDE PORT AND RADIOPAQUE TIP MARKER BAND

## (undated) DEVICE — ESU ELEC BLADE 2.75" COATED/INSULATED E1455

## (undated) DEVICE — SU SILK 0 SH 30" K834H

## (undated) DEVICE — 17FR 23CM ARTERIAL ECMO CANNULA WITH BIOLINE COATING

## (undated) DEVICE — CLIP HORIZON SM RED WIDE SLOT 001201

## (undated) DEVICE — CATH GUIDING BLUE YELLOW PTFE JR4 6FRX100CM 67008200

## (undated) DEVICE — SU VICRYL 0 CT-1 27" UND J260H

## (undated) DEVICE — DRAPE IOBAN INCISE 23X17" 6650EZ

## (undated) DEVICE — TUBING PRESSURE 30"

## (undated) DEVICE — WIRE GUIDE 0.035"X145CM AMPLATZ XSTIFF J THSCF-35-145-3-AES

## (undated) DEVICE — KIT ACCESSORY INTRO INFLATION SYS 20/30 PRIORITY 1000186-115

## (undated) DEVICE — SYR 10ML LL W/O NDL 302995

## (undated) DEVICE — Device

## (undated) DEVICE — KIT CATHETER INDIGO RX 140CM WITH LG LUMEN ASP TUBING

## (undated) DEVICE — CATH ANGIO SUPERTORQUE PLUS JL4 6FRX100CM 533620

## (undated) DEVICE — INTRO SHEATH 8FRX10CM PINNACLE RSS802

## (undated) DEVICE — STENT CORONARY DES SYNERGY XD MR US 2.50X8MM H7493941808250: Type: IMPLANTABLE DEVICE | Status: NON-FUNCTIONAL

## (undated) DEVICE — PREP POVIDONE IODINE SOLUTION 10% 4OZ

## (undated) DEVICE — VALVE HEMOSTASIS .096" COPILOT MECH 1003331

## (undated) DEVICE — SURGICEL HEMOSTAT 4X8" 1952

## (undated) DEVICE — CATH GUIDELINER 6FR 5571

## (undated) DEVICE — CATH BALLOON NC EMERGE 2.50X12MM H7493926712250

## (undated) DEVICE — GLOVE SENSICARE 7.5 MSG1075 LATEX FREE

## (undated) DEVICE — SU ETHILON 3-0 PS-1 18" 1663H

## (undated) DEVICE — PACK HEART LEFT CUSTOM

## (undated) DEVICE — MANIFOLD KIT ANGIO AUTOMATED 014613

## (undated) DEVICE — NDL BLUNT 18GA 1" W/O FILTER 305181

## (undated) DEVICE — INTRODUCER SHEATH 4FRX40CM MICROPUNC PED G47946

## (undated) DEVICE — SU VICRYL 2-0 SH 27" UND J417H

## (undated) DEVICE — CATH BALLOON EMERGE 2.5X15MM H7493918915250

## (undated) DEVICE — PREP SKIN SCRUB TRAY 4461A

## (undated) DEVICE — TOURNIQUET VASCULAR KIT 7 1/2" 79012

## (undated) DEVICE — VESSEL LOOPS YELLOW MAXI 31145694

## (undated) DEVICE — PREP CHLORHEXIDINE 4% 4OZ (HIBICLENS) 57504

## (undated) DEVICE — DRSG ADAPTIC 3X16"  6114

## (undated) DEVICE — CATH GUIDING BLUE YELLOW PTFE XB3.5 6FRX100CM 67005400

## (undated) DEVICE — CATH BALLOON EMERGE 2.5X8MM H7493918908250

## (undated) DEVICE — SU PROLENE 4-0 SHDA 36" 8521H

## (undated) DEVICE — COVER CAMERA IN-LIGHT DISP LT-C02

## (undated) DEVICE — ESU GROUND PAD ADULT W/CORD E7507

## (undated) DEVICE — SYR 50ML LL W/O NDL 309653

## (undated) DEVICE — SUTURE BOOTS 051003PBX

## (undated) DEVICE — SU PROLENE 6-0 C-1DA 30" 8706H

## (undated) DEVICE — KIT HAND CONTROL ACIST 014644 AR-P54

## (undated) DEVICE — CANNULA VENOUS 25FR LONG

## (undated) DEVICE — SU SILK 0 TIE 6X30" A306H

## (undated) DEVICE — DRAPE SHEET REV FOLD 3/4 9349

## (undated) DEVICE — INTRO SHEATH 6FRX25CM PINNACLE RSS606

## (undated) DEVICE — KIT MANIFORD ACIST B200

## (undated) DEVICE — LINEN TOWEL PACK X6 WHITE 5487

## (undated) DEVICE — KIT ARTERIAL LINE 2GA 12CM INTRO NDL ASK-04510-HF

## (undated) DEVICE — INTRO SHEATH MICRO PLATINUM TIP 4FRX40CM 7274

## (undated) DEVICE — LINEN GOWN XLG 5407

## (undated) DEVICE — CATH TURNPIKE LP 135CM

## (undated) DEVICE — CATH BALLOON EMERGE 2.0X20MM H7493918920200

## (undated) DEVICE — CATH GUIDING 100CM 6FR .070IN VSTBR

## (undated) DEVICE — SU VICRYL 3-0 FS-1 27" J442H

## (undated) DEVICE — CANISTER WOUND VAC W/GEL 1000ML M8275093/5

## (undated) DEVICE — GUIDEWIRE VASC 0.014INX180CM RUNTHROUGH 25-1011

## (undated) DEVICE — GW 0.014IN X 180CM ASAHI FIELD

## (undated) DEVICE — SU PROLENE 5-0 RB-1DA 36"  8556H

## (undated) DEVICE — ESU PENCIL SMOKE EVAC W/ROCKER SWITCH 0703-047-000

## (undated) DEVICE — GW 0.014IN (0.01IN TAPER) X 19

## (undated) DEVICE — LINEN TOWEL PACK X30 5481

## (undated) DEVICE — SU PROLENE 4-0 RB-1DA 36" 8557H

## (undated) DEVICE — CANISTER ENGINE FOR INDIGO SYSTEM

## (undated) DEVICE — SU PLEDGET SOFT TFE 3/8"X3/26"X1/16" PCP40

## (undated) DEVICE — INTRO SHEATH 9FRX10CM PINNACLE RSS902

## (undated) DEVICE — CATH BALLOON EMERGE 2.5X12MM H7493918912250

## (undated) DEVICE — CATH QUATTRO 9.3FR  FEM INSTRN

## (undated) DEVICE — DRAPE TIBURON CARDIOVASCULAR PERI-GROIN LF 9154

## (undated) DEVICE — SYR 10ML SLIP TIP W/O NDL 303134

## (undated) DEVICE — PITCHER STERILE 1000ML  SSK9004A

## (undated) RX ORDER — HEPARIN SODIUM 1000 [USP'U]/ML
INJECTION, SOLUTION INTRAVENOUS; SUBCUTANEOUS
Status: DISPENSED
Start: 2021-12-12

## (undated) RX ORDER — FENTANYL CITRATE 50 UG/ML
INJECTION, SOLUTION INTRAMUSCULAR; INTRAVENOUS
Status: DISPENSED
Start: 2021-12-06

## (undated) RX ORDER — CEFAZOLIN SODIUM 1 G/3ML
INJECTION, POWDER, FOR SOLUTION INTRAMUSCULAR; INTRAVENOUS
Status: DISPENSED
Start: 2021-12-12

## (undated) RX ORDER — HEPARIN SODIUM 1000 [USP'U]/ML
INJECTION, SOLUTION INTRAVENOUS; SUBCUTANEOUS
Status: DISPENSED
Start: 2021-12-06

## (undated) RX ORDER — FENTANYL CITRATE 50 UG/ML
INJECTION, SOLUTION INTRAMUSCULAR; INTRAVENOUS
Status: DISPENSED
Start: 2021-12-13

## (undated) RX ORDER — MIDAZOLAM HCL IN 0.9 % NACL/PF 1 MG/ML
PLASTIC BAG, INJECTION (ML) INTRAVENOUS
Status: DISPENSED
Start: 2021-12-06

## (undated) RX ORDER — PROPOFOL 10 MG/ML
INJECTION, EMULSION INTRAVENOUS
Status: DISPENSED
Start: 2021-12-12

## (undated) RX ORDER — PROPOFOL 10 MG/ML
INJECTION, EMULSION INTRAVENOUS
Status: DISPENSED
Start: 2021-12-13

## (undated) RX ORDER — CEFAZOLIN SODIUM 1 G/3ML
INJECTION, POWDER, FOR SOLUTION INTRAMUSCULAR; INTRAVENOUS
Status: DISPENSED
Start: 2021-12-13

## (undated) RX ORDER — ASPIRIN 81 MG/1
TABLET, CHEWABLE ORAL
Status: DISPENSED
Start: 2021-12-06

## (undated) RX ORDER — CALCIUM CHLORIDE 100 MG/ML
INJECTION INTRAVENOUS; INTRAVENTRICULAR
Status: DISPENSED
Start: 2021-12-13

## (undated) RX ORDER — HEPARIN SODIUM 1000 [USP'U]/ML
INJECTION, SOLUTION INTRAVENOUS; SUBCUTANEOUS
Status: DISPENSED
Start: 2021-12-13

## (undated) RX ORDER — ROCURONIUM BROMIDE 50 MG/5 ML
SYRINGE (ML) INTRAVENOUS
Status: DISPENSED
Start: 2021-12-13

## (undated) RX ORDER — SODIUM NITROPRUSSIDE 25 MG/ML
INJECTION INTRAVENOUS
Status: DISPENSED
Start: 2021-12-06

## (undated) RX ORDER — ROCURONIUM BROMIDE 50 MG/5 ML
SYRINGE (ML) INTRAVENOUS
Status: DISPENSED
Start: 2021-12-12